# Patient Record
Sex: FEMALE | Race: WHITE | NOT HISPANIC OR LATINO | Employment: OTHER | URBAN - METROPOLITAN AREA
[De-identification: names, ages, dates, MRNs, and addresses within clinical notes are randomized per-mention and may not be internally consistent; named-entity substitution may affect disease eponyms.]

---

## 2023-06-05 LAB
ACTIVATED PARTIAL THROMBOPLASTIN TIME IN PPP BY COAGULATION ASSAY: 34 SEC (ref 26–39)
ALANINE AMINOTRANSFERASE (SGPT) (U/L) IN SER/PLAS: 14 U/L (ref 7–45)
ALBUMIN (G/DL) IN SER/PLAS: 4 G/DL (ref 3.4–5)
ALKALINE PHOSPHATASE (U/L) IN SER/PLAS: 68 U/L (ref 33–136)
ANION GAP IN SER/PLAS: 10 MMOL/L (ref 10–20)
ASPARTATE AMINOTRANSFERASE (SGOT) (U/L) IN SER/PLAS: 20 U/L (ref 9–39)
BILIRUBIN TOTAL (MG/DL) IN SER/PLAS: 0.4 MG/DL (ref 0–1.2)
CALCIUM (MG/DL) IN SER/PLAS: 8.9 MG/DL (ref 8.6–10.3)
CARBON DIOXIDE, TOTAL (MMOL/L) IN SER/PLAS: 30 MMOL/L (ref 21–32)
CHLORIDE (MMOL/L) IN SER/PLAS: 103 MMOL/L (ref 98–107)
CREATININE (MG/DL) IN SER/PLAS: 0.92 MG/DL (ref 0.5–1.05)
ERYTHROCYTE DISTRIBUTION WIDTH (RATIO) BY AUTOMATED COUNT: 13.8 % (ref 11.5–14.5)
ERYTHROCYTE MEAN CORPUSCULAR HEMOGLOBIN CONCENTRATION (G/DL) BY AUTOMATED: 31.3 G/DL (ref 32–36)
ERYTHROCYTE MEAN CORPUSCULAR VOLUME (FL) BY AUTOMATED COUNT: 93 FL (ref 80–100)
ERYTHROCYTES (10*6/UL) IN BLOOD BY AUTOMATED COUNT: 4.67 X10E12/L (ref 4–5.2)
GFR FEMALE: 67 ML/MIN/1.73M2
GLUCOSE (MG/DL) IN SER/PLAS: 100 MG/DL (ref 74–99)
HEMATOCRIT (%) IN BLOOD BY AUTOMATED COUNT: 43.2 % (ref 36–46)
HEMOGLOBIN (G/DL) IN BLOOD: 13.5 G/DL (ref 12–16)
INR IN PPP BY COAGULATION ASSAY: 1 (ref 0.9–1.1)
LEUKOCYTES (10*3/UL) IN BLOOD BY AUTOMATED COUNT: 6.5 X10E9/L (ref 4.4–11.3)
PLATELETS (10*3/UL) IN BLOOD AUTOMATED COUNT: 337 X10E9/L (ref 150–450)
POTASSIUM (MMOL/L) IN SER/PLAS: 4.3 MMOL/L (ref 3.5–5.3)
PROTEIN TOTAL: 6.9 G/DL (ref 6.4–8.2)
PROTHROMBIN TIME (PT) IN PPP BY COAGULATION ASSAY: 11.5 SEC (ref 9.8–13.4)
SODIUM (MMOL/L) IN SER/PLAS: 139 MMOL/L (ref 136–145)
UREA NITROGEN (MG/DL) IN SER/PLAS: 16 MG/DL (ref 6–23)

## 2023-06-15 ENCOUNTER — HOSPITAL ENCOUNTER (OUTPATIENT)
Dept: DATA CONVERSION | Facility: HOSPITAL | Age: 68
End: 2023-06-15
Attending: INTERNAL MEDICINE | Admitting: INTERNAL MEDICINE
Payer: MEDICARE

## 2023-06-15 DIAGNOSIS — E03.9 HYPOTHYROIDISM, UNSPECIFIED: ICD-10-CM

## 2023-06-15 DIAGNOSIS — R91.8 OTHER NONSPECIFIC ABNORMAL FINDING OF LUNG FIELD: ICD-10-CM

## 2023-06-15 DIAGNOSIS — Z87.891 PERSONAL HISTORY OF NICOTINE DEPENDENCE: ICD-10-CM

## 2023-06-15 DIAGNOSIS — R59.0 LOCALIZED ENLARGED LYMPH NODES: ICD-10-CM

## 2023-06-15 DIAGNOSIS — C79.51 SECONDARY MALIGNANT NEOPLASM OF BONE (MULTI): ICD-10-CM

## 2023-06-15 DIAGNOSIS — E78.5 HYPERLIPIDEMIA, UNSPECIFIED: ICD-10-CM

## 2023-06-15 DIAGNOSIS — C77.1 SECONDARY AND UNSPECIFIED MALIGNANT NEOPLASM OF INTRATHORACIC LYMPH NODES (MULTI): ICD-10-CM

## 2023-06-26 LAB
COMPLETE PATHOLOGY REPORT: NORMAL
CONVERTED ADDENDUM DIAGNOSIS 2: NORMAL
CONVERTED ADDENDUM DIAGNOSIS 3: NORMAL
CONVERTED CLINICAL DIAGNOSIS-HISTORY: NORMAL
CONVERTED DIAGNOSIS COMMENT: NORMAL
CONVERTED FINAL DIAGNOSIS: NORMAL
CONVERTED FINAL REPORT PDF LINK TO COPY AND PASTE: NORMAL
CONVERTED RAPID EVALUATION: NORMAL
CONVERTED SPECIMEN DESCRIPTION: NORMAL

## 2023-06-28 ENCOUNTER — DOCUMENTATION (OUTPATIENT)
Dept: RADIATION ONCOLOGY | Facility: HOSPITAL | Age: 68
End: 2023-06-28

## 2023-06-28 ENCOUNTER — HOSPITAL ENCOUNTER (OUTPATIENT)
Dept: DATA CONVERSION | Facility: HOSPITAL | Age: 68
End: 2023-06-28
Attending: NEUROLOGICAL SURGERY | Admitting: NEUROLOGICAL SURGERY
Payer: MEDICARE

## 2023-06-28 DIAGNOSIS — C79.31 SECONDARY MALIGNANT NEOPLASM OF BRAIN (MULTI): ICD-10-CM

## 2023-06-28 DIAGNOSIS — Z85.118 PERSONAL HISTORY OF OTHER MALIGNANT NEOPLASM OF BRONCHUS AND LUNG: ICD-10-CM

## 2023-09-01 PROBLEM — N81.9 VAGINAL VAULT PROLAPSE: Status: ACTIVE | Noted: 2023-09-01

## 2023-09-01 PROBLEM — E55.9 VITAMIN D DEFICIENCY: Status: ACTIVE | Noted: 2023-09-01

## 2023-09-01 PROBLEM — R92.30 BREAST DENSITY: Status: ACTIVE | Noted: 2023-09-01

## 2023-09-01 PROBLEM — R19.5 FECAL OCCULT BLOOD TEST POSITIVE: Status: ACTIVE | Noted: 2023-09-01

## 2023-09-01 PROBLEM — E03.9 HYPOTHYROIDISM: Status: ACTIVE | Noted: 2023-09-01

## 2023-09-01 PROBLEM — R39.15 URINARY URGENCY: Status: ACTIVE | Noted: 2023-09-01

## 2023-09-01 PROBLEM — C34.90 LUNG CANCER (MULTI): Status: ACTIVE | Noted: 2023-09-01

## 2023-09-01 PROBLEM — F41.9 ANXIETY DISORDER: Status: ACTIVE | Noted: 2023-09-01

## 2023-09-01 PROBLEM — S61.431A PUNCTURE WOUND OF RIGHT HAND WITHOUT FOREIGN BODY: Status: ACTIVE | Noted: 2023-09-01

## 2023-09-01 PROBLEM — E78.2 HYPERLIPIDEMIA, MIXED: Status: ACTIVE | Noted: 2023-09-01

## 2023-09-01 PROBLEM — C79.31 SECONDARY MALIGNANT NEOPLASM OF BRAIN (MULTI): Status: ACTIVE | Noted: 2023-09-01

## 2023-09-01 PROBLEM — R15.1 FECAL SMEARING: Status: ACTIVE | Noted: 2023-09-01

## 2023-09-01 PROBLEM — M81.0 OSTEOPOROSIS: Status: ACTIVE | Noted: 2023-09-01

## 2023-09-01 RX ORDER — CHOLECALCIFEROL (VITAMIN D3) 50 MCG
1 TABLET ORAL DAILY
COMMUNITY
Start: 2014-12-21

## 2023-09-01 RX ORDER — ATORVASTATIN CALCIUM 20 MG/1
10 TABLET, FILM COATED ORAL DAILY
COMMUNITY
End: 2024-03-26 | Stop reason: ALTCHOICE

## 2023-09-01 RX ORDER — DEXAMETHASONE 2 MG/1
1 TABLET ORAL
COMMUNITY
Start: 2023-06-19 | End: 2024-03-26 | Stop reason: ALTCHOICE

## 2023-09-01 RX ORDER — BETAMETHASONE DIPROPIONATE 0.5 MG/G
CREAM TOPICAL 2 TIMES DAILY
COMMUNITY
Start: 2022-11-03 | End: 2024-02-19 | Stop reason: WASHOUT

## 2023-09-01 RX ORDER — LEVOTHYROXINE SODIUM 100 UG/1
1 TABLET ORAL DAILY
COMMUNITY
End: 2024-02-19 | Stop reason: WASHOUT

## 2023-09-01 RX ORDER — SERTRALINE HYDROCHLORIDE 50 MG/1
1 TABLET, FILM COATED ORAL DAILY
COMMUNITY
End: 2024-04-26 | Stop reason: SDUPTHER

## 2023-09-05 PROBLEM — H35.3190 DRY ARMD: Status: ACTIVE | Noted: 2023-09-05

## 2023-09-05 PROBLEM — R91.8 RIGHT LOWER LOBE LUNG MASS: Status: ACTIVE | Noted: 2023-09-05

## 2023-09-07 VITALS — BODY MASS INDEX: 20.98 KG/M2 | WEIGHT: 111.11 LBS | HEIGHT: 61 IN

## 2023-09-20 VITALS — WEIGHT: 110.89 LBS | BODY MASS INDEX: 20.94 KG/M2 | HEIGHT: 61 IN

## 2023-09-20 DIAGNOSIS — C34.90 MALIGNANT NEOPLASM OF LUNG, UNSPECIFIED LATERALITY, UNSPECIFIED PART OF LUNG (MULTI): Primary | ICD-10-CM

## 2023-09-20 PROBLEM — C34.11 PRIMARY ADENOCARCINOMA OF UPPER LOBE OF RIGHT LUNG (MULTI): Status: ACTIVE | Noted: 2023-09-20

## 2023-09-20 RX ORDER — HEPARIN SODIUM,PORCINE/PF 10 UNIT/ML
50 SYRINGE (ML) INTRAVENOUS AS NEEDED
Status: CANCELLED | OUTPATIENT
Start: 2023-09-30

## 2023-09-20 RX ORDER — HEPARIN 100 UNIT/ML
500 SYRINGE INTRAVENOUS AS NEEDED
Status: CANCELLED | OUTPATIENT
Start: 2023-09-30

## 2023-09-25 DIAGNOSIS — C34.90 MALIGNANT NEOPLASM OF LUNG, UNSPECIFIED LATERALITY, UNSPECIFIED PART OF LUNG (MULTI): Primary | ICD-10-CM

## 2023-09-25 RX ORDER — EPINEPHRINE 0.3 MG/.3ML
0.3 INJECTION SUBCUTANEOUS EVERY 5 MIN PRN
Status: CANCELLED | OUTPATIENT
Start: 2023-10-09

## 2023-09-25 RX ORDER — PROCHLORPERAZINE EDISYLATE 5 MG/ML
10 INJECTION INTRAMUSCULAR; INTRAVENOUS EVERY 6 HOURS PRN
Status: CANCELLED | OUTPATIENT
Start: 2023-10-09

## 2023-09-25 RX ORDER — FAMOTIDINE 10 MG/ML
20 INJECTION INTRAVENOUS ONCE AS NEEDED
Status: CANCELLED | OUTPATIENT
Start: 2023-10-09

## 2023-09-25 RX ORDER — DIPHENHYDRAMINE HYDROCHLORIDE 50 MG/ML
50 INJECTION INTRAMUSCULAR; INTRAVENOUS AS NEEDED
Status: CANCELLED | OUTPATIENT
Start: 2023-10-09

## 2023-09-25 RX ORDER — ALBUTEROL SULFATE 0.83 MG/ML
3 SOLUTION RESPIRATORY (INHALATION) AS NEEDED
Status: CANCELLED | OUTPATIENT
Start: 2023-10-09

## 2023-09-25 RX ORDER — PROCHLORPERAZINE MALEATE 10 MG
10 TABLET ORAL EVERY 6 HOURS PRN
Status: CANCELLED | OUTPATIENT
Start: 2023-10-09

## 2023-10-09 ENCOUNTER — DOCUMENTATION (OUTPATIENT)
Dept: RESEARCH | Age: 68
End: 2023-10-09

## 2023-10-09 ENCOUNTER — EDUCATION (OUTPATIENT)
Dept: RESEARCH | Age: 68
End: 2023-10-09
Payer: MEDICARE

## 2023-10-09 ENCOUNTER — INFUSION (OUTPATIENT)
Dept: HEMATOLOGY/ONCOLOGY | Facility: HOSPITAL | Age: 68
End: 2023-10-09
Payer: MEDICARE

## 2023-10-09 ENCOUNTER — APPOINTMENT (OUTPATIENT)
Dept: LAB | Facility: HOSPITAL | Age: 68
End: 2023-10-09
Payer: MEDICARE

## 2023-10-09 ENCOUNTER — TELEPHONE (OUTPATIENT)
Dept: ADMISSION | Facility: HOSPITAL | Age: 68
End: 2023-10-09

## 2023-10-09 ENCOUNTER — OFFICE VISIT (OUTPATIENT)
Dept: HEMATOLOGY/ONCOLOGY | Facility: HOSPITAL | Age: 68
End: 2023-10-09
Payer: MEDICARE

## 2023-10-09 ENCOUNTER — LAB (OUTPATIENT)
Dept: LAB | Facility: HOSPITAL | Age: 68
End: 2023-10-09
Payer: MEDICARE

## 2023-10-09 VITALS
WEIGHT: 110.67 LBS | OXYGEN SATURATION: 99 % | HEART RATE: 65 BPM | HEIGHT: 61 IN | BODY MASS INDEX: 20.89 KG/M2 | SYSTOLIC BLOOD PRESSURE: 120 MMHG | RESPIRATION RATE: 16 BRPM | DIASTOLIC BLOOD PRESSURE: 59 MMHG | TEMPERATURE: 97.5 F

## 2023-10-09 DIAGNOSIS — C34.90 MALIGNANT NEOPLASM OF LUNG, UNSPECIFIED LATERALITY, UNSPECIFIED PART OF LUNG (MULTI): ICD-10-CM

## 2023-10-09 LAB
ALBUMIN SERPL BCP-MCNC: 4 G/DL (ref 3.4–5)
ALP SERPL-CCNC: 60 U/L (ref 33–136)
ALT SERPL W P-5'-P-CCNC: 18 U/L (ref 7–45)
ANION GAP SERPL CALC-SCNC: 13 MMOL/L (ref 10–20)
APPEARANCE UR: ABNORMAL
AST SERPL W P-5'-P-CCNC: 22 U/L (ref 9–39)
BASOPHILS # BLD AUTO: 0.02 X10*3/UL (ref 0–0.1)
BASOPHILS NFR BLD AUTO: 0.5 %
BILIRUB SERPL-MCNC: 0.3 MG/DL (ref 0–1.2)
BILIRUB UR STRIP.AUTO-MCNC: NEGATIVE MG/DL
BUN SERPL-MCNC: 22 MG/DL (ref 6–23)
CALCIUM SERPL-MCNC: 8.9 MG/DL (ref 8.6–10.3)
CHLORIDE SERPL-SCNC: 106 MMOL/L (ref 98–107)
CK SERPL-CCNC: 52 U/L (ref 0–215)
CO2 SERPL-SCNC: 28 MMOL/L (ref 21–32)
COLOR UR: YELLOW
CREAT SERPL-MCNC: 1.13 MG/DL (ref 0.5–1.05)
EOSINOPHIL # BLD AUTO: 0.08 X10*3/UL (ref 0–0.7)
EOSINOPHIL NFR BLD AUTO: 1.9 %
ERYTHROCYTE [DISTWIDTH] IN BLOOD BY AUTOMATED COUNT: 14.4 % (ref 11.5–14.5)
GFR SERPL CREATININE-BSD FRML MDRD: 52 ML/MIN/1.73M*2
GLUCOSE SERPL-MCNC: 94 MG/DL (ref 74–99)
GLUCOSE UR STRIP.AUTO-MCNC: NEGATIVE MG/DL
HCT VFR BLD AUTO: 41.9 % (ref 36–46)
HGB BLD-MCNC: 13.3 G/DL (ref 12–16)
IMM GRANULOCYTES # BLD AUTO: 0.02 X10*3/UL (ref 0–0.7)
IMM GRANULOCYTES NFR BLD AUTO: 0.5 % (ref 0–0.9)
KETONES UR STRIP.AUTO-MCNC: NEGATIVE MG/DL
LEUKOCYTE ESTERASE UR QL STRIP.AUTO: ABNORMAL
LYMPHOCYTES # BLD AUTO: 0.88 X10*3/UL (ref 1.2–4.8)
LYMPHOCYTES NFR BLD AUTO: 20.8 %
MAGNESIUM SERPL-MCNC: 2.12 MG/DL (ref 1.6–2.4)
MCH RBC QN AUTO: 29.4 PG (ref 26–34)
MCHC RBC AUTO-ENTMCNC: 31.7 G/DL (ref 32–36)
MCV RBC AUTO: 93 FL (ref 80–100)
MONOCYTES # BLD AUTO: 0.27 X10*3/UL (ref 0.1–1)
MONOCYTES NFR BLD AUTO: 6.4 %
MUCOUS THREADS #/AREA URNS AUTO: ABNORMAL /LPF
NEUTROPHILS # BLD AUTO: 2.97 X10*3/UL (ref 1.2–7.7)
NEUTROPHILS NFR BLD AUTO: 69.9 %
NITRITE UR QL STRIP.AUTO: NEGATIVE
NRBC BLD-RTO: 0 /100 WBCS (ref 0–0)
PH UR STRIP.AUTO: 5 [PH]
PLATELET # BLD AUTO: 177 X10*3/UL (ref 150–450)
PMV BLD AUTO: 9.7 FL (ref 7.5–11.5)
POTASSIUM SERPL-SCNC: 3.9 MMOL/L (ref 3.5–5.3)
PROT SERPL-MCNC: 6.6 G/DL (ref 6.4–8.2)
PROT UR STRIP.AUTO-MCNC: NEGATIVE MG/DL
RBC # BLD AUTO: 4.52 X10*6/UL (ref 4–5.2)
RBC # UR STRIP.AUTO: NEGATIVE /UL
RBC #/AREA URNS AUTO: ABNORMAL /HPF
SODIUM SERPL-SCNC: 143 MMOL/L (ref 136–145)
SP GR UR STRIP.AUTO: 1.02
SQUAMOUS #/AREA URNS AUTO: ABNORMAL /HPF
TRANS CELLS #/AREA UR COMP ASSIST: ABNORMAL /HPF
UROBILINOGEN UR STRIP.AUTO-MCNC: <2 MG/DL
WBC # BLD AUTO: 4.2 X10*3/UL (ref 4.4–11.3)
WBC #/AREA URNS AUTO: ABNORMAL /HPF

## 2023-10-09 PROCEDURE — 85025 COMPLETE CBC W/AUTO DIFF WBC: CPT

## 2023-10-09 PROCEDURE — 96413 CHEMO IV INFUSION 1 HR: CPT

## 2023-10-09 PROCEDURE — 96365 THER/PROPH/DIAG IV INF INIT: CPT

## 2023-10-09 PROCEDURE — 80053 COMPREHEN METABOLIC PANEL: CPT

## 2023-10-09 PROCEDURE — 99214 OFFICE O/P EST MOD 30 MIN: CPT | Performed by: CLINICAL NURSE SPECIALIST

## 2023-10-09 PROCEDURE — 83735 ASSAY OF MAGNESIUM: CPT

## 2023-10-09 PROCEDURE — 1036F TOBACCO NON-USER: CPT | Performed by: CLINICAL NURSE SPECIALIST

## 2023-10-09 PROCEDURE — 99214 OFFICE O/P EST MOD 30 MIN: CPT | Mod: 25 | Performed by: CLINICAL NURSE SPECIALIST

## 2023-10-09 PROCEDURE — 1159F MED LIST DOCD IN RCRD: CPT | Performed by: CLINICAL NURSE SPECIALIST

## 2023-10-09 PROCEDURE — 36415 COLL VENOUS BLD VENIPUNCTURE: CPT

## 2023-10-09 PROCEDURE — 81001 URINALYSIS AUTO W/SCOPE: CPT | Performed by: INTERNAL MEDICINE

## 2023-10-09 PROCEDURE — 1126F AMNT PAIN NOTED NONE PRSNT: CPT | Performed by: CLINICAL NURSE SPECIALIST

## 2023-10-09 PROCEDURE — 82550 ASSAY OF CK (CPK): CPT | Performed by: INTERNAL MEDICINE

## 2023-10-09 PROCEDURE — 2500000004 HC RX 250 GENERAL PHARMACY W/ HCPCS (ALT 636 FOR OP/ED): Performed by: INTERNAL MEDICINE

## 2023-10-09 RX ORDER — DIPHENHYDRAMINE HYDROCHLORIDE 50 MG/ML
50 INJECTION INTRAMUSCULAR; INTRAVENOUS AS NEEDED
Status: DISCONTINUED | OUTPATIENT
Start: 2023-10-09 | End: 2023-10-09 | Stop reason: HOSPADM

## 2023-10-09 RX ORDER — FAMOTIDINE 10 MG/ML
20 INJECTION INTRAVENOUS ONCE AS NEEDED
Status: DISCONTINUED | OUTPATIENT
Start: 2023-10-09 | End: 2023-10-09 | Stop reason: HOSPADM

## 2023-10-09 RX ORDER — PROCHLORPERAZINE EDISYLATE 5 MG/ML
10 INJECTION INTRAMUSCULAR; INTRAVENOUS EVERY 6 HOURS PRN
Status: DISCONTINUED | OUTPATIENT
Start: 2023-10-09 | End: 2023-10-09 | Stop reason: HOSPADM

## 2023-10-09 RX ORDER — ALBUTEROL SULFATE 0.83 MG/ML
3 SOLUTION RESPIRATORY (INHALATION) AS NEEDED
Status: DISCONTINUED | OUTPATIENT
Start: 2023-10-09 | End: 2023-10-09 | Stop reason: HOSPADM

## 2023-10-09 RX ORDER — PROCHLORPERAZINE MALEATE 10 MG
10 TABLET ORAL EVERY 6 HOURS PRN
Status: DISCONTINUED | OUTPATIENT
Start: 2023-10-09 | End: 2023-10-09 | Stop reason: HOSPADM

## 2023-10-09 RX ORDER — EPINEPHRINE 0.3 MG/.3ML
0.3 INJECTION SUBCUTANEOUS EVERY 5 MIN PRN
Status: DISCONTINUED | OUTPATIENT
Start: 2023-10-09 | End: 2023-10-09 | Stop reason: HOSPADM

## 2023-10-09 RX ADMIN — Medication 757.5 MG: at 12:31

## 2023-10-09 ASSESSMENT — PAIN SCALES - GENERAL: PAINLEVEL: 0-NO PAIN

## 2023-10-09 ASSESSMENT — PATIENT HEALTH QUESTIONNAIRE - PHQ9
SUM OF ALL RESPONSES TO PHQ9 QUESTIONS 1 AND 2: 0
1. LITTLE INTEREST OR PLEASURE IN DOING THINGS: NOT AT ALL
2. FEELING DOWN, DEPRESSED OR HOPELESS: NOT AT ALL

## 2023-10-09 ASSESSMENT — ENCOUNTER SYMPTOMS
OCCASIONAL FEELINGS OF UNSTEADINESS: 0
DEPRESSION: 0
LOSS OF SENSATION IN FEET: 0

## 2023-10-09 NOTE — PROGRESS NOTES
Cancer History:   Treatment Synopsis:       DIAGNOSIS     Adenocarcinoma of the lung.  Date of diagnosis is [place] 15, 2023 from a level 7 lymph node biopsy/fine-needle aspiration.  Immunohistochemistry positive for TTF-1.        STAGING     Clinical T2a (right lower lobe mass measuring 3.6 cm), clinical N2 (PET positivity and subcarinal region), M1C disease (presence of brain metastases, ribs, right acetabulum, liver, brain), stage IVb disease        CURRENT SITES OF DISEASE     Right lower lobe, right hilum of the lung, ipsilateral mediastinum, liver, bones, brain        MOLECULAR GENOMICS     TEST: Focused Solid Tumor DNA/RNA Panel   SPECIMEN: Supernatant, LYMPH NODE 7, [id number] A   DISEASE DIAGNOSIS: Adenocarcinoma   Estimated Tumor Content: 40%   COLLECTION DATE: 6/15/2023     MICROSATELLITE STATUS: Microsatellite  Instability-High (MSI-H) is NOT DETECTED.     DISEASE ASSOCIATED GENOMIC FINDINGS:   EGFR p.[id number] ([id number] c.[id number])    TP53 p.N247I ([id number] c.740A>T)     DISEASE RELEVANT ALTERATIONS NOT DETECTED:   Negative for ALK fusion.   Negative for BRAF V600E.   Negative for ERBB2 activating mutation   Negative for KRAS G12C.   Negative for  MET exon 14 skipping mutation.   Negative for NTRK fusion.   Negative for RET fusion.   Negative for ROS1 fusion.        Circulating tumor DNA sent on June 19, 2023  EGFR p.[id number], Variant allelic fraction of 1.6%  TP53 N247I Missense variant, loss of function, variant allelic fraction 1.1%  MSI stable        SERUM TUMOR MARKER     Slightly elevated CEA and CA 19.9 June 2023        PRIOR THERAPY     1- Gamma knife radiosurgery to brain lesions completed on June 28, 2023.        CURRENT THERAPY     Osimertinib on study  on clinical trial EA [id number] which is a randomized trial of osimertinib with or without bevacizumab.  She has been randomized to the bevacizumab arm- Cycle 1 Day 1 is 7/12/2023        CURRENT ONCOLOGICAL PROBLEMS     1-brain  metastases, symptomatic, word finding difficulties, mild unsteadiness on her feet but no falls, Resolved July 3, 2023  2-anterior chest pain, increases with deep inspiration, Improved July 3, 2023  3-mild intermittent grade 1 diarrhea, mild hair loss, mild leukopenia, mild elevation in creatinine, mild fatigue.  Most likely all osimertinib related August 7, 2023        HISTORY OF PRESENT ILLNESS     This is a 68-year-old patient.  She states that towards Thanksgiving of 2022 she started feeling some pain in her right shoulder.  Eventually got better however the same pain came back in January or February 2023.  Ultimately some x-rays were done of  the shoulder that were generally negative.  She then developed some pain in her sternum a month or 2 later with deep inspiration and ultimately underwent imaging in May 2023.  Chest CT without contrast was done on June 1, 2023.  This showed a 3.4 cm mass  within the superior segment of the right lower lobe with some associated satellite pulmonary nodules.  Additionally there were some other subcentimeter pulmonary nodules.  There was evidence of mediastinal lymphadenopathy.  Subcarinal lymph node measured  1.4 cm in short axis.She was seen by pulmonary medicine on June 5, 2023 a combined PET/CT was ordered on June 6, 2023 showing a relatively intense area of hypermetabolic activity along the superior posteromedial aspect of the right lower lobe corresponding  to the known right lower lobe lung mass.  There was foci of hypermetabolic activity in the right supraclavicular region, subcarinal and right hilar regions.  There was area of uptake within the posterior upper ribs as well as right acetabulum suspicious  for osseous metastatic disease.  There was a punctate area of mild hypermetabolic activity in the right hepatic lobe concerning for hepatic metastases.  Small focus in the subcutaneous region just anterior to the lower aspect of the sternum could be inflammatory   versus metastatic.  She undergoes a bronchoscopy dated [place] 15, 2023.  There were no endobronchial lesions.  Primary cytology with rapid onsite evaluation was suggestive of malignancy in level 7, final lymph nodes were pathology was pending.  MRI of the  brain with and without contrast dated June 16, 2023 showed approximately 20 enhancing nodules concerning for metastatic disease.  Specifically there was a 1.5 cm nodule in the right cerebellar hemisphere.  Most of the other nodules were less than 1 cm.   Patient also notes that she has some difficulty expressing words over the past 1 to 2 weeks prior to her first visit with us as well as some mild unsteadiness on her legs where she feels she is drifting towards the right side.        PAST MEDICAL HISTORY     1-hypothyroidism  2-hysterectomy 1988  3-abdominal hernia surgery 1995  4-some hearing difficulties        SOCIAL HISTORY     Patient lives with her significant other.  She has 2 daughters.  Lives in [facility].  Smoked for only 3 years during college and during this time it was a pack per day.  She has retired.  She had a retail store.        CURRENT MEDS     See medication list, meds reviewed        ALLERGIES     Patient denies any drug allergies        FAMILY HISTORY     Patient's father had bladder cancer at the age of 86.  She has 1 brother with no cancer.  She has 2 children        History of Present Illness:      ID Statement:    MIRTA BUTTS is a 68 year old Female        Interval History:       Today Mirta is here on study.  She reports that she is doing well overall.  She does have 2 bothersome symptoms, diarrhea and hair loss.  Diarrhea is intermittent, sometimes starting in the morning and she may have 4 bowel movements in a short period of time.  She has reluctantly taken 1/2 to 1 imodium with not much effect.  Other days it starts in the afternoon.  She has diarrhea most days.  She also has some patchy hair loss, and she does feel it has been a  little worse lately.  She has seen dermatology and they have prescribed topical minoxidil.  She thinks it may be related to the gamma knife, or stress.  She has some mild  elevation of her creatinine (1.13 today) and white count.  She is feeling well and is active and busy.      Review of Systems:   · Constitutional NEGATIVE: Fever, Chills, Anorexia, Weight Loss, Malaise      · Eyes NEGATIVE: Blurry Vision, Drainage, Diploplia, Redness, Vision Loss/ Change      · ENMT NEGATIVE: Nasal Discharge, Nasal Congestion, Ear Pain, Mouth Pain, Throat Pain      · Respiratory NEGATIVE: Dry Cough, Productive Cough, Hemoptysis, Wheezing, Shortness of Breath      · Cardiology NEGATIVE: Chest Pain, Dyspnea on Exertion, Orthopnea, Palpitations, Syncope      ·  Gastrointestinal POSITIVE: Diarrhea     NEGATIVE: Abdominal Pain, Constipation, Nausea, Vomiting      · Genitourinary NEGATIVE: Discharge, Dysuria, Flank Pain, Frequency, Hematuria      · Musculoskeletal NEGATIVE: Decreased ROM, Pain, Swelling, Stiffness, Weakness      · Neurological NEGATIVE: Dizziness, Confusion, Headache, Seizures, Syncope      · Psychiatric NEGATIVE: Mood Changes, Anxiety, Hallucinations, Sleep Changes, Suicidal Ideas      · Skin NEGATIVE: Mass, Pain, Pruritus, Rash, Ulcer      · Hematologic/Lymph NEGATIVE: Anemia, Bruising, Easy Bleeding, Night Sweats, Petechiae      · Allergic/Immunologic NEGATIVE: Anaphylaxis, Itchy/ Teary Eyes, Itching, Sneezing, Swelling            Allergies and Intolerances:       Allergies:         No Known Allergies: Active    ASSESSMENT and PLAN:    Makayla will dispense the study drug Tagrisso after her EKG, and she will have avastin today.  We have urged her to be a little more aggressive with the imodium, and gave her permission to take 2 tabs after first loose stool and one or 2 after subsequent bowel movements.  She has had an elevated TSH and her PCP has increased her synthroid, but feels she may be losing some of it in  her diarrhea- we will all keep an eye on her TSH as well.  She is planning to get a wig just in case hair loss continues, and will follow with Derm.  We will see her back per trial requirements.

## 2023-10-09 NOTE — RESEARCH NOTES
Research Note Treatment Day    Mirta Hills is here today for treatment on [id number]. Today is C5D1. Procedures completed per protocol. AE's and con-meds reviewed with patient. Patient is aware of treatment plan.    [x]   Received treatment as planned   OR  []    Treatment delayed; patient calendar updated as required   Treatment delayed because:    []   AE    []   Physician Discretion    []   Clinical Deterioration or Progression     []   Other

## 2023-10-10 ENCOUNTER — TELEPHONE (OUTPATIENT)
Dept: HEMATOLOGY/ONCOLOGY | Facility: HOSPITAL | Age: 68
End: 2023-10-10
Payer: MEDICARE

## 2023-10-10 DIAGNOSIS — N39.0 URINARY TRACT INFECTION WITHOUT HEMATURIA, SITE UNSPECIFIED: Primary | ICD-10-CM

## 2023-10-10 RX ORDER — CIPROFLOXACIN 250 MG/1
250 TABLET, FILM COATED ORAL 2 TIMES DAILY
Qty: 10 TABLET | Refills: 0 | Status: SHIPPED | OUTPATIENT
Start: 2023-10-10 | End: 2023-10-15

## 2023-10-10 NOTE — PROGRESS NOTES
Research Note Treatment Day    Mirta Hills is here today for treatment on [id number] Today is C5D1. Procedures completed per protocol. AE's and con-meds reviewed with patient. Patient is aware of treatment plan.    [x]   Received treatment as planned   OR  []    Treatment delayed; patient calendar updated as required   Treatment delayed because:    []   AE    []   Physician Discretion    []   Clinical Deterioration or Progression     []   Other    Education Documentation  Alopecia, taught by Makayla Joseph RN at 10/9/2023  1:58 PM.  Learner: Patient  Readiness: Acceptance  Method: Explanation  Response: Verbalizes Understanding, Needs Reinforcement    Diarrhea, taught by Makayla Joseph RN at 10/9/2023  1:58 PM.  Learner: Patient  Readiness: Acceptance  Method: Explanation  Response: Verbalizes Understanding, Needs Reinforcement    Supportive Medications, taught by Makayla Joseph RN at 10/9/2023  1:58 PM.  Learner: Patient  Readiness: Acceptance  Method: Explanation  Response: Verbalizes Understanding, Needs Reinforcement    Education Comments  10/09/23 1148 Makayla Joseph RN  Patient instructed to take immodium by Moni Cortez 2 tabs after first episode of diarrhea and then 1-2 tablets for subsequent diarrhea.

## 2023-10-12 DIAGNOSIS — C34.90 MALIGNANT NEOPLASM OF LUNG, UNSPECIFIED LATERALITY, UNSPECIFIED PART OF LUNG (MULTI): Primary | ICD-10-CM

## 2023-10-17 RX ORDER — DIPHENHYDRAMINE HYDROCHLORIDE 50 MG/ML
50 INJECTION INTRAMUSCULAR; INTRAVENOUS AS NEEDED
Status: CANCELLED | OUTPATIENT
Start: 2023-10-30

## 2023-10-17 RX ORDER — PROCHLORPERAZINE MALEATE 10 MG
10 TABLET ORAL EVERY 6 HOURS PRN
Status: CANCELLED | OUTPATIENT
Start: 2023-10-30

## 2023-10-17 RX ORDER — PROCHLORPERAZINE EDISYLATE 5 MG/ML
10 INJECTION INTRAMUSCULAR; INTRAVENOUS EVERY 6 HOURS PRN
Status: CANCELLED | OUTPATIENT
Start: 2023-10-30

## 2023-10-17 RX ORDER — ALBUTEROL SULFATE 0.83 MG/ML
3 SOLUTION RESPIRATORY (INHALATION) AS NEEDED
Status: CANCELLED | OUTPATIENT
Start: 2023-10-30

## 2023-10-17 RX ORDER — EPINEPHRINE 0.3 MG/.3ML
0.3 INJECTION SUBCUTANEOUS EVERY 5 MIN PRN
Status: CANCELLED | OUTPATIENT
Start: 2023-10-30

## 2023-10-17 RX ORDER — FAMOTIDINE 10 MG/ML
20 INJECTION INTRAVENOUS ONCE AS NEEDED
Status: CANCELLED | OUTPATIENT
Start: 2023-10-30

## 2023-10-23 DIAGNOSIS — C34.90 MALIGNANT NEOPLASM OF LUNG, UNSPECIFIED LATERALITY, UNSPECIFIED PART OF LUNG (MULTI): ICD-10-CM

## 2023-10-30 ENCOUNTER — INFUSION (OUTPATIENT)
Dept: HEMATOLOGY/ONCOLOGY | Facility: HOSPITAL | Age: 68
End: 2023-10-30
Payer: MEDICARE

## 2023-10-30 ENCOUNTER — APPOINTMENT (OUTPATIENT)
Dept: HEMATOLOGY/ONCOLOGY | Facility: HOSPITAL | Age: 68
End: 2023-10-30
Payer: MEDICARE

## 2023-10-30 ENCOUNTER — APPOINTMENT (OUTPATIENT)
Dept: LAB | Facility: HOSPITAL | Age: 68
End: 2023-10-30
Payer: MEDICARE

## 2023-10-30 ENCOUNTER — EDUCATION (OUTPATIENT)
Dept: HEMATOLOGY/ONCOLOGY | Facility: HOSPITAL | Age: 68
End: 2023-10-30

## 2023-10-30 ENCOUNTER — LAB (OUTPATIENT)
Dept: LAB | Facility: HOSPITAL | Age: 68
End: 2023-10-30
Payer: MEDICARE

## 2023-10-30 ENCOUNTER — OFFICE VISIT (OUTPATIENT)
Dept: HEMATOLOGY/ONCOLOGY | Facility: HOSPITAL | Age: 68
End: 2023-10-30
Payer: MEDICARE

## 2023-10-30 VITALS
BODY MASS INDEX: 20.8 KG/M2 | SYSTOLIC BLOOD PRESSURE: 122 MMHG | RESPIRATION RATE: 16 BRPM | OXYGEN SATURATION: 100 % | TEMPERATURE: 97.7 F | HEART RATE: 68 BPM | DIASTOLIC BLOOD PRESSURE: 74 MMHG | WEIGHT: 111.33 LBS

## 2023-10-30 DIAGNOSIS — C34.11 PRIMARY ADENOCARCINOMA OF UPPER LOBE OF RIGHT LUNG (MULTI): ICD-10-CM

## 2023-10-30 DIAGNOSIS — C34.90 MALIGNANT NEOPLASM OF LUNG, UNSPECIFIED LATERALITY, UNSPECIFIED PART OF LUNG (MULTI): Primary | ICD-10-CM

## 2023-10-30 DIAGNOSIS — C34.90 MALIGNANT NEOPLASM OF LUNG, UNSPECIFIED LATERALITY, UNSPECIFIED PART OF LUNG (MULTI): ICD-10-CM

## 2023-10-30 LAB
ALBUMIN SERPL BCP-MCNC: 4.1 G/DL (ref 3.4–5)
ALP SERPL-CCNC: 56 U/L (ref 33–136)
ALT SERPL W P-5'-P-CCNC: 16 U/L (ref 7–45)
ANION GAP SERPL CALC-SCNC: 13 MMOL/L (ref 10–20)
APPEARANCE UR: ABNORMAL
AST SERPL W P-5'-P-CCNC: 22 U/L (ref 9–39)
BASOPHILS # BLD AUTO: 0.02 X10*3/UL (ref 0–0.1)
BASOPHILS NFR BLD AUTO: 0.5 %
BILIRUB SERPL-MCNC: 0.4 MG/DL (ref 0–1.2)
BILIRUB UR STRIP.AUTO-MCNC: NEGATIVE MG/DL
BUN SERPL-MCNC: 19 MG/DL (ref 6–23)
CALCIUM SERPL-MCNC: 9.5 MG/DL (ref 8.6–10.3)
CHLORIDE SERPL-SCNC: 104 MMOL/L (ref 98–107)
CK SERPL-CCNC: 60 U/L (ref 0–215)
CO2 SERPL-SCNC: 29 MMOL/L (ref 21–32)
COLOR UR: YELLOW
CREAT SERPL-MCNC: 1.19 MG/DL (ref 0.5–1.05)
EOSINOPHIL # BLD AUTO: 0.06 X10*3/UL (ref 0–0.7)
EOSINOPHIL NFR BLD AUTO: 1.4 %
ERYTHROCYTE [DISTWIDTH] IN BLOOD BY AUTOMATED COUNT: 14.2 % (ref 11.5–14.5)
GFR SERPL CREATININE-BSD FRML MDRD: 49 ML/MIN/1.73M*2
GLUCOSE SERPL-MCNC: 92 MG/DL (ref 74–99)
GLUCOSE UR STRIP.AUTO-MCNC: NEGATIVE MG/DL
HCT VFR BLD AUTO: 42.3 % (ref 36–46)
HGB BLD-MCNC: 13.4 G/DL (ref 12–16)
IMM GRANULOCYTES # BLD AUTO: 0.01 X10*3/UL (ref 0–0.7)
IMM GRANULOCYTES NFR BLD AUTO: 0.2 % (ref 0–0.9)
KETONES UR STRIP.AUTO-MCNC: NEGATIVE MG/DL
LEUKOCYTE ESTERASE UR QL STRIP.AUTO: ABNORMAL
LYMPHOCYTES # BLD AUTO: 0.74 X10*3/UL (ref 1.2–4.8)
LYMPHOCYTES NFR BLD AUTO: 17.6 %
MAGNESIUM SERPL-MCNC: 2.38 MG/DL (ref 1.6–2.4)
MCH RBC QN AUTO: 29.2 PG (ref 26–34)
MCHC RBC AUTO-ENTMCNC: 31.7 G/DL (ref 32–36)
MCV RBC AUTO: 92 FL (ref 80–100)
MONOCYTES # BLD AUTO: 0.3 X10*3/UL (ref 0.1–1)
MONOCYTES NFR BLD AUTO: 7.1 %
MUCOUS THREADS #/AREA URNS AUTO: ABNORMAL /LPF
NEUTROPHILS # BLD AUTO: 3.08 X10*3/UL (ref 1.2–7.7)
NEUTROPHILS NFR BLD AUTO: 73.2 %
NITRITE UR QL STRIP.AUTO: NEGATIVE
NRBC BLD-RTO: 0 /100 WBCS (ref 0–0)
PH UR STRIP.AUTO: 6 [PH]
PLATELET # BLD AUTO: 191 X10*3/UL (ref 150–450)
PMV BLD AUTO: 9.8 FL (ref 7.5–11.5)
POTASSIUM SERPL-SCNC: 4.1 MMOL/L (ref 3.5–5.3)
PROT SERPL-MCNC: 6.8 G/DL (ref 6.4–8.2)
PROT UR STRIP.AUTO-MCNC: NEGATIVE MG/DL
RBC # BLD AUTO: 4.59 X10*6/UL (ref 4–5.2)
RBC # UR STRIP.AUTO: NEGATIVE /UL
RBC #/AREA URNS AUTO: ABNORMAL /HPF
SODIUM SERPL-SCNC: 142 MMOL/L (ref 136–145)
SP GR UR STRIP.AUTO: 1.01
SQUAMOUS #/AREA URNS AUTO: ABNORMAL /HPF
T4 FREE SERPL-MCNC: 1.14 NG/DL (ref 0.78–1.48)
TRANS CELLS #/AREA UR COMP ASSIST: ABNORMAL /HPF
TSH SERPL-ACNC: 9.3 MIU/L (ref 0.44–3.98)
UROBILINOGEN UR STRIP.AUTO-MCNC: <2 MG/DL
WBC # BLD AUTO: 4.2 X10*3/UL (ref 4.4–11.3)
WBC #/AREA URNS AUTO: ABNORMAL /HPF

## 2023-10-30 PROCEDURE — 81001 URINALYSIS AUTO W/SCOPE: CPT | Performed by: INTERNAL MEDICINE

## 2023-10-30 PROCEDURE — 83735 ASSAY OF MAGNESIUM: CPT

## 2023-10-30 PROCEDURE — 2500000004 HC RX 250 GENERAL PHARMACY W/ HCPCS (ALT 636 FOR OP/ED): Performed by: INTERNAL MEDICINE

## 2023-10-30 PROCEDURE — 99214 OFFICE O/P EST MOD 30 MIN: CPT | Mod: 25 | Performed by: CLINICAL NURSE SPECIALIST

## 2023-10-30 PROCEDURE — 1126F AMNT PAIN NOTED NONE PRSNT: CPT | Performed by: CLINICAL NURSE SPECIALIST

## 2023-10-30 PROCEDURE — 99214 OFFICE O/P EST MOD 30 MIN: CPT | Performed by: CLINICAL NURSE SPECIALIST

## 2023-10-30 PROCEDURE — 84443 ASSAY THYROID STIM HORMONE: CPT | Performed by: CLINICAL NURSE SPECIALIST

## 2023-10-30 PROCEDURE — 1036F TOBACCO NON-USER: CPT | Performed by: CLINICAL NURSE SPECIALIST

## 2023-10-30 PROCEDURE — 1159F MED LIST DOCD IN RCRD: CPT | Performed by: CLINICAL NURSE SPECIALIST

## 2023-10-30 PROCEDURE — 84439 ASSAY OF FREE THYROXINE: CPT | Performed by: CLINICAL NURSE SPECIALIST

## 2023-10-30 PROCEDURE — 80053 COMPREHEN METABOLIC PANEL: CPT

## 2023-10-30 PROCEDURE — 85025 COMPLETE CBC W/AUTO DIFF WBC: CPT

## 2023-10-30 PROCEDURE — 36415 COLL VENOUS BLD VENIPUNCTURE: CPT

## 2023-10-30 PROCEDURE — 82550 ASSAY OF CK (CPK): CPT | Performed by: INTERNAL MEDICINE

## 2023-10-30 PROCEDURE — 96365 THER/PROPH/DIAG IV INF INIT: CPT | Mod: INF

## 2023-10-30 RX ORDER — PROCHLORPERAZINE MALEATE 10 MG
10 TABLET ORAL EVERY 6 HOURS PRN
Status: DISCONTINUED | OUTPATIENT
Start: 2023-10-30 | End: 2023-10-30 | Stop reason: HOSPADM

## 2023-10-30 RX ORDER — HEPARIN 100 UNIT/ML
500 SYRINGE INTRAVENOUS AS NEEDED
Status: CANCELLED | OUTPATIENT
Start: 2023-10-30

## 2023-10-30 RX ORDER — PROCHLORPERAZINE EDISYLATE 5 MG/ML
10 INJECTION INTRAMUSCULAR; INTRAVENOUS EVERY 6 HOURS PRN
Status: DISCONTINUED | OUTPATIENT
Start: 2023-10-30 | End: 2023-10-30 | Stop reason: HOSPADM

## 2023-10-30 RX ORDER — HEPARIN SODIUM,PORCINE/PF 10 UNIT/ML
50 SYRINGE (ML) INTRAVENOUS AS NEEDED
Status: CANCELLED | OUTPATIENT
Start: 2023-10-30

## 2023-10-30 RX ADMIN — Medication 757.5 MG: at 14:10

## 2023-10-30 ASSESSMENT — PAIN SCALES - GENERAL: PAINLEVEL: 0-NO PAIN

## 2023-10-30 NOTE — PROGRESS NOTES
Cancer History:   Treatment Synopsis:       DIAGNOSIS     Adenocarcinoma of the lung.  Date of diagnosis is [place] 15, 2023 from a level 7 lymph node biopsy/fine-needle aspiration.  Immunohistochemistry positive for TTF-1.        STAGING     Clinical T2a (right lower lobe mass measuring 3.6 cm), clinical N2 (PET positivity and subcarinal region), M1C disease (presence of brain metastases, ribs, right acetabulum, liver, brain), stage IVb disease        CURRENT SITES OF DISEASE     Right lower lobe, right hilum of the lung, ipsilateral mediastinum, liver, bones, brain        MOLECULAR GENOMICS     TEST: Focused Solid Tumor DNA/RNA Panel   SPECIMEN: Supernatant, LYMPH NODE 7, [id number] A   DISEASE DIAGNOSIS: Adenocarcinoma   Estimated Tumor Content: 40%   COLLECTION DATE: 6/15/2023     MICROSATELLITE STATUS: Microsatellite  Instability-High (MSI-H) is NOT DETECTED.     DISEASE ASSOCIATED GENOMIC FINDINGS:   EGFR p.[id number] ([id number] c.[id number])    TP53 p.N247I ([id number] c.740A>T)     DISEASE RELEVANT ALTERATIONS NOT DETECTED:   Negative for ALK fusion.   Negative for BRAF V600E.   Negative for ERBB2 activating mutation   Negative for KRAS G12C.   Negative for  MET exon 14 skipping mutation.   Negative for NTRK fusion.   Negative for RET fusion.   Negative for ROS1 fusion.        Circulating tumor DNA sent on June 19, 2023  EGFR p.[id number], Variant allelic fraction of 1.6%  TP53 N247I Missense variant, loss of function, variant allelic fraction 1.1%  MSI stable        SERUM TUMOR MARKER     Slightly elevated CEA and CA 19.9 June 2023        PRIOR THERAPY     1- Gamma knife radiosurgery to brain lesions completed on June 28, 2023.        CURRENT THERAPY     Osimertinib on study  on clinical trial [id number] which is a randomized trial of osimertinib with or without bevacizumab.  She has been randomized to the bevacizumab arm- Cycle 1 Day 1 is 7/12/2023        CURRENT ONCOLOGICAL PROBLEMS     1-brain  metastases, symptomatic, word finding difficulties, mild unsteadiness on her feet but no falls, Resolved July 3, 2023  2-anterior chest pain, increases with deep inspiration, Improved July 3, 2023  3-mild intermittent grade 1 diarrhea, mild hair loss, mild leukopenia, mild elevation in creatinine, mild fatigue.  Most likely all osimertinib related August 7, 2023        HISTORY OF PRESENT ILLNESS     This is a 68-year-old patient.  She states that towards Thanksgiving of 2022 she started feeling some pain in her right shoulder.  Eventually got better however the same pain came back in January or February 2023.  Ultimately some x-rays were done of  the shoulder that were generally negative.  She then developed some pain in her sternum a month or 2 later with deep inspiration and ultimately underwent imaging in May 2023.  Chest CT without contrast was done on June 1, 2023.  This showed a 3.4 cm mass  within the superior segment of the right lower lobe with some associated satellite pulmonary nodules.  Additionally there were some other subcentimeter pulmonary nodules.  There was evidence of mediastinal lymphadenopathy.  Subcarinal lymph node measured  1.4 cm in short axis.She was seen by pulmonary medicine on June 5, 2023 a combined PET/CT was ordered on June 6, 2023 showing a relatively intense area of hypermetabolic activity along the superior posteromedial aspect of the right lower lobe corresponding  to the known right lower lobe lung mass.  There was foci of hypermetabolic activity in the right supraclavicular region, subcarinal and right hilar regions.  There was area of uptake within the posterior upper ribs as well as right acetabulum suspicious  for osseous metastatic disease.  There was a punctate area of mild hypermetabolic activity in the right hepatic lobe concerning for hepatic metastases.  Small focus in the subcutaneous region just anterior to the lower aspect of the sternum could be inflammatory   versus metastatic.  She undergoes a bronchoscopy dated [place] 15, 2023.  There were no endobronchial lesions.  Primary cytology with rapid onsite evaluation was suggestive of malignancy in level 7, final lymph nodes were pathology was pending.  MRI of the  brain with and without contrast dated June 16, 2023 showed approximately 20 enhancing nodules concerning for metastatic disease.  Specifically there was a 1.5 cm nodule in the right cerebellar hemisphere.  Most of the other nodules were less than 1 cm.   Patient also notes that she has some difficulty expressing words over the past 1 to 2 weeks prior to her first visit with us as well as some mild unsteadiness on her legs where she feels she is drifting towards the right side.        PAST MEDICAL HISTORY     1-hypothyroidism  2-hysterectomy 1988  3-abdominal hernia surgery 1995  4-some hearing difficulties        SOCIAL HISTORY     Patient lives with her significant other.  She has 2 daughters.  Lives in [facility].  Smoked for only 3 years during college and during this time it was a pack per day.  She has retired.  She had a retail store.        CURRENT MEDS     See medication list, meds reviewed        ALLERGIES     Patient denies any drug allergies        FAMILY HISTORY     Patient's father had bladder cancer at the age of 86.  She has 1 brother with no cancer.  She has 2 children        History of Present Illness:      ID Statement:    MIRTA BUTTS is a 68 year old Female        Interval History:       Today Mirta is here on study.  She reports that she is doing well overall.  At last visit she reported some increase in diarrhea and we encouraged more liberal use of imodium.  Today she reports that it is under much better control.  She has seen derm related to the hair loss, and is hopeful it will come back.    She is feeling well and is active and busy.  Synthroid has been adjusted by her PCP.      Review of Systems:   · Constitutional NEGATIVE: Fever, Chills,  Anorexia, Weight Loss, Malaise      · Eyes NEGATIVE: Blurry Vision, Drainage, Diploplia, Redness, Vision Loss/ Change      · ENMT NEGATIVE: Nasal Discharge, Nasal Congestion, Ear Pain, Mouth Pain, Throat Pain      · Respiratory NEGATIVE: Dry Cough, Productive Cough, Hemoptysis, Wheezing, Shortness of Breath      · Cardiology NEGATIVE: Chest Pain, Dyspnea on Exertion, Orthopnea, Palpitations, Syncope      ·  Gastrointestinal POSITIVE: Diarrhea     NEGATIVE: Abdominal Pain, Constipation, Nausea, Vomiting      · Genitourinary NEGATIVE: Discharge, Dysuria, Flank Pain, Frequency, Hematuria      · Musculoskeletal NEGATIVE: Decreased ROM, Pain, Swelling, Stiffness, Weakness      · Neurological NEGATIVE: Dizziness, Confusion, Headache, Seizures, Syncope      · Psychiatric NEGATIVE: Mood Changes, Anxiety, Hallucinations, Sleep Changes, Suicidal Ideas      · Skin NEGATIVE: Mass, Pain, Pruritus, Rash, Ulcer      · Hematologic/Lymph NEGATIVE: Anemia, Bruising, Easy Bleeding, Night Sweats, Petechiae      · Allergic/Immunologic NEGATIVE: Anaphylaxis, Itchy/ Teary Eyes, Itching, Sneezing, Swelling            Allergies and Intolerances:       Allergies:         No Known Allergies: Active    ASSESSMENT and PLAN:  Proceed with study plan today.    Makayla will dispense the study drug Tagrisso after her EKG, and she will have avastin today.   At last visit we treated her for signs of a UTI in urinalysis, she was not symptomatic.  We will follow TSH.

## 2023-10-30 NOTE — RESEARCH NOTES
Research Note Non-Treatment Day    Mirta Hills is here today. Patient is on [id number]. Today is C1D6. Procedures completed per protocol. AE's and con-meds reviewed with patient. Patient is aware of treatment plan.      Education Documentation  No documentation found.  Education Comments  No comments found.

## 2023-10-31 ENCOUNTER — TELEPHONE (OUTPATIENT)
Dept: HEMATOLOGY/ONCOLOGY | Facility: HOSPITAL | Age: 68
End: 2023-10-31
Payer: MEDICARE

## 2023-10-31 DIAGNOSIS — C34.90 MALIGNANT NEOPLASM OF LUNG, UNSPECIFIED LATERALITY, UNSPECIFIED PART OF LUNG (MULTI): Primary | ICD-10-CM

## 2023-10-31 DIAGNOSIS — Z00.6 EXAM FOR CLINICAL TRIAL: ICD-10-CM

## 2023-10-31 DIAGNOSIS — Z51.81 ENCOUNTER FOR MONITORING CARDIOTOXIC DRUG THERAPY: Primary | ICD-10-CM

## 2023-10-31 DIAGNOSIS — Z79.899 ENCOUNTER FOR MONITORING CARDIOTOXIC DRUG THERAPY: Primary | ICD-10-CM

## 2023-10-31 DIAGNOSIS — C34.90 MALIGNANT NEOPLASM OF LUNG, UNSPECIFIED LATERALITY, UNSPECIFIED PART OF LUNG (MULTI): ICD-10-CM

## 2023-10-31 NOTE — RESEARCH NOTES
Research Note Non-Treatment Day    Mirta Hills is here today. Patient is on [id number]. Today is C6D1. Procedures completed per protocol. AE's and con-meds reviewed with patient. Patient is aware of treatment plan.      Education Documentation  Shortness of Breath, taught by Makalya Joseph RN at 10/31/2023  1:42 PM.  Learner: Patient  Readiness: Acceptance  Method: Explanation  Response: Verbalizes Understanding    Changes in Appetite, taught by Makayla Joseph RN at 10/31/2023  1:42 PM.  Learner: Patient  Readiness: Acceptance  Method: Explanation  Response: Verbalizes Understanding    Fertility Issues, taught by Makayla Joseph RN at 10/31/2023  1:42 PM.  Learner: Patient  Readiness: Acceptance  Method: Explanation  Response: Verbalizes Understanding    Memory Problems, taught by Makayla Joseph RN at 10/31/2023  1:42 PM.  Learner: Patient  Readiness: Acceptance  Method: Explanation  Response: Verbalizes Understanding    Skin and Nail Changes, taught by Makayla Joseph RN at 10/31/2023  1:42 PM.  Learner: Patient  Readiness: Acceptance  Method: Explanation  Response: Verbalizes Understanding    Changes in Urination, taught by Makayla Joseph RN at 10/31/2023  1:42 PM.  Learner: Patient  Readiness: Acceptance  Method: Explanation  Response: Verbalizes Understanding    Bleeding Precautions, taught by Makayla Joseph RN at 10/31/2023  1:42 PM.  Learner: Patient  Readiness: Acceptance  Method: Explanation  Response: Verbalizes Understanding    Edema Management, taught by Makayla Joseph RN at 10/31/2023  1:42 PM.  Learner: Patient  Readiness: Acceptance  Method: Explanation  Response: Verbalizes Understanding    Care of Neuropathy, taught by Makayla Joseph RN at 10/31/2023  1:42 PM.  Learner: Patient  Readiness: Acceptance  Method: Explanation  Response: Verbalizes Understanding    Infection Control, taught by Makayla Joseph RN at 10/31/2023  1:42 PM.  Learner:  Patient  Readiness: Acceptance  Method: Explanation  Response: Verbalizes Understanding    Constipation, taught by Makayla Joseph RN at 10/31/2023  1:42 PM.  Learner: Patient  Readiness: Acceptance  Method: Explanation  Response: Verbalizes Understanding    Mouth Sores, taught by Makayla Joseph RN at 10/31/2023  1:42 PM.  Learner: Patient  Readiness: Acceptance  Method: Explanation  Response: Verbalizes Understanding    Nausea Management, taught by Makayla Joseph RN at 10/31/2023  1:42 PM.  Learner: Patient  Readiness: Acceptance  Method: Explanation  Response: Verbalizes Understanding    Fatigue, taught by Makayla Joseph RN at 10/31/2023  1:42 PM.  Learner: Patient  Readiness: Acceptance  Method: Explanation  Response: Verbalizes Understanding    Alopecia, taught by Makayla Joseph RN at 10/31/2023  1:42 PM.  Learner: Patient  Readiness: Acceptance  Method: Explanation  Response: Verbalizes Understanding    Diarrhea, taught by Makayla Joseph RN at 10/31/2023  1:42 PM.  Learner: Patient  Readiness: Acceptance  Method: Explanation  Response: Verbalizes Understanding    Changes in Appetite, taught by Makayla Joseph RN at 10/31/2023  1:41 PM.  Learner: Patient  Readiness: Acceptance  Method: Explanation  Response: Verbalizes Understanding    Memory Problems, taught by Makayla Joseph RN at 10/31/2023  1:41 PM.  Learner: Patient  Readiness: Acceptance  Method: Explanation  Response: Verbalizes Understanding    Skin and Nail Changes, taught by Makayla Joseph RN at 10/31/2023  1:41 PM.  Learner: Patient  Readiness: Acceptance  Method: Explanation  Response: Verbalizes Understanding    Changes in Urination, taught by Makayla Joseph RN at 10/31/2023  1:41 PM.  Learner: Patient  Readiness: Acceptance  Method: Explanation  Response: Verbalizes Understanding    Bleeding Precautions, taught by Makayla Joseph RN at 10/31/2023  1:41 PM.  Learner: Patient  Readiness:  Acceptance  Method: Explanation  Response: Verbalizes Understanding    Edema Management, taught by Makayla Joseph RN at 10/31/2023  1:41 PM.  Learner: Patient  Readiness: Acceptance  Method: Explanation  Response: Verbalizes Understanding    Care of Neuropathy, taught by Makayla Joseph RN at 10/31/2023  1:41 PM.  Learner: Patient  Readiness: Acceptance  Method: Explanation  Response: Verbalizes Understanding    Infection Control, taught by Makalya Joseph RN at 10/31/2023  1:41 PM.  Learner: Patient  Readiness: Acceptance  Method: Explanation  Response: Verbalizes Understanding    Constipation, taught by Makayla Joseph RN at 10/31/2023  1:41 PM.  Learner: Patient  Readiness: Acceptance  Method: Explanation  Response: Verbalizes Understanding    Mouth Sores, taught by Makayla Joseph RN at 10/31/2023  1:41 PM.  Learner: Patient  Readiness: Acceptance  Method: Explanation  Response: Verbalizes Understanding    Nausea Management, taught by Makayla Joseph RN at 10/31/2023  1:41 PM.  Learner: Patient  Readiness: Acceptance  Method: Explanation  Response: Verbalizes Understanding    Fatigue, taught by Makayla Joseph RN at 10/31/2023  1:41 PM.  Learner: Patient  Readiness: Acceptance  Method: Explanation  Response: Verbalizes Understanding    Alopecia, taught by Makayla Joseph RN at 10/31/2023  1:41 PM.  Learner: Patient  Readiness: Acceptance  Method: Explanation  Response: Verbalizes Understanding    Diarrhea, taught by Makayla Joseph RN at 10/31/2023  1:41 PM.  Learner: Patient  Readiness: Acceptance  Method: Explanation  Response: Verbalizes Understanding    Changes in Appetite, taught by Makayla Joseph RN at 10/31/2023  1:40 PM.  Learner: Patient  Readiness: Acceptance  Method: Explanation  Response: Verbalizes Understanding    Memory Problems, taught by Makayla Joseph RN at 10/31/2023  1:40 PM.  Learner: Patient  Readiness: Acceptance  Method:  Explanation  Response: Verbalizes Understanding    Skin and Nail Changes, taught by Makayla Joseph RN at 10/31/2023  1:40 PM.  Learner: Patient  Readiness: Acceptance  Method: Explanation  Response: Verbalizes Understanding    Changes in Urination, taught by Makayla Joseph RN at 10/31/2023  1:40 PM.  Learner: Patient  Readiness: Acceptance  Method: Explanation  Response: Verbalizes Understanding    Bleeding Precautions, taught by Makayla Joseph RN at 10/31/2023  1:40 PM.  Learner: Patient  Readiness: Acceptance  Method: Explanation  Response: Verbalizes Understanding    Edema Management, taught by Makayla Joseph RN at 10/31/2023  1:40 PM.  Learner: Patient  Readiness: Acceptance  Method: Explanation  Response: Verbalizes Understanding    Care of Neuropathy, taught by Makayla Joseph RN at 10/31/2023  1:40 PM.  Learner: Patient  Readiness: Acceptance  Method: Explanation  Response: Verbalizes Understanding    Infection Control, taught by Makayla Joseph RN at 10/31/2023  1:40 PM.  Learner: Patient  Readiness: Acceptance  Method: Explanation  Response: Verbalizes Understanding    Constipation, taught by Makayla Joseph RN at 10/31/2023  1:40 PM.  Learner: Patient  Readiness: Acceptance  Method: Explanation  Response: Verbalizes Understanding    Mouth Sores, taught by Makayla Joseph RN at 10/31/2023  1:40 PM.  Learner: Patient  Readiness: Acceptance  Method: Explanation  Response: Verbalizes Understanding    Nausea Management, taught by Makayla Jospeh RN at 10/31/2023  1:40 PM.  Learner: Patient  Readiness: Acceptance  Method: Explanation  Response: Verbalizes Understanding    Fatigue, taught by Makayla Joseph RN at 10/31/2023  1:40 PM.  Learner: Patient  Readiness: Acceptance  Method: Explanation  Response: Verbalizes Understanding    Alopecia, taught by Makayla Joseph RN at 10/31/2023  1:40 PM.  Learner: Patient  Readiness: Acceptance  Method: Explanation  Response:  Verbalizes Understanding    Diarrhea, taught by Makayla Joseph RN at 10/31/2023  1:40 PM.  Learner: Patient  Readiness: Acceptance  Method: Explanation  Response: Verbalizes Understanding    Changes in Appetite, taught by Makayla Joseph RN at 10/30/2023  2:38 PM.  Learner: Patient  Readiness: Acceptance  Method: Explanation  Response: Verbalizes Understanding    Memory Problems, taught by Makayla Joseph RN at 10/30/2023  2:38 PM.  Learner: Patient  Readiness: Acceptance  Method: Explanation  Response: Verbalizes Understanding    Skin and Nail Changes, taught by Makayla Joseph RN at 10/30/2023  2:38 PM.  Learner: Patient  Readiness: Acceptance  Method: Explanation  Response: Verbalizes Understanding    Changes in Urination, taught by Makayla Joseph RN at 10/30/2023  2:38 PM.  Learner: Patient  Readiness: Acceptance  Method: Explanation  Response: Verbalizes Understanding    Bleeding Precautions, taught by Makayla Joseph RN at 10/30/2023  2:38 PM.  Learner: Patient  Readiness: Acceptance  Method: Explanation  Response: Verbalizes Understanding    Edema Management, taught by Makayla Joseph RN at 10/30/2023  2:38 PM.  Learner: Patient  Readiness: Acceptance  Method: Explanation  Response: Verbalizes Understanding    Care of Neuropathy, taught by Makayla Joseph RN at 10/30/2023  2:38 PM.  Learner: Patient  Readiness: Acceptance  Method: Explanation  Response: Verbalizes Understanding    Infection Control, taught by Makayla Joseph RN at 10/30/2023  2:38 PM.  Learner: Patient  Readiness: Acceptance  Method: Explanation  Response: Verbalizes Understanding    Constipation, taught by Makayla Joseph RN at 10/30/2023  2:38 PM.  Learner: Patient  Readiness: Acceptance  Method: Explanation  Response: Verbalizes Understanding    Mouth Sores, taught by Makayla Joseph RN at 10/30/2023  2:38 PM.  Learner: Patient  Readiness: Acceptance  Method: Explanation  Response: Verbalizes  Understanding    Nausea Management, taught by Makayla Joseph RN at 10/30/2023  2:38 PM.  Learner: Patient  Readiness: Acceptance  Method: Explanation  Response: Verbalizes Understanding    Fatigue, taught by Makayla Joseph RN at 10/30/2023  2:38 PM.  Learner: Patient  Readiness: Acceptance  Method: Explanation  Response: Verbalizes Understanding    Alopecia, taught by Makayla Joseph RN at 10/30/2023  2:38 PM.  Learner: Patient  Readiness: Acceptance  Method: Explanation  Response: Verbalizes Understanding    Diarrhea, taught by Makayla Joseph RN at 10/30/2023  2:38 PM.  Learner: Patient  Readiness: Acceptance  Method: Explanation  Response: Verbalizes Understanding    Education Comments  No comments found.

## 2023-10-31 NOTE — TELEPHONE ENCOUNTER
Called Mirta HUMPHREY Reinier [phone] to inform her of upcoming appointments made today per [id number] protocol.     The appt that was made is an ECHO at [facility] - on 11/9/2023 @ 9:20am.     Ms. Hills did not answer the phone, a message was left. Patient DOES use [employer]. Patient was instructed to call back if she had any questions but no call back is necessary.

## 2023-11-01 DIAGNOSIS — C34.90 MALIGNANT NEOPLASM OF LUNG, UNSPECIFIED LATERALITY, UNSPECIFIED PART OF LUNG (MULTI): Primary | ICD-10-CM

## 2023-11-06 ENCOUNTER — NURSE TRIAGE (OUTPATIENT)
Dept: ADMISSION | Facility: HOSPITAL | Age: 68
End: 2023-11-06
Payer: MEDICARE

## 2023-11-06 NOTE — TELEPHONE ENCOUNTER
Moni Cortez calling Mirta back directly to further discuss; she confirmed she is currently speaking with the patient.

## 2023-11-06 NOTE — TELEPHONE ENCOUNTER
Mirta called the office with c/o pain in her chest. She has lung cancer and has scans on 11/15 with FUV 11/20. Taking Tagrisso and also receiving Avastin (on a trial). She denies any shortness of breath, tachycardia or swelling. Denies any other symptoms at this time. Asking if scans can be moved up. Patient does not feel she needs to report to the ED at this time as this pain is not debilitating and has been going on for about a week now. She goes out of town Thursday so asking if scans could be done before this. Secure Chat sent to team and trials nurse.   Additional Information   Are there daily activities that you're having trouble with such as getting dressed or making meals?     States she is able to do her ADLs, however, she is just more mindful of certain movements she is making. States she was out running errands today so the pain is not debilitating.   Commented on: Where is the pain? Is there more than one place where you're having pain?     Pain is in her chest above her breastbone; she states she has not had pain in this area before, this is higher than her previous pain.   Commented on: On a scale of 0 - 10 with 0 being no pain and 10 being the worst pain ever, how would you rate your pain when it is at its worst?     Sneezing/turning her steering wheel; states pain is worse with certain movement   Commented on: What does the pain feel like? What words would describe your pain (sharp, burning, stabbing, etc)?     Stabbing pain with sudden movement otherwise it is an ache   Commented on: How long have they been going on?     Progressively worse since last week. Today has been the worst   Commented on: What helps these problems?     Has not tried anything   Commented on: What makes these problems worse?     Sneezing   Commented on: What else do you want to tell me about this problem?     She is supposed to go out of town on Thursday to [place] so is anxious if she should have imaging/intervention done  prior    Protocols used: Pain

## 2023-11-07 ENCOUNTER — TELEPHONE (OUTPATIENT)
Dept: HEMATOLOGY/ONCOLOGY | Facility: HOSPITAL | Age: 68
End: 2023-11-07
Payer: MEDICARE

## 2023-11-07 NOTE — TELEPHONE ENCOUNTER
"Per Moni Cortez \"She and I talked at length- sounded musculoskeletal- but if she is not better today I will see her in clinic tomorrow\".   "

## 2023-11-07 NOTE — TELEPHONE ENCOUNTER
Patient called to assess how she was feeling. She stated she took 2 ibuprofen last evening which helped to decrease her chest pain. She rates today 3-4 on pain scale compared to yesterday which was a 6 out of 10. Patient agreed to see Moni Cortez tomorrow in clinic.

## 2023-11-08 ENCOUNTER — APPOINTMENT (OUTPATIENT)
Dept: HEMATOLOGY/ONCOLOGY | Facility: HOSPITAL | Age: 68
End: 2023-11-08
Payer: MEDICARE

## 2023-11-08 ENCOUNTER — OFFICE VISIT (OUTPATIENT)
Dept: HEMATOLOGY/ONCOLOGY | Facility: HOSPITAL | Age: 68
End: 2023-11-08
Payer: MEDICARE

## 2023-11-08 VITALS
SYSTOLIC BLOOD PRESSURE: 117 MMHG | TEMPERATURE: 97.7 F | WEIGHT: 110.45 LBS | DIASTOLIC BLOOD PRESSURE: 73 MMHG | HEART RATE: 74 BPM | BODY MASS INDEX: 20.64 KG/M2 | RESPIRATION RATE: 16 BRPM | OXYGEN SATURATION: 98 %

## 2023-11-08 DIAGNOSIS — C34.90 MALIGNANT NEOPLASM OF LUNG, UNSPECIFIED LATERALITY, UNSPECIFIED PART OF LUNG (MULTI): Primary | ICD-10-CM

## 2023-11-08 PROCEDURE — 99214 OFFICE O/P EST MOD 30 MIN: CPT | Performed by: CLINICAL NURSE SPECIALIST

## 2023-11-08 PROCEDURE — 1036F TOBACCO NON-USER: CPT | Performed by: CLINICAL NURSE SPECIALIST

## 2023-11-08 PROCEDURE — 1126F AMNT PAIN NOTED NONE PRSNT: CPT | Performed by: CLINICAL NURSE SPECIALIST

## 2023-11-08 PROCEDURE — 1159F MED LIST DOCD IN RCRD: CPT | Performed by: CLINICAL NURSE SPECIALIST

## 2023-11-08 ASSESSMENT — PAIN SCALES - GENERAL: PAINLEVEL: 0-NO PAIN

## 2023-11-08 ASSESSMENT — COLUMBIA-SUICIDE SEVERITY RATING SCALE - C-SSRS
1. IN THE PAST MONTH, HAVE YOU WISHED YOU WERE DEAD OR WISHED YOU COULD GO TO SLEEP AND NOT WAKE UP?: NO
6. HAVE YOU EVER DONE ANYTHING, STARTED TO DO ANYTHING, OR PREPARED TO DO ANYTHING TO END YOUR LIFE?: NO
2. HAVE YOU ACTUALLY HAD ANY THOUGHTS OF KILLING YOURSELF?: NO

## 2023-11-09 ENCOUNTER — APPOINTMENT (OUTPATIENT)
Dept: CARDIOLOGY | Facility: CLINIC | Age: 68
End: 2023-11-09
Payer: MEDICARE

## 2023-11-11 NOTE — PROGRESS NOTES
Cancer History:   Treatment Synopsis:       DIAGNOSIS     Adenocarcinoma of the lung.  Date of diagnosis is [place] 15, 2023 from a level 7 lymph node biopsy/fine-needle aspiration.  Immunohistochemistry positive for TTF-1.        STAGING     Clinical T2a (right lower lobe mass measuring 3.6 cm), clinical N2 (PET positivity and subcarinal region), M1C disease (presence of brain metastases, ribs, right acetabulum, liver, brain), stage IVb disease        CURRENT SITES OF DISEASE     Right lower lobe, right hilum of the lung, ipsilateral mediastinum, liver, bones, brain        MOLECULAR GENOMICS     TEST: Focused Solid Tumor DNA/RNA Panel   SPECIMEN: Supernatant, LYMPH NODE 7, [id number] A   DISEASE DIAGNOSIS: Adenocarcinoma   Estimated Tumor Content: 40%   COLLECTION DATE: 6/15/2023     MICROSATELLITE STATUS: Microsatellite  Instability-High (MSI-H) is NOT DETECTED.     DISEASE ASSOCIATED GENOMIC FINDINGS:   EGFR p.[id number] ([id number])    TP53 p.N247I ([id number] c.[id number]>T)     DISEASE RELEVANT ALTERATIONS NOT DETECTED:   Negative for ALK fusion.   Negative for BRAF V600E.   Negative for ERBB2 activating mutation   Negative for KRAS G12C.   Negative for  MET exon 14 skipping mutation.   Negative for NTRK fusion.   Negative for RET fusion.   Negative for ROS1 fusion.        Circulating tumor DNA sent on June 19, 2023  EGFR p.[id number], Variant allelic fraction of 1.6%  TP53 N247I Missense variant, loss of function, variant allelic fraction 1.1%  MSI stable        SERUM TUMOR MARKER     Slightly elevated CEA and CA 19.9 June 2023        PRIOR THERAPY     1- Gamma knife radiosurgery to brain lesions completed on June 28, 2023.        CURRENT THERAPY     Osimertinib on study  on clinical trial [id number] which is a randomized trial of osimertinib with or without bevacizumab.  She has been randomized to the bevacizumab arm- Cycle 1 Day 1 is 7/12/2023        CURRENT ONCOLOGICAL PROBLEMS     1-brain  metastases, symptomatic, word finding difficulties, mild unsteadiness on her feet but no falls, Resolved July 3, 2023  2-anterior chest pain, increases with deep inspiration, Improved July 3, 2023  3-mild intermittent grade 1 diarrhea, mild hair loss, mild leukopenia, mild elevation in creatinine, mild fatigue.  Most likely all osimertinib related August 7, 2023        HISTORY OF PRESENT ILLNESS     This is a 68-year-old patient.  She states that towards Thanksgiving of 2022 she started feeling some pain in her right shoulder.  Eventually got better however the same pain came back in January or February 2023.  Ultimately some x-rays were done of  the shoulder that were generally negative.  She then developed some pain in her sternum a month or 2 later with deep inspiration and ultimately underwent imaging in May 2023.  Chest CT without contrast was done on June 1, 2023.  This showed a 3.4 cm mass  within the superior segment of the right lower lobe with some associated satellite pulmonary nodules.  Additionally there were some other subcentimeter pulmonary nodules.  There was evidence of mediastinal lymphadenopathy.  Subcarinal lymph node measured  1.4 cm in short axis.She was seen by pulmonary medicine on June 5, 2023 a combined PET/CT was ordered on June 6, 2023 showing a relatively intense area of hypermetabolic activity along the superior posteromedial aspect of the right lower lobe corresponding  to the known right lower lobe lung mass.  There was foci of hypermetabolic activity in the right supraclavicular region, subcarinal and right hilar regions.  There was area of uptake within the posterior upper ribs as well as right acetabulum suspicious  for osseous metastatic disease.  There was a punctate area of mild hypermetabolic activity in the right hepatic lobe concerning for hepatic metastases.  Small focus in the subcutaneous region just anterior to the lower aspect of the sternum could be inflammatory   versus metastatic.  She undergoes a bronchoscopy dated [place] 15, 2023.  There were no endobronchial lesions.  Primary cytology with rapid onsite evaluation was suggestive of malignancy in level 7, final lymph nodes were pathology was pending.  MRI of the  brain with and without contrast dated June 16, 2023 showed approximately 20 enhancing nodules concerning for metastatic disease.  Specifically there was a 1.5 cm nodule in the right cerebellar hemisphere.  Most of the other nodules were less than 1 cm.   Patient also notes that she has some difficulty expressing words over the past 1 to 2 weeks prior to her first visit with us as well as some mild unsteadiness on her legs where she feels she is drifting towards the right side.        PAST MEDICAL HISTORY     1-hypothyroidism  2-hysterectomy 1988  3-abdominal hernia surgery 1995  4-some hearing difficulties        SOCIAL HISTORY     Patient lives with her significant other.  She has 2 daughters.  Lives in [facility] [facility].  Smoked for only 3 years during college and during this time it was a pack per day.  She has retired.  She had a retail store.        CURRENT MEDS     See medication list, meds reviewed        ALLERGIES     Patient denies any drug allergies        FAMILY HISTORY     Patient's father had bladder cancer at the age of 86.  She has 1 brother with no cancer.  She has 2 children        History of Present Illness:      ID Statement:    MIRTA BUTTS is a 68 year old Female        Interval History:       Today Mirta is here after calling in to report some chest pain.  She noticed it had sudden onset almost a week ago,, and is related to some movement - it is in her upper chest area and is sharp and stabbing- and intermittent.  She noticed it when she moved her head while driving.  She is fearful her cancer is returning.  We spoke b;y phone 2 days ago,  and she agreed to try 2 ibuprofen.  She had no blood pressure cuff at home, and is leaving to go out of town on  Thursday for a bar mitzvah, so she is worried.  I had some concern due to her recent avastin infusion- she was unable to check her blood pressure at home.  Today she reports that her pain was much improved after 2 ibuprofen yesterday, and is much improved overall.  She agrees that it feels musculoskeletal.  Her blood pressure is good today.   Synthroid has been adjusted by her PCP.  Her diarrhea is much better controlled now that she is taking more imodium.      Review of Systems:   · Constitutional NEGATIVE: Fever, Chills, Anorexia, Weight Loss, Malaise      · Eyes NEGATIVE: Blurry Vision, Drainage, Diploplia, Redness, Vision Loss/ Change      · ENMT NEGATIVE: Nasal Discharge, Nasal Congestion, Ear Pain, Mouth Pain, Throat Pain      · Respiratory NEGATIVE: Dry Cough, Productive Cough, Hemoptysis, Wheezing, Shortness of Breath      · Cardiology NEGATIVE: Chest Pain, Dyspnea on Exertion, Orthopnea, Palpitations, Syncope      ·  Gastrointestinal POSITIVE: Diarrhea     NEGATIVE: Abdominal Pain, Constipation, Nausea, Vomiting      · Genitourinary NEGATIVE: Discharge, Dysuria, Flank Pain, Frequency, Hematuria      · Musculoskeletal NEGATIVE: Decreased ROM, Pain, Swelling, Stiffness, Weakness      · Neurological NEGATIVE: Dizziness, Confusion, Headache, Seizures, Syncope      · Psychiatric NEGATIVE: Mood Changes, Anxiety, Hallucinations, Sleep Changes, Suicidal Ideas      · Skin NEGATIVE: Mass, Pain, Pruritus, Rash, Ulcer      · Hematologic/Lymph NEGATIVE: Anemia, Bruising, Easy Bleeding, Night Sweats, Petechiae      · Allergic/Immunologic NEGATIVE: Anaphylaxis, Itchy/ Teary Eyes, Itching, Sneezing, Swelling            Allergies and Intolerances:       Allergies:         No Known Allergies: Active    ASSESSMENT and PLAN:    Mirta noticed some new upper chest pain related to movement which was relieved with ibuprofen, and seemed related to movement, but she was fearful that her cancer was coming back.  She is also going out  of [facility].  I had some concern related to her recent Avastin, , and wanted to see her in follow up.  She has upcoming scans on 11/15.  Today her blood pressure is good, and her pain responded to some ibuprofen, supporting the idea that her pain was musculoskeletal in nature.  I am reassured that she I s not having symptoms related to the avastin.  She will keep in touch by phone, with any reoccurrence, and we will see her back next week with planned scans.

## 2023-11-14 DIAGNOSIS — C34.90 MALIGNANT NEOPLASM OF LUNG, UNSPECIFIED LATERALITY, UNSPECIFIED PART OF LUNG (MULTI): Primary | ICD-10-CM

## 2023-11-14 RX ORDER — EPINEPHRINE 0.3 MG/.3ML
0.3 INJECTION SUBCUTANEOUS EVERY 5 MIN PRN
Status: CANCELLED | OUTPATIENT
Start: 2023-11-20

## 2023-11-14 RX ORDER — ALBUTEROL SULFATE 0.83 MG/ML
3 SOLUTION RESPIRATORY (INHALATION) AS NEEDED
Status: CANCELLED | OUTPATIENT
Start: 2023-11-20

## 2023-11-14 RX ORDER — PROCHLORPERAZINE MALEATE 10 MG
10 TABLET ORAL EVERY 6 HOURS PRN
Status: CANCELLED | OUTPATIENT
Start: 2023-11-20

## 2023-11-14 RX ORDER — PROCHLORPERAZINE EDISYLATE 5 MG/ML
10 INJECTION INTRAMUSCULAR; INTRAVENOUS EVERY 6 HOURS PRN
Status: CANCELLED | OUTPATIENT
Start: 2023-11-20

## 2023-11-14 RX ORDER — DIPHENHYDRAMINE HYDROCHLORIDE 50 MG/ML
50 INJECTION INTRAMUSCULAR; INTRAVENOUS AS NEEDED
Status: CANCELLED | OUTPATIENT
Start: 2023-11-20

## 2023-11-14 RX ORDER — FAMOTIDINE 10 MG/ML
20 INJECTION INTRAVENOUS ONCE AS NEEDED
Status: CANCELLED | OUTPATIENT
Start: 2023-11-20

## 2023-11-15 ENCOUNTER — HOSPITAL ENCOUNTER (OUTPATIENT)
Dept: RADIOLOGY | Facility: HOSPITAL | Age: 68
Discharge: HOME | End: 2023-11-15
Payer: MEDICARE

## 2023-11-15 ENCOUNTER — TELEPHONE (OUTPATIENT)
Dept: RADIATION ONCOLOGY | Facility: HOSPITAL | Age: 68
End: 2023-11-15
Payer: MEDICARE

## 2023-11-15 DIAGNOSIS — C34.90 MALIGNANT NEOPLASM OF UNSPECIFIED PART OF UNSPECIFIED BRONCHUS OR LUNG (MULTI): ICD-10-CM

## 2023-11-15 PROCEDURE — 70553 MRI BRAIN STEM W/O & W/DYE: CPT | Performed by: RADIOLOGY

## 2023-11-15 PROCEDURE — A9575 INJ GADOTERATE MEGLUMI 0.1ML: HCPCS | Performed by: INTERNAL MEDICINE

## 2023-11-15 PROCEDURE — 71260 CT THORAX DX C+: CPT | Performed by: STUDENT IN AN ORGANIZED HEALTH CARE EDUCATION/TRAINING PROGRAM

## 2023-11-15 PROCEDURE — 74177 CT ABD & PELVIS W/CONTRAST: CPT

## 2023-11-15 PROCEDURE — 74177 CT ABD & PELVIS W/CONTRAST: CPT | Performed by: STUDENT IN AN ORGANIZED HEALTH CARE EDUCATION/TRAINING PROGRAM

## 2023-11-15 PROCEDURE — 2550000001 HC RX 255 CONTRASTS: Performed by: INTERNAL MEDICINE

## 2023-11-15 PROCEDURE — 70553 MRI BRAIN STEM W/O & W/DYE: CPT

## 2023-11-15 RX ORDER — GADOTERATE MEGLUMINE 376.9 MG/ML
9 INJECTION INTRAVENOUS
Status: COMPLETED | OUTPATIENT
Start: 2023-11-15 | End: 2023-11-15

## 2023-11-15 RX ADMIN — GADOTERATE MEGLUMINE 9 ML: 376.9 INJECTION INTRAVENOUS at 10:18

## 2023-11-15 RX ADMIN — IOHEXOL 75 ML: 350 INJECTION, SOLUTION INTRAVENOUS at 10:40

## 2023-11-15 NOTE — TELEPHONE ENCOUNTER
Called pt to remind of appointment on 11/16/23 at 11:00 Pt confirmed appointment.     Vladimir Chawla MA

## 2023-11-16 ENCOUNTER — HOSPITAL ENCOUNTER (OUTPATIENT)
Dept: RADIATION ONCOLOGY | Facility: HOSPITAL | Age: 68
Setting detail: RADIATION/ONCOLOGY SERIES
Discharge: HOME | End: 2023-11-16
Payer: MEDICARE

## 2023-11-16 VITALS
BODY MASS INDEX: 21.02 KG/M2 | HEIGHT: 61 IN | WEIGHT: 111.33 LBS | SYSTOLIC BLOOD PRESSURE: 118 MMHG | OXYGEN SATURATION: 98 % | HEART RATE: 63 BPM | RESPIRATION RATE: 18 BRPM | TEMPERATURE: 96.8 F | DIASTOLIC BLOOD PRESSURE: 73 MMHG

## 2023-11-16 DIAGNOSIS — C79.31 SECONDARY MALIGNANT NEOPLASM OF BRAIN (MULTI): Primary | ICD-10-CM

## 2023-11-16 PROCEDURE — 99214 OFFICE O/P EST MOD 30 MIN: CPT | Performed by: NURSE PRACTITIONER

## 2023-11-16 PROCEDURE — 99214 OFFICE O/P EST MOD 30 MIN: CPT | Mod: PO | Performed by: NURSE PRACTITIONER

## 2023-11-16 ASSESSMENT — PAIN SCALES - GENERAL: PAINLEVEL: 0-NO PAIN

## 2023-11-16 ASSESSMENT — PATIENT HEALTH QUESTIONNAIRE - PHQ9
2. FEELING DOWN, DEPRESSED OR HOPELESS: NOT AT ALL
SUM OF ALL RESPONSES TO PHQ9 QUESTIONS 1 AND 2: 0
1. LITTLE INTEREST OR PLEASURE IN DOING THINGS: NOT AT ALL

## 2023-11-16 ASSESSMENT — COLUMBIA-SUICIDE SEVERITY RATING SCALE - C-SSRS
6. HAVE YOU EVER DONE ANYTHING, STARTED TO DO ANYTHING, OR PREPARED TO DO ANYTHING TO END YOUR LIFE?: NO
2. HAVE YOU ACTUALLY HAD ANY THOUGHTS OF KILLING YOURSELF?: NO
1. IN THE PAST MONTH, HAVE YOU WISHED YOU WERE DEAD OR WISHED YOU COULD GO TO SLEEP AND NOT WAKE UP?: NO

## 2023-11-16 ASSESSMENT — ENCOUNTER SYMPTOMS
DEPRESSION: 0
LOSS OF SENSATION IN FEET: 0
OCCASIONAL FEELINGS OF UNSTEADINESS: 0

## 2023-11-16 NOTE — PROGRESS NOTES
Radiation Oncology Follow-Up    Patient Name:  Mirta Hills  MRN:  [id number]  :  1955    Referring Provider: No ref. provider found  Primary Care Provider: Haris Mcclellan MD  Care Team: Patient Care Team:  Haris Mcclellan MD as PCP - General  Humberto Landin MD as Consulting Physician (Hematology and Oncology)  Makayla Joseph, RN as Registered Nurse (Hematology and Oncology)    Date of Service: 2023   68 y.o. female with adenocarcinoma of the lung.  Date of diagnosis is [place] 15, 2023 from a level 7 lymph node biopsy/fine-needle aspiration.  Immunohistochemistry positive  for TTF-1.  Clinical T2a (right lower lobe mass measuring 3.6 cm), clinical N2 (PET positivity and subcarinal region), M1C disease (presence of brain metastases, ribs, right acetabulum, liver, brain), stage IVb disease.     Treatment Summary:     - Towards  of  she started feeling some pain in her right shoulder.  Eventually got better however the same pain came back in January or 2023.  Ultimately some x-rays were done of the shoulder that were generally negative.       - She then developed some pain in her sternum a month or 2 later with deep inspiration and ultimately underwent imaging in May 2023.  Chest CT without contrast was done on 2023.  This showed a 3.4 cm mass within the superior segment of the right  lower lobe with some associated satellite pulmonary nodules.  Additionally there were some other subcentimeter pulmonary nodules.  There was evidence of mediastinal lymphadenopathy.  Subcarinal lymph node measured 1.4 cm in short axis.     - She was seen by pulmonary medicine on 2023 a combined PET/CT was ordered on 2023 showing a relatively intense area of hypermetabolic activity along the superior posteromedial aspect of the right lower lobe corresponding to the known  right lower lobe lung mass.  There was foci of hypermetabolic activity in the right  supraclavicular region, subcarinal and right hilar regions.  There was area of uptake within the posterior upper ribs as well as right acetabulum suspicious for osseous  metastatic disease.  There was a punctate area of mild hypermetabolic activity in the right hepatic lobe concerning for hepatic metastases.  Small focus in the subcutaneous region just anterior to the lower aspect of the sternum could be inflammatory  versus metastatic.       - She underwent a bronchoscopy dated [place] 15, 2023.  There were no endobronchial lesions.  Primary cytology with rapid onsite evaluation was suggestive of malignancy in level 7, final lymph nodes were pathology was pending.      -  MRI of the brain with and without contrast dated June 16, 2023 showed approximately 20 enhancing nodules concerning for metastatic disease.  Specifically there was a 1.5 cm nodule in the right cerebellar hemisphere.  Most of the other nodules were  less than 1 cm.      -  Gamma knife radiosurgery to multiple brain lesions (total of 9) completed on June 28, 2023.     - Clinical trial [id number] which is a randomized trial of osimertinib with or without bevacizumab.  She has been randomized to the bevacizumab arm- Cycle 1 Day 1 on 7/12/2023     SUBJECTIVE  History of Present Illness:   Mirta Hills is here today for routine radiation follow up and review of MRI of the brain done yesterday. She says she is feeling very well and continues on bevacizumab on a clinical trial.  CT of CAP also done yesterday with results pending. She denies any headaches, seizures, visual changes or neurologic deficits.  She did have some musculoskeletal pain upper chest this past week but it resolved with ibuprofen.  She has occasional diarrhea controlled with immodium.  Denies cough, SOB, hemoptysis, N/V, abdominal pain or bony pain.     Review of Systems:    Review of Systems   All other systems reviewed and are negative.    Performance Status:   The Karnofsky performance  scale today is 100, Fully active, able to carry on all pre-disease performed without restriction (ECOG equivalent 0).      OBJECTIVE    Current Outpatient Medications:     atorvastatin (Lipitor) 20 mg tablet, Take 1 tablet (20 mg) by mouth once daily., Disp: , Rfl:     betamethasone, augmented, (Diprolene AF) 0.05 % cream, Apply topically 2 times a day.  apply to affected area ON NECK, Disp: , Rfl:     CALCIUM ORAL, Take by mouth., Disp: , Rfl:     cholecalciferol (Vitamin D-3) 50 MCG (2000 UT) tablet, Take 1 tablet (2,000 Units) by mouth once daily., Disp: , Rfl:     dexAMETHasone (Decadron) 2 mg tablet, Take 1 tablet (2 mg) by mouth 2 times a day with meals. Take with food., Disp: , Rfl:     multivit-min/ferrous fumarate (MULTI VITAMIN ORAL), Take by mouth., Disp: , Rfl:     sertraline (Zoloft) 50 mg tablet, Take 1 tablet (50 mg) by mouth once daily., Disp: , Rfl:     Study [id number] osimertinib 80 mg tablet, Take 1 tablet (80 mg total) by mouth once daily for 21 doses.  Swallow whole., Disp: 30 tablet, Rfl: 0    Synthroid 100 mcg tablet, Take 1 tablet (100 mcg) by mouth once daily. EXCEPT ON SUNDAY ONLY TAKE 1/2 TABLET, Disp: , Rfl:      Physical Exam  Constitutional:       Appearance: Normal appearance.   HENT:      Head: Normocephalic and atraumatic.      Nose: Nose normal.      Mouth/Throat:      Mouth: Mucous membranes are moist.      Pharynx: Oropharynx is clear.   Eyes:      Extraocular Movements: Extraocular movements intact.      Pupils: Pupils are equal, round, and reactive to light.   Cardiovascular:      Rate and Rhythm: Normal rate.      Heart sounds: Normal heart sounds.   Pulmonary:      Effort: Pulmonary effort is normal.      Breath sounds: Normal breath sounds.   Abdominal:      Palpations: Abdomen is soft.   Musculoskeletal:         General: No swelling. Normal range of motion.      Cervical back: Normal range of motion.   Lymphadenopathy:      Cervical: No cervical adenopathy.   Skin:     General:  Skin is warm and dry.   Neurological:      General: No focal deficit present.      Mental Status: She is alert and oriented to person, place, and time.      Cranial Nerves: No cranial nerve deficit.      Sensory: No sensory deficit.      Motor: No weakness.      Coordination: Coordination normal.      Gait: Gait normal.   Psychiatric:         Mood and Affect: Mood normal.         Behavior: Behavior normal.       RESULTS:  MR brain w and wo IV contrast    Result Date: 11/15/2023  Interpreted By:  Dickson San  and Tommy Neal STUDY: MR BRAIN W AND WO IV CONTRAST;  11/15/2023 10:27 am   INDICATION: Signs/Symptoms: metastatic disease  C34.90: Lung cancer.   Per EMR: 68-year-old female history of adenocarcinoma of the right lower lobe with metastasis to the brain, ribs, right acetabulum, and liver. Status post gamma knife radiosurgery to brain lesions completed on 06/28/2023. Currently on chemotherapy.   COMPARISON: MRI brain dated 09/13/2023 and 06/16/2023.   ACCESSION NUMBER(S): [id number]   ORDERING CLINICIAN: ADA ANTON   TECHNIQUE: Axial T2, FLAIR, DWI, gradient echo T2 and axial T1 weighted images of brain were acquired. Post contrast T1 weighted images were acquired after administration of 9 mL of Dotarem gadolinium based intravenous contrast.   FINDINGS: There is continued interval improvement in numerous scattered enhancing lesions throughout the cerebral and cerebellar hemispheres. Numerous prior lesions within the bilateral cerebral hemispheres and body of the corpus callosum continues to decrease in size when compared with MRI brain dated 06/16/2023 and were not well visualized on today's study. The remaining enhancing lesions within the bilateral cerebellar hemispheres (series 12, image 73 and 77) and brainstem (series 12, image 76), and left parietal lobe (series 12, image 175) are minimally decreased compared to most recent MRI brain dated 09/13/2023. There is no acute diffusion restriction within  these enhancing lesions. There is no mass effect or midline shift.   There is no acute intracranial hemorrhage or acute infarct. There is no abnormal extra-axial fluid collection. Few susceptibility artifact within T2 gradient echo may likely representing hemosiderin. Nonspecific subcortical and periventricular T2/FLAIR hyperintensities likely sequela of chronic small-vessel ischemic changes.   The ventricles, sulci, and basilar cisterns are prominent reflecting age related generalized parenchymal volume loss.   Visualized paranasal sinuses and mastoid air cells are unremarkable.       1. Continued decreased size/enhancement of numerous scattered enhancing lesions throughout the cerebral and cerebellar hemispheres as detailed above. No new or enlarging intracranial metastases. 2. Mild small-vessel ischemic disease.   I personally reviewed the images/study and I agree with the findings as stated by Dr. Ángel Sanders. This study was interpreted at [place], [place].   MACRO: None   Signed by: Dickson San 11/15/2023 3:52 PM Dictation workstation:   [id number]       ASSESSMENT/PLAN:  68 y.o. female with metastatic lung cancer to the brain s/p gamma knife to multiple lesions.  She seems to be doing well and no adverse effects from gamma knife and  excellent response to treatment and no new lesions identified. She will continue getting CT scans and brain MRIs every 2 mo per the clinical trial and these appts to be arranged per clinical trials team.  Radiation follow up after her next MRI of brain.  She will call me with any questions  or concerns     Amada Bradley CNP  [phone]

## 2023-11-17 ENCOUNTER — HOSPITAL ENCOUNTER (OUTPATIENT)
Dept: CARDIOLOGY | Facility: CLINIC | Age: 68
Discharge: HOME | End: 2023-11-17
Payer: MEDICARE

## 2023-11-17 DIAGNOSIS — Z79.899 ENCOUNTER FOR MONITORING CARDIOTOXIC DRUG THERAPY: ICD-10-CM

## 2023-11-17 DIAGNOSIS — Z51.81 ENCOUNTER FOR MONITORING CARDIOTOXIC DRUG THERAPY: ICD-10-CM

## 2023-11-17 PROCEDURE — 93306 TTE W/DOPPLER COMPLETE: CPT | Performed by: INTERNAL MEDICINE

## 2023-11-17 PROCEDURE — 93306 TTE W/DOPPLER COMPLETE: CPT

## 2023-11-20 ENCOUNTER — INFUSION (OUTPATIENT)
Dept: HEMATOLOGY/ONCOLOGY | Facility: HOSPITAL | Age: 68
End: 2023-11-20
Payer: MEDICARE

## 2023-11-20 ENCOUNTER — APPOINTMENT (OUTPATIENT)
Dept: HEMATOLOGY/ONCOLOGY | Facility: HOSPITAL | Age: 68
End: 2023-11-20
Payer: MEDICARE

## 2023-11-20 ENCOUNTER — LAB (OUTPATIENT)
Dept: LAB | Facility: HOSPITAL | Age: 68
End: 2023-11-20
Payer: MEDICARE

## 2023-11-20 ENCOUNTER — OFFICE VISIT (OUTPATIENT)
Dept: HEMATOLOGY/ONCOLOGY | Facility: HOSPITAL | Age: 68
End: 2023-11-20
Payer: MEDICARE

## 2023-11-20 VITALS
BODY MASS INDEX: 20.91 KG/M2 | RESPIRATION RATE: 18 BRPM | OXYGEN SATURATION: 100 % | HEART RATE: 57 BPM | SYSTOLIC BLOOD PRESSURE: 137 MMHG | WEIGHT: 110.67 LBS | TEMPERATURE: 97.9 F | DIASTOLIC BLOOD PRESSURE: 80 MMHG

## 2023-11-20 DIAGNOSIS — C34.90 MALIGNANT NEOPLASM OF LUNG, UNSPECIFIED LATERALITY, UNSPECIFIED PART OF LUNG (MULTI): ICD-10-CM

## 2023-11-20 DIAGNOSIS — C34.90 MALIGNANT NEOPLASM OF LUNG, UNSPECIFIED LATERALITY, UNSPECIFIED PART OF LUNG (MULTI): Primary | ICD-10-CM

## 2023-11-20 LAB
ALBUMIN SERPL BCP-MCNC: 4.1 G/DL (ref 3.4–5)
ALP SERPL-CCNC: 61 U/L (ref 33–136)
ALT SERPL W P-5'-P-CCNC: 17 U/L (ref 7–45)
ANION GAP SERPL CALC-SCNC: 11 MMOL/L (ref 10–20)
AORTIC VALVE PEAK VELOCITY: 1.17
APPEARANCE UR: ABNORMAL
AST SERPL W P-5'-P-CCNC: 20 U/L (ref 9–39)
AV PEAK GRADIENT: 5.4
AVA (PEAK VEL): 2.25
BASOPHILS # BLD AUTO: 0.03 X10*3/UL (ref 0–0.1)
BASOPHILS NFR BLD AUTO: 0.6 %
BILIRUB SERPL-MCNC: 0.4 MG/DL (ref 0–1.2)
BILIRUB UR STRIP.AUTO-MCNC: NEGATIVE MG/DL
BUN SERPL-MCNC: 15 MG/DL (ref 6–23)
CALCIUM SERPL-MCNC: 9.9 MG/DL (ref 8.6–10.3)
CHLORIDE SERPL-SCNC: 105 MMOL/L (ref 98–107)
CK SERPL-CCNC: 49 U/L (ref 0–215)
CO2 SERPL-SCNC: 30 MMOL/L (ref 21–32)
COLOR UR: YELLOW
CREAT SERPL-MCNC: 1.26 MG/DL (ref 0.5–1.05)
EJECTION FRACTION APICAL 4 CHAMBER: 65.2
EJECTION FRACTION: 66
EOSINOPHIL # BLD AUTO: 0.09 X10*3/UL (ref 0–0.7)
EOSINOPHIL NFR BLD AUTO: 1.9 %
ERYTHROCYTE [DISTWIDTH] IN BLOOD BY AUTOMATED COUNT: 14.1 % (ref 11.5–14.5)
GFR SERPL CREATININE-BSD FRML MDRD: 46 ML/MIN/1.73M*2
GLUCOSE SERPL-MCNC: 100 MG/DL (ref 74–99)
GLUCOSE UR STRIP.AUTO-MCNC: NEGATIVE MG/DL
HCT VFR BLD AUTO: 43.3 % (ref 36–46)
HGB BLD-MCNC: 13.7 G/DL (ref 12–16)
IMM GRANULOCYTES # BLD AUTO: 0.01 X10*3/UL (ref 0–0.7)
IMM GRANULOCYTES NFR BLD AUTO: 0.2 % (ref 0–0.9)
KETONES UR STRIP.AUTO-MCNC: NEGATIVE MG/DL
LEFT ATRIUM VOLUME AREA LENGTH INDEX BSA: 26.6
LEFT VENTRICLE INTERNAL DIMENSION DIASTOLE: 4.02 (ref 3.5–6)
LEFT VENTRICULAR OUTFLOW TRACT DIAMETER: 1.83
LEUKOCYTE ESTERASE UR QL STRIP.AUTO: ABNORMAL
LYMPHOCYTES # BLD AUTO: 0.77 X10*3/UL (ref 1.2–4.8)
LYMPHOCYTES NFR BLD AUTO: 15.9 %
MAGNESIUM SERPL-MCNC: 2.19 MG/DL (ref 1.6–2.4)
MCH RBC QN AUTO: 29.3 PG (ref 26–34)
MCHC RBC AUTO-ENTMCNC: 31.6 G/DL (ref 32–36)
MCV RBC AUTO: 93 FL (ref 80–100)
MITRAL VALVE E/A RATIO: 0.91
MITRAL VALVE E/E' RATIO: 7.09
MONOCYTES # BLD AUTO: 0.39 X10*3/UL (ref 0.1–1)
MONOCYTES NFR BLD AUTO: 8.1 %
MUCOUS THREADS #/AREA URNS AUTO: ABNORMAL /LPF
NEUTROPHILS # BLD AUTO: 3.55 X10*3/UL (ref 1.2–7.7)
NEUTROPHILS NFR BLD AUTO: 73.3 %
NITRITE UR QL STRIP.AUTO: NEGATIVE
NRBC BLD-RTO: 0 /100 WBCS (ref 0–0)
PH UR STRIP.AUTO: 5 [PH]
PLATELET # BLD AUTO: 191 X10*3/UL (ref 150–450)
POTASSIUM SERPL-SCNC: 5.1 MMOL/L (ref 3.5–5.3)
PROT SERPL-MCNC: 6.6 G/DL (ref 6.4–8.2)
PROT UR STRIP.AUTO-MCNC: NEGATIVE MG/DL
RBC # BLD AUTO: 4.67 X10*6/UL (ref 4–5.2)
RBC # UR STRIP.AUTO: NEGATIVE /UL
RBC #/AREA URNS AUTO: ABNORMAL /HPF
RIGHT VENTRICLE FREE WALL PEAK S': 10
RIGHT VENTRICLE PEAK SYSTOLIC PRESSURE: 20.9
SODIUM SERPL-SCNC: 141 MMOL/L (ref 136–145)
SP GR UR STRIP.AUTO: 1.01
SQUAMOUS #/AREA URNS AUTO: ABNORMAL /HPF
TRANS CELLS #/AREA UR COMP ASSIST: ABNORMAL /HPF
TRICUSPID ANNULAR PLANE SYSTOLIC EXCURSION: 1.7
UROBILINOGEN UR STRIP.AUTO-MCNC: <2 MG/DL
WBC # BLD AUTO: 4.8 X10*3/UL (ref 4.4–11.3)
WBC #/AREA URNS AUTO: >50 /HPF

## 2023-11-20 PROCEDURE — 1036F TOBACCO NON-USER: CPT | Performed by: INTERNAL MEDICINE

## 2023-11-20 PROCEDURE — 99215 OFFICE O/P EST HI 40 MIN: CPT | Performed by: INTERNAL MEDICINE

## 2023-11-20 PROCEDURE — 83735 ASSAY OF MAGNESIUM: CPT

## 2023-11-20 PROCEDURE — 85025 COMPLETE CBC W/AUTO DIFF WBC: CPT

## 2023-11-20 PROCEDURE — 1126F AMNT PAIN NOTED NONE PRSNT: CPT | Performed by: INTERNAL MEDICINE

## 2023-11-20 PROCEDURE — 81001 URINALYSIS AUTO W/SCOPE: CPT | Performed by: INTERNAL MEDICINE

## 2023-11-20 PROCEDURE — 1159F MED LIST DOCD IN RCRD: CPT | Performed by: INTERNAL MEDICINE

## 2023-11-20 PROCEDURE — 82550 ASSAY OF CK (CPK): CPT | Performed by: INTERNAL MEDICINE

## 2023-11-20 PROCEDURE — 80053 COMPREHEN METABOLIC PANEL: CPT

## 2023-11-20 PROCEDURE — 2500000004 HC RX 250 GENERAL PHARMACY W/ HCPCS (ALT 636 FOR OP/ED): Performed by: INTERNAL MEDICINE

## 2023-11-20 PROCEDURE — 36415 COLL VENOUS BLD VENIPUNCTURE: CPT

## 2023-11-20 PROCEDURE — 96413 CHEMO IV INFUSION 1 HR: CPT

## 2023-11-20 RX ORDER — DIPHENHYDRAMINE HYDROCHLORIDE 50 MG/ML
50 INJECTION INTRAMUSCULAR; INTRAVENOUS AS NEEDED
Status: DISCONTINUED | OUTPATIENT
Start: 2023-11-20 | End: 2023-11-20 | Stop reason: HOSPADM

## 2023-11-20 RX ORDER — EPINEPHRINE 0.3 MG/.3ML
0.3 INJECTION SUBCUTANEOUS EVERY 5 MIN PRN
Status: DISCONTINUED | OUTPATIENT
Start: 2023-11-20 | End: 2023-11-20 | Stop reason: HOSPADM

## 2023-11-20 RX ORDER — HEPARIN SODIUM,PORCINE/PF 10 UNIT/ML
50 SYRINGE (ML) INTRAVENOUS AS NEEDED
Status: CANCELLED | OUTPATIENT
Start: 2023-11-20

## 2023-11-20 RX ORDER — ALBUTEROL SULFATE 0.83 MG/ML
3 SOLUTION RESPIRATORY (INHALATION) AS NEEDED
Status: DISCONTINUED | OUTPATIENT
Start: 2023-11-20 | End: 2023-11-20 | Stop reason: HOSPADM

## 2023-11-20 RX ORDER — FAMOTIDINE 10 MG/ML
20 INJECTION INTRAVENOUS ONCE AS NEEDED
Status: DISCONTINUED | OUTPATIENT
Start: 2023-11-20 | End: 2023-11-20 | Stop reason: HOSPADM

## 2023-11-20 RX ORDER — HEPARIN 100 UNIT/ML
500 SYRINGE INTRAVENOUS AS NEEDED
Status: CANCELLED | OUTPATIENT
Start: 2023-11-20

## 2023-11-20 RX ADMIN — Medication 757.5 MG: at 14:18

## 2023-11-20 ASSESSMENT — ENCOUNTER SYMPTOMS
FATIGUE: 0
HEADACHES: 0
JOINT SWELLING: 0
NAUSEA: 0
VERTIGO: 0
ANOREXIA: 0
VISUAL CHANGE: 0
NECK PAIN: 0
CHILLS: 0
COUGH: 0
SWOLLEN GLANDS: 0
CHANGE IN BOWEL HABIT: 1
DIAPHORESIS: 0
ARTHRALGIAS: 0
VOMITING: 0
SORE THROAT: 0
FEVER: 0
WEAKNESS: 0
NUMBNESS: 0
ABDOMINAL PAIN: 0
MYALGIAS: 0

## 2023-11-20 ASSESSMENT — PAIN SCALES - GENERAL: PAINLEVEL: 0-NO PAIN

## 2023-11-20 NOTE — PROGRESS NOTES
Patient ID: Mirta Hills is a 68 y.o. female.  Referring Physician: No referring provider defined for this encounter.  Primary Care Provider: Haris Mcclellan MD     DIAGNOSIS     Adenocarcinoma of the lung.  Date of diagnosis is [place] 15, 2023 from a level 7 lymph node biopsy/fine-needle aspiration.  Immunohistochemistry positive for TTF-1.        STAGING     Clinical T2a (right lower lobe mass measuring 3.6 cm), clinical N2 (PET positivity and subcarinal region), M1C disease (presence of brain metastases, ribs, right acetabulum, liver, brain), stage IVb disease        CURRENT SITES OF DISEASE     Right lower lobe, right hilum of the lung, ipsilateral mediastinum, liver, bones, brain        MOLECULAR GENOMICS     TEST: Focused Solid Tumor DNA/RNA Panel   SPECIMEN: Supernatant, LYMPH NODE 7, [id number] A   DISEASE DIAGNOSIS: Adenocarcinoma   Estimated Tumor Content: 40%   COLLECTION DATE: 6/15/2023     MICROSATELLITE STATUS: Microsatellite  Instability-High (MSI-H) is NOT DETECTED.     DISEASE ASSOCIATED GENOMIC FINDINGS:   EGFR p.[id number] ([id number] [id number])    TP53 p.N247I ([id number] c.740A>T)     DISEASE RELEVANT ALTERATIONS NOT DETECTED:   Negative for ALK fusion.   Negative for BRAF V600E.   Negative for ERBB2 activating mutation   Negative for KRAS G12C.   Negative for  MET exon 14 skipping mutation.   Negative for NTRK fusion.   Negative for RET fusion.   Negative for ROS1 fusion.        Circulating tumor DNA sent on June 19, 2023  EGFR p.[id number], Variant allelic fraction of 1.6%  TP53 N247I Missense variant, loss of function, variant allelic fraction 1.1%  MSI stable        SERUM TUMOR MARKER     Slightly elevated CEA and CA 19.9 June 2023        PRIOR THERAPY     1- Gamma knife radiosurgery to brain lesions completed on June 28, 2023.        CURRENT THERAPY     Osimertinib on study  on clinical trial EA [id number] which is a randomized trial of osimertinib with or without bevacizumab.   She has been randomized to the bevacizumab arm- Cycle 1 Day 1 is 7/12/2023        CURRENT ONCOLOGICAL PROBLEMS     1-brain metastases, symptomatic, word finding difficulties, mild unsteadiness on her feet but no falls, Resolved July 3, 2023  2-anterior chest pain, increases with deep inspiration, Improved July 3, 2023  3-mild intermittent grade 1 diarrhea, mild hair loss, mild leukopenia, mild elevation in creatinine, mild fatigue.  Most likely all osimertinib related August 7, 2023        HISTORY OF PRESENT ILLNESS     This is a 68-year-old patient.  She states that towards Thanksgiving of 2022 she started feeling some pain in her right shoulder.  Eventually got better however the same pain came back in January or February 2023.  Ultimately some x-rays were done of  the shoulder that were generally negative.  She then developed some pain in her sternum a month or 2 later with deep inspiration and ultimately underwent imaging in May 2023.  Chest CT without contrast was done on June 1, 2023.  This showed a 3.4 cm mass  within the superior segment of the right lower lobe with some associated satellite pulmonary nodules.  Additionally there were some other subcentimeter pulmonary nodules.  There was evidence of mediastinal lymphadenopathy.  Subcarinal lymph node measured  1.4 cm in short axis.She was seen by pulmonary medicine on June 5, 2023 a combined PET/CT was ordered on June 6, 2023 showing a relatively intense area of hypermetabolic activity along the superior posteromedial aspect of the right lower lobe corresponding  to the known right lower lobe lung mass.  There was foci of hypermetabolic activity in the right supraclavicular region, subcarinal and right hilar regions.  There was area of uptake within the posterior upper ribs as well as right acetabulum suspicious  for osseous metastatic disease.  There was a punctate area of mild hypermetabolic activity in the right hepatic lobe concerning for hepatic  metastases.  Small focus in the subcutaneous region just anterior to the lower aspect of the sternum could be inflammatory  versus metastatic.  She undergoes a bronchoscopy dated [place] 15, 2023.  There were no endobronchial lesions.  Primary cytology with rapid onsite evaluation was suggestive of malignancy in level 7, final lymph nodes were pathology was pending.  MRI of the  brain with and without contrast dated June 16, 2023 showed approximately 20 enhancing nodules concerning for metastatic disease.  Specifically there was a 1.5 cm nodule in the right cerebellar hemisphere.  Most of the other nodules were less than 1 cm.   Patient also notes that she has some difficulty expressing words over the past 1 to 2 weeks prior to her first visit with us as well as some mild unsteadiness on her legs where she feels she is drifting towards the right side.        PAST MEDICAL HISTORY     1-hypothyroidism  2-hysterectomy 1988  3-abdominal hernia surgery 1995  4-some hearing difficulties        SOCIAL HISTORY     Patient lives with her significant other.  She has 2 daughter.  L[place] in [facility].  Smoked for only 3 years during college and during this time it was a pack per day.  She has retired.  She had a retail store.        CURRENT MEDS     See medication list, meds reviewed        ALLERGIES     Patient denies any drug allergies        FAMILY HISTORY     Patient's father had bladder cancer at the age of 86.  She has 1 brother with no cancer.  She has 2 children        History of Present Illness:      ID Statement:    EUN BUTTS is a 68 year old Female        Interval History:       Patient is overall feeling well.  She has episodic diarrhea up to 3 bowel movements per day but not any worse.  It does not occur every day, on average she has 2 bowel movements per day, no nausea no vomiting, appetite is good, some skin sensitivity around  her eyes but no rash, no fever no night sweats.  Mild intermittent headaches that  have been improving, some mild hair loss.     Review of Systems:   · Constitutional NEGATIVE: Fever, Chills, Anorexia, Weight Loss, Malaise      · Eyes NEGATIVE: Blurry Vision, Drainage, Diploplia, Redness, Vision Loss/ Change      · ENMT NEGATIVE: Nasal Discharge, Nasal Congestion, Ear Pain, Mouth Pain, Throat Pain      · Respiratory NEGATIVE: Dry Cough, Productive Cough, Hemoptysis, Wheezing, Shortness of Breath      · Cardiology NEGATIVE: Chest Pain, Dyspnea on Exertion, Orthopnea, Palpitations, Syncope      ·  Gastrointestinal POSITIVE: Diarrhea     NEGATIVE: Abdominal Pain, Constipation, Nausea, Vomiting      · Genitourinary NEGATIVE: Discharge, Dysuria, Flank Pain, Frequency, Hematuria      · Musculoskeletal NEGATIVE: Decreased ROM, Pain, Swelling, Stiffness, Weakness      · Neurological NEGATIVE: Dizziness, Confusion, Headache, Seizures, Syncope      · Psychiatric NEGATIVE: Mood Changes, Anxiety, Hallucinations, Sleep Changes, Suicidal Ideas      · Skin NEGATIVE: Mass, Pain, Pruritus, Rash, Ulcer      · Hematologic/Lymph NEGATIVE: Anemia, Bruising, Easy Bleeding, Night Sweats, Petechiae      · Allergic/Immunologic NEGATIVE: Anaphylaxis, Itchy/ Teary Eyes, Itching, Sneezing, Swelling            Allergies and Intolerances:       Allergies:         No Known Allergies: Active     Outpatient Medication Profile:  * Patient Currently Takes Medications as of 07-Aug-2023 09:37 documented in Structured Notes         ondansetron 8 mg oral tablet : Last Dose Taken:  , 1 tab(s) orally every 8 hours as needed for nausea, Start Date: 12-Jul-2023         Lipitor 20 mg oral tablet: Last Dose Taken:  , 1 tab(s) orally once a  day         Synthroid 100 mcg (0.1 mg) oral tablet: Last Dose Taken:  , 1 tab(s)  orally once a day         sertraline 50 mg oral tablet: Last Dose Taken:  , 1 tab(s) orally once  a day             Medical History:         Primary adenocarcinoma of upper lobe of right lung: ICD-10:  C34.11, Status:  Active         Metastasis to brain: ICD-10: C79.31, Status: Active     Family History: No Family History items are recorded  in the problem list.      Social History:   Social Substance History:  ·  Smoking Status former smoker (1)   ·  Additional History     PAST MEDICAL HISTORY     1-hypothyroidism  2-hysterectomy 1988  3-abdominal hernia surgery 1995  4-some hearing difficulties        SOCIAL HISTORY     Patient lives with her significant other.  She has 2 daughter.  Lives in [facility].  Smoked for only 3 years during college and during this time it was a pack per day.  She has retired.  She had a retail store.        CURRENT MEDS     See medication list, meds reviewed        ALLERGIES     Patient denies any drug allergies        FAMILY HISTORY     Patient's father had bladder cancer at the age of 86.  She has 1 brother with no cancer.  She has 2 children       Subjective    Cancer  Associated symptoms include a change in bowel habit. Pertinent negatives include no abdominal pain, anorexia, arthralgias, chest pain, chills, congestion, coughing, diaphoresis, fatigue, fever, headaches, joint swelling, myalgias, nausea, neck pain, numbness, rash, sore throat, swollen glands, urinary symptoms, vertigo, visual change, vomiting or weakness.       Patient is clinically doing very well.  She had some chest pain that seems to be musculoskeletal for few days back in October 2023.  Ultimately this was felt to be chest wall related given the fact that it would increase with sneezing and certain movements and it is now gone away.  It was not related to exertion.  She is not coughing she denies any shortness of breath no new headaches no fever no night sweats her appetite is good her bowel movements still at times can have diarrhea up to 2 to 3/day but she is taking Imodium.      Review of Systems   Constitutional:  Negative for chills, diaphoresis, fatigue and fever.   HENT:   Negative for congestion and sore throat.     Respiratory:  Negative for cough.    Cardiovascular:  Negative for chest pain.   Gastrointestinal:  Positive for change in bowel habit. Negative for abdominal pain, anorexia, nausea and vomiting.   Musculoskeletal:  Negative for arthralgias, joint swelling, myalgias and neck pain.   Skin:  Negative for rash.   Neurological:  Negative for headaches, numbness, vertigo and weakness.        Objective   BSA: 1.47 meters squared  /80 (BP Location: Right arm, Patient Position: Sitting, BP Cuff Size: Adult)   Pulse 57   Temp 36.6 °C (97.9 °F) (Temporal)   Resp 18   Wt 50.2 kg (110 lb 10.7 oz)   SpO2 100%   BMI 20.91 kg/m²       Physical Exam  Constitutional:       Appearance: Normal appearance. She is normal weight.   HENT:      Mouth/Throat:      Mouth: Mucous membranes are moist.      Pharynx: Oropharynx is clear. No oropharyngeal exudate or posterior oropharyngeal erythema.   Eyes:      General: No scleral icterus.     Conjunctiva/sclera: Conjunctivae normal.      Pupils: Pupils are equal, round, and reactive to light.   Cardiovascular:      Rate and Rhythm: Normal rate and regular rhythm.      Pulses: Normal pulses.      Heart sounds: Normal heart sounds. No murmur heard.     No friction rub. No gallop.   Pulmonary:      Effort: Pulmonary effort is normal. No respiratory distress.      Breath sounds: Normal breath sounds. No stridor. No wheezing or rhonchi.   Abdominal:      General: Abdomen is flat. Bowel sounds are normal. There is no distension.      Palpations: Abdomen is soft. There is no mass.      Tenderness: There is no abdominal tenderness. There is no guarding or rebound.   Musculoskeletal:         General: Signs of injury present. No swelling, tenderness or deformity. Normal range of motion.      Cervical back: No rigidity or tenderness.      Right lower leg: No edema.      Left lower leg: No edema.   Lymphadenopathy:      Cervical: No cervical adenopathy.   Skin:     General: Skin is warm.       Coloration: Skin is not jaundiced or pale.      Findings: No bruising or erythema.   Neurological:      General: No focal deficit present.      Mental Status: She is alert and oriented to person, place, and time.      Motor: No weakness.      Gait: Gait normal.   Psychiatric:         Mood and Affect: Mood normal.         Behavior: Behavior normal.         Performance Status:  Asymptomatic     Latest Reference Range & Units 11/20/23 10:44   GLUCOSE 74 - 99 mg/dL 100 (H)   SODIUM 136 - 145 mmol/L 141   POTASSIUM 3.5 - 5.3 mmol/L 5.1   CHLORIDE 98 - 107 mmol/L 105   Bicarbonate 21 - 32 mmol/L 30   Anion Gap 10 - 20 mmol/L 11   Blood Urea Nitrogen 6 - 23 mg/dL 15   Creatinine 0.50 - 1.05 mg/dL 1.26 (H)   EGFR >60 mL/min/1.73m*2 46 (L)   Calcium 8.6 - 10.3 mg/dL 9.9   Albumin 3.4 - 5.0 g/dL 4.1   Alkaline Phosphatase 33 - 136 U/L 61   ALT 7 - 45 U/L 17   AST 9 - 39 U/L 20   Bilirubin Total 0.0 - 1.2 mg/dL 0.4   Total Protein 6.4 - 8.2 g/dL 6.6   MAGNESIUM 1.60 - 2.40 mg/dL 2.19   WBC 4.4 - 11.3 x10*3/uL 4.8   nRBC 0.0 - 0.0 /100 WBCs 0.0   RBC 4.00 - 5.20 x10*6/uL 4.67   HEMOGLOBIN 12.0 - 16.0 g/dL 13.7   HEMATOCRIT 36.0 - 46.0 % 43.3   MCV 80 - 100 fL 93   MCH 26.0 - 34.0 pg 29.3   MCHC 32.0 - 36.0 g/dL 31.6 (L)   RED CELL DISTRIBUTION WIDTH 11.5 - 14.5 % 14.1   Platelets 150 - 450 x10*3/uL 191   Neutrophils % 40.0 - 80.0 % 73.3   Immature Granulocytes %, Automated 0.0 - 0.9 % 0.2   Lymphocytes % 13.0 - 44.0 % 15.9   Monocytes % 2.0 - 10.0 % 8.1   Eosinophils % 0.0 - 6.0 % 1.9   Basophils % 0.0 - 2.0 % 0.6   Neutrophils Absolute 1.20 - 7.70 x10*3/uL 3.55   Immature Granulocytes Absolute, Automated 0.00 - 0.70 x10*3/uL 0.01   Lymphocytes Absolute 1.20 - 4.80 x10*3/uL 0.77 (L)   Monocytes Absolute 0.10 - 1.00 x10*3/uL 0.39   Eosinophils Absolute 0.00 - 0.70 x10*3/uL 0.09   Basophils Absolute 0.00 - 0.10 x10*3/uL 0.03   (H): Data is abnormally high  (L): Data is abnormally low    MRI Brain 11/15/2023  IMPRESSION:  1.  Continued decreased size/enhancement of numerous scattered  enhancing lesions throughout the cerebral and cerebellar hemispheres  as detailed above. No new or enlarging intracranial metastases.  2. Mild small-vessel ischemic disease.    CT C/A/P 11/15/2023  MPRESSION:  CHEST:  1. Stable right lower lobe superior segment spiculated mass measuring  2.7 x 1.9 cm with inward tethering of the right major fissure.  2. Stable few subcentimeter other right pulmonary nodules. No new  suspicious pulmonary nodules.  3. Stable prominent subcentimeter mediastinal lymph nodes. No new  intrathoracic suspicious lymphadenopathy.      ABDOMEN-PELVIS:  1. No intra-abdominal or pelvic metastatic disease.  2. Stable multiple prominent subcentimeter retroperitoneal and iliac  chain/pelvic lymph nodes.  3. Hepatic steatosis.  4. Splenomegaly as in prior.    11/17/2023 Echocardiogram  Pending      Assessment/Plan      This is a very nice 68-year-old patient with EGFR, exon 19 with concurrent T p53 mutation adenocarcinoma of the lung diagnosed 6 months ago in June 2023 on increased her cooperative onc[facility] group trial of osimertinib with bevacizumab.  She is tolerating treatment quite well except for some grade 1 intermittent diarrhea.  Imaging studies have been reviewed including MRI of the brain and CT scan of the chest that continue to show excellent response.  I personally reviewed them.  We will continue her per protocol.               Humberto Landin MD

## 2023-11-21 ENCOUNTER — EDUCATION (OUTPATIENT)
Dept: HEMATOLOGY/ONCOLOGY | Facility: HOSPITAL | Age: 68
End: 2023-11-21
Payer: MEDICARE

## 2023-11-21 NOTE — RESEARCH NOTES
Research Note Non-Treatment Day    Mirta Hills is here today. Patient is on [id number] clinical trial. Today is C7D1. Procedures completed per protocol. AE's and con-meds reviewed with patient. Patient is aware of treatment plan.  Dr. Chairez reviewed CT scan and brain MRI results. Patient states diarrhea is better controlled with consistent use of imodium.      Education Documentation  Treatment Plan and Schedule, taught by Makayla Joseph RN at 11/21/2023 12:04 AM.  Learner: Patient  Readiness: Acceptance  Method: Explanation  Response: Verbalizes Understanding    General Medication Information, taught by Makayla Joseph RN at 11/21/2023 12:04 AM.  Learner: Patient  Readiness: Acceptance  Method: Explanation  Response: Verbalizes Understanding    Supportive Medications, taught by Makayla Joseph RN at 11/21/2023 12:04 AM.  Learner: Patient  Readiness: Acceptance  Method: Explanation  Response: Verbalizes Understanding    Education Comments  No comments found.

## 2023-11-30 ENCOUNTER — TELEPHONE (OUTPATIENT)
Dept: HEMATOLOGY/ONCOLOGY | Facility: HOSPITAL | Age: 68
End: 2023-11-30
Payer: MEDICARE

## 2023-11-30 NOTE — TELEPHONE ENCOUNTER
Mirta calls today to inquire why she is being referred to Infectious Disease.  Per Dr. Landin's clinic note dated 11/20, the referral is secondary to pt's recurrent urinary tract infections.  Pt verbalized understanding with teach back and agreed to call back to schedule the appointment with ID. No further needs at this time.

## 2023-12-01 ENCOUNTER — OFFICE VISIT (OUTPATIENT)
Dept: INFECTIOUS DISEASES | Facility: CLINIC | Age: 68
End: 2023-12-01
Payer: MEDICARE

## 2023-12-01 DIAGNOSIS — C34.90 MALIGNANT NEOPLASM OF LUNG, UNSPECIFIED LATERALITY, UNSPECIFIED PART OF LUNG (MULTI): ICD-10-CM

## 2023-12-01 DIAGNOSIS — N81.9 VAGINAL VAULT PROLAPSE: Primary | ICD-10-CM

## 2023-12-01 PROBLEM — R82.81 PYURIA: Status: ACTIVE | Noted: 2023-12-01

## 2023-12-01 PROBLEM — M85.859 OSTEOPENIA OF NECK OF FEMUR: Status: ACTIVE | Noted: 2020-11-02

## 2023-12-01 PROBLEM — C34.91 ADENOCARCINOMA OF RIGHT LUNG (MULTI): Status: ACTIVE | Noted: 2023-11-13

## 2023-12-01 PROCEDURE — 1126F AMNT PAIN NOTED NONE PRSNT: CPT | Performed by: INTERNAL MEDICINE

## 2023-12-01 PROCEDURE — 1159F MED LIST DOCD IN RCRD: CPT | Performed by: INTERNAL MEDICINE

## 2023-12-01 PROCEDURE — 1036F TOBACCO NON-USER: CPT | Performed by: INTERNAL MEDICINE

## 2023-12-01 PROCEDURE — 99203 OFFICE O/P NEW LOW 30 MIN: CPT | Performed by: INTERNAL MEDICINE

## 2023-12-01 PROCEDURE — 1160F RVW MEDS BY RX/DR IN RCRD: CPT | Performed by: INTERNAL MEDICINE

## 2023-12-01 NOTE — PATIENT INSTRUCTIONS
It was great to meet you today.  The condition that you have is called pyuria or white blood cells in urine.  This does not equal having a UTI, especially as you have no symptoms of UTI.  You do not need to receive antibiotics for this unless you are having urinary symptoms.    I have referred you to physical therapy for management of your pelvic floor dysfunction.  They may have something to offer you for self-management / improvement in your pelvic floor.    Please feel free to contact my office if you have any further questions.

## 2023-12-01 NOTE — LETTER
12/26/23    Humberto Landin MD  [address]  [facility] [address]      Dear Dr. Humberto Landin MD,    Thank you for referring your patient, Mirta Hills, to receive care in my office. I have enclosed a summary of the care provided to Mirta on 12/01/23.    Please contact me with any questions you may have regarding the visit.    Sincerely,         Dee Temple MD  [address]  [place] [phone]    CC: No Recipients

## 2023-12-01 NOTE — Clinical Note
12/01/23    Haris Mcclellan MD  [address]  [facility]  [id number], [facility] [address]  [facility] [address]      Dear Dr. Haris Mcclellan MD,    I am writing to confirm that your patient, Mirta Hills, received care in my office on 12/01/23. I have enclosed a summary of the care provided to Mirta for your reference.    Please contact me with any questions you may have regarding the visit.    Sincerely,         Dee Temple MD  [address]  [facility] [id number]    CC: No Recipients

## 2023-12-06 NOTE — PROGRESS NOTES
Radiation Oncology Treatment Summary    Patient Name:  Mirta Hills  MRN:  [id number]  :  1955    Referring Provider: No ref. provider found  Primary Care Provider: Haris Mcclellan MD    Brief History: Mirta Hills is a 68 y.o. female with No matching staging information was found for the patient..  The patient completed radiotherapy as outlined below.    Radiation Treatment Summary:    Rx: [id number]  2023 1,850 cGY/1,850 cGY  Rx: [id number]  2023 2,000 cGY/2,000 cGY  Rx:  2023 2,000 cGY/2,000 cGY  Rx: [id number] 2023 2,000 cGY/2,000 cGY  Rx:  2023 2,000 cGY/2,000 cGY  Rx: [id number] 2023 1,800 cGY/1,800 cGY  Rx: [id number] 2023 1,800 cGY/1,800 cGY  Rx: [id number] 2023 1,800 cGY/1,800 cGY  Rx: [id number] 2023 1,500 cGY/1,500 cGY      Concurrent Chemotherapy:  Treatment Plans       Name Type Plan Dates Plan Provider         Active    ([facility]) [id number] Arm B - Bevacizumab / Osimertinib, 21 Day Cycles Oncology Treatment  2023 - Present Humberto Landin MD                    CTCAE Toxicity Overview:     Patient Disposition: Phone Follow up with Amada Bradley NP on 2023

## 2023-12-07 DIAGNOSIS — C34.90 MALIGNANT NEOPLASM OF LUNG, UNSPECIFIED LATERALITY, UNSPECIFIED PART OF LUNG (MULTI): Primary | ICD-10-CM

## 2023-12-07 RX ORDER — PROCHLORPERAZINE MALEATE 10 MG
10 TABLET ORAL EVERY 6 HOURS PRN
Status: CANCELLED | OUTPATIENT
Start: 2023-12-11

## 2023-12-07 RX ORDER — EPINEPHRINE 0.3 MG/.3ML
0.3 INJECTION SUBCUTANEOUS EVERY 5 MIN PRN
Status: CANCELLED | OUTPATIENT
Start: 2023-12-11

## 2023-12-07 RX ORDER — PROCHLORPERAZINE EDISYLATE 5 MG/ML
10 INJECTION INTRAMUSCULAR; INTRAVENOUS EVERY 6 HOURS PRN
Status: CANCELLED | OUTPATIENT
Start: 2023-12-11

## 2023-12-07 RX ORDER — DIPHENHYDRAMINE HYDROCHLORIDE 50 MG/ML
50 INJECTION INTRAMUSCULAR; INTRAVENOUS AS NEEDED
Status: CANCELLED | OUTPATIENT
Start: 2023-12-11

## 2023-12-07 RX ORDER — FAMOTIDINE 10 MG/ML
20 INJECTION INTRAVENOUS ONCE AS NEEDED
Status: CANCELLED | OUTPATIENT
Start: 2023-12-11

## 2023-12-07 RX ORDER — ALBUTEROL SULFATE 0.83 MG/ML
3 SOLUTION RESPIRATORY (INHALATION) AS NEEDED
Status: CANCELLED | OUTPATIENT
Start: 2023-12-11

## 2023-12-11 ENCOUNTER — INFUSION (OUTPATIENT)
Dept: HEMATOLOGY/ONCOLOGY | Facility: HOSPITAL | Age: 68
End: 2023-12-11
Payer: MEDICARE

## 2023-12-11 ENCOUNTER — LAB (OUTPATIENT)
Dept: LAB | Facility: HOSPITAL | Age: 68
End: 2023-12-11
Payer: MEDICARE

## 2023-12-11 ENCOUNTER — OFFICE VISIT (OUTPATIENT)
Dept: HEMATOLOGY/ONCOLOGY | Facility: HOSPITAL | Age: 68
End: 2023-12-11
Payer: MEDICARE

## 2023-12-11 ENCOUNTER — EDUCATION (OUTPATIENT)
Dept: HEMATOLOGY/ONCOLOGY | Facility: HOSPITAL | Age: 68
End: 2023-12-11

## 2023-12-11 VITALS
HEART RATE: 75 BPM | TEMPERATURE: 97.5 F | RESPIRATION RATE: 16 BRPM | DIASTOLIC BLOOD PRESSURE: 74 MMHG | SYSTOLIC BLOOD PRESSURE: 132 MMHG | WEIGHT: 110.23 LBS | BODY MASS INDEX: 20.83 KG/M2 | OXYGEN SATURATION: 99 %

## 2023-12-11 VITALS
TEMPERATURE: 98.2 F | OXYGEN SATURATION: 99 % | RESPIRATION RATE: 20 BRPM | SYSTOLIC BLOOD PRESSURE: 127 MMHG | DIASTOLIC BLOOD PRESSURE: 64 MMHG | HEART RATE: 66 BPM

## 2023-12-11 DIAGNOSIS — C34.90 MALIGNANT NEOPLASM OF LUNG, UNSPECIFIED LATERALITY, UNSPECIFIED PART OF LUNG (MULTI): ICD-10-CM

## 2023-12-11 DIAGNOSIS — C34.90 MALIGNANT NEOPLASM OF LUNG, UNSPECIFIED LATERALITY, UNSPECIFIED PART OF LUNG (MULTI): Primary | ICD-10-CM

## 2023-12-11 LAB
ALBUMIN SERPL BCP-MCNC: 4.1 G/DL (ref 3.4–5)
ALP SERPL-CCNC: 57 U/L (ref 33–136)
ALT SERPL W P-5'-P-CCNC: 17 U/L (ref 7–45)
ANION GAP SERPL CALC-SCNC: 14 MMOL/L (ref 10–20)
AST SERPL W P-5'-P-CCNC: 22 U/L (ref 9–39)
BASOPHILS # BLD AUTO: 0.03 X10*3/UL (ref 0–0.1)
BASOPHILS NFR BLD AUTO: 0.6 %
BILIRUB SERPL-MCNC: 0.3 MG/DL (ref 0–1.2)
BUN SERPL-MCNC: 23 MG/DL (ref 6–23)
CALCIUM SERPL-MCNC: 9.5 MG/DL (ref 8.6–10.3)
CHLORIDE SERPL-SCNC: 106 MMOL/L (ref 98–107)
CK SERPL-CCNC: 75 U/L (ref 0–215)
CO2 SERPL-SCNC: 27 MMOL/L (ref 21–32)
CREAT SERPL-MCNC: 1.3 MG/DL (ref 0.5–1.05)
EOSINOPHIL # BLD AUTO: 0.07 X10*3/UL (ref 0–0.7)
EOSINOPHIL NFR BLD AUTO: 1.5 %
ERYTHROCYTE [DISTWIDTH] IN BLOOD BY AUTOMATED COUNT: 14.4 % (ref 11.5–14.5)
GFR SERPL CREATININE-BSD FRML MDRD: 44 ML/MIN/1.73M*2
GLUCOSE SERPL-MCNC: 93 MG/DL (ref 74–99)
HCT VFR BLD AUTO: 43.7 % (ref 36–46)
HGB BLD-MCNC: 14 G/DL (ref 12–16)
IMM GRANULOCYTES # BLD AUTO: 0.02 X10*3/UL (ref 0–0.7)
IMM GRANULOCYTES NFR BLD AUTO: 0.4 % (ref 0–0.9)
LYMPHOCYTES # BLD AUTO: 0.87 X10*3/UL (ref 1.2–4.8)
LYMPHOCYTES NFR BLD AUTO: 18.7 %
MAGNESIUM SERPL-MCNC: 2.19 MG/DL (ref 1.6–2.4)
MCH RBC QN AUTO: 29.5 PG (ref 26–34)
MCHC RBC AUTO-ENTMCNC: 32 G/DL (ref 32–36)
MCV RBC AUTO: 92 FL (ref 80–100)
MONOCYTES # BLD AUTO: 0.36 X10*3/UL (ref 0.1–1)
MONOCYTES NFR BLD AUTO: 7.7 %
NEUTROPHILS # BLD AUTO: 3.3 X10*3/UL (ref 1.2–7.7)
NEUTROPHILS NFR BLD AUTO: 71.1 %
NRBC BLD-RTO: 0 /100 WBCS (ref 0–0)
PLATELET # BLD AUTO: 204 X10*3/UL (ref 150–450)
POTASSIUM SERPL-SCNC: 4.5 MMOL/L (ref 3.5–5.3)
PROT SERPL-MCNC: 6.4 G/DL (ref 6.4–8.2)
RBC # BLD AUTO: 4.75 X10*6/UL (ref 4–5.2)
SODIUM SERPL-SCNC: 142 MMOL/L (ref 136–145)
T4 FREE SERPL-MCNC: 1.24 NG/DL (ref 0.78–1.48)
TSH SERPL-ACNC: 7.16 MIU/L (ref 0.44–3.98)
WBC # BLD AUTO: 4.7 X10*3/UL (ref 4.4–11.3)

## 2023-12-11 PROCEDURE — 85025 COMPLETE CBC W/AUTO DIFF WBC: CPT

## 2023-12-11 PROCEDURE — 1160F RVW MEDS BY RX/DR IN RCRD: CPT | Performed by: CLINICAL NURSE SPECIALIST

## 2023-12-11 PROCEDURE — 36415 COLL VENOUS BLD VENIPUNCTURE: CPT

## 2023-12-11 PROCEDURE — 99214 OFFICE O/P EST MOD 30 MIN: CPT | Mod: 25 | Performed by: CLINICAL NURSE SPECIALIST

## 2023-12-11 PROCEDURE — 83735 ASSAY OF MAGNESIUM: CPT

## 2023-12-11 PROCEDURE — 1036F TOBACCO NON-USER: CPT | Performed by: CLINICAL NURSE SPECIALIST

## 2023-12-11 PROCEDURE — 84439 ASSAY OF FREE THYROXINE: CPT | Performed by: CLINICAL NURSE SPECIALIST

## 2023-12-11 PROCEDURE — 80053 COMPREHEN METABOLIC PANEL: CPT

## 2023-12-11 PROCEDURE — 99214 OFFICE O/P EST MOD 30 MIN: CPT | Performed by: CLINICAL NURSE SPECIALIST

## 2023-12-11 PROCEDURE — 84443 ASSAY THYROID STIM HORMONE: CPT | Performed by: CLINICAL NURSE SPECIALIST

## 2023-12-11 PROCEDURE — 1159F MED LIST DOCD IN RCRD: CPT | Performed by: CLINICAL NURSE SPECIALIST

## 2023-12-11 PROCEDURE — 82550 ASSAY OF CK (CPK): CPT

## 2023-12-11 PROCEDURE — 96365 THER/PROPH/DIAG IV INF INIT: CPT | Mod: INF

## 2023-12-11 PROCEDURE — 2500000004 HC RX 250 GENERAL PHARMACY W/ HCPCS (ALT 636 FOR OP/ED): Performed by: CLINICAL NURSE SPECIALIST

## 2023-12-11 PROCEDURE — 1126F AMNT PAIN NOTED NONE PRSNT: CPT | Performed by: CLINICAL NURSE SPECIALIST

## 2023-12-11 RX ORDER — PROCHLORPERAZINE EDISYLATE 5 MG/ML
10 INJECTION INTRAMUSCULAR; INTRAVENOUS EVERY 6 HOURS PRN
Status: DISCONTINUED | OUTPATIENT
Start: 2023-12-11 | End: 2023-12-11 | Stop reason: HOSPADM

## 2023-12-11 RX ORDER — PROCHLORPERAZINE MALEATE 10 MG
10 TABLET ORAL EVERY 6 HOURS PRN
Status: DISCONTINUED | OUTPATIENT
Start: 2023-12-11 | End: 2023-12-11 | Stop reason: HOSPADM

## 2023-12-11 RX ORDER — FAMOTIDINE 10 MG/ML
20 INJECTION INTRAVENOUS ONCE AS NEEDED
Status: DISCONTINUED | OUTPATIENT
Start: 2023-12-11 | End: 2023-12-11 | Stop reason: HOSPADM

## 2023-12-11 RX ORDER — DIPHENHYDRAMINE HYDROCHLORIDE 50 MG/ML
50 INJECTION INTRAMUSCULAR; INTRAVENOUS AS NEEDED
Status: DISCONTINUED | OUTPATIENT
Start: 2023-12-11 | End: 2023-12-11 | Stop reason: HOSPADM

## 2023-12-11 RX ORDER — EPINEPHRINE 0.3 MG/.3ML
0.3 INJECTION SUBCUTANEOUS EVERY 5 MIN PRN
Status: DISCONTINUED | OUTPATIENT
Start: 2023-12-11 | End: 2023-12-11 | Stop reason: HOSPADM

## 2023-12-11 RX ORDER — ALBUTEROL SULFATE 0.83 MG/ML
3 SOLUTION RESPIRATORY (INHALATION) AS NEEDED
Status: DISCONTINUED | OUTPATIENT
Start: 2023-12-11 | End: 2023-12-11 | Stop reason: HOSPADM

## 2023-12-11 RX ADMIN — Medication 757.5 MG: at 13:38

## 2023-12-11 ASSESSMENT — ENCOUNTER SYMPTOMS
VISUAL CHANGE: 0
VERTIGO: 0
COUGH: 0
ANOREXIA: 0
SWOLLEN GLANDS: 0
VOMITING: 0
ARTHRALGIAS: 0
SORE THROAT: 0
ABDOMINAL PAIN: 0
NUMBNESS: 0
HEADACHES: 0
JOINT SWELLING: 0
FATIGUE: 0
NECK PAIN: 0
CHILLS: 0
MYALGIAS: 0
FEVER: 0
NAUSEA: 0
DIAPHORESIS: 0
WEAKNESS: 0
CHANGE IN BOWEL HABIT: 1

## 2023-12-11 ASSESSMENT — PAIN SCALES - GENERAL: PAINLEVEL: 0-NO PAIN

## 2023-12-11 NOTE — RESEARCH NOTES
Research Note Non-Treatment Day    Mirta Hills is here today. Patient is on WC0334. Today is C8D1. Procedures completed per protocol. AE's and con-meds reviewed with patient. Patient is aware of treatment plan. Patient continues to have intermittent diarrhea which is controlled with immodium.      Education Documentation  Treatment Plan and Schedule, taught by Makayla Joseph RN at 12/11/2023  1:07 PM.  Learner: Patient  Readiness: Eager  Method: Explanation  Response: Verbalizes Understanding    General Medication Information, taught by Makayla Joseph RN at 12/11/2023  1:07 PM.  Learner: Patient  Readiness: Eager  Method: Explanation  Response: Verbalizes Understanding    Supportive Medications, taught by Makayla Joseph RN at 12/11/2023  1:07 PM.  Learner: Patient  Readiness: Eager  Method: Explanation  Response: Verbalizes Understanding    Education Comments  No comments found.

## 2023-12-11 NOTE — PROGRESS NOTES
Patient ID: Mirta Hills is a 68 y.o. female.  Referring Physician: No referring provider defined for this encounter.  Primary Care Provider: Haris Mcclellan MD     DIAGNOSIS     Adenocarcinoma of the lung.  Date of diagnosis is [place] 15, 2023 from a level 7 lymph node biopsy/fine-needle aspiration.  Immunohistochemistry positive for TTF-1.        STAGING     Clinical T2a (right lower lobe mass measuring 3.6 cm), clinical N2 (PET positivity and subcarinal region), M1C disease (presence of brain metastases, ribs, right acetabulum, liver, brain), stage IVb disease        CURRENT SITES OF DISEASE     Right lower lobe, right hilum of the lung, ipsilateral mediastinum, liver, bones, brain        MOLECULAR GENOMICS     TEST: Focused Solid Tumor DNA/RNA Panel   SPECIMEN: Supernatant, LYMPH NODE 7, [id number]   DISEASE DIAGNOSIS: Adenocarcinoma   Estimated Tumor Content: 40%   COLLECTION DATE: 6/15/2023     MICROSATELLITE STATUS: Microsatellite  Instability-High (MSI-H) is NOT DETECTED.     DISEASE ASSOCIATED GENOMIC FINDINGS:   EGFR p.[id number] ([id number])    TP53 p.N247I ([id number] c.[id number]>T)     DISEASE RELEVANT ALTERATIONS NOT DETECTED:   Negative for ALK fusion.   Negative for BRAF V600E.   Negative for ERBB2 activating mutation   Negative for KRAS G12C.   Negative for  MET exon 14 skipping mutation.   Negative for NTRK fusion.   Negative for RET fusion.   Negative for ROS1 fusion.        Circulating tumor DNA sent on June 19, 2023  EGFR p.[id number], Variant allelic fraction of 1.6%  TP53 N247I Missense variant, loss of function, variant allelic fraction 1.1%  MSI stable        SERUM TUMOR MARKER     Slightly elevated CEA and CA 19.9 June 2023        PRIOR THERAPY     1- Gamma knife radiosurgery to brain lesions completed on June 28, 2023.        CURRENT THERAPY     Osimertinib on study  on clinical trial [id number] which is a randomized trial of osimertinib with or without bevacizumab.   She has been randomized to the bevacizumab arm- Cycle 1 Day 1 is 7/12/2023        CURRENT ONCOLOGICAL PROBLEMS     1-brain metastases, symptomatic, word finding difficulties, mild unsteadiness on her feet but no falls, Resolved July 3, 2023  2-anterior chest pain, increases with deep inspiration, Improved July 3, 2023  3-mild intermittent grade 1 diarrhea, mild hair loss, mild leukopenia, mild elevation in creatinine, mild fatigue.  Most likely all osimertinib related August 7, 2023        HISTORY OF PRESENT ILLNESS     This is a 68-year-old patient.  She states that towards Thanksgiving of 2022 she started feeling some pain in her right shoulder.  Eventually got better however the same pain came back in January or February 2023.  Ultimately some x-rays were done of  the shoulder that were generally negative.  She then developed some pain in her sternum a month or 2 later with deep inspiration and ultimately underwent imaging in May 2023.  Chest CT without contrast was done on June 1, 2023.  This showed a 3.4 cm mass  within the superior segment of the right lower lobe with some associated satellite pulmonary nodules.  Additionally there were some other subcentimeter pulmonary nodules.  There was evidence of mediastinal lymphadenopathy.  Subcarinal lymph node measured  1.4 cm in short axis.She was seen by pulmonary medicine on June 5, 2023 a combined PET/CT was ordered on June 6, 2023 showing a relatively intense area of hypermetabolic activity along the superior posteromedial aspect of the right lower lobe corresponding  to the known right lower lobe lung mass.  There was foci of hypermetabolic activity in the right supraclavicular region, subcarinal and right hilar regions.  There was area of uptake within the posterior upper ribs as well as right acetabulum suspicious  for osseous metastatic disease.  There was a punctate area of mild hypermetabolic activity in the right hepatic lobe concerning for hepatic  metastases.  Small focus in the subcutaneous region just anterior to the lower aspect of the sternum could be inflammatory  versus metastatic.  She undergoes a bronchoscopy dated [place] 15, 2023.  There were no endobronchial lesions.  Primary cytology with rapid onsite evaluation was suggestive of malignancy in level 7, final lymph nodes were pathology was pending.  MRI of the  brain with and without contrast dated June 16, 2023 showed approximately 20 enhancing nodules concerning for metastatic disease.  Specifically there was a 1.5 cm nodule in the right cerebellar hemisphere.  Most of the other nodules were less than 1 cm.   Patient also notes that she has some difficulty expressing words over the past 1 to 2 weeks prior to her first visit with us as well as some mild unsteadiness on her legs where she feels she is drifting towards the right side.        PAST MEDICAL HISTORY     1-hypothyroidism  2-hysterectomy 1988  3-abdominal hernia surgery 1995  4-some hearing difficulties        SOCIAL HISTORY     Patient lives with her significant other.  She has 2 daughter.  Lives in [facility].  Smoked for only 3 years during college and during this time it was a pack per day.  She has retired.  She had a retail store.        CURRENT MEDS     See medication list, meds reviewed        ALLERGIES     Patient denies any drug allergies        FAMILY HISTORY     Patient's father had bladder cancer at the age of 86.  She has 1 brother with no cancer.  She has 2 children        History of Present Illness:      ID Statement:    EUN BUTTS is a 68 year old Female        Interval History:       Patient is overall feeling well.  She has episodic diarrhea up to 3 bowel movements per day but not any worse.  It does not occur every day, on average she has 2 bowel movements per day, no nausea no vomiting, appetite is good, some skin sensitivity around  her eyes but no rash, no fever no night sweats.  Mild intermittent headaches that  have been improving, some mild hair loss.     Review of Systems:   · Constitutional NEGATIVE: Fever, Chills, Anorexia, Weight Loss, Malaise      · Eyes NEGATIVE: Blurry Vision, Drainage, Diploplia, Redness, Vision Loss/ Change      · ENMT NEGATIVE: Nasal Discharge, Nasal Congestion, Ear Pain, Mouth Pain, Throat Pain      · Respiratory NEGATIVE: Dry Cough, Productive Cough, Hemoptysis, Wheezing, Shortness of Breath      · Cardiology NEGATIVE: Chest Pain, Dyspnea on Exertion, Orthopnea, Palpitations, Syncope      ·  Gastrointestinal POSITIVE: Diarrhea     NEGATIVE: Abdominal Pain, Constipation, Nausea, Vomiting      · Genitourinary NEGATIVE: Discharge, Dysuria, Flank Pain, Frequency, Hematuria      · Musculoskeletal NEGATIVE: Decreased ROM, Pain, Swelling, Stiffness, Weakness      · Neurological NEGATIVE: Dizziness, Confusion, Headache, Seizures, Syncope      · Psychiatric NEGATIVE: Mood Changes, Anxiety, Hallucinations, Sleep Changes, Suicidal Ideas      · Skin NEGATIVE: Mass, Pain, Pruritus, Rash, Ulcer      · Hematologic/Lymph NEGATIVE: Anemia, Bruising, Easy Bleeding, Night Sweats, Petechiae      · Allergic/Immunologic NEGATIVE: Anaphylaxis, Itchy/ Teary Eyes, Itching, Sneezing, Swelling            Allergies and Intolerances:       Allergies:         No Known Allergies: Active     Outpatient Medication Profile:  * Patient Currently Takes Medications as of 07-Aug-2023 09:37 documented in Structured Notes         ondansetron 8 mg oral tablet : Last Dose Taken:  , 1 tab(s) orally every 8 hours as needed for nausea, Start Date: 12-Jul-2023         Lipitor 20 mg oral tablet: Last Dose Taken:  , 1 tab(s) orally once a  day         Synthroid 100 mcg (0.1 mg) oral tablet: Last Dose Taken:  , 1 tab(s)  orally once a day         sertraline 50 mg oral tablet: Last Dose Taken:  , 1 tab(s) orally once  a day             Medical History:         Primary adenocarcinoma of upper lobe of right lung: ICD-10:  C34.11, Status:  Active         Metastasis to brain: ICD-10: C79.31, Status: Active     Family History: No Family History items are recorded  in the problem list.      Social History:   Social Substance History:  ·  Smoking Status former smoker (1)   ·  Additional History     PAST MEDICAL HISTORY     1-hypothyroidism  2-hysterectomy   3-abdominal hernia surgery   4-some hearing difficulties        SOCIAL HISTORY     Patient lives with her significant other.  She has 2 daughter.  Lives in [facility].  Smoked for only 3 years during college and during this time it was a pack per day.  She has retired.  She had a retail store.        CURRENT MEDS     See medication list, meds reviewed        ALLERGIES     Patient denies any drug allergies        FAMILY HISTORY     Patient's father had bladder cancer at the age of 86.  She has 1 brother with no cancer.  She has 2 children       Subjective    Mirta is here on study.  She is feeling well.  No new problems.  Her mother  recently at 99 1/2 years of age- this was difficult.  She is planning a trip to the [facility] [place] after - her ECOG performance staus is 0.  Only side effect from the Tagrisso is occasional loose stools- less of a problem now than when she first started.  She uses imodium as needed.      Cancer  Associated symptoms include a change in bowel habit. Pertinent negatives include no abdominal pain, anorexia, arthralgias, chest pain, chills, congestion, coughing, diaphoresis, fatigue, fever, headaches, joint swelling, myalgias, nausea, neck pain, numbness, rash, sore throat, swollen glands, urinary symptoms, vertigo, visual change, vomiting or weakness.         Review of Systems   Constitutional:  Negative for chills, diaphoresis, fatigue and fever.   HENT:   Negative for congestion and sore throat.    Respiratory:  Negative for cough.    Cardiovascular:  Negative for chest pain.   Gastrointestinal:  Positive for change in bowel habit. Negative for abdominal  pain, anorexia, nausea and vomiting.   Musculoskeletal:  Negative for arthralgias, joint swelling, myalgias and neck pain.   Skin:  Negative for rash.   Neurological:  Negative for headaches, numbness, vertigo and weakness.        Objective   BSA: 1.47 meters squared  /74 (BP Location: Right arm, Patient Position: Sitting, BP Cuff Size: Small adult)   Pulse 75   Temp 36.4 °C (97.5 °F) (Temporal)   Resp 16   Wt 50 kg (110 lb 3.7 oz)   SpO2 99%   BMI 20.83 kg/m²       Physical Exam  Constitutional:       Appearance: Normal appearance. She is normal weight.   HENT:      Mouth/Throat:      Mouth: Mucous membranes are moist.      Pharynx: Oropharynx is clear. No oropharyngeal exudate or posterior oropharyngeal erythema.   Eyes:      General: No scleral icterus.     Conjunctiva/sclera: Conjunctivae normal.      Pupils: Pupils are equal, round, and reactive to light.   Cardiovascular:      Rate and Rhythm: Normal rate and regular rhythm.      Pulses: Normal pulses.      Heart sounds: Normal heart sounds. No murmur heard.     No friction rub. No gallop.   Pulmonary:      Effort: Pulmonary effort is normal. No respiratory distress.      Breath sounds: Normal breath sounds. No stridor. No wheezing or rhonchi.   Abdominal:      General: Abdomen is flat. Bowel sounds are normal. There is no distension.      Palpations: Abdomen is soft. There is no mass.      Tenderness: There is no abdominal tenderness. There is no guarding or rebound.   Musculoskeletal:         General: Signs of injury present. No swelling, tenderness or deformity. Normal range of motion.      Cervical back: No rigidity or tenderness.      Right lower leg: No edema.      Left lower leg: No edema.   Lymphadenopathy:      Cervical: No cervical adenopathy.   Skin:     General: Skin is warm.      Coloration: Skin is not jaundiced or pale.      Findings: No bruising or erythema.   Neurological:      General: No focal deficit present.      Mental Status:  She is alert and oriented to person, place, and time.      Motor: No weakness.      Gait: Gait normal.   Psychiatric:         Mood and Affect: Mood normal.         Behavior: Behavior normal.       Performance Status:  Asymptomatic     Latest Reference Range & Units 11/20/23 10:44   GLUCOSE 74 - 99 mg/dL 100 (H)   SODIUM 136 - 145 mmol/L 141   POTASSIUM 3.5 - 5.3 mmol/L 5.1   CHLORIDE 98 - 107 mmol/L 105   Bicarbonate 21 - 32 mmol/L 30   Anion Gap 10 - 20 mmol/L 11   Blood Urea Nitrogen 6 - 23 mg/dL 15   Creatinine 0.50 - 1.05 mg/dL 1.26 (H)   EGFR >60 mL/min/1.73m*2 46 (L)   Calcium 8.6 - 10.3 mg/dL 9.9   Albumin 3.4 - 5.0 g/dL 4.1   Alkaline Phosphatase 33 - 136 U/L 61   ALT 7 - 45 U/L 17   AST 9 - 39 U/L 20   Bilirubin Total 0.0 - 1.2 mg/dL 0.4   Total Protein 6.4 - 8.2 g/dL 6.6   MAGNESIUM 1.60 - 2.40 mg/dL 2.19   WBC 4.4 - 11.3 x10*3/uL 4.8   nRBC 0.0 - 0.0 /100 WBCs 0.0   RBC 4.00 - 5.20 x10*6/uL 4.67   HEMOGLOBIN 12.0 - 16.0 g/dL 13.7   HEMATOCRIT 36.0 - 46.0 % 43.3   MCV 80 - 100 fL 93   MCH 26.0 - 34.0 pg 29.3   MCHC 32.0 - 36.0 g/dL 31.6 (L)   RED CELL DISTRIBUTION WIDTH 11.5 - 14.5 % 14.1   Platelets 150 - 450 x10*3/uL 191   Neutrophils % 40.0 - 80.0 % 73.3   Immature Granulocytes %, Automated 0.0 - 0.9 % 0.2   Lymphocytes % 13.0 - 44.0 % 15.9   Monocytes % 2.0 - 10.0 % 8.1   Eosinophils % 0.0 - 6.0 % 1.9   Basophils % 0.0 - 2.0 % 0.6   Neutrophils Absolute 1.20 - 7.70 x10*3/uL 3.55   Immature Granulocytes Absolute, Automated 0.00 - 0.70 x10*3/uL 0.01   Lymphocytes Absolute 1.20 - 4.80 x10*3/uL 0.77 (L)   Monocytes Absolute 0.10 - 1.00 x10*3/uL 0.39   Eosinophils Absolute 0.00 - 0.70 x10*3/uL 0.09   Basophils Absolute 0.00 - 0.10 x10*3/uL 0.03   (H): Data is abnormally high  (L): Data is abnormally low    MRI Brain 11/15/2023  IMPRESSION:  1. Continued decreased size/enhancement of numerous scattered  enhancing lesions throughout the cerebral and cerebellar hemispheres  as detailed above. No new or  enlarging intracranial metastases.  2. Mild small-vessel ischemic disease.    CT C/A/P 11/15/2023  MPRESSION:  CHEST:  1. Stable right lower lobe superior segment spiculated mass measuring  2.7 x 1.9 cm with inward tethering of the right major fissure.  2. Stable few subcentimeter other right pulmonary nodules. No new  suspicious pulmonary nodules.  3. Stable prominent subcentimeter mediastinal lymph nodes. No new  intrathoracic suspicious lymphadenopathy.      ABDOMEN-PELVIS:  1. No intra-abdominal or pelvic metastatic disease.  2. Stable multiple prominent subcentimeter retroperitoneal and iliac  chain/pelvic lymph nodes.  3. Hepatic steatosis.  4. Splenomegaly as in prior.    11/17/2023 Echocardiogram  Pending      Assessment/Plan      This is a very nice 68-year-old patient with EGFR, exon 19 with concurrent T p53 mutation adenocarcinoma of the lung diagnosed 6 months ago in June 2023 on increased her cooperative oncology group trial of osimertinib with bevacizumab.  She is tolerating treatment quite well except for some grade 1 intermittent diarrhea.  Recent imaging studies have been reviewed including MRI of the brain and CT scan of the chest that continue to show excellent response.  ECOG performance status is 0.    We will continue her per protocol.               Moni Cortez, APRN-CNS

## 2023-12-15 NOTE — PROGRESS NOTES
Infectious Diseases Clinic Consult Note:    Referred by Hmuberto Landin MD  Primary MD: Haris Mcclellan MD  Reason for Consult: Pyuria     History of Current Issue  Mirta Hills is a 68 y.o. year old female   referred for persistent abnormal urinalysis.     She was diagnosed with metastatic adenocarcinoma of the lung in June 2023 with staging investigation revealing metastatic lesions in her brain, ribs, right acetabulum, and liver.  She underwent gamma knife surgery to her brain lesions on June 28, 2023.  She is currently on clinical trial for which she is receiving Osimertinib and bevacizumab.  He study commenced on 7/12/23.    As part of her clinical trial, she submits urine for analysis on the day that she is to receive her chemotherapy.  She does not receive antibiotics prior to infusion. She states that she has only received antibiotics twice based on urinalysis. Both episodes were ciprofloxacin 250mg BID x 5 days.  She states that she has never had urinary symptoms at the time of urine submission.  Denies dysuria, frequency or urgency.  No hematuria, suprapubic pain, or flank pain.    Uro-gynecologic Hx:   3 years ago. Currently in a relationship.    She had 2 children by uncomplicated vaginal deliveries.  She underwent hysterectomy 35 yrs ago. She has never received estrogen replacement.  She was diagnosed with pelvic floor dysfunction by a urogynecologist. When assessed, she had no urinary retention.  She does suffer from both rectal and vaginal prolapse.  She has never used a pessary.  She also has hemorrhoids that occasionally flare.        PAST MEDICAL HISTORY:  Past Medical History:   Diagnosis Date    Chronic rhinitis 11/18/2014    Rhinitis    Encounter for other screening for malignant neoplasm of breast     Breast cancer screening    Encounter for screening for malignant neoplasm of cervix     Screening for cervical cancer    Laceration without foreign body of scalp, initial encounter  06/16/2014    Scalp laceration    Personal history of diseases of the skin and subcutaneous tissue 12/19/2014    History of dermatitis    Personal history of other benign neoplasm 03/18/2015    History of uterine leiomyoma     PAST SURGICAL HISTORY:  Past Surgical History:   Procedure Laterality Date    HERNIA REPAIR  03/18/2015    Hernia Repair    HYSTERECTOMY  04/09/2013    Hysterectomy     ALLERGIES:    No Known Allergies    MEDICATIONS:      Current Outpatient Medications:     atorvastatin (Lipitor) 20 mg tablet, Take 1 tablet (20 mg) by mouth once daily., Disp: , Rfl:     betamethasone, augmented, (Diprolene AF) 0.05 % cream, Apply topically 2 times a day.  apply to affected area ON NECK, Disp: , Rfl:     CALCIUM ORAL, Take by mouth., Disp: , Rfl:     cholecalciferol (Vitamin D-3) 50 MCG (2000 UT) tablet, Take 1 tablet (2,000 Units) by mouth once daily., Disp: , Rfl:     dexAMETHasone (Decadron) 2 mg tablet, Take 1 tablet (2 mg) by mouth 2 times a day with meals. Take with food., Disp: , Rfl:     multivit-min/ferrous fumarate (MULTI VITAMIN ORAL), Take by mouth., Disp: , Rfl:     sertraline (Zoloft) 50 mg tablet, Take 1 tablet (50 mg) by mouth once daily., Disp: , Rfl:     Study [id number] osimertinib 80 mg tablet, Take 1 tablet (80 mg total) by mouth once daily for 21 doses.  Swallow whole., Disp: 30 tablet, Rfl: 0    Synthroid 100 mcg tablet, Take 1 tablet (100 mcg) by mouth once daily. EXCEPT ON SUNDAY ONLY TAKE 1/2 TABLET, Disp: , Rfl:     FAMILY HISTORY:   Family History   Problem Relation Name Age of Onset    Hyperlipidemia Mother      Alzheimer's disease Mother      Hyperlipidemia Father      Cancer Maternal Grandmother      Cancer Other aunt     Hypertension Other      Coronary artery disease Other          SOCIAL HISTORY:       Marital Status:  Tobacco / Smokeless tobacco/ Vaping:   reports that she has quit smoking. Her smoking use included cigarettes. She has never used smokeless tobacco.   Alcohol:    Social History     Substance and Sexual Activity   Alcohol Use None      Drug Use:    Social History     Substance and Sexual Activity   Drug Use Not on file      IMMUNIZATIONS:    Immunization History   Administered Date(s) Administered    Flu vaccine (IIV4), preservative free *Check age/dose* 10/14/2016, 10/06/2017    Flu vaccine, quadrivalent, high-dose, preservative free, age 65y+ (FLUZONE) 10/02/2020, 10/05/2021    Influenza, High Dose Seasonal, Preservative Free 10/04/2020    Influenza, Seasonal, Quadrivalent, Adjuvanted 10/18/2022    Influenza, Unspecified 10/07/2009    Influenza, injectable, MDCK, preservative free, quadrivalent 10/08/2019    Influenza, seasonal, injectable, preservative free 10/09/2015    Influenza, trivalent, adjuvanted 10/08/2018    Moderna COVID-19 vaccine, bivalent, blue cap/gray label *Check age/dose* 12/15/2022    Moderna SARS-CoV-2 Vaccination 02/09/2021, 03/09/2021, 10/26/2021, 05/24/2022    Pneumococcal conjugate vaccine, 13-valent (PREVNAR 13) 05/10/2018    Pneumococcal polysaccharide vaccine, 23-valent, age 2 years and older (PNEUMOVAX 23) 08/28/2019    Tdap vaccine, age 7 year and older (BOOSTRIX) 08/17/2012, 10/09/2015    Zoster vaccine, recombinant, adult (SHINGRIX) 03/06/2020, 06/22/2020       REVIEW OF SYSTEMS:  All pertinent positives and negatives as documented in HPI.     PHYSICAL EXAMINATION:       DATA:  Laboratory Values and Test Results:   10/9/23, 10/30/23, 11/20/23:Urinalysis with reflex microscopic were hazy with large (3+) LE and negative nitrites.  Microscopic studies reveal pyuria with sparse to few squamous epithelial cells    Microbiology: 8/28/23: Urine culture with no growth    ASSESSMENT / RECOMMENDATIONS:  69 yo woman  referred to ID due to persistent pyuria found onscreening urinalysis as part of a clinical trial for metastatic lumg cancer.  She does not endorse symptoms of UTI when she submits urine specimens for testing.  Urinalyses are consistent  with asymptomatic bacteruria/pyuria.  This is consistent with the fact that she has rarely received antibiotics for urine results and suffered no ill effects.  When she received antibiotics she noted no change in urinary symptoms.    She has multiple uro-gynecologic issues that predispose her to having abnormal urinalysis.  We discussed Kegal excercises as well as possible pelvic floor PT to help with her pelvic floor dysfunction.    We also discussed that unless she has symptomatic urinary tract infections, she does not require antibiotics    I spent 35 minutes in the professional and overall care of this patient.      Dee Temple MD

## 2023-12-18 DIAGNOSIS — C34.90 MALIGNANT NEOPLASM OF LUNG, UNSPECIFIED LATERALITY, UNSPECIFIED PART OF LUNG (MULTI): ICD-10-CM

## 2023-12-19 RX ORDER — DIPHENHYDRAMINE HYDROCHLORIDE 50 MG/ML
50 INJECTION INTRAMUSCULAR; INTRAVENOUS AS NEEDED
Status: CANCELLED | OUTPATIENT
Start: 2024-01-03

## 2023-12-19 RX ORDER — ALBUTEROL SULFATE 0.83 MG/ML
3 SOLUTION RESPIRATORY (INHALATION) AS NEEDED
Status: CANCELLED | OUTPATIENT
Start: 2024-01-03

## 2023-12-19 RX ORDER — PROCHLORPERAZINE MALEATE 5 MG
10 TABLET ORAL EVERY 6 HOURS PRN
Status: CANCELLED | OUTPATIENT
Start: 2024-01-03

## 2023-12-19 RX ORDER — FAMOTIDINE 10 MG/ML
20 INJECTION INTRAVENOUS ONCE AS NEEDED
Status: CANCELLED | OUTPATIENT
Start: 2024-01-03

## 2023-12-19 RX ORDER — PROCHLORPERAZINE EDISYLATE 5 MG/ML
10 INJECTION INTRAMUSCULAR; INTRAVENOUS EVERY 6 HOURS PRN
Status: CANCELLED | OUTPATIENT
Start: 2024-01-03

## 2023-12-19 RX ORDER — EPINEPHRINE 0.3 MG/.3ML
0.3 INJECTION SUBCUTANEOUS EVERY 5 MIN PRN
Status: CANCELLED | OUTPATIENT
Start: 2024-01-03

## 2023-12-21 DIAGNOSIS — I42.7 CARDIOTOXICITY (MULTI): Primary | ICD-10-CM

## 2023-12-21 DIAGNOSIS — C34.90 MALIGNANT NEOPLASM OF LUNG, UNSPECIFIED LATERALITY, UNSPECIFIED PART OF LUNG (MULTI): ICD-10-CM

## 2024-01-03 ENCOUNTER — INFUSION (OUTPATIENT)
Dept: HEMATOLOGY/ONCOLOGY | Facility: HOSPITAL | Age: 69
End: 2024-01-03
Payer: MEDICARE

## 2024-01-03 ENCOUNTER — OFFICE VISIT (OUTPATIENT)
Dept: HEMATOLOGY/ONCOLOGY | Facility: HOSPITAL | Age: 69
End: 2024-01-03
Payer: MEDICARE

## 2024-01-03 ENCOUNTER — LAB (OUTPATIENT)
Dept: LAB | Facility: HOSPITAL | Age: 69
End: 2024-01-03
Payer: MEDICARE

## 2024-01-03 VITALS
BODY MASS INDEX: 20.08 KG/M2 | WEIGHT: 106.26 LBS | HEART RATE: 59 BPM | DIASTOLIC BLOOD PRESSURE: 64 MMHG | SYSTOLIC BLOOD PRESSURE: 120 MMHG | RESPIRATION RATE: 18 BRPM | OXYGEN SATURATION: 100 % | TEMPERATURE: 96.8 F

## 2024-01-03 DIAGNOSIS — C34.90 MALIGNANT NEOPLASM OF LUNG, UNSPECIFIED LATERALITY, UNSPECIFIED PART OF LUNG (MULTI): ICD-10-CM

## 2024-01-03 DIAGNOSIS — C34.90 MALIGNANT NEOPLASM OF LUNG, UNSPECIFIED LATERALITY, UNSPECIFIED PART OF LUNG (MULTI): Primary | ICD-10-CM

## 2024-01-03 LAB
ALBUMIN SERPL BCP-MCNC: 4.1 G/DL (ref 3.4–5)
ALP SERPL-CCNC: 50 U/L (ref 33–136)
ALT SERPL W P-5'-P-CCNC: 20 U/L (ref 7–45)
ANION GAP SERPL CALC-SCNC: 11 MMOL/L (ref 10–20)
APPEARANCE UR: ABNORMAL
AST SERPL W P-5'-P-CCNC: 24 U/L (ref 9–39)
BACTERIA #/AREA URNS AUTO: ABNORMAL /HPF
BASOPHILS # BLD AUTO: 0.02 X10*3/UL (ref 0–0.1)
BASOPHILS NFR BLD AUTO: 0.5 %
BILIRUB SERPL-MCNC: 0.3 MG/DL (ref 0–1.2)
BILIRUB UR STRIP.AUTO-MCNC: NEGATIVE MG/DL
BUN SERPL-MCNC: 21 MG/DL (ref 6–23)
CALCIUM SERPL-MCNC: 9 MG/DL (ref 8.6–10.3)
CHLORIDE SERPL-SCNC: 108 MMOL/L (ref 98–107)
CK SERPL-CCNC: 114 U/L (ref 0–215)
CO2 SERPL-SCNC: 26 MMOL/L (ref 21–32)
COLOR UR: YELLOW
CREAT SERPL-MCNC: 1.1 MG/DL (ref 0.5–1.05)
EOSINOPHIL # BLD AUTO: 0.07 X10*3/UL (ref 0–0.7)
EOSINOPHIL NFR BLD AUTO: 1.7 %
ERYTHROCYTE [DISTWIDTH] IN BLOOD BY AUTOMATED COUNT: 14.9 % (ref 11.5–14.5)
GFR SERPL CREATININE-BSD FRML MDRD: 54 ML/MIN/1.73M*2
GLUCOSE SERPL-MCNC: 98 MG/DL (ref 74–99)
GLUCOSE UR STRIP.AUTO-MCNC: NEGATIVE MG/DL
HCT VFR BLD AUTO: 42.4 % (ref 36–46)
HGB BLD-MCNC: 13.6 G/DL (ref 12–16)
HYALINE CASTS #/AREA URNS AUTO: ABNORMAL /LPF
IMM GRANULOCYTES # BLD AUTO: 0.02 X10*3/UL (ref 0–0.7)
IMM GRANULOCYTES NFR BLD AUTO: 0.5 % (ref 0–0.9)
KETONES UR STRIP.AUTO-MCNC: NEGATIVE MG/DL
LEUKOCYTE ESTERASE UR QL STRIP.AUTO: ABNORMAL
LYMPHOCYTES # BLD AUTO: 0.85 X10*3/UL (ref 1.2–4.8)
LYMPHOCYTES NFR BLD AUTO: 20.8 %
MAGNESIUM SERPL-MCNC: 2.2 MG/DL (ref 1.6–2.4)
MCH RBC QN AUTO: 29.3 PG (ref 26–34)
MCHC RBC AUTO-ENTMCNC: 32.1 G/DL (ref 32–36)
MCV RBC AUTO: 91 FL (ref 80–100)
MONOCYTES # BLD AUTO: 0.29 X10*3/UL (ref 0.1–1)
MONOCYTES NFR BLD AUTO: 7.1 %
MUCOUS THREADS #/AREA URNS AUTO: ABNORMAL /LPF
NEUTROPHILS # BLD AUTO: 2.84 X10*3/UL (ref 1.2–7.7)
NEUTROPHILS NFR BLD AUTO: 69.4 %
NITRITE UR QL STRIP.AUTO: NEGATIVE
NRBC BLD-RTO: 0 /100 WBCS (ref 0–0)
PH UR STRIP.AUTO: 5 [PH]
PLATELET # BLD AUTO: 201 X10*3/UL (ref 150–450)
POTASSIUM SERPL-SCNC: 4.2 MMOL/L (ref 3.5–5.3)
PROT SERPL-MCNC: 6.5 G/DL (ref 6.4–8.2)
PROT UR STRIP.AUTO-MCNC: NEGATIVE MG/DL
RBC # BLD AUTO: 4.64 X10*6/UL (ref 4–5.2)
RBC # UR STRIP.AUTO: NEGATIVE /UL
RBC #/AREA URNS AUTO: ABNORMAL /HPF
RENAL EPI CELLS #/AREA UR COMP ASSIST: ABNORMAL /HPF
SODIUM SERPL-SCNC: 141 MMOL/L (ref 136–145)
SP GR UR STRIP.AUTO: 1.02
SQUAMOUS #/AREA URNS AUTO: ABNORMAL /HPF
UROBILINOGEN UR STRIP.AUTO-MCNC: <2 MG/DL
WBC # BLD AUTO: 4.1 X10*3/UL (ref 4.4–11.3)
WBC #/AREA URNS AUTO: ABNORMAL /HPF

## 2024-01-03 PROCEDURE — 83735 ASSAY OF MAGNESIUM: CPT

## 2024-01-03 PROCEDURE — 96413 CHEMO IV INFUSION 1 HR: CPT

## 2024-01-03 PROCEDURE — 99214 OFFICE O/P EST MOD 30 MIN: CPT | Mod: 25 | Performed by: CLINICAL NURSE SPECIALIST

## 2024-01-03 PROCEDURE — 1159F MED LIST DOCD IN RCRD: CPT | Performed by: CLINICAL NURSE SPECIALIST

## 2024-01-03 PROCEDURE — 82550 ASSAY OF CK (CPK): CPT | Performed by: INTERNAL MEDICINE

## 2024-01-03 PROCEDURE — 85025 COMPLETE CBC W/AUTO DIFF WBC: CPT

## 2024-01-03 PROCEDURE — 2500000004 HC RX 250 GENERAL PHARMACY W/ HCPCS (ALT 636 FOR OP/ED): Performed by: INTERNAL MEDICINE

## 2024-01-03 PROCEDURE — 99214 OFFICE O/P EST MOD 30 MIN: CPT | Performed by: CLINICAL NURSE SPECIALIST

## 2024-01-03 PROCEDURE — 81003 URINALYSIS AUTO W/O SCOPE: CPT | Performed by: INTERNAL MEDICINE

## 2024-01-03 PROCEDURE — 1036F TOBACCO NON-USER: CPT | Performed by: CLINICAL NURSE SPECIALIST

## 2024-01-03 PROCEDURE — 1126F AMNT PAIN NOTED NONE PRSNT: CPT | Performed by: CLINICAL NURSE SPECIALIST

## 2024-01-03 PROCEDURE — 36415 COLL VENOUS BLD VENIPUNCTURE: CPT

## 2024-01-03 PROCEDURE — 80053 COMPREHEN METABOLIC PANEL: CPT

## 2024-01-03 RX ORDER — PROCHLORPERAZINE EDISYLATE 5 MG/ML
10 INJECTION INTRAMUSCULAR; INTRAVENOUS EVERY 6 HOURS PRN
Status: DISCONTINUED | OUTPATIENT
Start: 2024-01-03 | End: 2024-01-03 | Stop reason: HOSPADM

## 2024-01-03 RX ORDER — DIPHENHYDRAMINE HYDROCHLORIDE 50 MG/ML
50 INJECTION INTRAMUSCULAR; INTRAVENOUS AS NEEDED
Status: DISCONTINUED | OUTPATIENT
Start: 2024-01-03 | End: 2024-01-03 | Stop reason: HOSPADM

## 2024-01-03 RX ORDER — EPINEPHRINE 0.3 MG/.3ML
0.3 INJECTION SUBCUTANEOUS EVERY 5 MIN PRN
Status: DISCONTINUED | OUTPATIENT
Start: 2024-01-03 | End: 2024-01-03 | Stop reason: HOSPADM

## 2024-01-03 RX ORDER — HEPARIN SODIUM,PORCINE/PF 10 UNIT/ML
50 SYRINGE (ML) INTRAVENOUS AS NEEDED
Status: CANCELLED | OUTPATIENT
Start: 2024-01-03

## 2024-01-03 RX ORDER — FAMOTIDINE 10 MG/ML
20 INJECTION INTRAVENOUS ONCE AS NEEDED
Status: DISCONTINUED | OUTPATIENT
Start: 2024-01-03 | End: 2024-01-03 | Stop reason: HOSPADM

## 2024-01-03 RX ORDER — PROCHLORPERAZINE MALEATE 10 MG
10 TABLET ORAL EVERY 6 HOURS PRN
Status: DISCONTINUED | OUTPATIENT
Start: 2024-01-03 | End: 2024-01-03 | Stop reason: HOSPADM

## 2024-01-03 RX ORDER — ALBUTEROL SULFATE 0.83 MG/ML
3 SOLUTION RESPIRATORY (INHALATION) AS NEEDED
Status: DISCONTINUED | OUTPATIENT
Start: 2024-01-03 | End: 2024-01-03 | Stop reason: HOSPADM

## 2024-01-03 RX ORDER — HEPARIN 100 UNIT/ML
500 SYRINGE INTRAVENOUS AS NEEDED
Status: CANCELLED | OUTPATIENT
Start: 2024-01-03

## 2024-01-03 RX ADMIN — Medication 757.5 MG: at 13:58

## 2024-01-03 ASSESSMENT — PAIN SCALES - GENERAL: PAINLEVEL: 0-NO PAIN

## 2024-01-08 ENCOUNTER — TELEPHONE (OUTPATIENT)
Dept: ADMISSION | Facility: HOSPITAL | Age: 69
End: 2024-01-08

## 2024-01-08 ENCOUNTER — OFFICE VISIT (OUTPATIENT)
Dept: PRIMARY CARE | Facility: CLINIC | Age: 69
End: 2024-01-08
Payer: MEDICARE

## 2024-01-08 DIAGNOSIS — Z71.9 ENCOUNTER FOR COUNSELING: Primary | ICD-10-CM

## 2024-01-08 DIAGNOSIS — Z00.00 WELLNESS EXAMINATION: ICD-10-CM

## 2024-01-08 PROCEDURE — 1036F TOBACCO NON-USER: CPT | Performed by: INTERNAL MEDICINE

## 2024-01-08 PROCEDURE — UHSMG PR UH SELECT MEET AND GREET: Performed by: INTERNAL MEDICINE

## 2024-01-08 PROCEDURE — 1159F MED LIST DOCD IN RCRD: CPT | Performed by: INTERNAL MEDICINE

## 2024-01-08 PROCEDURE — 1126F AMNT PAIN NOTED NONE PRSNT: CPT | Performed by: INTERNAL MEDICINE

## 2024-01-08 ASSESSMENT — ENCOUNTER SYMPTOMS
ANOREXIA: 0
VOMITING: 0
HEADACHES: 0
WEAKNESS: 0
ABDOMINAL PAIN: 0
CHILLS: 0
VERTIGO: 0
SORE THROAT: 0
MYALGIAS: 0
VISUAL CHANGE: 0
CHANGE IN BOWEL HABIT: 1
FEVER: 0
DIAPHORESIS: 0
NECK PAIN: 0
NUMBNESS: 0
SWOLLEN GLANDS: 0
COUGH: 0
FATIGUE: 0
JOINT SWELLING: 0
NAUSEA: 0
ARTHRALGIAS: 0

## 2024-01-08 NOTE — TELEPHONE ENCOUNTER
The patient needs to schedule a TTE (left a VM on our RN triage line to get this scheduled), the order is in, I called the patient back and transferred her to the dept in order to get this scheduled

## 2024-01-08 NOTE — PROGRESS NOTES
Patient presents today for meet/greet  Current PCP, Dr. Mcclellan (previously Dr. Monte)    Plan will be to schedule wellness exam in April  Fasting lab work will be ordered, to exclude routine standing lab work per medical oncology    Nikolas Villatoro MD

## 2024-01-09 NOTE — PROGRESS NOTES
Patient ID: Mirta Hills is a 68 y.o. female.  Referring Physician: No referring provider defined for this encounter.  Primary Care Provider: Nikolas Villatoro MD     DIAGNOSIS     Adenocarcinoma of the lung.  Date of diagnosis is [place] 15, 2023 from a level 7 lymph node biopsy/fine-needle aspiration.  Immunohistochemistry positive for TTF-1.        STAGING     Clinical T2a (right lower lobe mass measuring 3.6 cm), clinical N2 (PET positivity and subcarinal region), M1C disease (presence of brain metastases, ribs, right acetabulum, liver, brain), stage IVb disease        CURRENT SITES OF DISEASE     Right lower lobe, right hilum of the lung, ipsilateral mediastinum, liver, bones, brain        MOLECULAR GENOMICS     TEST: Focused Solid Tumor DNA/RNA Panel   SPECIMEN: Supernatant, LYMPH NODE 7, [id number]   DISEASE DIAGNOSIS: Adenocarcinoma   Estimated Tumor Content: 40%   COLLECTION DATE: 6/15/2023     MICROSATELLITE STATUS: Microsatellite  Instability-High (MSI-H) is NOT DETECTED.     DISEASE ASSOCIATED GENOMIC FINDINGS:   EGFR p.[id number] ([id number] c.[id number])    TP53 p.N247I ([id number] c.740A>T)     DISEASE RELEVANT ALTERATIONS NOT DETECTED:   Negative for ALK fusion.   Negative for BRAF V600E.   Negative for ERBB2 activating mutation   Negative for KRAS G12C.   Negative for  MET exon 14 skipping mutation.   Negative for NTRK fusion.   Negative for RET fusion.   Negative for ROS1 fusion.        Circulating tumor DNA sent on June 19, 2023  EGFR p.[id number], Variant allelic fraction of 1.6%  TP53 N247I Missense variant, loss of function, variant allelic fraction 1.1%  MSI stable        SERUM TUMOR MARKER     Slightly elevated CEA and CA 19.9 June 2023        PRIOR THERAPY     1- Gamma knife radiosurgery to brain lesions completed on June 28, 2023.        CURRENT THERAPY     Osimertinib on study  on clinical trial [id number] which is a randomized trial of osimertinib with or without bevacizumab.  She  has been randomized to the bevacizumab arm- Cycle 1 Day 1 is 7/12/2023        CURRENT ONCOLOGICAL PROBLEMS     1-brain metastases, symptomatic, word finding difficulties, mild unsteadiness on her feet but no falls, Resolved July 3, 2023  2-anterior chest pain, increases with deep inspiration, Improved July 3, 2023  3-mild intermittent grade 1 diarrhea, mild hair loss, mild leukopenia, mild elevation in creatinine, mild fatigue.  Most likely all osimertinib related August 7, 2023        HISTORY OF PRESENT ILLNESS     This is a 68-year-old patient.  She states that towards Thanksgiving of 2022 she started feeling some pain in her right shoulder.  Eventually got better however the same pain came back in January or February 2023.  Ultimately some x-rays were done of  the shoulder that were generally negative.  She then developed some pain in her sternum a month or 2 later with deep inspiration and ultimately underwent imaging in May 2023.  Chest CT without contrast was done on June 1, 2023.  This showed a 3.4 cm mass  within the superior segment of the right lower lobe with some associated satellite pulmonary nodules.  Additionally there were some other subcentimeter pulmonary nodules.  There was evidence of mediastinal lymphadenopathy.  Subcarinal lymph node measured  1.4 cm in short axis.She was seen by pulmonary medicine on June 5, 2023 a combined PET/CT was ordered on June 6, 2023 showing a relatively intense area of hypermetabolic activity along the superior posteromedial aspect of the right lower lobe corresponding  to the known right lower lobe lung mass.  There was foci of hypermetabolic activity in the right supraclavicular region, subcarinal and right hilar regions.  There was area of uptake within the posterior upper ribs as well as right acetabulum suspicious  for osseous metastatic disease.  There was a punctate area of mild hypermetabolic activity in the right hepatic lobe concerning for hepatic  metastases.  Small focus in the subcutaneous region just anterior to the lower aspect of the sternum could be inflammatory  versus metastatic.  She undergoes a bronchoscopy dated [place] 15, 2023.  There were no endobronchial lesions.  Primary cytology with rapid onsite evaluation was suggestive of malignancy in level 7, final lymph nodes were pathology was pending.  MRI of the  brain with and without contrast dated June 16, 2023 showed approximately 20 enhancing nodules concerning for metastatic disease.  Specifically there was a 1.5 cm nodule in the right cerebellar hemisphere.  Most of the other nodules were less than 1 cm.   Patient also notes that she has some difficulty expressing words over the past 1 to 2 weeks prior to her first visit with us as well as some mild unsteadiness on her legs where she feels she is drifting towards the right side.        PAST MEDICAL HISTORY     1-hypothyroidism  2-hysterectomy 1988  3-abdominal hernia surgery 1995  4-some hearing difficulties        SOCIAL HISTORY     Patient lives with her significant other.  She has 2 daughter.  Lives in [facility].  Smoked for only 3 years during college and during this time it was a pack per day.  She has retired.  She had a retail store.        CURRENT MEDS     See medication list, meds reviewed        ALLERGIES     Patient denies any drug allergies        FAMILY HISTORY     Patient's father had bladder cancer at the age of 86.  She has 1 brother with no cancer.  She has 2 children        History of Present Illness:      ID Statement:    EUN BTUTS is a 68 year old Female        Interval History:       Patient is overall feeling well.  She has episodic diarrhea up to 3 bowel movements per day but not any worse.  It does not occur every day, on average she has 2 bowel movements per day, no nausea no vomiting, appetite is good, some skin sensitivity around  her eyes but no rash, no fever no night sweats.  Mild intermittent headaches that  have been improving, some mild hair loss.     Review of Systems:   · Constitutional NEGATIVE: Fever, Chills, Anorexia, Weight Loss, Malaise      · Eyes NEGATIVE: Blurry Vision, Drainage, Diploplia, Redness, Vision Loss/ Change      · ENMT NEGATIVE: Nasal Discharge, Nasal Congestion, Ear Pain, Mouth Pain, Throat Pain      · Respiratory NEGATIVE: Dry Cough, Productive Cough, Hemoptysis, Wheezing, Shortness of Breath      · Cardiology NEGATIVE: Chest Pain, Dyspnea on Exertion, Orthopnea, Palpitations, Syncope      ·  Gastrointestinal POSITIVE: Diarrhea     NEGATIVE: Abdominal Pain, Constipation, Nausea, Vomiting      · Genitourinary NEGATIVE: Discharge, Dysuria, Flank Pain, Frequency, Hematuria      · Musculoskeletal NEGATIVE: Decreased ROM, Pain, Swelling, Stiffness, Weakness      · Neurological NEGATIVE: Dizziness, Confusion, Headache, Seizures, Syncope      · Psychiatric NEGATIVE: Mood Changes, Anxiety, Hallucinations, Sleep Changes, Suicidal Ideas      · Skin NEGATIVE: Mass, Pain, Pruritus, Rash, Ulcer      · Hematologic/Lymph NEGATIVE: Anemia, Bruising, Easy Bleeding, Night Sweats, Petechiae      · Allergic/Immunologic NEGATIVE: Anaphylaxis, Itchy/ Teary Eyes, Itching, Sneezing, Swelling            Allergies and Intolerances:       Allergies:         No Known Allergies: Active     Outpatient Medication Profile:  * Patient Currently Takes Medications as of 07-Aug-2023 09:37 documented in Structured Notes         ondansetron 8 mg oral tablet : Last Dose Taken:  , 1 tab(s) orally every 8 hours as needed for nausea, Start Date: 12-Jul-2023         Lipitor 20 mg oral tablet: Last Dose Taken:  , 1 tab(s) orally once a  day         Synthroid 100 mcg (0.1 mg) oral tablet: Last Dose Taken:  , 1 tab(s)  orally once a day         sertraline 50 mg oral tablet: Last Dose Taken:  , 1 tab(s) orally once  a day             Medical History:         Primary adenocarcinoma of upper lobe of right lung: ICD-10:  C34.11, Status:  Active         Metastasis to brain: ICD-10: C79.31, Status: Active     Family History: No Family History items are recorded  in the problem list.      Social History:   Social Substance History:  ·  Smoking Status former smoker (1)   ·  Additional History     PAST MEDICAL HISTORY     1-hypothyroidism  2-hysterectomy 1988  3-abdominal hernia surgery 1995  4-some hearing difficulties        SOCIAL HISTORY     Patient lives with her significant other.  She has 2 daughter.  Lives in [facility].  Smoked for only 3 years during college and during this time it was a pack per day.  She has retired.  She had a retail store.        CURRENT MEDS     See medication list, meds reviewed        ALLERGIES     Patient denies any drug allergies        FAMILY HISTORY     Patient's father had bladder cancer at the age of 86.  She has 1 brother with no cancer.  She has 2 children       Subjective    Today I am meeting with Mirta - she is doing well- just back from a vacation.  Her chest pain has resolved, and thought to be costochondritis.  She did have some food poisoning on the trip and had difficulty for a few days of diarrhea.  Bowels had stabilized prior to this bout.  She is working on getting back on track- occasional loose stools.   She is pleased with how things are going.      Cancer  Associated symptoms include a change in bowel habit. Pertinent negatives include no abdominal pain, anorexia, arthralgias, chest pain, chills, congestion, coughing, diaphoresis, fatigue, fever, headaches, joint swelling, myalgias, nausea, neck pain, numbness, rash, sore throat, swollen glands, urinary symptoms, vertigo, visual change, vomiting or weakness.           Review of Systems   Constitutional:  Negative for chills, diaphoresis, fatigue and fever.   HENT:   Negative for congestion and sore throat.    Respiratory:  Negative for cough.    Cardiovascular:  Negative for chest pain.   Gastrointestinal:  Positive for change in bowel habit.  Negative for abdominal pain, anorexia, nausea and vomiting.   Musculoskeletal:  Negative for arthralgias, joint swelling, myalgias and neck pain.   Skin:  Negative for rash.   Neurological:  Negative for headaches, numbness, vertigo and weakness.        Objective   BSA: 1.44 meters squared  /64 (BP Location: Left arm, Patient Position: Sitting, BP Cuff Size: Small adult)   Pulse 59   Temp 36 °C (96.8 °F) (Temporal)   Resp 18   Wt 48.2 kg (106 lb 4.2 oz)   SpO2 100%   BMI 20.08 kg/m²       Physical Exam  Constitutional:       Appearance: Normal appearance. She is normal weight.   HENT:      Mouth/Throat:      Mouth: Mucous membranes are moist.      Pharynx: Oropharynx is clear. No oropharyngeal exudate or posterior oropharyngeal erythema.   Eyes:      General: No scleral icterus.     Conjunctiva/sclera: Conjunctivae normal.      Pupils: Pupils are equal, round, and reactive to light.   Cardiovascular:      Rate and Rhythm: Normal rate and regular rhythm.      Pulses: Normal pulses.      Heart sounds: Normal heart sounds. No murmur heard.     No friction rub. No gallop.   Pulmonary:      Effort: Pulmonary effort is normal. No respiratory distress.      Breath sounds: Normal breath sounds. No stridor. No wheezing or rhonchi.   Abdominal:      General: Abdomen is flat. Bowel sounds are normal. There is no distension.      Palpations: Abdomen is soft. There is no mass.      Tenderness: There is no abdominal tenderness. There is no guarding or rebound.   Musculoskeletal:         General: No swelling, tenderness or deformity. Normal range of motion.      Cervical back: No rigidity or tenderness.      Right lower leg: No edema.      Left lower leg: No edema.   Lymphadenopathy:      Cervical: No cervical adenopathy.   Skin:     General: Skin is warm.      Coloration: Skin is not jaundiced or pale.      Findings: No bruising or erythema.   Neurological:      General: No focal deficit present.      Mental Status:  She is alert and oriented to person, place, and time.      Motor: No weakness.      Gait: Gait normal.   Psychiatric:         Mood and Affect: Mood normal.         Behavior: Behavior normal.         Performance Status:  Asymptomatic        MRI Brain 11/15/2023  IMPRESSION:  1. Continued decreased size/enhancement of numerous scattered  enhancing lesions throughout the cerebral and cerebellar hemispheres  as detailed above. No new or enlarging intracranial metastases.  2. Mild small-vessel ischemic disease.    CT C/A/P 11/15/2023  MPRESSION:  CHEST:  1. Stable right lower lobe superior segment spiculated mass measuring  2.7 x 1.9 cm with inward tethering of the right major fissure.  2. Stable few subcentimeter other right pulmonary nodules. No new  suspicious pulmonary nodules.  3. Stable prominent subcentimeter mediastinal lymph nodes. No new  intrathoracic suspicious lymphadenopathy.      ABDOMEN-PELVIS:  1. No intra-abdominal or pelvic metastatic disease.  2. Stable multiple prominent subcentimeter retroperitoneal and iliac  chain/pelvic lymph nodes.  3. Hepatic steatosis.  4. Splenomegaly as in prior.    11/17/2023 Echocardiogram  Pending      Assessment/Plan      This is a very nice 68-year-old patient with EGFR, exon 19 with concurrent T p53 mutation adenocarcinoma of the lung diagnosed 6 months ago in June 2023 on increased her cooperative onc[facility] group trial of osimertinib with bevacizumab.  She is tolerating treatment quite well except for some grade 1 intermittent diarrhea.  We will continue treatment with bevacizumab today and osimertinib daily.  We will see her back per study requirements.             Moni Cortez, GONZALO-CNS

## 2024-01-17 ENCOUNTER — APPOINTMENT (OUTPATIENT)
Dept: RADIOLOGY | Facility: HOSPITAL | Age: 69
End: 2024-01-17
Payer: MEDICARE

## 2024-01-17 ENCOUNTER — APPOINTMENT (OUTPATIENT)
Dept: CARDIOLOGY | Facility: CLINIC | Age: 69
End: 2024-01-17
Payer: MEDICARE

## 2024-01-18 ENCOUNTER — HOSPITAL ENCOUNTER (OUTPATIENT)
Dept: RADIOLOGY | Facility: HOSPITAL | Age: 69
Discharge: HOME | End: 2024-01-18
Payer: MEDICARE

## 2024-01-18 DIAGNOSIS — C34.90 MALIGNANT NEOPLASM OF LUNG, UNSPECIFIED LATERALITY, UNSPECIFIED PART OF LUNG (MULTI): ICD-10-CM

## 2024-01-18 PROCEDURE — 2550000001 HC RX 255 CONTRASTS: Performed by: INTERNAL MEDICINE

## 2024-01-18 PROCEDURE — A9575 INJ GADOTERATE MEGLUMI 0.1ML: HCPCS | Performed by: INTERNAL MEDICINE

## 2024-01-18 PROCEDURE — 70553 MRI BRAIN STEM W/O & W/DYE: CPT | Performed by: RADIOLOGY

## 2024-01-18 PROCEDURE — 74177 CT ABD & PELVIS W/CONTRAST: CPT

## 2024-01-18 PROCEDURE — 70553 MRI BRAIN STEM W/O & W/DYE: CPT

## 2024-01-18 PROCEDURE — 71260 CT THORAX DX C+: CPT | Performed by: RADIOLOGY

## 2024-01-18 PROCEDURE — 74177 CT ABD & PELVIS W/CONTRAST: CPT | Performed by: RADIOLOGY

## 2024-01-18 RX ORDER — GADOTERATE MEGLUMINE 376.9 MG/ML
10 INJECTION INTRAVENOUS
Status: COMPLETED | OUTPATIENT
Start: 2024-01-18 | End: 2024-01-18

## 2024-01-18 RX ADMIN — GADOTERATE MEGLUMINE 10 ML: 376.9 INJECTION INTRAVENOUS at 10:50

## 2024-01-18 RX ADMIN — IOHEXOL 75 ML: 350 INJECTION, SOLUTION INTRAVENOUS at 10:03

## 2024-01-19 ENCOUNTER — HOSPITAL ENCOUNTER (OUTPATIENT)
Dept: CARDIOLOGY | Facility: CLINIC | Age: 69
Discharge: HOME | End: 2024-01-19
Payer: MEDICARE

## 2024-01-19 DIAGNOSIS — I42.7 CARDIOTOXICITY (MULTI): ICD-10-CM

## 2024-01-19 LAB
AORTIC VALVE PEAK VELOCITY: 1.31 M/S
AV PEAK GRADIENT: 6.9 MMHG
AVA (PEAK VEL): 2.43 CM2
EJECTION FRACTION APICAL 4 CHAMBER: 62.7
EJECTION FRACTION: 60 %
LEFT ATRIUM VOLUME AREA LENGTH INDEX BSA: 29.8 ML/M2
LEFT VENTRICLE INTERNAL DIMENSION DIASTOLE: 3.86 CM (ref 3.5–6)
LEFT VENTRICULAR OUTFLOW TRACT DIAMETER: 2.04 CM
MITRAL VALVE E/A RATIO: 0.93
MITRAL VALVE E/E' RATIO: 5.88
RIGHT VENTRICLE FREE WALL PEAK S': 9.65 CM/S
TRICUSPID ANNULAR PLANE SYSTOLIC EXCURSION: 2.4 CM

## 2024-01-19 PROCEDURE — 93306 TTE W/DOPPLER COMPLETE: CPT

## 2024-01-19 PROCEDURE — 93306 TTE W/DOPPLER COMPLETE: CPT | Performed by: INTERNAL MEDICINE

## 2024-01-22 ENCOUNTER — APPOINTMENT (OUTPATIENT)
Dept: HEMATOLOGY/ONCOLOGY | Facility: HOSPITAL | Age: 69
End: 2024-01-22
Payer: MEDICARE

## 2024-01-22 ENCOUNTER — OFFICE VISIT (OUTPATIENT)
Dept: PRIMARY CARE | Facility: CLINIC | Age: 69
End: 2024-01-22
Payer: MEDICARE

## 2024-01-22 DIAGNOSIS — Z71.9 HEALTH EDUCATION/COUNSELING: Primary | ICD-10-CM

## 2024-01-22 PROCEDURE — 1160F RVW MEDS BY RX/DR IN RCRD: CPT | Performed by: FAMILY MEDICINE

## 2024-01-22 PROCEDURE — 1126F AMNT PAIN NOTED NONE PRSNT: CPT | Performed by: FAMILY MEDICINE

## 2024-01-22 PROCEDURE — 1159F MED LIST DOCD IN RCRD: CPT | Performed by: FAMILY MEDICINE

## 2024-01-22 PROCEDURE — 1036F TOBACCO NON-USER: CPT | Performed by: FAMILY MEDICINE

## 2024-01-22 PROCEDURE — UHSMG PR UH SELECT MEET AND GREET: Performed by: FAMILY MEDICINE

## 2024-01-23 DIAGNOSIS — Z00.00 HEALTHCARE MAINTENANCE: ICD-10-CM

## 2024-01-25 DIAGNOSIS — C34.90 MALIGNANT NEOPLASM OF LUNG, UNSPECIFIED LATERALITY, UNSPECIFIED PART OF LUNG (MULTI): Primary | ICD-10-CM

## 2024-01-25 RX ORDER — FAMOTIDINE 10 MG/ML
20 INJECTION INTRAVENOUS ONCE AS NEEDED
Status: CANCELLED | OUTPATIENT
Start: 2024-01-31

## 2024-01-25 RX ORDER — ALBUTEROL SULFATE 0.83 MG/ML
3 SOLUTION RESPIRATORY (INHALATION) AS NEEDED
Status: CANCELLED | OUTPATIENT
Start: 2024-01-31

## 2024-01-25 RX ORDER — PROCHLORPERAZINE EDISYLATE 5 MG/ML
10 INJECTION INTRAMUSCULAR; INTRAVENOUS EVERY 6 HOURS PRN
Status: CANCELLED | OUTPATIENT
Start: 2024-01-31

## 2024-01-25 RX ORDER — PROCHLORPERAZINE MALEATE 10 MG
10 TABLET ORAL EVERY 6 HOURS PRN
Status: CANCELLED | OUTPATIENT
Start: 2024-01-31

## 2024-01-25 RX ORDER — EPINEPHRINE 0.3 MG/.3ML
0.3 INJECTION SUBCUTANEOUS EVERY 5 MIN PRN
Status: CANCELLED | OUTPATIENT
Start: 2024-01-31

## 2024-01-25 RX ORDER — DIPHENHYDRAMINE HYDROCHLORIDE 50 MG/ML
50 INJECTION INTRAMUSCULAR; INTRAVENOUS AS NEEDED
Status: CANCELLED | OUTPATIENT
Start: 2024-01-31

## 2024-01-29 ENCOUNTER — LAB (OUTPATIENT)
Dept: LAB | Facility: HOSPITAL | Age: 69
End: 2024-01-29
Payer: MEDICARE

## 2024-01-29 ENCOUNTER — EDUCATION (OUTPATIENT)
Dept: HEMATOLOGY/ONCOLOGY | Facility: HOSPITAL | Age: 69
End: 2024-01-29

## 2024-01-29 ENCOUNTER — INFUSION (OUTPATIENT)
Dept: HEMATOLOGY/ONCOLOGY | Facility: HOSPITAL | Age: 69
End: 2024-01-29
Payer: MEDICARE

## 2024-01-29 ENCOUNTER — OFFICE VISIT (OUTPATIENT)
Dept: HEMATOLOGY/ONCOLOGY | Facility: HOSPITAL | Age: 69
End: 2024-01-29
Payer: MEDICARE

## 2024-01-29 VITALS
WEIGHT: 108.69 LBS | OXYGEN SATURATION: 97 % | HEART RATE: 63 BPM | RESPIRATION RATE: 18 BRPM | BODY MASS INDEX: 20.54 KG/M2 | SYSTOLIC BLOOD PRESSURE: 131 MMHG | TEMPERATURE: 96.4 F | DIASTOLIC BLOOD PRESSURE: 75 MMHG

## 2024-01-29 DIAGNOSIS — C34.90 MALIGNANT NEOPLASM OF LUNG, UNSPECIFIED LATERALITY, UNSPECIFIED PART OF LUNG (MULTI): ICD-10-CM

## 2024-01-29 LAB
ALBUMIN SERPL BCP-MCNC: 4.2 G/DL (ref 3.4–5)
ALP SERPL-CCNC: 58 U/L (ref 33–136)
ALT SERPL W P-5'-P-CCNC: 18 U/L (ref 7–45)
ANION GAP SERPL CALC-SCNC: 12 MMOL/L (ref 10–20)
AST SERPL W P-5'-P-CCNC: 21 U/L (ref 9–39)
BASOPHILS # BLD AUTO: 0.02 X10*3/UL (ref 0–0.1)
BASOPHILS NFR BLD AUTO: 0.5 %
BILIRUB SERPL-MCNC: 0.4 MG/DL (ref 0–1.2)
BUN SERPL-MCNC: 26 MG/DL (ref 6–23)
CALCIUM SERPL-MCNC: 9.7 MG/DL (ref 8.6–10.3)
CHLORIDE SERPL-SCNC: 104 MMOL/L (ref 98–107)
CK SERPL-CCNC: 46 U/L (ref 0–215)
CO2 SERPL-SCNC: 30 MMOL/L (ref 21–32)
CREAT SERPL-MCNC: 1.34 MG/DL (ref 0.5–1.05)
EGFRCR SERPLBLD CKD-EPI 2021: 42 ML/MIN/1.73M*2
EOSINOPHIL # BLD AUTO: 0.09 X10*3/UL (ref 0–0.4)
EOSINOPHIL NFR BLD AUTO: 2.2 %
ERYTHROCYTE [DISTWIDTH] IN BLOOD BY AUTOMATED COUNT: 14.8 % (ref 11.5–14.5)
GLUCOSE SERPL-MCNC: 89 MG/DL (ref 74–99)
HCT VFR BLD AUTO: 44.8 % (ref 36–46)
HGB BLD-MCNC: 14.2 G/DL (ref 12–16)
IMM GRANULOCYTES # BLD AUTO: 0.02 X10*3/UL (ref 0–0.5)
IMM GRANULOCYTES NFR BLD AUTO: 0.5 % (ref 0–0.9)
LYMPHOCYTES # BLD AUTO: 0.81 X10*3/UL (ref 0.8–3)
LYMPHOCYTES NFR BLD AUTO: 19.5 %
MAGNESIUM SERPL-MCNC: 2.13 MG/DL (ref 1.6–2.4)
MCH RBC QN AUTO: 29.4 PG (ref 26–34)
MCHC RBC AUTO-ENTMCNC: 31.7 G/DL (ref 32–36)
MCV RBC AUTO: 93 FL (ref 80–100)
MONOCYTES # BLD AUTO: 0.31 X10*3/UL (ref 0.05–0.8)
MONOCYTES NFR BLD AUTO: 7.5 %
NEUTROPHILS # BLD AUTO: 2.9 X10*3/UL (ref 1.6–5.5)
NEUTROPHILS NFR BLD AUTO: 69.8 %
NRBC BLD-RTO: 0 /100 WBCS (ref 0–0)
PLATELET # BLD AUTO: 201 X10*3/UL (ref 150–450)
POTASSIUM SERPL-SCNC: 5 MMOL/L (ref 3.5–5.3)
PROT SERPL-MCNC: 6.9 G/DL (ref 6.4–8.2)
RBC # BLD AUTO: 4.83 X10*6/UL (ref 4–5.2)
SODIUM SERPL-SCNC: 141 MMOL/L (ref 136–145)
WBC # BLD AUTO: 4.2 X10*3/UL (ref 4.4–11.3)

## 2024-01-29 PROCEDURE — 99215 OFFICE O/P EST HI 40 MIN: CPT | Performed by: INTERNAL MEDICINE

## 2024-01-29 PROCEDURE — 1126F AMNT PAIN NOTED NONE PRSNT: CPT | Performed by: INTERNAL MEDICINE

## 2024-01-29 PROCEDURE — 82550 ASSAY OF CK (CPK): CPT

## 2024-01-29 PROCEDURE — 2500000004 HC RX 250 GENERAL PHARMACY W/ HCPCS (ALT 636 FOR OP/ED)

## 2024-01-29 PROCEDURE — 80053 COMPREHEN METABOLIC PANEL: CPT

## 2024-01-29 PROCEDURE — 36415 COLL VENOUS BLD VENIPUNCTURE: CPT

## 2024-01-29 PROCEDURE — 1036F TOBACCO NON-USER: CPT | Performed by: INTERNAL MEDICINE

## 2024-01-29 PROCEDURE — 96413 CHEMO IV INFUSION 1 HR: CPT

## 2024-01-29 PROCEDURE — 83735 ASSAY OF MAGNESIUM: CPT

## 2024-01-29 PROCEDURE — 1159F MED LIST DOCD IN RCRD: CPT | Performed by: INTERNAL MEDICINE

## 2024-01-29 PROCEDURE — 85025 COMPLETE CBC W/AUTO DIFF WBC: CPT

## 2024-01-29 RX ORDER — PROCHLORPERAZINE EDISYLATE 5 MG/ML
10 INJECTION INTRAMUSCULAR; INTRAVENOUS EVERY 6 HOURS PRN
Status: DISCONTINUED | OUTPATIENT
Start: 2024-01-29 | End: 2024-01-29 | Stop reason: HOSPADM

## 2024-01-29 RX ORDER — FAMOTIDINE 10 MG/ML
20 INJECTION INTRAVENOUS ONCE AS NEEDED
Status: DISCONTINUED | OUTPATIENT
Start: 2024-01-29 | End: 2024-01-29 | Stop reason: HOSPADM

## 2024-01-29 RX ORDER — ALBUTEROL SULFATE 0.83 MG/ML
3 SOLUTION RESPIRATORY (INHALATION) AS NEEDED
Status: DISCONTINUED | OUTPATIENT
Start: 2024-01-29 | End: 2024-01-29 | Stop reason: HOSPADM

## 2024-01-29 RX ORDER — PROCHLORPERAZINE MALEATE 10 MG
10 TABLET ORAL EVERY 6 HOURS PRN
Status: DISCONTINUED | OUTPATIENT
Start: 2024-01-29 | End: 2024-01-29 | Stop reason: HOSPADM

## 2024-01-29 RX ORDER — EPINEPHRINE 0.3 MG/.3ML
0.3 INJECTION SUBCUTANEOUS EVERY 5 MIN PRN
Status: DISCONTINUED | OUTPATIENT
Start: 2024-01-29 | End: 2024-01-29 | Stop reason: HOSPADM

## 2024-01-29 RX ORDER — DIPHENHYDRAMINE HYDROCHLORIDE 50 MG/ML
50 INJECTION INTRAMUSCULAR; INTRAVENOUS AS NEEDED
Status: DISCONTINUED | OUTPATIENT
Start: 2024-01-29 | End: 2024-01-29 | Stop reason: HOSPADM

## 2024-01-29 RX ADMIN — Medication 757.5 MG: at 13:57

## 2024-01-29 ASSESSMENT — ENCOUNTER SYMPTOMS
ABDOMINAL PAIN: 0
WEAKNESS: 0
VISUAL CHANGE: 0
DIAPHORESIS: 0
SWOLLEN GLANDS: 0
NUMBNESS: 0
VOMITING: 0
JOINT SWELLING: 0
NECK PAIN: 0
HEADACHES: 0
MYALGIAS: 0
SORE THROAT: 0
CHILLS: 0
FEVER: 0
VERTIGO: 0
ANOREXIA: 0
CHANGE IN BOWEL HABIT: 1
COUGH: 0
NAUSEA: 0
ARTHRALGIAS: 0
FATIGUE: 0

## 2024-01-29 ASSESSMENT — PAIN SCALES - GENERAL: PAINLEVEL: 0-NO PAIN

## 2024-01-29 NOTE — RESEARCH NOTES
Research Note Treatment Day    Mirta Hills is here today for treatment on [id number]. Today is C10. Procedures completed per protocol. AE's and con-meds reviewed with patient. Patient is aware of treatment plan. Patient continues to follow up on thyroid function and medications with endocrinology. No new AE's to report.     [x]   Received treatment as planned   OR  []    Treatment delayed; patient calendar updated as required   Treatment delayed because:    []   AE    []   Physician Discretion    []   Clinical Deterioration or Progression     []   Other    Education Documentation  Supportive Medications, taught by Makayla Joseph RN at 1/29/2024  3:11 PM.  Learner: Patient  Readiness: Acceptance  Method: Explanation  Response: Verbalizes Understanding    Education Comments  No comments found.

## 2024-01-29 NOTE — PROGRESS NOTES
Patient ID: Mirta Hills is a 69 y.o. female.    DIAGNOSIS     Adenocarcinoma of the lung.  Date of diagnosis is [place] 15, 2023 from a level 7 lymph node biopsy/fine-needle aspiration.  Immunohistochemistry positive for TTF-1.        STAGING     Clinical T2a (right lower lobe mass measuring 3.6 cm), clinical N2 (PET positivity and subcarinal region), M1C disease (presence of brain metastases, ribs, right acetabulum, liver, brain), stage IVb disease        CURRENT SITES OF DISEASE     Right lower lobe, right hilum of the lung, ipsilateral mediastinum, liver, bones, brain        MOLECULAR GENOMICS     TEST: Focused Solid Tumor DNA/RNA Panel   SPECIMEN: Supernatant, LYMPH NODE 7, [id number]   DISEASE DIAGNOSIS: Adenocarcinoma   Estimated Tumor Content: 40%   COLLECTION DATE: 6/15/2023     MICROSATELLITE STATUS: Microsatellite  Instability-High (MSI-H) is NOT DETECTED.     DISEASE ASSOCIATED GENOMIC FINDINGS:   EGFR p.[id number] ([id number] [id number])    TP53 p.N247I ([id number] c.740A>T)     DISEASE RELEVANT ALTERATIONS NOT DETECTED:   Negative for ALK fusion.   Negative for BRAF V600E.   Negative for ERBB2 activating mutation   Negative for KRAS G12C.   Negative for  MET exon 14 skipping mutation.   Negative for NTRK fusion.   Negative for RET fusion.   Negative for ROS1 fusion.        Circulating tumor DNA sent on June 19, 2023  EGFR p.[id number], Variant allelic fraction of 1.6%  TP53 N247I Missense variant, loss of function, variant allelic fraction 1.1%  MSI stable        SERUM TUMOR MARKER     Slightly elevated CEA and CA 19.9 June 2023        PRIOR THERAPY     1- Gamma knife radiosurgery to brain lesions completed on June 28, 2023.        CURRENT THERAPY     Osimertinib on study  on clinical trial [id number] which is a randomized trial of osimertinib with or without bevacizumab.  She has been randomized to the bevacizumab arm- Cycle 1 Day 1 is 7/12/2023        CURRENT ONCOLOGICAL PROBLEMS     1-brain  metastases, symptomatic, word finding difficulties, mild unsteadiness on her feet but no falls, Resolved July 3, 2023  2-anterior chest pain, increases with deep inspiration, Improved July 3, 2023  3-mild intermittent grade 1 diarrhea, mild hair loss, mild leukopenia, mild elevation in creatinine, mild fatigue.  Most likely all osimertinib related August 7, 2023        HISTORY OF PRESENT ILLNESS     This is a 68-year-old patient.  She states that towards Thanksgiving of 2022 she started feeling some pain in her right shoulder.  Eventually got better however the same pain came back in January or February 2023.  Ultimately some x-rays were done of  the shoulder that were generally negative.  She then developed some pain in her sternum a month or 2 later with deep inspiration and ultimately underwent imaging in May 2023.  Chest CT without contrast was done on June 1, 2023.  This showed a 3.4 cm mass  within the superior segment of the right lower lobe with some associated satellite pulmonary nodules.  Additionally there were some other subcentimeter pulmonary nodules.  There was evidence of mediastinal lymphadenopathy.  Subcarinal lymph node measured  1.4 cm in short axis.She was seen by pulmonary medicine on June 5, 2023 a combined PET/CT was ordered on June 6, 2023 showing a relatively intense area of hypermetabolic activity along the superior posteromedial aspect of the right lower lobe corresponding  to the known right lower lobe lung mass.  There was foci of hypermetabolic activity in the right supraclavicular region, subcarinal and right hilar regions.  There was area of uptake within the posterior upper ribs as well as right acetabulum suspicious  for osseous metastatic disease.  There was a punctate area of mild hypermetabolic activity in the right hepatic lobe concerning for hepatic metastases.  Small focus in the subcutaneous region just anterior to the lower aspect of the sternum could be inflammatory   versus metastatic.  She undergoes a bronchoscopy dated [place] 15, 2023.  There were no endobronchial lesions.  Primary cytology with rapid onsite evaluation was suggestive of malignancy in level 7, final lymph nodes were pathology was pending.  MRI of the  brain with and without contrast dated June 16, 2023 showed approximately 20 enhancing nodules concerning for metastatic disease.  Specifically there was a 1.5 cm nodule in the right cerebellar hemisphere.  Most of the other nodules were less than 1 cm.   Patient also notes that she has some difficulty expressing words over the past 1 to 2 weeks prior to her first visit with us as well as some mild unsteadiness on her legs where she feels she is drifting towards the right side.        PAST MEDICAL HISTORY     1-hypothyroidism  2-hysterectomy 1988  3-abdominal hernia surgery 1995  4-some hearing difficulties        SOCIAL HISTORY     Patient lives with her significant other.  She has 2 daughter.  Lives in [facility].  Smoked for only 3 years during college and during this time it was a pack per day.  She has retired.  She had a retail store.        CURRENT MEDS     See medication list, meds reviewed        ALLERGIES     Patient denies any drug allergies        FAMILY HISTORY     Patient's father had bladder cancer at the age of 86.  She has 1 brother with no cancer.  She has 2 children       Subjective    Cancer  Associated symptoms include a change in bowel habit. Pertinent negatives include no abdominal pain, anorexia, arthralgias, chest pain, chills, coughing, diaphoresis, fatigue, fever, headaches, joint swelling, myalgias, nausea, neck pain, numbness, rash, sore throat, swollen glands, urinary symptoms, vertigo, visual change, vomiting or weakness.     Patient is overall doing well, went [place].  Has some intermittent diarrhea up to 4 times a day, no melena or bright blood per rectum no nausea no vomiting, no new headaches,  feels dizzy at times.  No shortness of breath cough or hemoptysis.    Objective    BSA: 1.46 meters squared  /75 (BP Location: Right arm, Patient Position: Sitting, BP Cuff Size: Adult)   Pulse 63   Temp 35.8 °C (96.4 °F) (Temporal)   Resp 18   Wt 49.3 kg (108 lb 11 oz)   SpO2 97%   BMI 20.54 kg/m²      Physical Exam  Constitutional:       General: She is not in acute distress.     Appearance: She is not ill-appearing, toxic-appearing or diaphoretic.   HENT:      Mouth/Throat:      Mouth: Mucous membranes are moist.      Pharynx: Oropharynx is clear. No oropharyngeal exudate or posterior oropharyngeal erythema.   Eyes:      General: No scleral icterus.     Conjunctiva/sclera: Conjunctivae normal.   Cardiovascular:      Rate and Rhythm: Normal rate and regular rhythm.      Pulses: Normal pulses.      Heart sounds: Normal heart sounds. No murmur heard.     No friction rub. No gallop.   Pulmonary:      Effort: Pulmonary effort is normal. No respiratory distress.      Breath sounds: Normal breath sounds. No stridor. No wheezing, rhonchi or rales.   Chest:      Chest wall: No tenderness.   Abdominal:      General: There is no distension.      Palpations: There is no mass.      Tenderness: There is no abdominal tenderness. There is no guarding or rebound.   Musculoskeletal:         General: No swelling.      Cervical back: No tenderness.      Right lower leg: No edema.      Left lower leg: No edema.   Lymphadenopathy:      Cervical: No cervical adenopathy.   Skin:     General: Skin is warm.      Coloration: Skin is not jaundiced or pale.      Findings: No bruising, erythema or rash.   Neurological:      General: No focal deficit present.      Mental Status: She is oriented to person, place, and time.   Psychiatric:         Mood and Affect: Mood normal.         Behavior: Behavior normal.         Performance Status:  Asymptomatic     Latest Reference Range & Units 01/29/24 10:48   GLUCOSE 74 - 99 mg/dL 89    SODIUM 136 - 145 mmol/L 141   POTASSIUM 3.5 - 5.3 mmol/L 5.0   CHLORIDE 98 - 107 mmol/L 104   Bicarbonate 21 - 32 mmol/L 30   Anion Gap 10 - 20 mmol/L 12   Blood Urea Nitrogen 6 - 23 mg/dL 26 (H)   Creatinine 0.50 - 1.05 mg/dL 1.34 (H)   EGFR >60 mL/min/1.73m*2 42 (L)   Calcium 8.6 - 10.3 mg/dL 9.7   Albumin 3.4 - 5.0 g/dL 4.2   Alkaline Phosphatase 33 - 136 U/L 58   ALT 7 - 45 U/L 18   AST 9 - 39 U/L 21   Bilirubin Total 0.0 - 1.2 mg/dL 0.4   Total Protein 6.4 - 8.2 g/dL 6.9   MAGNESIUM 1.60 - 2.40 mg/dL 2.13   WBC 4.4 - 11.3 x10*3/uL 4.2 (L)   nRBC 0.0 - 0.0 /100 WBCs 0.0   RBC 4.00 - 5.20 x10*6/uL 4.83   HEMOGLOBIN 12.0 - 16.0 g/dL 14.2   HEMATOCRIT 36.0 - 46.0 % 44.8   MCV 80 - 100 fL 93   MCH 26.0 - 34.0 pg 29.4   MCHC 32.0 - 36.0 g/dL 31.7 (L)   RED CELL DISTRIBUTION WIDTH 11.5 - 14.5 % 14.8 (H)   Platelets 150 - 450 x10*3/uL 201   Neutrophils % 40.0 - 80.0 % 69.8   Immature Granulocytes %, Automated 0.0 - 0.9 % 0.5   Lymphocytes % 13.0 - 44.0 % 19.5   Monocytes % 2.0 - 10.0 % 7.5   Eosinophils % 0.0 - 6.0 % 2.2   Basophils % 0.0 - 2.0 % 0.5   Neutrophils Absolute 1.60 - 5.50 x10*3/uL 2.90   Immature Granulocytes Absolute, Automated 0.00 - 0.50 x10*3/uL 0.02   Lymphocytes Absolute 0.80 - 3.00 x10*3/uL 0.81   Monocytes Absolute 0.05 - 0.80 x10*3/uL 0.31   Eosinophils Absolute 0.00 - 0.40 x10*3/uL 0.09   Basophils Absolute 0.00 - 0.10 x10*3/uL 0.02   (H): Data is abnormally high  (L): Data is abnormally low    MRI Brain 1/18/2024  IMPRESSION:  When compared with the prior study dated 11/15/2023, there are  residual mildly enhancing lesions within the cerebral hemispheres  bilaterally and right cerebellar hemisphere denoted by arrows on the  axial post gadolinium volumetric T1 weighted MRI images series 14  which are similar when compared with the prior examination dated  11/15/2023.      No new abnormal intracranial enhancing mass lesions are noted on the  current exam.    Assessment/Plan     This is a very  nice 69-year-old patient with a history of metastatic adenocarcinoma of the lung, carrying an exon 19 EGFR mutation and a concurrent T p53 mutation.  She is on a randomized clinical trial and has been randomized to osimertinib (targeting EGFR) along with the bevacizumab, antibody targeting vascular endothelial growth factor).  She has had a great response to treatment and has had no evidence of disease progression.  Indeed her recent imaging studies dated January 18, 2024 including an MRI of the brain and a CT scan of the chest abdomen pelvis shows no evidence of disease progression.  She continues to have grade 1 GI toxicities, as needed Imodium is used.  No Avastin related toxicities.      Cancer Staging   No matching staging information was found for the patient.    Oncology History   Lung cancer (CMS/HCC)   7/12/2023 -  Research Study Participant    ([facility]) [id number] Arm B - Bevacizumab / Osimertinib, 21 Day Cycles  Plan Provider: Humberto Landin MD  Treatment goal: [No plan goal]  Line of treatment: [No plan line of treatment]  Associated studies: [id number] (Osimertinib) +/- Bevacizumab as Initial Treatment for EGFR-Mutant Lung Cancer     9/1/2023 Initial Diagnosis    Lung cancer (CMS/HCC)                   Humberto Landin MD

## 2024-02-02 ENCOUNTER — HOSPITAL ENCOUNTER (OUTPATIENT)
Dept: RADIATION ONCOLOGY | Facility: CLINIC | Age: 69
Setting detail: RADIATION/ONCOLOGY SERIES
Discharge: HOME | End: 2024-02-02
Payer: MEDICARE

## 2024-02-02 ENCOUNTER — TELEPHONE (OUTPATIENT)
Dept: HEMATOLOGY/ONCOLOGY | Facility: HOSPITAL | Age: 69
End: 2024-02-02

## 2024-02-02 VITALS
SYSTOLIC BLOOD PRESSURE: 127 MMHG | DIASTOLIC BLOOD PRESSURE: 80 MMHG | BODY MASS INDEX: 20.79 KG/M2 | WEIGHT: 110.01 LBS | TEMPERATURE: 97.7 F | HEART RATE: 65 BPM | OXYGEN SATURATION: 95 % | RESPIRATION RATE: 18 BRPM

## 2024-02-02 DIAGNOSIS — C79.31 SECONDARY MALIGNANT NEOPLASM OF BRAIN (MULTI): ICD-10-CM

## 2024-02-02 PROCEDURE — 99214 OFFICE O/P EST MOD 30 MIN: CPT | Performed by: NURSE PRACTITIONER

## 2024-02-02 ASSESSMENT — PAIN SCALES - GENERAL: PAINLEVEL: 0-NO PAIN

## 2024-02-02 NOTE — TELEPHONE ENCOUNTER
Mirta calls today requesting to speak with her Clinical Trials Nurse, Makayla Joseph. She is thinking about planning a trip and wants to know how much flexibility she has around her treatments and if she could be up to a week late if she wanted to go out of town.  Message sent to team.

## 2024-02-02 NOTE — PROGRESS NOTES
Radiation Oncology Follow-Up    Patient Name:  Mirta Hills  MRN:  [id number]  :  1955    Referring Provider: Ileana Bradley, APR*  Primary Care Provider: No Assigned PCP Generic Provider, MD  Care Team: Patient Care Team:  No Assigned Pcp Generic Provider, MD as PCP - General (Internal Medicine)  Humberto Landin MD as Consulting Physician (Hematology and Oncology)  Makayla Joseph, RN as Registered Nurse (Hematology and Oncology)    Date of Service: 2024   69 y.o. female with adenocarcinoma of the lung.  Date of diagnosis is [place] 15, 2023 from a level 7 lymph node biopsy/fine-needle aspiration.  Immunohistochemistry positive  for TTF-1.  Clinical T2a (right lower lobe mass measuring 3.6 cm), clinical N2 (PET positivity and subcarinal region), M1C disease (presence of brain metastases, ribs, right acetabulum, liver, brain), stage IVb disease.     Treatment Summary:     - Towards  of  she started feeling some pain in her right shoulder.  Eventually got better however the same pain came back in January or 2023.  Ultimately some x-rays were done of the shoulder that were generally negative.       - She then developed some pain in her sternum a month or 2 later with deep inspiration and ultimately underwent imaging in May 2023.  Chest CT without contrast was done on 2023.  This showed a 3.4 cm mass within the superior segment of the right  lower lobe with some associated satellite pulmonary nodules.  Additionally there were some other subcentimeter pulmonary nodules.  There was evidence of mediastinal lymphadenopathy.  Subcarinal lymph node measured 1.4 cm in short axis.     - She was seen by pulmonary medicine on 2023 a combined PET/CT was ordered on 2023 showing a relatively intense area of hypermetabolic activity along the superior posteromedial aspect of the right lower lobe corresponding to the known  right lower lobe lung mass.  There was foci of  hypermetabolic activity in the right supraclavicular region, subcarinal and right hilar regions.  There was area of uptake within the posterior upper ribs as well as right acetabulum suspicious for osseous  metastatic disease.  There was a punctate area of mild hypermetabolic activity in the right hepatic lobe concerning for hepatic metastases.  Small focus in the subcutaneous region just anterior to the lower aspect of the sternum could be inflammatory  versus metastatic.       - She underwent a bronchoscopy dated [place] 15, 2023.  There were no endobronchial lesions.  Primary cytology with rapid onsite evaluation was suggestive of malignancy in level 7, final lymph nodes were pathology was pending.      -  MRI of the brain with and without contrast dated June 16, 2023 showed approximately 20 enhancing nodules concerning for metastatic disease.  Specifically there was a 1.5 cm nodule in the right cerebellar hemisphere.  Most of the other nodules were  less than 1 cm.      -  Gamma knife radiosurgery to multiple brain lesions (total of 9) completed on June 28, 2023.     - Clinical trial EA [id number] which is a randomized trial of osimertinib with or without bevacizumab.  She has been randomized to the bevacizumab arm- Cycle 1 Day 1 on 7/12/2023     SUBJECTIVE  History of Present Illness:   Mirta Hills is here today for routine radiation follow up and review of MRI of the brain done yesterday. She says she is feeling very well and continues on bevacizumab on a clinical trial.  CT of CAP also done yesterday with results pending. She denies any headaches, seizures, visual changes or neurologic deficits.  She has been having some lightheadedness recently but no problems with balance or falls. She has occasional diarrhea controlled with immodium.  Denies cough, SOB, hemoptysis, N/V, abdominal pain or bony pain.     Review of Systems:    Review of Systems   All other systems reviewed and are negative.    Performance Status:    The Karnofsky performance scale today is 90, Able to carry on normal activity; minor signs or symptoms of disease (ECOG equivalent 0).        OBJECTIVE  Vital Signs:  /80 (BP Location: Left arm, Patient Position: Sitting, BP Cuff Size: Adult)   Pulse 65   Temp 36.5 °C (97.7 °F) (Core)   Resp 18   Wt 49.9 kg (110 lb 0.2 oz)   SpO2 95%   BMI 20.79 kg/m²      Current Outpatient Medications:     atorvastatin (Lipitor) 20 mg tablet, Take 1 tablet (20 mg) by mouth once daily., Disp: , Rfl:     betamethasone, augmented, (Diprolene AF) 0.05 % cream, Apply topically 2 times a day.  apply to affected area ON NECK, Disp: , Rfl:     CALCIUM ORAL, Take by mouth., Disp: , Rfl:     cholecalciferol (Vitamin D-3) 50 MCG (2000 UT) tablet, Take 1 tablet (2,000 Units) by mouth once daily., Disp: , Rfl:     dexAMETHasone (Decadron) 2 mg tablet, Take 1 tablet (2 mg) by mouth 2 times a day with meals. Take with food., Disp: , Rfl:     multivit-min/ferrous fumarate (MULTI VITAMIN ORAL), Take by mouth., Disp: , Rfl:     sertraline (Zoloft) 50 mg tablet, Take 1 tablet (50 mg) by mouth once daily., Disp: , Rfl:     Study RR0186 osimertinib 80 mg tablet, Take 1 tablet (80 mg total) by mouth once daily for 21 doses.  Swallow whole., Disp: 30 tablet, Rfl: 0    Synthroid 100 mcg tablet, Take 1 tablet (100 mcg) by mouth once daily. EXCEPT ON SUNDAY ONLY TAKE 1/2 TABLET, Disp: , Rfl:    Physical Exam:  Physical Exam  Constitutional:       Appearance: Normal appearance.   HENT:      Head: Normocephalic and atraumatic.      Nose: Nose normal.      Mouth/Throat:      Mouth: Mucous membranes are moist.      Pharynx: Oropharynx is clear.   Eyes:      Extraocular Movements: Extraocular movements intact.      Conjunctiva/sclera: Conjunctivae normal.      Pupils: Pupils are equal, round, and reactive to light.   Cardiovascular:      Rate and Rhythm: Normal rate and regular rhythm.      Heart sounds: Normal heart sounds.   Pulmonary:       Effort: Pulmonary effort is normal.      Breath sounds: Normal breath sounds.   Abdominal:      Palpations: Abdomen is soft.   Musculoskeletal:         General: No swelling. Normal range of motion.      Cervical back: Normal range of motion and neck supple.   Lymphadenopathy:      Cervical: No cervical adenopathy.   Skin:     General: Skin is warm and dry.      Comments: Raynauds in hands   Neurological:      General: No focal deficit present.      Mental Status: She is alert.      Cranial Nerves: No cranial nerve deficit.      Sensory: No sensory deficit.      Motor: No weakness.      Coordination: Coordination normal.      Gait: Gait normal.   Psychiatric:         Mood and Affect: Mood normal.         Behavior: Behavior normal.         Thought Content: Thought content normal.         Judgment: Judgment normal.             RESULTS:  CT chest abdomen pelvis w IV contrast    Result Date: 1/19/2024  Interpreted By:  Alvaro Ayon and Beyersdorf Conner STUDY: CT CHEST ABDOMEN PELVIS W IV CONTRAST;  1/18/2024 10:31 am   INDICATION: Signs/Symptoms:metastatic disease evaluation per clinical trial requirements. 68-year-old female with right lower lung adenocarcinoma with metastasis to nodes and distant sites including liver, bone, and brain, diagnosed [place] 15, 2023 with CA 19 9 of 65 and CEA 10.8. Started on osimertinib and bevacizumab 07/12/2023. Gamma knife radiation performed on brain lesions.   COMPARISON: CT chest abdomen pelvis 11/15/2023   ACCESSION NUMBER(S): [id number]   ORDERING CLINICIAN: ADA ANTON   TECHNIQUE: CT of the chest, abdomen, and pelvis was performed.  Contiguous axial images were obtained at 3 mm slice thickness through the chest, abdomen and pelvis. Coronal and sagittal reconstructions at 3 mm slice thickness were performed. 75 ml of contrast Omnipaque 350 were administered intravenously without immediate complication.   FINDINGS: CHEST:   LUNG/PLEURA/LARGE AIRWAYS: Spiculated nodule in  the right lower lobe measuring 2.6 X 1.8 cm (series 204, image 123) consistent with known adenocarcinoma is similar to mildly decreased in size, previously measuring 2.7 X 1.9 cm. Persistent distortion of the right major fissure. Similar appearance of right minor fissural nodule measuring 0.8 cm (series 204, image 143), right upper lobe nodule measuring 0.3 cm (series 204, image 121), and left lower lobe subpleural nodule measuring 0.3 cm (series 204, image 235). Calcified granuloma within the right middle lobe is stable. Small pulmonary cyst in the left lower lobe measuring 1.7 cm (series 204, image 223) is stable. No focal consolidation, pleural effusion, or pneumothorax. Central airways are patent without evidence of intraluminal mass.   VESSELS: Aorta and pulmonary arteries are normal caliber.  Mild atherosclerotic changes are noted of the aorta and branching vessels. Moderate coronary artery calcifications are present.   HEART: The heart is normal in size.  No pericardial effusion   MEDIASTINUM AND RAI: Right perihilar calcified nodules are stable. Few prominent subcentimeter mediastinal lymph nodes are stable to mildly decreased in size. No axillary lymphadenopathy. The esophagus is normal.   CHEST WALL AND LOWER NECK: The soft tissues of the chest wall demonstrate no gross abnormality. The visualized thyroid gland appears within normal limits.   ABDOMEN:   LIVER: The liver is normal in size measuring 16 cm in the craniocaudal axis. Few small hypodensities in the right lobe of the liver are too small to characterize.   BILE DUCTS: The intrahepatic and extrahepatic ducts are not dilated.   GALLBLADDER: The gallbladder is nondistended and without evidence of radiopaque stones.   PANCREAS: The pancreas appears unremarkable without evidence of ductal dilatation or masses.   SPLEEN: Splenomegaly measuring 15 cm in craniocaudal axis of the 13 cm in the AP dimension. Around hypodensity measuring 0.6 cm (series 201,  image 81) is unchanged.   ADRENAL GLANDS: Bilateral adrenal glands appear normal.   KIDNEYS AND URETERS: The kidneys are normal in size and enhance symmetrically.  No hydroureteronephrosis or nephroureterolithiasis is identified.   PELVIS:   BLADDER: The urinary bladder appears normal without abnormal wall thickening.   REPRODUCTIVE ORGANS: The uterus is surgically absent.   BOWEL: The stomach is unremarkable.  The small and large bowel are normal in caliber and demonstrate no wall thickening.  The appendix is not definitely visualized. There is however no pericecal stranding or fluid.     VESSELS: There is no aneurysmal dilatation of the abdominal aorta. The IVC appears normal.   PERITONEUM/RETROPERITONEUM/LYMPH NODES: Multiple prominent subcentimeter mesenteric lymph nodes including a 0.7 cm node (series 201, image 112) and two 0.8 cm nodes (series 201, image 124). Previously described retroperitoneal para-aortic node measures 0.5 cm (series 201, image 115), and previously measured 0.7 cm. Right obturator node measures 1.0 cm (series 201, image 161), stable when remeasured on prior exam. No abdominopelvic fluid collections or free air.   BONE AND SOFT TISSUE: Sclerotic/lytic lesion at the inferior sternum (series 204, image 145) likely represents area of prior metastasis and correlates with increased activity on prior PET-CT, and demonstrates increased sclerosis compared to prior exam. Additional sclerotic area in the right T3 transverse process/costovertebral joint space. No additional suspicious osseous lesions. The abdominal wall soft tissues appear normal.       Right lung adenocarcinoma with lesley and brain metastasis, restaging scan as compared to CT on 11/15/2023: 1.  Stable appearance of the right lower lobe spiculated mass consistent with known adenocarcinoma. 2. Multiple bilateral pulmonary nodules are stable in size, no new suspicious nodules. 3. No new evidence of metastatic disease in the chest,  abdomen, or pelvis. 4. Splenomegaly, stable compared to prior exams, is likely a benign finding but underlying indolent lymphoma can not be excluded. 5. Mixed sclerotic/lytic lesion in the lower left-sided sternum with interval increased sclerosis compared to prior exam likely representing posttreatment changes and bone healing.     I personally reviewed the image(s)/study and resident interpretation. I agree with the findings as stated by resident Brayden Galvan. Data analyzed and images interpreted at [facility], [place], [place].   MACRO: None   Signed by: Alvaro Ayon 1/19/2024 6:42 PM Dictation workstation:   [id number]    Transthoracic Echo (TTE) Complete    Result Date: 1/19/2024    [facility] at [place] [address]                      Tel [phone] and Fax [phone] TRANSTHORACIC ECHOCARDIOGRAM REPORT  Patient Name:      EUN Ruiz Physician:    [id number] Haris Johnson MD Study Date:        1/19/2024            Ordering Provider:    [id number] ANTON MRN/PID:           [id number]             Fellow: Accession#:        [id number]         Nurse: Date of Birth/Age: 1955 / 68 years Sonographer:          Sonali Holman RDCS Gender:            F                    Additional Staff: Height:            154.94 cm            Admit Date: Weight:            48.08 kg             Admission Status:     Outpatient BSA:               1.44 m2              Encounter#:           [id number]                                         Department Location:  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 Hypothyroid, Lung Cancer. Study Detail: The following Echo studies were performed: color flow, Doppler, 2D               and M-Mode. Technically challenging study due to prominent lung               artifact.  PHYSICIAN INTERPRETATION: Left Ventricle: The left ventricular systolic function is normal, with an estimated ejection fraction of 60-65%. There are no regional wall motion abnormalities. The left ventricular cavity size is normal. Spectral Doppler shows a normal pattern of left ventricular diastolic filling. Left Atrium: The left atrium is normal in size. Right Ventricle: The right ventricle is slightly enlarged. There is normal right ventricular global systolic function. Right Atrium: The right atrium is normal in size. Aortic Valve: The aortic valve is trileaflet. There is minimal aortic valve cusp calcification. There is trace to mild aortic valve regurgitation. The peak instantaneous gradient of the aortic valve is 6.9 mmHg. Mitral Valve: The mitral valve is normal in structure. There is trace mitral valve regurgitation. Tricuspid Valve: The tricuspid valve is structurally normal. There is trace tricuspid regurgitation. The right ventricular systolic pressure is unable to be estimated. Pulmonic Valve: The pulmonic valve is structurally normal. There is trace pulmonic valve regurgitation. Pericardium: There is a trivial to small pericardial effusion. Aorta: The aortic root is normal.  CONCLUSIONS:  1. Left ventricular systolic function is normal with a 60-65% estimated ejection fraction.  2. There is evidence of slow flow in the IVC and a ~ 3 mm mobile echo density in the pre-hepatic vein IVC of uncertain significance. Suggest abdominal sonogram to better characterize the echodensity.  QUANTITATIVE DATA SUMMARY: 2D MEASUREMENTS:                          Normal Ranges: Ao Root d:     2.90 cm   (2.0-3.7cm) LAs:           3.68 cm   (2.7-4.0cm) RVIDd:         1.78 cm   (0.9-3.6cm) IVSd:          0.82 cm   (0.6-1.1cm) LVPWd:         0.69 cm   (0.6-1.1cm) LVIDd:         3.86 cm   (3.9-5.9cm) LVIDs:         2.41 cm LV Mass Index: 56.8 g/m2 LV % FS        37.5 % LA VOLUME:                               Normal Ranges: LA Vol A4C:        38.3 ml    (22+/-6mL/m2) LA Vol A2C:        46.3 ml LA Vol BP:         43.0 ml LA Vol Index A4C:  26.5ml/m2 LA Vol Index A2C:  32.1 ml/m2 LA Vol Index BP:   29.8 ml/m2 LA Area A4C:       14.7 cm2 LA Area A2C:       16.5 cm2 LA Major Axis A4C: 4.8 cm LA Major Axis A2C: 5.0 cm LA Volume Index:   32.1 ml/m2 LA Vol A4C:        34.8 ml LA Vol A2C:        44.0 ml AORTA MEASUREMENTS:                    Normal Ranges: Asc Ao, d: 2.90 cm (2.1-3.4cm) LV SYSTOLIC FUNCTION BY 2D PLANIMETRY (MOD):                     Normal Ranges: EF-A4C View: 62.7 % (>=55%) EF-A2C View: 58.1 % EF-Biplane:  60.5 % LV DIASTOLIC FUNCTION:                               Normal Ranges: MV Peak E:        0.65 m/s    (0.7-1.2 m/s) MV Peak A:        0.69 m/s    (0.42-0.7 m/s) E/A Ratio:        0.93        (1.0-2.2) MV e'             0.11 m/s    (>8.0) MV lateral e'     0.13 m/s MV medial e'      0.09 m/s MV A Dur:         128.45 msec E/e' Ratio:       5.88        (<8.0) PulmV Sys Chevy:    51.21 cm/s PulmV Vera Chevy:   21.12 cm/s PulmV S/D Chevy:    2.42 PulmV A Revs Chevy: 25.09 cm/s PulmV A Revs Dur: 108.47 msec MITRAL VALVE:                 Normal Ranges: MV DT: 230 msec (150-240msec) AORTIC VALVE:                         Normal Ranges: AoV Vmax:      1.31 m/s (<=1.7m/s) AoV Peak P.9 mmHg (<20mmHg) LVOT Max Chevy:  0.98 m/s (<=1.1m/s) LVOT VTI:      22.99 cm LVOT Diameter: 2.04 cm  (1.8-2.4cm) AoV Area,Vmax: 2.43 cm2 (2.5-4.5cm2)  RIGHT VENTRICLE: RV Basal 3.60 cm RV Mid   1.80 cm RV Major 7.3 cm TAPSE:   23.6  mm RV s'    0.10 m/s PULMONIC VALVE:                      Normal Ranges: PV Max Chevy: 0.7 m/s  (0.6-0.9m/s) PV Max P.1 mmHg Pulmonary Veins: PulmV A Revs Dur: 108.47 msec PulmV A Revs Chevy: 25.09 cm/s PulmV Vera Chevy:   21.12 cm/s PulmV S/D Chevy:    2.42 PulmV Sys Chevy:    51.21 cm/s AORTA: Asc Ao Diam 2.93 cm  [id number] Haris Johnson MD Electronically signed on 2024 at 11:42:56 AM  ** Final **     MR brain w and wo IV contrast    Result Date: 2024  Interpreted By:  Jostin Gray, STUDY: MR BRAIN W AND WO IV CONTRAST;  2024 11:12 am   INDICATION: Signs/Symptoms:metastatic disease evaluation per clinical trial requirements.   COMPARISON: 11/15/2023   ACCESSION NUMBER(S): [id number]   ORDERING CLINICIAN: ADA ANTON   TECHNIQUE: Axial diffusion, axial T2, axial FLAIR, axial gradient echo T2, axial T1, post gadolinium volumetric T1, as well as post gadolinium axial T1 weighted MRI images of the brain were obtained. The patient received  10 mL of  Dotarem gadolinium intravenously.   FINDINGS:   When compared with the prior study dated 11/15/2023, there are residual mildly enhancing lesions within the cerebral hemispheres bilaterally and right cerebellar hemisphere denoted by arrows on the axial post gadolinium volumetric T1 weighted MRI images series 14 which are similar when compared with the prior examination dated 11/15/2023. The most conspicuous residual enhancing lesion is again noted in a parasagittal location along the left postcentral gyrus along the convexity measuring approximately 6-7 mm in axial dimension as seen on axial post gadolinium volumetric T1 slice 173 of 240 similar when compared with 11/15/2023. There is again evidence of a subtle focus of enhancement noted along the right frontal convexity as seen on axial post gadolinium volumetric T1 slice 172 of 240. There is a stable punctate enhancing lesion again noted more anteriorly along the left frontal lobe convexity as seen on axial  post gadolinium volumetric T1 slice 160 of 240. There is a stable small focal lesion noted along the body of the corpus callosum without well-defined pathologic enhancement as seen on axial post gadolinium slice 134 of 240. There is a vague focus of subtle enhancement within the right cerebellar hemisphere as seen on axial post gadolinium volumetric T1 slice 72 of 240 similar when compared with the prior exam. There is again evidence of a similar small vague focus of enhancement more inferiorly within the right cerebellar hemisphere as seen on axial post gadolinium volumetric T1 slice 65 of 240.   No new abnormal intracranial enhancing mass lesions are noted on the current exam.   The ventricular system remains nondilated.   There are a few small nonspecific white matter changes noted within the cerebral hemispheres bilaterally as well as minimal ill-defined increased signal on the FLAIR and T2 images overlying the brainstem which while nonspecific, given the patient's age, may represent sequelae of more remote small-vessel ischemic change.   There are additional punctate foci of bright signal on the T2 images noted within the subinsular regions and basal ganglia bilaterally suggesting incidental mildly prominent perivascular spaces and/or small scattered more remote lacunar infarctions.   There is no midline shift. The suprasellar/basilar cisterns are patent.   There is a retention cyst or polyp within the inferior left maxillary sinus. Mild mucosal thickening is noted within the inferior right maxillary sinus and scattered ethmoid air cells.   The visualized paranasal sinuses and mastoid air cells are clear.       When compared with the prior study dated 11/15/2023, there are residual mildly enhancing lesions within the cerebral hemispheres bilaterally and right cerebellar hemisphere denoted by arrows on the axial post gadolinium volumetric T1 weighted MRI images series 14 which are similar when compared with the  prior examination dated 11/15/2023.   No new abnormal intracranial enhancing mass lesions are noted on the current exam.   MACRO: None.   Signed by: Jostin Gray 1/18/2024 12:24 PM Dictation workstation:   [id number]     ASSESSMENT:  69 y.o. female with metastatic lung cancer to the brain s/p gamma knife to multiple lesions.  She seems to be doing well and no adverse effects from gamma knife and excellent response to treatment and no new lesions identified. She will continue getting CT scans and brain MRIs every 2 mo per the clinical trial and these appts to be arranged per clinical trials team.  Radiation follow up after her next MRI of brain.  She will call me with any questions  or concerns      Amada Bradley CNP  [phone]

## 2024-02-12 ENCOUNTER — APPOINTMENT (OUTPATIENT)
Dept: HEMATOLOGY/ONCOLOGY | Facility: HOSPITAL | Age: 69
End: 2024-02-12
Payer: MEDICARE

## 2024-02-12 ENCOUNTER — TELEPHONE (OUTPATIENT)
Dept: ADMISSION | Facility: HOSPITAL | Age: 69
End: 2024-02-12
Payer: MEDICARE

## 2024-02-12 NOTE — TELEPHONE ENCOUNTER
Pt requesting to be seen or speak with someone from research team or Dr. Landin's office today if possible.   Pt states that on Saturday she had her acrylic nails removed because technician noticed her nails were falling off. Pt states that nails are broken, peeling, and a few have fallen off  down to the cuticle.   She is also c/o chills and palpitations at night when laying down.   Pt denies fever, ongoing bowel issues managed with imodium, no urination issues, no headache.   Last FUV 1/29 with next FUV and infusion 2/19.

## 2024-02-13 ENCOUNTER — TELEPHONE (OUTPATIENT)
Dept: ADMISSION | Facility: HOSPITAL | Age: 69
End: 2024-02-13
Payer: MEDICARE

## 2024-02-13 DIAGNOSIS — C34.90 MALIGNANT NEOPLASM OF LUNG, UNSPECIFIED LATERALITY, UNSPECIFIED PART OF LUNG (MULTI): Primary | ICD-10-CM

## 2024-02-13 DIAGNOSIS — E03.8 HYPOTHYROIDISM DUE TO HASHIMOTO'S THYROIDITIS: ICD-10-CM

## 2024-02-13 DIAGNOSIS — C34.11 PRIMARY ADENOCARCINOMA OF UPPER LOBE OF RIGHT LUNG (MULTI): Primary | ICD-10-CM

## 2024-02-13 DIAGNOSIS — C34.90 MALIGNANT NEOPLASM OF LUNG, UNSPECIFIED LATERALITY, UNSPECIFIED PART OF LUNG (MULTI): ICD-10-CM

## 2024-02-13 DIAGNOSIS — E06.3 HYPOTHYROIDISM DUE TO HASHIMOTO'S THYROIDITIS: ICD-10-CM

## 2024-02-13 RX ORDER — FAMOTIDINE 10 MG/ML
20 INJECTION INTRAVENOUS ONCE AS NEEDED
Status: CANCELLED | OUTPATIENT
Start: 2024-02-21

## 2024-02-13 RX ORDER — ALBUTEROL SULFATE 0.83 MG/ML
3 SOLUTION RESPIRATORY (INHALATION) AS NEEDED
Status: CANCELLED | OUTPATIENT
Start: 2024-02-21

## 2024-02-13 RX ORDER — EPINEPHRINE 0.3 MG/.3ML
0.3 INJECTION SUBCUTANEOUS EVERY 5 MIN PRN
Status: CANCELLED | OUTPATIENT
Start: 2024-02-21

## 2024-02-13 RX ORDER — PROCHLORPERAZINE EDISYLATE 5 MG/ML
10 INJECTION INTRAMUSCULAR; INTRAVENOUS EVERY 6 HOURS PRN
Status: CANCELLED | OUTPATIENT
Start: 2024-02-21

## 2024-02-13 RX ORDER — PROCHLORPERAZINE MALEATE 10 MG
10 TABLET ORAL EVERY 6 HOURS PRN
Status: CANCELLED | OUTPATIENT
Start: 2024-02-21

## 2024-02-13 RX ORDER — DIPHENHYDRAMINE HYDROCHLORIDE 50 MG/ML
50 INJECTION INTRAMUSCULAR; INTRAVENOUS AS NEEDED
Status: CANCELLED | OUTPATIENT
Start: 2024-02-21

## 2024-02-13 NOTE — TELEPHONE ENCOUNTER
Pt notified that Dr. Landin placed lab orders for prior to FUV on 2/19, also dermatology referral   Spoke with pt and she also spoke with research team.

## 2024-02-13 NOTE — TELEPHONE ENCOUNTER
Patient called back in for an update. She is also asking if labs could be ordered to check her thyroid as she takes Levothyroxine and states her Endocrinologist has been managing. She is wondering if this may be what is causing her symptoms.

## 2024-02-13 NOTE — TELEPHONE ENCOUNTER
Pt called- she said she just got off the phone with Faviola in scheduling regarding her infusion on 2/20. She said as soon as she hung up with her she realized that date won't work and wanted to ask about openings on 2/19 and if not then find a different day. Faviola aware and said she will call her tomorrow. Pt agreeable.

## 2024-02-16 ENCOUNTER — LAB (OUTPATIENT)
Dept: LAB | Facility: LAB | Age: 69
End: 2024-02-16
Payer: MEDICARE

## 2024-02-16 DIAGNOSIS — C34.90 MALIGNANT NEOPLASM OF LUNG, UNSPECIFIED LATERALITY, UNSPECIFIED PART OF LUNG (MULTI): ICD-10-CM

## 2024-02-16 DIAGNOSIS — E06.3 HYPOTHYROIDISM DUE TO HASHIMOTO'S THYROIDITIS: ICD-10-CM

## 2024-02-16 DIAGNOSIS — C34.90 MALIGNANT NEOPLASM OF UNSPECIFIED PART OF UNSPECIFIED BRONCHUS OR LUNG (MULTI): Primary | ICD-10-CM

## 2024-02-16 DIAGNOSIS — E03.8 HYPOTHYROIDISM DUE TO HASHIMOTO'S THYROIDITIS: ICD-10-CM

## 2024-02-16 LAB
ALBUMIN SERPL BCP-MCNC: 4.2 G/DL (ref 3.4–5)
ALP SERPL-CCNC: 56 U/L (ref 33–136)
ALT SERPL W P-5'-P-CCNC: 22 U/L (ref 7–45)
ANION GAP SERPL CALC-SCNC: 13 MMOL/L (ref 10–20)
AST SERPL W P-5'-P-CCNC: 27 U/L (ref 9–39)
BASOPHILS # BLD AUTO: 0.03 X10*3/UL (ref 0–0.1)
BASOPHILS NFR BLD AUTO: 0.7 %
BILIRUB SERPL-MCNC: 0.4 MG/DL (ref 0–1.2)
BUN SERPL-MCNC: 22 MG/DL (ref 6–23)
CALCIUM SERPL-MCNC: 10 MG/DL (ref 8.6–10.6)
CHLORIDE SERPL-SCNC: 105 MMOL/L (ref 98–107)
CK SERPL-CCNC: 87 U/L (ref 0–215)
CO2 SERPL-SCNC: 28 MMOL/L (ref 21–32)
CREAT SERPL-MCNC: 1.29 MG/DL (ref 0.5–1.05)
EGFRCR SERPLBLD CKD-EPI 2021: 44 ML/MIN/1.73M*2
EOSINOPHIL # BLD AUTO: 0.09 X10*3/UL (ref 0–0.4)
EOSINOPHIL NFR BLD AUTO: 2 %
ERYTHROCYTE [DISTWIDTH] IN BLOOD BY AUTOMATED COUNT: 14.7 % (ref 11.5–14.5)
GLUCOSE SERPL-MCNC: 81 MG/DL (ref 74–99)
HCT VFR BLD AUTO: 46.9 % (ref 36–46)
HGB BLD-MCNC: 14.3 G/DL (ref 12–16)
IMM GRANULOCYTES # BLD AUTO: 0.02 X10*3/UL (ref 0–0.5)
IMM GRANULOCYTES NFR BLD AUTO: 0.4 % (ref 0–0.9)
LYMPHOCYTES # BLD AUTO: 0.64 X10*3/UL (ref 0.8–3)
LYMPHOCYTES NFR BLD AUTO: 14.3 %
MAGNESIUM SERPL-MCNC: 2.2 MG/DL (ref 1.6–2.4)
MCH RBC QN AUTO: 28.5 PG (ref 26–34)
MCHC RBC AUTO-ENTMCNC: 30.5 G/DL (ref 32–36)
MCV RBC AUTO: 93 FL (ref 80–100)
MONOCYTES # BLD AUTO: 0.3 X10*3/UL (ref 0.05–0.8)
MONOCYTES NFR BLD AUTO: 6.7 %
NEUTROPHILS # BLD AUTO: 3.4 X10*3/UL (ref 1.6–5.5)
NEUTROPHILS NFR BLD AUTO: 75.9 %
NRBC BLD-RTO: 0 /100 WBCS (ref 0–0)
PLATELET # BLD AUTO: 202 X10*3/UL (ref 150–450)
POTASSIUM SERPL-SCNC: 5.1 MMOL/L (ref 3.5–5.3)
PROT SERPL-MCNC: 6.6 G/DL (ref 6.4–8.2)
RBC # BLD AUTO: 5.02 X10*6/UL (ref 4–5.2)
SODIUM SERPL-SCNC: 141 MMOL/L (ref 136–145)
T4 FREE SERPL-MCNC: 1.31 NG/DL (ref 0.78–1.48)
TSH SERPL-ACNC: 2.81 MIU/L (ref 0.44–3.98)
WBC # BLD AUTO: 4.5 X10*3/UL (ref 4.4–11.3)

## 2024-02-16 PROCEDURE — 84443 ASSAY THYROID STIM HORMONE: CPT

## 2024-02-16 PROCEDURE — 36415 COLL VENOUS BLD VENIPUNCTURE: CPT

## 2024-02-16 PROCEDURE — 84439 ASSAY OF FREE THYROXINE: CPT

## 2024-02-16 PROCEDURE — 85025 COMPLETE CBC W/AUTO DIFF WBC: CPT

## 2024-02-16 PROCEDURE — 82550 ASSAY OF CK (CPK): CPT

## 2024-02-16 PROCEDURE — 80053 COMPREHEN METABOLIC PANEL: CPT

## 2024-02-16 PROCEDURE — 83735 ASSAY OF MAGNESIUM: CPT

## 2024-02-19 ENCOUNTER — TELEPHONE (OUTPATIENT)
Dept: HEMATOLOGY/ONCOLOGY | Facility: HOSPITAL | Age: 69
End: 2024-02-19

## 2024-02-19 ENCOUNTER — EDUCATION (OUTPATIENT)
Dept: HEMATOLOGY/ONCOLOGY | Facility: HOSPITAL | Age: 69
End: 2024-02-19

## 2024-02-19 ENCOUNTER — APPOINTMENT (OUTPATIENT)
Dept: HEMATOLOGY/ONCOLOGY | Facility: HOSPITAL | Age: 69
End: 2024-02-19
Payer: MEDICARE

## 2024-02-19 ENCOUNTER — OFFICE VISIT (OUTPATIENT)
Dept: DERMATOLOGY | Facility: HOSPITAL | Age: 69
End: 2024-02-19
Payer: MEDICARE

## 2024-02-19 ENCOUNTER — OFFICE VISIT (OUTPATIENT)
Dept: HEMATOLOGY/ONCOLOGY | Facility: HOSPITAL | Age: 69
End: 2024-02-19
Payer: MEDICARE

## 2024-02-19 ENCOUNTER — INFUSION (OUTPATIENT)
Dept: HEMATOLOGY/ONCOLOGY | Facility: HOSPITAL | Age: 69
End: 2024-02-19
Payer: MEDICARE

## 2024-02-19 VITALS
HEART RATE: 85 BPM | BODY MASS INDEX: 20.79 KG/M2 | OXYGEN SATURATION: 100 % | SYSTOLIC BLOOD PRESSURE: 117 MMHG | WEIGHT: 110.01 LBS | DIASTOLIC BLOOD PRESSURE: 61 MMHG | TEMPERATURE: 97.5 F | RESPIRATION RATE: 18 BRPM

## 2024-02-19 DIAGNOSIS — C34.90 MALIGNANT NEOPLASM OF LUNG, UNSPECIFIED LATERALITY, UNSPECIFIED PART OF LUNG (MULTI): ICD-10-CM

## 2024-02-19 DIAGNOSIS — L60.1 ONYCHOLYSIS: Primary | ICD-10-CM

## 2024-02-19 DIAGNOSIS — L57.8 ACTINIC SKIN DAMAGE: ICD-10-CM

## 2024-02-19 PROCEDURE — 1036F TOBACCO NON-USER: CPT | Performed by: DERMATOLOGY

## 2024-02-19 PROCEDURE — 1126F AMNT PAIN NOTED NONE PRSNT: CPT | Performed by: DERMATOLOGY

## 2024-02-19 PROCEDURE — 1036F TOBACCO NON-USER: CPT | Performed by: CLINICAL NURSE SPECIALIST

## 2024-02-19 PROCEDURE — 99214 OFFICE O/P EST MOD 30 MIN: CPT | Performed by: CLINICAL NURSE SPECIALIST

## 2024-02-19 PROCEDURE — 1126F AMNT PAIN NOTED NONE PRSNT: CPT | Performed by: CLINICAL NURSE SPECIALIST

## 2024-02-19 PROCEDURE — 96413 CHEMO IV INFUSION 1 HR: CPT

## 2024-02-19 PROCEDURE — 87102 FUNGUS ISOLATION CULTURE: CPT | Performed by: DERMATOLOGY

## 2024-02-19 PROCEDURE — 99213 OFFICE O/P EST LOW 20 MIN: CPT | Performed by: DERMATOLOGY

## 2024-02-19 PROCEDURE — 2500000004 HC RX 250 GENERAL PHARMACY W/ HCPCS (ALT 636 FOR OP/ED): Performed by: INTERNAL MEDICINE

## 2024-02-19 PROCEDURE — 99203 OFFICE O/P NEW LOW 30 MIN: CPT | Performed by: DERMATOLOGY

## 2024-02-19 PROCEDURE — 1159F MED LIST DOCD IN RCRD: CPT | Performed by: DERMATOLOGY

## 2024-02-19 PROCEDURE — 1159F MED LIST DOCD IN RCRD: CPT | Performed by: CLINICAL NURSE SPECIALIST

## 2024-02-19 RX ORDER — PROCHLORPERAZINE MALEATE 10 MG
10 TABLET ORAL EVERY 6 HOURS PRN
Status: DISCONTINUED | OUTPATIENT
Start: 2024-02-19 | End: 2024-02-19 | Stop reason: HOSPADM

## 2024-02-19 RX ORDER — DIPHENHYDRAMINE HYDROCHLORIDE 50 MG/ML
50 INJECTION INTRAMUSCULAR; INTRAVENOUS AS NEEDED
Status: DISCONTINUED | OUTPATIENT
Start: 2024-02-19 | End: 2024-02-19 | Stop reason: HOSPADM

## 2024-02-19 RX ORDER — ALBUTEROL SULFATE 0.83 MG/ML
3 SOLUTION RESPIRATORY (INHALATION) AS NEEDED
Status: DISCONTINUED | OUTPATIENT
Start: 2024-02-19 | End: 2024-02-19 | Stop reason: HOSPADM

## 2024-02-19 RX ORDER — EPINEPHRINE 0.3 MG/.3ML
0.3 INJECTION SUBCUTANEOUS EVERY 5 MIN PRN
Status: DISCONTINUED | OUTPATIENT
Start: 2024-02-19 | End: 2024-02-19 | Stop reason: HOSPADM

## 2024-02-19 RX ORDER — HEPARIN 100 UNIT/ML
500 SYRINGE INTRAVENOUS AS NEEDED
Status: CANCELLED | OUTPATIENT
Start: 2024-02-19

## 2024-02-19 RX ORDER — PROCHLORPERAZINE EDISYLATE 5 MG/ML
10 INJECTION INTRAMUSCULAR; INTRAVENOUS EVERY 6 HOURS PRN
Status: DISCONTINUED | OUTPATIENT
Start: 2024-02-19 | End: 2024-02-19 | Stop reason: HOSPADM

## 2024-02-19 RX ORDER — LEVOTHYROXINE SODIUM 112 UG/1
112 TABLET ORAL
COMMUNITY

## 2024-02-19 RX ORDER — HEPARIN SODIUM,PORCINE/PF 10 UNIT/ML
50 SYRINGE (ML) INTRAVENOUS AS NEEDED
Status: CANCELLED | OUTPATIENT
Start: 2024-02-19

## 2024-02-19 RX ORDER — FAMOTIDINE 10 MG/ML
20 INJECTION INTRAVENOUS ONCE AS NEEDED
Status: DISCONTINUED | OUTPATIENT
Start: 2024-02-19 | End: 2024-02-19 | Stop reason: HOSPADM

## 2024-02-19 RX ADMIN — Medication 757.5 MG: at 12:31

## 2024-02-19 ASSESSMENT — ITCH NUMERIC RATING SCALE: HOW SEVERE IS YOUR ITCHING?: 0

## 2024-02-19 ASSESSMENT — ENCOUNTER SYMPTOMS
VOMITING: 0
NUMBNESS: 0
VERTIGO: 0
JOINT SWELLING: 0
VISUAL CHANGE: 0
ANOREXIA: 0
CHILLS: 0
HEADACHES: 0
NAUSEA: 0
SORE THROAT: 0
ARTHRALGIAS: 0
ABDOMINAL PAIN: 0
DIAPHORESIS: 0
FEVER: 0
WEAKNESS: 0
CHANGE IN BOWEL HABIT: 1
NECK PAIN: 0
MYALGIAS: 0
SWOLLEN GLANDS: 0
COUGH: 0
FATIGUE: 0

## 2024-02-19 ASSESSMENT — PAIN SCALES - GENERAL: PAINLEVEL: 0-NO PAIN

## 2024-02-19 ASSESSMENT — DERMATOLOGY QUALITY OF LIFE (QOL) ASSESSMENT
RATE HOW EMOTIONALLY BOTHERED YOU ARE BY YOUR SKIN PROBLEM (FOR EXAMPLE, WORRY, EMBARRASSMENT, FRUSTRATION): 0 - NEVER BOTHERED
RATE HOW BOTHERED YOU ARE BY EFFECTS OF YOUR SKIN PROBLEMS ON YOUR ACTIVITIES (EG, GOING OUT, ACCOMPLISHING WHAT YOU WANT, WORK ACTIVITIES OR YOUR RELATIONSHIPS WITH OTHERS): 0 - NEVER BOTHERED
DATE THE QUALITY-OF-LIFE ASSESSMENT WAS COMPLETED: 66889
RATE HOW BOTHERED YOU ARE BY SYMPTOMS OF YOUR SKIN PROBLEM (EG, ITCHING, STINGING BURNING, HURTING OR SKIN IRRITATION): 0 - NEVER BOTHERED
ARE THERE EXCLUSIONS OR EXCEPTIONS FOR THE QUALITY OF LIFE ASSESSMENT: NO

## 2024-02-19 ASSESSMENT — DERMATOLOGY PATIENT ASSESSMENT
HAVE YOU HAD OR DO YOU HAVE A STAPH INFECTION: NO
ARE YOU AN ORGAN TRANSPLANT RECIPIENT: NO
ARE YOU TRYING TO GET PREGNANT: NO
DO YOU HAVE ANY NEW OR CHANGING LESIONS: YES
ARE YOU ON BIRTH CONTROL: NO
DO YOU HAVE IRREGULAR MENSTRUAL CYCLES: NO
HAVE YOU HAD OR DO YOU HAVE VASCULAR DISEASE: NO
DO YOU USE SUNSCREEN: OCCASIONALLY
DO YOU USE A TANNING BED: YES, PREVIOUSLY

## 2024-02-19 ASSESSMENT — PATIENT GLOBAL ASSESSMENT (PGA): PATIENT GLOBAL ASSESSMENT: PATIENT GLOBAL ASSESSMENT:  1 - CLEAR

## 2024-02-19 NOTE — PATIENT INSTRUCTIONS
"- Ok to use nail polish  - Jennifer Carlos's \"hard as nails\" is a nail strengthening polish  - Avoid any nail extenders (tips, acrylic applications)  - Keep nails trimmed short  - OK to take an OTC biotin nail supplement but make sure you stop it 48 hours before blood draws  "

## 2024-02-19 NOTE — RESEARCH NOTES
Research Note Treatment Day    Mirta Hills is here today for treatment on [id number]. Today is C11. Procedures completed per protocol. AE's and con-meds reviewed with patient. Patient is aware of treatment plan. Patient is seeing dermatology today regarding finger nail symptoms.     []   Received treatment as planned   OR  [x]    Treatment delayed; patient calendar updated as required   Treatment delayed because:    []   AE    []   Physician Discretion    []   Clinical Deterioration or Progression     []   Other    Education Documentation  Skin and Nail Changes, taught by Makayla Joseph RN at 2/19/2024  2:31 PM.  Learner: Patient  Readiness: Acceptance  Method: Explanation  Response: Verbalizes Understanding    Education Comments  No comments found.

## 2024-02-19 NOTE — PROGRESS NOTES
Patient arrived to infusion. Patient reports dropping of urinalysis specimen at lab, but no lab pending. Urine specimen cup provided to patient while in infusion. IV access obtained, orders released to investigational pharmacy. Patient states she received her oral study osimertinib tablet from the research nurse. Study bevacizumab infusion given over 30 minutes, as ordered, and tolerated without difficulty.

## 2024-02-19 NOTE — PROGRESS NOTES
Patient ID: Mirta Hills is a 69 y.o. female.    DIAGNOSIS     Adenocarcinoma of the lung.  Date of diagnosis is [place] 15, 2023 from a level 7 lymph node biopsy/fine-needle aspiration.  Immunohistochemistry positive for TTF-1.        STAGING     Clinical T2a (right lower lobe mass measuring 3.6 cm), clinical N2 (PET positivity and subcarinal region), M1C disease (presence of brain metastases, ribs, right acetabulum, liver, brain), stage IVb disease        CURRENT SITES OF DISEASE     Right lower lobe, right hilum of the lung, ipsilateral mediastinum, liver, bones, brain        MOLECULAR GENOMICS     TEST: Focused Solid Tumor DNA/RNA Panel   SPECIMEN: Supernatant, LYMPH NODE 7, [id number] A   DISEASE DIAGNOSIS: Adenocarcinoma   Estimated Tumor Content: 40%   COLLECTION DATE: 6/15/2023     MICROSATELLITE STATUS: Microsatellite  Instability-High (MSI-H) is NOT DETECTED.     DISEASE ASSOCIATED GENOMIC FINDINGS:   EGFR p.[id number] ([id number] c.[id number])    TP53 p.N247I ([id number] c.740A>T)     DISEASE RELEVANT ALTERATIONS NOT DETECTED:   Negative for ALK fusion.   Negative for BRAF V600E.   Negative for ERBB2 activating mutation   Negative for KRAS G12C.   Negative for  MET exon 14 skipping mutation.   Negative for NTRK fusion.   Negative for RET fusion.   Negative for ROS1 fusion.        Circulating tumor DNA sent on June 19, 2023  EGFR p.[id number], Variant allelic fraction of 1.6%  TP53 N247I Missense variant, loss of function, variant allelic fraction 1.1%  MSI stable        SERUM TUMOR MARKER     Slightly elevated CEA and CA 19.9 June 2023        PRIOR THERAPY     1- Gamma knife radiosurgery to brain lesions completed on June 28, 2023.        CURRENT THERAPY     Osimertinib on study  on clinical trial EA 5182 which is a randomized trial of osimertinib with or without bevacizumab.  She has been randomized to the bevacizumab arm- Cycle 1 Day 1 is 7/12/2023        CURRENT ONCOLOGICAL PROBLEMS     1-brain  metastases, symptomatic, word finding difficulties, mild unsteadiness on her feet but no falls, Resolved July 3, 2023  2-anterior chest pain, increases with deep inspiration, Improved July 3, 2023  3-mild intermittent grade 1 diarrhea, mild hair loss, mild leukopenia, mild elevation in creatinine, mild fatigue.  Most likely all osimertinib related August 7, 2023        HISTORY OF PRESENT ILLNESS     This is a 68-year-old patient.  She states that towards Thanksgiving of 2022 she started feeling some pain in her right shoulder.  Eventually got better however the same pain came back in January or February 2023.  Ultimately some x-rays were done of  the shoulder that were generally negative.  She then developed some pain in her sternum a month or 2 later with deep inspiration and ultimately underwent imaging in May 2023.  Chest CT without contrast was done on June 1, 2023.  This showed a 3.4 cm mass  within the superior segment of the right lower lobe with some associated satellite pulmonary nodules.  Additionally there were some other subcentimeter pulmonary nodules.  There was evidence of mediastinal lymphadenopathy.  Subcarinal lymph node measured  1.4 cm in short axis.She was seen by pulmonary medicine on June 5, 2023 a combined PET/CT was ordered on June 6, 2023 showing a relatively intense area of hypermetabolic activity along the superior posteromedial aspect of the right lower lobe corresponding  to the known right lower lobe lung mass.  There was foci of hypermetabolic activity in the right supraclavicular region, subcarinal and right hilar regions.  There was area of uptake within the posterior upper ribs as well as right acetabulum suspicious  for osseous metastatic disease.  There was a punctate area of mild hypermetabolic activity in the right hepatic lobe concerning for hepatic metastases.  Small focus in the subcutaneous region just anterior to the lower aspect of the sternum could be inflammatory   versus metastatic.  She undergoes a bronchoscopy dated [place] 15, 2023.  There were no endobronchial lesions.  Primary cytology with rapid onsite evaluation was suggestive of malignancy in level 7, final lymph nodes were pathology was pending.  MRI of the  brain with and without contrast dated June 16, 2023 showed approximately 20 enhancing nodules concerning for metastatic disease.  Specifically there was a 1.5 cm nodule in the right cerebellar hemisphere.  Most of the other nodules were less than 1 cm.   Patient also notes that she has some difficulty expressing words over the past 1 to 2 weeks prior to her first visit with us as well as some mild unsteadiness on her legs where she feels she is drifting towards the right side.        PAST MEDICAL HISTORY     1-hypothyroidism  2-hysterectomy 1988  3-abdominal hernia surgery 1995  4-some hearing difficulties        SOCIAL HISTORY     Patient lives with her significant other.  She has 2 daughter.  L[facility] in [facility]note [facility]  Smoked for only 3 years during college and during this time it was a pack per day.  She has retired.  She had a retail store.        CURRENT MEDS     See medication list, meds reviewed        ALLERGIES     Patient denies any drug allergies        FAMILY HISTORY     Patient's father had bladder cancer at the age of 86.  She has 1 brother with no cancer.  She has 2 children       Subjective    Today I am meeting with Mirta before her Avastin infusion.  She continues the Tagrisso, and has recently noticed that her fingernails are breaking off.  She notes some tares in them, and then they peel off.  She has no paronychia- skin is a bit sensitive but not painful.  There is no sign of infection.  She did used to wear acrylic nails and wonders if this contributes.  Otherwise she is managing well.  She now takes an imodium every day and occasionally takes another if she has a loose stool.  She does not have more than 1-2 loose stools in a day.  Her dizziness is  improved, she occasionally has slight dizziness.       Cancer  Associated symptoms include a change in bowel habit. Pertinent negatives include no abdominal pain, anorexia, arthralgias, chest pain, chills, coughing, diaphoresis, fatigue, fever, headaches, joint swelling, myalgias, nausea, neck pain, numbness, rash, sore throat, swollen glands, urinary symptoms, vertigo, visual change, vomiting or weakness.         Objective    BSA: 1.47 meters squared  /61 (BP Location: Left arm, Patient Position: Sitting, BP Cuff Size: Small adult)   Pulse 85   Temp 36.4 °C (97.5 °F) (Temporal)   Resp 18   Wt 49.9 kg (110 lb 0.2 oz)   SpO2 100%   BMI 20.79 kg/m²      Physical Exam  Constitutional:       General: She is not in acute distress.     Appearance: Normal appearance. She is not ill-appearing, toxic-appearing or diaphoretic.   HENT:      Mouth/Throat:      Mouth: Mucous membranes are moist.      Pharynx: Oropharynx is clear. No oropharyngeal exudate or posterior oropharyngeal erythema.   Eyes:      General: No scleral icterus.     Conjunctiva/sclera: Conjunctivae normal.   Cardiovascular:      Rate and Rhythm: Normal rate and regular rhythm.      Pulses: Normal pulses.      Heart sounds: Normal heart sounds. No murmur heard.     No friction rub. No gallop.   Pulmonary:      Effort: Pulmonary effort is normal. No respiratory distress.      Breath sounds: Normal breath sounds. No stridor. No wheezing, rhonchi or rales.   Chest:      Chest wall: No tenderness.   Abdominal:      General: There is no distension.      Palpations: There is no mass.      Tenderness: There is no abdominal tenderness. There is no guarding or rebound.   Musculoskeletal:         General: No swelling.      Cervical back: Normal range of motion. No tenderness.      Right lower leg: No edema.      Left lower leg: No edema.   Lymphadenopathy:      Cervical: No cervical adenopathy.   Skin:     General: Skin is warm.      Coloration: Skin is not  jaundiced or pale.      Findings: No bruising, erythema or rash.   Neurological:      General: No focal deficit present.      Mental Status: She is alert and oriented to person, place, and time.   Psychiatric:         Mood and Affect: Mood normal.         Behavior: Behavior normal.         Performance Status:  Asymptomatic      MRI Brain 1/18/2024  IMPRESSION:  When compared with the prior study dated 11/15/2023, there are  residual mildly enhancing lesions within the cerebral hemispheres  bilaterally and right cerebellar hemisphere denoted by arrows on the  axial post gadolinium volumetric T1 weighted MRI images series 14  which are similar when compared with the prior examination dated  11/15/2023.      No new abnormal intracranial enhancing mass lesions are noted on the  current exam.    Assessment/Plan     This is a very nice 69-year-old patient with a history of metastatic adenocarcinoma of the lung, carrying an exon 19 EGFR mutation and a concurrent T p53 mutation.  She is on a randomized clinical trial and has been randomized to osimertinib (targeting EGFR) along with the bevacizumab, antibody targeting vascular endothelial growth factor).  She has had a great response to treatment and has had no evidence of disease progression.  Indeed her recent imaging studies dated January 18, 2024 including an MRI of the brain and a CT scan of the chest abdomen pelvis shows no evidence of disease progression.  She continues to have grade 1 GI toxicities, as needed Imodium is used.  She now has some fingernail breakage without paronychia. She will see derm today regarding this.  No Avastin related toxicities.  BP is 117/61.         Cancer Staging   No matching staging information was found for the patient.    Oncology History   Lung cancer (CMS/HCC)   7/12/2023 -  Research Study Participant    ([facility]) [id number] Arm B - Bevacizumab / Osimertinib, 21 Day Cycles  Plan Provider: Humberto Landin MD  Treatment goal: [No plan  goal]  Line of treatment: [No plan line of treatment]  Associated studies: [id number] (Osimertinib) +/- Bevacizumab as Initial Treatment for EGFR-Mutant Lung Cancer     9/1/2023 Initial Diagnosis    Lung cancer (CMS/HCC)                   Moni Cortez, APRN-CNS

## 2024-02-19 NOTE — PROGRESS NOTES
"Subjective     Mirta Hills is a 69 y.o. female who presents for the following: Nail Problem (Bilat hands).     She has a history of adenocarcinoma of the lung diagnosed 6/15/23. She is currently on osimertinib on clinica trial EA 5182; she has been randomized to the arm that is also receiving bevacizumab, received cycle 1 7/12/23. She has been developing fingernail breakage without paronychia and was referred to derm.     Review of Systems:  No other skin or systemic complaints other than what is documented elsewhere in the note.    The following portions of the chart were reviewed this encounter and updated as appropriate:          Skin Cancer History  No skin cancer on file.      Specialty Problems          Dermatology Problems    Puncture wound of right hand without foreign body        Objective   Well appearing patient in no apparent distress; mood and affect are within normal limits.    A focused skin examination was performed. All findings within normal limits unless otherwise noted below.    Assessment/Plan   1. Onycholysis (2)  Left Hand - Posterior, Right Hand - Posterior  Distal white onychlosis of several finger nails. All at same level 1/3 distal    Favor traumatic onycholysis  - we will check nail culture to eval for fungus  - less likely related to treatment, although it could be making nails/fingers more sensitive  - should be ok to continue treatment and avoid traumatic treatments to nails      - Ok to use nail polish  - Jennifer Carlos's \"hard as nails\" is a nail strengthening polish  - Avoid any nail extenders (tips, acrylic applications)  - Keep nails trimmed short  - OK to take an OTC biotin nail supplement but make sure you stop it 48 hours before blood draws    Specimen A - FUNGAL CULTURE/CALCO - DERM  Differential Diagnosis: traumatic onycholysis vs onychomycosis  Check Margins Yes/No?:    Comments:    Dermpath Lab: No DIF, Routine Histopath, B-Cell or T-Cell Gene Rearrangement    2. Actinic " skin damage  Background of photodamage with hyper- and hypo-pigmented macules on the skin    Related Procedures  Follow Up In Dermatology - Established Patient

## 2024-02-20 ENCOUNTER — APPOINTMENT (OUTPATIENT)
Dept: HEMATOLOGY/ONCOLOGY | Facility: HOSPITAL | Age: 69
End: 2024-02-20
Payer: MEDICARE

## 2024-02-21 ENCOUNTER — APPOINTMENT (OUTPATIENT)
Dept: HEMATOLOGY/ONCOLOGY | Facility: HOSPITAL | Age: 69
End: 2024-02-21
Payer: MEDICARE

## 2024-03-04 ENCOUNTER — APPOINTMENT (OUTPATIENT)
Dept: HEMATOLOGY/ONCOLOGY | Facility: HOSPITAL | Age: 69
End: 2024-03-04
Payer: MEDICARE

## 2024-03-05 DIAGNOSIS — C34.90 MALIGNANT NEOPLASM OF LUNG, UNSPECIFIED LATERALITY, UNSPECIFIED PART OF LUNG (MULTI): Primary | ICD-10-CM

## 2024-03-05 RX ORDER — FAMOTIDINE 10 MG/ML
20 INJECTION INTRAVENOUS ONCE AS NEEDED
Status: CANCELLED | OUTPATIENT
Start: 2024-03-13

## 2024-03-05 RX ORDER — DIPHENHYDRAMINE HYDROCHLORIDE 50 MG/ML
50 INJECTION INTRAMUSCULAR; INTRAVENOUS AS NEEDED
Status: CANCELLED | OUTPATIENT
Start: 2024-03-13

## 2024-03-05 RX ORDER — ALBUTEROL SULFATE 0.83 MG/ML
3 SOLUTION RESPIRATORY (INHALATION) AS NEEDED
Status: CANCELLED | OUTPATIENT
Start: 2024-03-13

## 2024-03-05 RX ORDER — EPINEPHRINE 0.3 MG/.3ML
0.3 INJECTION SUBCUTANEOUS EVERY 5 MIN PRN
Status: CANCELLED | OUTPATIENT
Start: 2024-03-13

## 2024-03-05 RX ORDER — PROCHLORPERAZINE MALEATE 10 MG
10 TABLET ORAL EVERY 6 HOURS PRN
Status: CANCELLED | OUTPATIENT
Start: 2024-03-13

## 2024-03-05 RX ORDER — PROCHLORPERAZINE EDISYLATE 5 MG/ML
10 INJECTION INTRAMUSCULAR; INTRAVENOUS EVERY 6 HOURS PRN
Status: CANCELLED | OUTPATIENT
Start: 2024-03-13

## 2024-03-11 ENCOUNTER — LAB (OUTPATIENT)
Dept: LAB | Facility: HOSPITAL | Age: 69
End: 2024-03-11
Payer: MEDICARE

## 2024-03-11 ENCOUNTER — INFUSION (OUTPATIENT)
Dept: HEMATOLOGY/ONCOLOGY | Facility: HOSPITAL | Age: 69
End: 2024-03-11
Payer: MEDICARE

## 2024-03-11 ENCOUNTER — OFFICE VISIT (OUTPATIENT)
Dept: HEMATOLOGY/ONCOLOGY | Facility: HOSPITAL | Age: 69
End: 2024-03-11
Payer: MEDICARE

## 2024-03-11 ENCOUNTER — EDUCATION (OUTPATIENT)
Dept: HEMATOLOGY/ONCOLOGY | Facility: HOSPITAL | Age: 69
End: 2024-03-11

## 2024-03-11 VITALS
RESPIRATION RATE: 18 BRPM | HEART RATE: 62 BPM | DIASTOLIC BLOOD PRESSURE: 74 MMHG | BODY MASS INDEX: 20.95 KG/M2 | WEIGHT: 110.89 LBS | OXYGEN SATURATION: 100 % | TEMPERATURE: 97.3 F | SYSTOLIC BLOOD PRESSURE: 141 MMHG

## 2024-03-11 DIAGNOSIS — C34.90 MALIGNANT NEOPLASM OF LUNG, UNSPECIFIED LATERALITY, UNSPECIFIED PART OF LUNG (MULTI): ICD-10-CM

## 2024-03-11 DIAGNOSIS — Z00.00 HEALTHCARE MAINTENANCE: ICD-10-CM

## 2024-03-11 LAB
25(OH)D3 SERPL-MCNC: 53 NG/ML (ref 30–100)
ALBUMIN SERPL BCP-MCNC: 4.2 G/DL (ref 3.4–5)
ALP SERPL-CCNC: 53 U/L (ref 33–136)
ALT SERPL W P-5'-P-CCNC: 15 U/L (ref 7–45)
ANION GAP SERPL CALC-SCNC: 13 MMOL/L (ref 10–20)
APPEARANCE UR: ABNORMAL
AST SERPL W P-5'-P-CCNC: 19 U/L (ref 9–39)
BASOPHILS # BLD AUTO: 0.03 X10*3/UL (ref 0–0.1)
BASOPHILS NFR BLD AUTO: 0.7 %
BILIRUB SERPL-MCNC: 0.4 MG/DL (ref 0–1.2)
BILIRUB UR STRIP.AUTO-MCNC: NEGATIVE MG/DL
BUN SERPL-MCNC: 30 MG/DL (ref 6–23)
CALCIUM SERPL-MCNC: 9.5 MG/DL (ref 8.6–10.3)
CHLORIDE SERPL-SCNC: 105 MMOL/L (ref 98–107)
CHOLEST SERPL-MCNC: 154 MG/DL (ref 0–199)
CHOLESTEROL/HDL RATIO: 2.9
CK SERPL-CCNC: 56 U/L (ref 0–215)
CO2 SERPL-SCNC: 28 MMOL/L (ref 21–32)
COLOR UR: YELLOW
CREAT SERPL-MCNC: 1.34 MG/DL (ref 0.5–1.05)
CRP SERPL HS-MCNC: <0.2 MG/L
EGFRCR SERPLBLD CKD-EPI 2021: 42 ML/MIN/1.73M*2
EOSINOPHIL # BLD AUTO: 0.07 X10*3/UL (ref 0–0.4)
EOSINOPHIL NFR BLD AUTO: 1.6 %
ERYTHROCYTE [DISTWIDTH] IN BLOOD BY AUTOMATED COUNT: 14.8 % (ref 11.5–14.5)
EST. AVERAGE GLUCOSE BLD GHB EST-MCNC: 103 MG/DL
GLUCOSE SERPL-MCNC: 94 MG/DL (ref 74–99)
GLUCOSE UR STRIP.AUTO-MCNC: NORMAL MG/DL
HBA1C MFR BLD: 5.2 %
HCT VFR BLD AUTO: 44.5 % (ref 36–46)
HDLC SERPL-MCNC: 52.9 MG/DL
HGB BLD-MCNC: 14.3 G/DL (ref 12–16)
HYALINE CASTS #/AREA URNS AUTO: ABNORMAL /LPF
IMM GRANULOCYTES # BLD AUTO: 0.02 X10*3/UL (ref 0–0.5)
IMM GRANULOCYTES NFR BLD AUTO: 0.4 % (ref 0–0.9)
KETONES UR STRIP.AUTO-MCNC: NEGATIVE MG/DL
LDLC SERPL CALC-MCNC: 78 MG/DL
LEUKOCYTE ESTERASE UR QL STRIP.AUTO: ABNORMAL
LYMPHOCYTES # BLD AUTO: 0.71 X10*3/UL (ref 0.8–3)
LYMPHOCYTES NFR BLD AUTO: 15.8 %
MAGNESIUM SERPL-MCNC: 2.12 MG/DL (ref 1.6–2.4)
MCH RBC QN AUTO: 29.6 PG (ref 26–34)
MCHC RBC AUTO-ENTMCNC: 32.1 G/DL (ref 32–36)
MCV RBC AUTO: 92 FL (ref 80–100)
MONOCYTES # BLD AUTO: 0.25 X10*3/UL (ref 0.05–0.8)
MONOCYTES NFR BLD AUTO: 5.6 %
MUCOUS THREADS #/AREA URNS AUTO: ABNORMAL /LPF
NEUTROPHILS # BLD AUTO: 3.41 X10*3/UL (ref 1.6–5.5)
NEUTROPHILS NFR BLD AUTO: 75.9 %
NITRITE UR QL STRIP.AUTO: NEGATIVE
NON HDL CHOLESTEROL: 101 MG/DL (ref 0–149)
NRBC BLD-RTO: 0 /100 WBCS (ref 0–0)
PH UR STRIP.AUTO: 5 [PH]
PLATELET # BLD AUTO: 196 X10*3/UL (ref 150–450)
POTASSIUM SERPL-SCNC: 4.6 MMOL/L (ref 3.5–5.3)
PROT SERPL-MCNC: 6.9 G/DL (ref 6.4–8.2)
PROT UR STRIP.AUTO-MCNC: ABNORMAL MG/DL
RBC # BLD AUTO: 4.83 X10*6/UL (ref 4–5.2)
RBC # UR STRIP.AUTO: NEGATIVE /UL
RBC #/AREA URNS AUTO: ABNORMAL /HPF
SODIUM SERPL-SCNC: 141 MMOL/L (ref 136–145)
SP GR UR STRIP.AUTO: 1.02
SQUAMOUS #/AREA URNS AUTO: ABNORMAL /HPF
T4 FREE SERPL-MCNC: 1.21 NG/DL (ref 0.78–1.48)
TRIGL SERPL-MCNC: 114 MG/DL (ref 0–149)
TSH SERPL-ACNC: 5.9 MIU/L (ref 0.44–3.98)
UROBILINOGEN UR STRIP.AUTO-MCNC: NORMAL MG/DL
VLDL: 23 MG/DL (ref 0–40)
WBC # BLD AUTO: 4.5 X10*3/UL (ref 4.4–11.3)
WBC #/AREA URNS AUTO: ABNORMAL /HPF

## 2024-03-11 PROCEDURE — 83036 HEMOGLOBIN GLYCOSYLATED A1C: CPT | Performed by: FAMILY MEDICINE

## 2024-03-11 PROCEDURE — 84075 ASSAY ALKALINE PHOSPHATASE: CPT

## 2024-03-11 PROCEDURE — 82550 ASSAY OF CK (CPK): CPT | Performed by: FAMILY MEDICINE

## 2024-03-11 PROCEDURE — 1159F MED LIST DOCD IN RCRD: CPT | Performed by: INTERNAL MEDICINE

## 2024-03-11 PROCEDURE — 86141 C-REACTIVE PROTEIN HS: CPT | Performed by: FAMILY MEDICINE

## 2024-03-11 PROCEDURE — 99215 OFFICE O/P EST HI 40 MIN: CPT | Performed by: INTERNAL MEDICINE

## 2024-03-11 PROCEDURE — 84439 ASSAY OF FREE THYROXINE: CPT | Performed by: FAMILY MEDICINE

## 2024-03-11 PROCEDURE — 96365 THER/PROPH/DIAG IV INF INIT: CPT | Mod: INF

## 2024-03-11 PROCEDURE — 2500000004 HC RX 250 GENERAL PHARMACY W/ HCPCS (ALT 636 FOR OP/ED): Performed by: INTERNAL MEDICINE

## 2024-03-11 PROCEDURE — 1036F TOBACCO NON-USER: CPT | Performed by: INTERNAL MEDICINE

## 2024-03-11 PROCEDURE — 81001 URINALYSIS AUTO W/SCOPE: CPT | Performed by: FAMILY MEDICINE

## 2024-03-11 PROCEDURE — 83735 ASSAY OF MAGNESIUM: CPT

## 2024-03-11 PROCEDURE — 84443 ASSAY THYROID STIM HORMONE: CPT | Performed by: FAMILY MEDICINE

## 2024-03-11 PROCEDURE — 1126F AMNT PAIN NOTED NONE PRSNT: CPT | Performed by: INTERNAL MEDICINE

## 2024-03-11 PROCEDURE — 80061 LIPID PANEL: CPT | Performed by: FAMILY MEDICINE

## 2024-03-11 PROCEDURE — 36415 COLL VENOUS BLD VENIPUNCTURE: CPT

## 2024-03-11 PROCEDURE — 85025 COMPLETE CBC W/AUTO DIFF WBC: CPT

## 2024-03-11 PROCEDURE — 82306 VITAMIN D 25 HYDROXY: CPT | Performed by: FAMILY MEDICINE

## 2024-03-11 RX ORDER — PROCHLORPERAZINE EDISYLATE 5 MG/ML
10 INJECTION INTRAMUSCULAR; INTRAVENOUS EVERY 6 HOURS PRN
Status: DISCONTINUED | OUTPATIENT
Start: 2024-03-11 | End: 2024-03-11 | Stop reason: HOSPADM

## 2024-03-11 RX ORDER — EPINEPHRINE 0.3 MG/.3ML
0.3 INJECTION SUBCUTANEOUS EVERY 5 MIN PRN
Status: DISCONTINUED | OUTPATIENT
Start: 2024-03-11 | End: 2024-03-11 | Stop reason: HOSPADM

## 2024-03-11 RX ORDER — DIPHENHYDRAMINE HYDROCHLORIDE 50 MG/ML
50 INJECTION INTRAMUSCULAR; INTRAVENOUS AS NEEDED
Status: DISCONTINUED | OUTPATIENT
Start: 2024-03-11 | End: 2024-03-11 | Stop reason: HOSPADM

## 2024-03-11 RX ORDER — PROCHLORPERAZINE MALEATE 10 MG
10 TABLET ORAL EVERY 6 HOURS PRN
Status: DISCONTINUED | OUTPATIENT
Start: 2024-03-11 | End: 2024-03-11 | Stop reason: HOSPADM

## 2024-03-11 RX ORDER — ALBUTEROL SULFATE 0.83 MG/ML
3 SOLUTION RESPIRATORY (INHALATION) AS NEEDED
Status: DISCONTINUED | OUTPATIENT
Start: 2024-03-11 | End: 2024-03-11 | Stop reason: HOSPADM

## 2024-03-11 RX ORDER — FAMOTIDINE 10 MG/ML
20 INJECTION INTRAVENOUS ONCE AS NEEDED
Status: DISCONTINUED | OUTPATIENT
Start: 2024-03-11 | End: 2024-03-11 | Stop reason: HOSPADM

## 2024-03-11 RX ADMIN — Medication 757.5 MG: at 13:20

## 2024-03-11 ASSESSMENT — ENCOUNTER SYMPTOMS
CHILLS: 0
NECK PAIN: 0
CHANGE IN BOWEL HABIT: 0
FATIGUE: 0
HEADACHES: 0
VERTIGO: 0
ARTHRALGIAS: 0
JOINT SWELLING: 0
SORE THROAT: 0
MYALGIAS: 0
ANOREXIA: 0
NAUSEA: 0
FEVER: 0
ABDOMINAL PAIN: 0
SWOLLEN GLANDS: 0
NUMBNESS: 0
COUGH: 0
VOMITING: 0
VISUAL CHANGE: 0
WEAKNESS: 0
DIAPHORESIS: 0

## 2024-03-11 ASSESSMENT — PAIN SCALES - GENERAL: PAINLEVEL: 0-NO PAIN

## 2024-03-11 NOTE — PROGRESS NOTES
Patient ID: Mirta Hills is a 69 y.o. female.    DIAGNOSIS     Adenocarcinoma of the lung.  Date of diagnosis is [place] 15, 2023 from a level 7 lymph node biopsy/fine-needle aspiration.  Immunohistochemistry positive for TTF-1.        STAGING     Clinical T2a (right lower lobe mass measuring 3.6 cm), clinical N2 (PET positivity and subcarinal region), M1C disease (presence of brain metastases, ribs, right acetabulum, liver, brain), stage IVb disease        CURRENT SITES OF DISEASE     Right lower lobe, right hilum of the lung, ipsilateral mediastinum, liver, bones, brain        MOLECULAR GENOMICS     TEST: Focused Solid Tumor DNA/RNA Panel   SPECIMEN: Supernatant, LYMPH NODE 7, [id number] A   DISEASE DIAGNOSIS: Adenocarcinoma   Estimated Tumor Content: 40%   COLLECTION DATE: 6/15/2023     MICROSATELLITE STATUS: Microsatellite  Instability-High (MSI-H) is NOT DETECTED.     DISEASE ASSOCIATED GENOMIC FINDINGS:   EGFR p.[id number] ([id number] c.[id number])    TP53 p.N247I ([id number] c.740A>T)     DISEASE RELEVANT ALTERATIONS NOT DETECTED:   Negative for ALK fusion.   Negative for BRAF V600E.   Negative for ERBB2 activating mutation   Negative for KRAS G12C.   Negative for  MET exon 14 skipping mutation.   Negative for NTRK fusion.   Negative for RET fusion.   Negative for ROS1 fusion.        Circulating tumor DNA sent on June 19, 2023  EGFR p.[id number], Variant allelic fraction of 1.6%  TP53 N247I Missense variant, loss of function, variant allelic fraction 1.1%  MSI stable        SERUM TUMOR MARKER     Slightly elevated CEA and CA 19.9 June 2023        PRIOR THERAPY     1- Gamma knife radiosurgery to brain lesions completed on June 28, 2023.        CURRENT THERAPY     Osimertinib on study  on clinical trial EA 5182 which is a randomized trial of osimertinib with or without bevacizumab.  She has been randomized to the bevacizumab arm- Cycle 1 Day 1 is 7/12/2023        CURRENT ONCOLOGICAL PROBLEMS     1-brain  metastases, symptomatic, word finding difficulties, mild unsteadiness on her feet but no falls, Resolved July 3, 2023  2-anterior chest pain, increases with deep inspiration, Improved July 3, 2023  3-mild intermittent grade 1 diarrhea, mild hair loss, mild leukopenia, mild elevation in creatinine, mild fatigue.  Most likely all osimertinib related August 7, 2023        HISTORY OF PRESENT ILLNESS     This is a 69-year-old patient.  She states that towards Thanksgiving of 2022 she started feeling some pain in her right shoulder.  Eventually got better however the same pain came back in January or February 2023.  Ultimately some x-rays were done of  the shoulder that were generally negative.  She then developed some pain in her sternum a month or 2 later with deep inspiration and ultimately underwent imaging in May 2023.  Chest CT without contrast was done on June 1, 2023.  This showed a 3.4 cm mass  within the superior segment of the right lower lobe with some associated satellite pulmonary nodules.  Additionally there were some other subcentimeter pulmonary nodules.  There was evidence of mediastinal lymphadenopathy.  Subcarinal lymph node measured  1.4 cm in short axis.She was seen by pulmonary medicine on June 5, 2023 a combined PET/CT was ordered on June 6, 2023 showing a relatively intense area of hypermetabolic activity along the superior posteromedial aspect of the right lower lobe corresponding  to the known right lower lobe lung mass.  There was foci of hypermetabolic activity in the right supraclavicular region, subcarinal and right hilar regions.  There was area of uptake within the posterior upper ribs as well as right acetabulum suspicious  for osseous metastatic disease.  There was a punctate area of mild hypermetabolic activity in the right hepatic lobe concerning for hepatic metastases.  Small focus in the subcutaneous region just anterior to the lower aspect of the sternum could be inflammatory   versus metastatic.  She undergoes a bronchoscopy dated [place] 15, 2023.  There were no endobronchial lesions.  Primary cytology with rapid onsite evaluation was suggestive of malignancy in level 7, final lymph nodes were pathology was pending.  MRI of the  brain with and without contrast dated June 16, 2023 showed approximately 20 enhancing nodules concerning for metastatic disease.  Specifically there was a 1.5 cm nodule in the right cerebellar hemisphere.  Most of the other nodules were less than 1 cm.   Patient also notes that she has some difficulty expressing words over the past 1 to 2 weeks prior to her first visit with us as well as some mild unsteadiness on her legs where she feels she is drifting towards the right side.        PAST MEDICAL HISTORY     1-hypothyroidism  2-hysterectomy 1988  3-abdominal hernia surgery 1995  4-some hearing difficulties        SOCIAL HISTORY     Patient lives with her significant other.  She has 2 daughter.  L[place] [facility].  Smoked for only 3 years during college and during this time it was a pack per day.  She has retired.  She had a retail store.        CURRENT MEDS     See medication list, meds reviewed        ALLERGIES     Patient denies any drug allergies        FAMILY HISTORY     Patient's father had bladder cancer at the age of 86.  She has 1 brother with no cancer.  She has 2 children    Subjective    Cancer  Pertinent negatives include no abdominal pain, anorexia, arthralgias, change in bowel habit, chest pain, chills, congestion, coughing, diaphoresis, fatigue, fever, headaches, joint swelling, myalgias, nausea, neck pain, numbness, rash, sore throat, swollen glands, urinary symptoms, vertigo, visual change, vomiting or weakness.     Patient is clinically doing well.  She was seen by dermatology because her nails are falling off.  They do not know if this is a reaction to the acrylic that she has been using on her nails.  However it is not really bothering her from  a symptomatic standpoint time.  She has had a few sores in her mouth especially on her lower lip inside of it.  The tip of them have been white.  She is having some tooth aches that she be seeing the dentist in the next day or 2.  She is breathing fine, no cough no hemoptysis, urinating well.  She has 1 loose bowel movement a day and takes Imodium preventatively.  No headaches.    Objective    BSA: There is no height or weight on file to calculate BSA.  There were no vitals taken for this visit.     Physical Exam  Constitutional:       General: She is not in acute distress.     Appearance: She is not ill-appearing, toxic-appearing or diaphoretic.   HENT:      Nose: No congestion or rhinorrhea.      Mouth/Throat:      Pharynx: Oropharyngeal exudate present. No posterior oropharyngeal erythema.   Eyes:      General: No scleral icterus.     Conjunctiva/sclera: Conjunctivae normal.   Cardiovascular:      Rate and Rhythm: Normal rate and regular rhythm.      Heart sounds: No murmur heard.     No friction rub. No gallop.   Pulmonary:      Effort: No respiratory distress.      Breath sounds: No stridor. No wheezing, rhonchi or rales.   Chest:      Chest wall: No tenderness.   Abdominal:      General: There is no distension.      Palpations: There is no mass.      Tenderness: There is no abdominal tenderness. There is no guarding or rebound.   Musculoskeletal:      Cervical back: No tenderness.      Right lower leg: No edema.      Left lower leg: No edema.   Lymphadenopathy:      Cervical: No cervical adenopathy.   Skin:     Coloration: Skin is not jaundiced or pale.      Findings: No bruising, erythema or lesion.   Psychiatric:         Mood and Affect: Mood normal.         Behavior: Behavior normal.         Performance Status:  Asymptomatic      Assessment/Plan     This is a very nice 69-year-old patient with EGFR mutation positive adenocarcinoma of the lung on a clinical trial of combination Tagrisso and bevacizumab.  She  has been on treatment since July 2023.  There is no evidence of disease progression based on imaging of January 2024.  He is tolerating treatment well has some grade 1 toxicities outlined above including grade 1 fatigue, grade 1 elevation in creatinine, grade 1 diarrhea.  Her nail problems are unclear if they are from the Tagrisso or not but is being evaluated by dermatology.        Cancer Staging   No matching staging information was found for the patient.    Oncology History   Lung cancer (CMS/HCC)   7/12/2023 -  Research Study Participant    ([facility]) [id number] Arm B - Bevacizumab / Osimertinib, 21 Day Cycles  Plan Provider: Humberto Landin MD  Treatment goal: [No plan goal]  Line of treatment: [No plan line of treatment]  Associated studies: [id number] (Osimertinib) +/- Bevacizumab as Initial Treatment for EGFR-Mutant Lung Cancer     9/1/2023 Initial Diagnosis    Lung cancer (CMS/HCC)                   Humberto Landin MD

## 2024-03-11 NOTE — RESEARCH NOTES
Research Note Non-Treatment Day    Mirta Hills is here today. Patient is on CO6117. Today is C12. Procedures completed per protocol. AE's and con-meds reviewed with patient. Patient is aware of treatment plan. Patient has new small mouth sores inside her lower gums/lip area. Patient discussed starting Biotin supplement for nail growth with Dr. Chairez. Patient also complained of left upper tooth pain and is seeing a dentist. She has been using immodium daily which is effective in diarrhea management.       Education Documentation  Mouth Sores, taught by Makayla Joseph RN at 3/11/2024 12:32 PM.  Learner: Patient  Readiness: Acceptance  Method: Explanation  Response: Verbalizes Understanding    Education Comments  No comments found.

## 2024-03-12 ENCOUNTER — TELEPHONE (OUTPATIENT)
Dept: HEMATOLOGY/ONCOLOGY | Facility: HOSPITAL | Age: 69
End: 2024-03-12

## 2024-03-12 ENCOUNTER — OFFICE VISIT (OUTPATIENT)
Dept: DERMATOLOGY | Facility: CLINIC | Age: 69
End: 2024-03-12
Payer: MEDICARE

## 2024-03-12 ENCOUNTER — APPOINTMENT (OUTPATIENT)
Dept: DERMATOLOGY | Facility: CLINIC | Age: 69
End: 2024-03-12
Payer: MEDICARE

## 2024-03-12 DIAGNOSIS — D48.5 NEOPLASM OF UNCERTAIN BEHAVIOR OF SKIN: Primary | ICD-10-CM

## 2024-03-12 DIAGNOSIS — L60.1 ONYCHOLYSIS: ICD-10-CM

## 2024-03-12 DIAGNOSIS — D22.9 MULTIPLE BENIGN NEVI: ICD-10-CM

## 2024-03-12 DIAGNOSIS — L82.1 SEBORRHEIC KERATOSIS: ICD-10-CM

## 2024-03-12 DIAGNOSIS — Z12.83 SCREENING EXAM FOR SKIN CANCER: ICD-10-CM

## 2024-03-12 DIAGNOSIS — L91.8 SKIN TAG: ICD-10-CM

## 2024-03-12 DIAGNOSIS — L81.4 LENTIGO: ICD-10-CM

## 2024-03-12 PROCEDURE — 1159F MED LIST DOCD IN RCRD: CPT | Performed by: DERMATOLOGY

## 2024-03-12 PROCEDURE — 1126F AMNT PAIN NOTED NONE PRSNT: CPT | Performed by: DERMATOLOGY

## 2024-03-12 PROCEDURE — 1036F TOBACCO NON-USER: CPT | Performed by: DERMATOLOGY

## 2024-03-12 PROCEDURE — 99213 OFFICE O/P EST LOW 20 MIN: CPT | Performed by: DERMATOLOGY

## 2024-03-12 ASSESSMENT — DERMATOLOGY QUALITY OF LIFE (QOL) ASSESSMENT
DATE THE QUALITY-OF-LIFE ASSESSMENT WAS COMPLETED: 66911
RATE HOW BOTHERED YOU ARE BY SYMPTOMS OF YOUR SKIN PROBLEM (EG, ITCHING, STINGING BURNING, HURTING OR SKIN IRRITATION): 0 - NEVER BOTHERED
RATE HOW BOTHERED YOU ARE BY EFFECTS OF YOUR SKIN PROBLEMS ON YOUR ACTIVITIES (EG, GOING OUT, ACCOMPLISHING WHAT YOU WANT, WORK ACTIVITIES OR YOUR RELATIONSHIPS WITH OTHERS): 0 - NEVER BOTHERED
ARE THERE EXCLUSIONS OR EXCEPTIONS FOR THE QUALITY OF LIFE ASSESSMENT: NO
RATE HOW EMOTIONALLY BOTHERED YOU ARE BY YOUR SKIN PROBLEM (FOR EXAMPLE, WORRY, EMBARRASSMENT, FRUSTRATION): 0 - NEVER BOTHERED

## 2024-03-12 ASSESSMENT — DERMATOLOGY PATIENT ASSESSMENT
DO YOU USE SUNSCREEN: OCCASIONALLY
HAVE YOU HAD OR DO YOU HAVE VASCULAR DISEASE: NO
DO YOU HAVE IRREGULAR MENSTRUAL CYCLES: NO
DO YOU USE A TANNING BED: YES, PREVIOUSLY
DO YOU HAVE ANY NEW OR CHANGING LESIONS: NO
ARE YOU TRYING TO GET PREGNANT: NO
ARE YOU AN ORGAN TRANSPLANT RECIPIENT: NO
HAVE YOU HAD OR DO YOU HAVE A STAPH INFECTION: NO
ARE YOU ON BIRTH CONTROL: NO

## 2024-03-12 ASSESSMENT — PATIENT GLOBAL ASSESSMENT (PGA): PATIENT GLOBAL ASSESSMENT: PATIENT GLOBAL ASSESSMENT:  1 - CLEAR

## 2024-03-12 ASSESSMENT — ITCH NUMERIC RATING SCALE: HOW SEVERE IS YOUR ITCHING?: 0

## 2024-03-12 NOTE — TELEPHONE ENCOUNTER
Pt saw her dentist today and wants to clarify if she still needs to hold Tagrisso for 3 days based on updated plan with her dentist?  She has follow up with dentist on 3/14 to have crown removed and he will either place a filling or replace the crown at that visit.  If pt needs a root canal or extraction, that would be done at another visit.

## 2024-03-12 NOTE — PATIENT INSTRUCTIONS
CeraVe - Facial moisturizers. Also can use Body cream for face  La Roche Posay - Lipikar (thicker creams) - gentle skin for face

## 2024-03-12 NOTE — PROGRESS NOTES
Subjective     Mirta Hills is a 69 y.o. female who presents for the following: Skin Check.     She has a history of adenocarcinoma of the lung diagnosed 6/15/23. She is currently on osimertinib on [facility] trial EA [id number]; she has been randomized to the arm that is also receiving bevacizumab, received cycle 1 7/12/23.     She was seen 2/19/24 for onycholysis, traumatic favored related to artificial nails. Fungal culture collected and still pending -- we will reach out with results.l onc said ok for biotin, stop 2 days prior to labs        No personal history of skin cancer      Review of Systems:  No other skin or systemic complaints other than what is documented elsewhere in the note.    The following portions of the chart were reviewed this encounter and updated as appropriate:         Skin Cancer History  No skin cancer on file.      Specialty Problems          Dermatology Problems    Puncture wound of right hand without foreign body        Objective   Well appearing patient in no apparent distress; mood and affect are within normal limits.    A full examination was performed including scalp, head, eyes, ears, nose, lips, neck, chest, axillae, abdomen, back, buttocks, bilateral upper extremities, bilateral lower extremities, hands, feet, fingers, toes, fingernails, and toenails. All findings within normal limits unless otherwise noted below.    Assessment/Plan   1. Neoplasm of uncertain behavior of skin  Right Lower Leg - Anterior  0.6x0.5 cm brown oval shaped macule with some corkscrew changes to pigment cords and early smudging of pigment pattern    Favor thin seborrheic keratosis but also consider melanocytic lesion. Photograph for monitroing                  2. Multiple benign nevi  Brown and tan macules and papules with reassuring findings on dermoscopy    -These lesions have benign, reassuring patterns on dermoscopy  -Recommend continued self observation, and to contact the office if any changes in nevi are  noticed    3. Lentigo  Tan macules    -Benign appearing on exam  -Reassurance, recommend observation    4. Seborrheic keratosis  Stuck on, waxy macule(s)/papule(s)/plaque(s) with comedo-like openings and milia like cysts    -Discussed the nature of the diagnosis  -Reassurance, recommend continued observation    5. Skin tag  Neck - Anterior  Flesh colored pedunculated papule(s); there is no clinically evident irritation or inflammation    -Discussed nature of the condition  -Reassurance  -Removal may be performed for an out of pocket fee given cosmetic nature of removal    - test spot today with liquid nitrogen     6. Screening exam for skin cancer    Full body skin exam  -No lesions concerning for malignancy on the remainder the skin exam today   - The ugly duckling sign was discussed. Monitor for any skin lesions that are different in color, shape, or size than others on body  -Sun protection was discussed. Recommend SPF 30+, hats with brims, sun protective clothing, and avoiding sun exposure between 10 AM and 2 PM whenever possible  -Recommend regular skin exams or sooner if new or changing lesions       Related Procedures  Follow Up In Dermatology - Established Patient    7. Onycholysis  Right Hand - Posterior  Nail polish     She was seen 2/19/24 for onycholysis, traumatic favored related to artificial nails. Fungal culture collected and still pending -- we will reach out with results.l onc said ok for biotin, stop 2 days prior to labs          Follow up 6-12 months Full Skin Exam

## 2024-03-13 NOTE — TELEPHONE ENCOUNTER
Pt called back and aware awaiting guidance on her question.  She did hold Tagrisso yesterday and today but was hoping to minimize the length of time she is off the medication.

## 2024-03-14 ENCOUNTER — TELEPHONE (OUTPATIENT)
Dept: PRIMARY CARE | Facility: CLINIC | Age: 69
End: 2024-03-14
Payer: MEDICARE

## 2024-03-16 NOTE — TELEPHONE ENCOUNTER
Some concerns about abnormal urinalysis, not new finding to see elev WBCs  Also no bacteria noted so do not feel needs to repeat in process culture at this time

## 2024-03-19 ENCOUNTER — TELEPHONE (OUTPATIENT)
Dept: DERMATOLOGY | Facility: HOSPITAL | Age: 69
End: 2024-03-19
Payer: MEDICARE

## 2024-03-19 DIAGNOSIS — C34.90 MALIGNANT NEOPLASM OF LUNG, UNSPECIFIED LATERALITY, UNSPECIFIED PART OF LUNG (MULTI): Primary | ICD-10-CM

## 2024-03-19 LAB
CALCOFLUOR WHITE SPEC: ABNORMAL
FUNGUS SPEC CULT: ABNORMAL

## 2024-03-19 RX ORDER — EPINEPHRINE 0.3 MG/.3ML
0.3 INJECTION SUBCUTANEOUS EVERY 5 MIN PRN
Status: CANCELLED | OUTPATIENT
Start: 2024-04-03

## 2024-03-19 RX ORDER — ALBUTEROL SULFATE 0.83 MG/ML
3 SOLUTION RESPIRATORY (INHALATION) AS NEEDED
Status: CANCELLED | OUTPATIENT
Start: 2024-04-03

## 2024-03-19 RX ORDER — FAMOTIDINE 10 MG/ML
20 INJECTION INTRAVENOUS ONCE AS NEEDED
Status: CANCELLED | OUTPATIENT
Start: 2024-04-03

## 2024-03-19 RX ORDER — DIPHENHYDRAMINE HYDROCHLORIDE 50 MG/ML
50 INJECTION INTRAMUSCULAR; INTRAVENOUS AS NEEDED
Status: CANCELLED | OUTPATIENT
Start: 2024-04-03

## 2024-03-19 RX ORDER — PROCHLORPERAZINE EDISYLATE 5 MG/ML
10 INJECTION INTRAMUSCULAR; INTRAVENOUS EVERY 6 HOURS PRN
Status: CANCELLED | OUTPATIENT
Start: 2024-04-03

## 2024-03-19 RX ORDER — PROCHLORPERAZINE MALEATE 10 MG
10 TABLET ORAL EVERY 6 HOURS PRN
Status: CANCELLED | OUTPATIENT
Start: 2024-04-03

## 2024-03-20 ENCOUNTER — APPOINTMENT (OUTPATIENT)
Dept: RADIOLOGY | Facility: CLINIC | Age: 69
End: 2024-03-20
Payer: MEDICARE

## 2024-03-26 ENCOUNTER — OFFICE VISIT (OUTPATIENT)
Dept: PRIMARY CARE | Facility: CLINIC | Age: 69
End: 2024-03-26
Payer: MEDICARE

## 2024-03-26 VITALS
BODY MASS INDEX: 20.81 KG/M2 | HEART RATE: 68 BPM | SYSTOLIC BLOOD PRESSURE: 118 MMHG | TEMPERATURE: 98.2 F | DIASTOLIC BLOOD PRESSURE: 70 MMHG | HEIGHT: 61 IN | WEIGHT: 110.2 LBS | OXYGEN SATURATION: 100 %

## 2024-03-26 VITALS
TEMPERATURE: 98.2 F | DIASTOLIC BLOOD PRESSURE: 84 MMHG | WEIGHT: 110.2 LBS | HEART RATE: 68 BPM | OXYGEN SATURATION: 100 % | SYSTOLIC BLOOD PRESSURE: 126 MMHG | HEIGHT: 61 IN | BODY MASS INDEX: 20.81 KG/M2

## 2024-03-26 DIAGNOSIS — Z13.6 SCREENING FOR HEART DISEASE: ICD-10-CM

## 2024-03-26 DIAGNOSIS — N81.89 PELVIC FLOOR WEAKNESS IN FEMALE: ICD-10-CM

## 2024-03-26 DIAGNOSIS — C34.90 MALIGNANT NEOPLASM OF LUNG, UNSPECIFIED LATERALITY, UNSPECIFIED PART OF LUNG (MULTI): ICD-10-CM

## 2024-03-26 DIAGNOSIS — Z00.00 MEDICARE ANNUAL WELLNESS VISIT, INITIAL: Primary | ICD-10-CM

## 2024-03-26 DIAGNOSIS — E03.9 HYPOTHYROIDISM, UNSPECIFIED TYPE: ICD-10-CM

## 2024-03-26 DIAGNOSIS — H35.3190 NONEXUDATIVE AGE-RELATED MACULAR DEGENERATION, UNSPECIFIED LATERALITY, UNSPECIFIED STAGE: ICD-10-CM

## 2024-03-26 DIAGNOSIS — G25.0 ESSENTIAL TREMOR: ICD-10-CM

## 2024-03-26 DIAGNOSIS — E78.2 HYPERLIPIDEMIA, MIXED: ICD-10-CM

## 2024-03-26 DIAGNOSIS — M85.80 OSTEOPENIA, UNSPECIFIED LOCATION: ICD-10-CM

## 2024-03-26 DIAGNOSIS — Z00.01 ENCOUNTER FOR GENERAL ADULT MEDICAL EXAMINATION WITH ABNORMAL FINDINGS: Primary | ICD-10-CM

## 2024-03-26 DIAGNOSIS — Z12.31 ENCOUNTER FOR SCREENING MAMMOGRAM FOR MALIGNANT NEOPLASM OF BREAST: ICD-10-CM

## 2024-03-26 DIAGNOSIS — I73.00 RAYNAUD'S PHENOMENON WITHOUT GANGRENE: ICD-10-CM

## 2024-03-26 PROCEDURE — 1036F TOBACCO NON-USER: CPT | Performed by: FAMILY MEDICINE

## 2024-03-26 PROCEDURE — UHSPHYS PR UH SELECT PHYSICAL: Performed by: FAMILY MEDICINE

## 2024-03-26 PROCEDURE — 1126F AMNT PAIN NOTED NONE PRSNT: CPT | Performed by: FAMILY MEDICINE

## 2024-03-26 PROCEDURE — 1159F MED LIST DOCD IN RCRD: CPT | Performed by: FAMILY MEDICINE

## 2024-03-26 PROCEDURE — G0438 PPPS, INITIAL VISIT: HCPCS | Performed by: FAMILY MEDICINE

## 2024-03-26 RX ORDER — LOPERAMIDE HYDROCHLORIDE 2 MG/1
2 CAPSULE ORAL DAILY
COMMUNITY

## 2024-03-26 RX ORDER — ATORVASTATIN CALCIUM 10 MG/1
10 TABLET, FILM COATED ORAL DAILY
COMMUNITY
Start: 2024-02-29

## 2024-03-26 ASSESSMENT — LIFESTYLE VARIABLES
AUDIT-C TOTAL SCORE: 3
HOW OFTEN DO YOU HAVE A DRINK CONTAINING ALCOHOL: 2-3 TIMES A WEEK
SKIP TO QUESTIONS 9-10: 1
HOW OFTEN DO YOU HAVE SIX OR MORE DRINKS ON ONE OCCASION: NEVER
HOW MANY STANDARD DRINKS CONTAINING ALCOHOL DO YOU HAVE ON A TYPICAL DAY: 1 OR 2

## 2024-03-26 ASSESSMENT — PATIENT HEALTH QUESTIONNAIRE - PHQ9
SUM OF ALL RESPONSES TO PHQ9 QUESTIONS 1 & 2: 0
1. LITTLE INTEREST OR PLEASURE IN DOING THINGS: NOT AT ALL
2. FEELING DOWN, DEPRESSED OR HOPELESS: NOT AT ALL

## 2024-03-26 ASSESSMENT — PAIN SCALES - GENERAL
PAINLEVEL: 0-NO PAIN
PAINLEVEL: 0-NO PAIN

## 2024-03-26 ASSESSMENT — ENCOUNTER SYMPTOMS
LOSS OF SENSATION IN FEET: 0
DEPRESSION: 0
OCCASIONAL FEELINGS OF UNSTEADINESS: 0

## 2024-03-26 NOTE — PROGRESS NOTES
Mirta Hills is a 68 year old here for a Medicare Annual Wellness Visit     Health Risk Assessment  In general, health is:  better than on paper     Concerns with balance:  no      Concerns with teeth or dentures:  yes - CALEB Briones     Concerns with sexual function:  no      Felt anxious, stressed, angry, irritable, lonely, isolated, or had thoughts of hurting themself:  no     Has little interest or pleasure in doing things:  no     Bothered by feeling down, depressed, or hopeless:  no     Needs help with grocery shopping, cooking, housework, bathing, grooming, dressing, eating, sitting or standing, walking, using the toilet, handling finances, taking medications, using the telephone, or driving:  no     Following safety precautions in the home environment and vehicle: removed throw rugs from floors, installed grab bars in the bathroom, handrails in stairwells, having adequate lighting, wearing seatbelt at all times?:  yes     Smokes cigarettes, vapes, or chew tobacco:  no     Eats healthy foods including fruits, vegetables, whole grains, and fiber-rich foods:  yes     Number of days per week engages in exercise:  1-3     Average alcohol consumption: 2-3/week        Current Providers  Specialists: I have reviewed specialist-related care of the patient in the medical record.     Medical/Family history review  Reviewed and updated problem list, medical/surgical/family/social history, medications, and allergies.     Opioid use review  Patient use of opioids:  no            Depression screening  Depression Screening PHQ-2 Score   Today  0         Depression screening tool completed and reviewed. Based on score and interview, patient is not at risk for depression. Screening tool discussed with patient, and I recommended no further intervention at this time.     Cognitive screening  Mini Cog Score:  5     Cognitive screening reviewed and plan:  follow - consider neurocog eval though given brain mets     Functional  "Observation  Was the patient's timed Up & Go test unsteady or >= 12 seconds?  <12     Advance Care Planning  End of Life planning discussed, including patient's advanced directive wishes:  yes - plans to bring in     Vision and Hearing assessment  Visual acuity (required for Welcome to Medicare):  sees Retinal specialist regularly  Hearing Evaluation:  sees audiologist     ====================================================    /84 (BP Location: Left arm, Patient Position: Sitting, BP Cuff Size: Adult)   Pulse 68   Temp 36.8 °C (98.2 °F) (Temporal)   Ht 1.55 m (5' 1.02\")   Wt 50 kg (110 lb 3.2 oz)   SpO2 100%   BMI 20.81 kg/m²     Chief Complaint   Patient presents with    Medicare Annual Wellness Visit Initial     1) Lung CA - followed by Dr Landin   DIAGNOSIS - Adenocarcinoma of the lung.  Date of diagnosis is [place] 15, 2023 from a level 7 lymph node biopsy/fine-needle aspiration.  Immunohistochemistry positive for TTF-1.  STAGING - Clinical T2a (right lower lobe mass measuring 3.6 cm), clinical N2 (PET positivity and subcarinal region), M1C disease (presence of brain metastases, ribs, right acetabulum, liver, brain), stage IVb disease  CURRENT SITES OF DISEASE - Right lower lobe, right hilum of the lung, ipsilateral mediastinum, liver, bones, brain  TX - q3w infusions and daily oral    2) Thyroid issues - elevated TSH - does feel a bit weak/sluggish, often can feel cold (has Raynaud's as well - CK was okay - has been titrating up on medication - uses trade Synthroid    3) Pelvic Floor Issues - had been suggested to get PT - would be interested - no marked bladder incontinence but this may be impacting her bowel fnxn (tends toward diarrhea and has been using Imodium daily)     4)  Dry MD - followed by Dr Chavez frequently     5) Rectal bleeding - happens frequently - consider CORS referral    6) Diagnosed w/ benign tremor L > R hand - seems stable / not a major issue    7) Raynaud's - long-standing " "- following - had tried something for it a long time ago     8) Here to follow-up lipids -denies chest pain, shortness of breath, dizziness, lightheadedness, hand or foot swelling that is new or different.  Taking and tolerating medications well.  Doing well with physical activity. Her 10 yr CAD risk is 8.4 v 7.7 %   Would be interested CAC testing     9) Screens  CAD risk - check CAC score  Breast CA risk - due for mammo  Bone Density - due next year - risks below target  Colon CA risk - due 2032      Review of Systems  Essentially noncontributory otherwise    Objective   /70   Pulse 68   Temp 36.8 °C (98.2 °F) (Temporal)   Ht 1.55 m (5' 1.02\")   Wt 50 kg (110 lb 3.2 oz)   SpO2 100%   BMI 20.81 kg/m²     Physical Exam  Vitals and nursing note reviewed.   Constitutional:       General: She is not in acute distress.     Appearance: She is not ill-appearing.   HENT:      Head: Normocephalic and atraumatic.      Mouth/Throat:      Pharynx: No posterior oropharyngeal erythema.   Eyes:      General: No scleral icterus.     Extraocular Movements: Extraocular movements intact.      Pupils: Pupils are equal, round, and reactive to light.   Cardiovascular:      Rate and Rhythm: Normal rate and regular rhythm.      Heart sounds: No murmur heard.  Pulmonary:      Breath sounds: Normal breath sounds. No wheezing or rhonchi.   Abdominal:      General: There is no distension.      Palpations: Abdomen is soft.      Tenderness: There is no abdominal tenderness.   Musculoskeletal:         General: Normal range of motion.      Cervical back: Normal range of motion.      Right lower leg: No edema.      Left lower leg: No edema.   Lymphadenopathy:      Cervical: No cervical adenopathy.   Skin:     General: Skin is dry.      Comments: Hands reveal duskiness and cool touch c/w Raynaud's   Neurological:      Mental Status: She is alert and oriented to person, place, and time. Mental status is at baseline.      Motor: No " weakness.      Coordination: Coordination normal.      Deep Tendon Reflexes: Reflexes normal.   Psychiatric:         Mood and Affect: Mood normal.         Behavior: Behavior normal.         Thought Content: Thought content normal.         Judgment: Judgment normal.         Assessment/Plan   Problem List Items Addressed This Visit       Hyperlipidemia, mixed    Hypothyroidism    Relevant Orders    Referral to Endocrinology    Tsh With Reflex To Free T4 If Abnormal    Lung cancer (CMS/HCC)    Dry ARMD     Other Visit Diagnoses       Encounter for general adult medical examination with abnormal findings    -  Primary    Pelvic floor weakness in female        Relevant Orders    Referral to Physical Therapy    Osteopenia, unspecified location        Raynaud's phenomenon without gangrene        Essential tremor        Encounter for screening mammogram for malignant neoplasm of breast        Relevant Orders    BI mammo bilateral screening tomosynthesis    Screening for heart disease        Relevant Orders    CT cardiac scoring wo IV contrast        1) Lung CA - followed by Dr Landin - cpm - some memory concerns (has brain involvement, consider neurocog testing)    2) Thyroid issues - change dose and refer to Endo    3) Pelvic Floor Issues - refer    4)  Dry MD - cpm    5) Rectal bleeding - ? Better w/ PF therapy  - consider CORS    6) Tremor - follow    7) Raynaud's -  follow - ? Better thyroid supp may help    8) Lipids - cpm - CAC test

## 2024-03-26 NOTE — PROGRESS NOTES
Subjective   Patient ID: Mirta Hills is a 69 y.o. female who presents for Annual Exam ([facility]).  HPI  1) Lung CA - followed by Dr Landin   DIAGNOSIS - Adenocarcinoma of the lung.  Date of diagnosis is [place] 15, 2023 from a level 7 lymph node biopsy/fine-needle aspiration.  Immunohistochemistry positive for TTF-1.  STAGING - Clinical T2a (right lower lobe mass measuring 3.6 cm), clinical N2 (PET positivity and subcarinal region), M1C disease (presence of brain metastases, ribs, right acetabulum, liver, brain), stage IVb disease  CURRENT SITES OF DISEASE - Right lower lobe, right hilum of the lung, ipsilateral mediastinum, liver, bones, brain  TX - q3w infusions and daily oral    2) Thyroid issues - elevated TSH - does feel a bit weak/sluggish, often can feel cold (has Raynaud's as well - CK was okay - has been titrating up on medication - uses trade Synthroid    3) Pelvic Floor Issues - had been suggested to get PT - would be interested - no marked bladder incontinence but this may be impacting her bowel fnxn (tends toward diarrhea and has been using Imodium daily)     4)  Dry MD - followed by Dr Chavez frequently     5) Rectal bleeding - happens frequently - consider CORS referral    6) Diagnosed w/ benign tremor L > R hand - seems stable / not a major issue    7) Raynaud's - long-standing - following - had tried something for it a long time ago     8) Here to follow-up lipids -denies chest pain, shortness of breath, dizziness, lightheadedness, hand or foot swelling that is new or different.  Taking and tolerating medications well.  Doing well with physical activity. Her 10 yr CAD risk is 8.4 v 7.7 %   Would be interested CAC testing     9) Screens  CAD risk - check CAC score  Breast CA risk - due for mammo  Bone Density - due next year - risks below target  Colon CA risk - due 2032      Review of Systems  Essentially noncontributory otherwise    Objective   /70   Pulse 68   Temp 36.8 °C (98.2  "°F) (Temporal)   Ht 1.55 m (5' 1.02\")   Wt 50 kg (110 lb 3.2 oz)   SpO2 100%   BMI 20.81 kg/m²     Physical Exam  Vitals and nursing note reviewed.   Constitutional:       General: She is not in acute distress.     Appearance: She is not ill-appearing.   HENT:      Head: Normocephalic and atraumatic.      Mouth/Throat:      Pharynx: No posterior oropharyngeal erythema.   Eyes:      General: No scleral icterus.     Extraocular Movements: Extraocular movements intact.      Pupils: Pupils are equal, round, and reactive to light.   Cardiovascular:      Rate and Rhythm: Normal rate and regular rhythm.      Heart sounds: No murmur heard.  Pulmonary:      Breath sounds: Normal breath sounds. No wheezing or rhonchi.   Abdominal:      General: There is no distension.      Palpations: Abdomen is soft.      Tenderness: There is no abdominal tenderness.   Musculoskeletal:         General: Normal range of motion.      Cervical back: Normal range of motion.      Right lower leg: No edema.      Left lower leg: No edema.   Lymphadenopathy:      Cervical: No cervical adenopathy.   Skin:     General: Skin is dry.      Comments: Hands reveal duskiness and cool touch c/w Raynaud's   Neurological:      Mental Status: She is alert and oriented to person, place, and time. Mental status is at baseline.      Motor: No weakness.      Coordination: Coordination normal.      Deep Tendon Reflexes: Reflexes normal.   Psychiatric:         Mood and Affect: Mood normal.         Behavior: Behavior normal.         Thought Content: Thought content normal.         Judgment: Judgment normal.         Assessment/Plan   Problem List Items Addressed This Visit       Hyperlipidemia, mixed    Hypothyroidism    Relevant Orders    Referral to Endocrinology    Tsh With Reflex To Free T4 If Abnormal    Lung cancer (CMS/HCC)    Dry ARMD     Other Visit Diagnoses       Encounter for general adult medical examination with abnormal findings    -  Primary    " Pelvic floor weakness in female        Relevant Orders    Referral to Physical Therapy    Osteopenia, unspecified location        Raynaud's phenomenon without gangrene        Essential tremor        Encounter for screening mammogram for malignant neoplasm of breast        Relevant Orders    BI mammo bilateral screening tomosynthesis    Screening for heart disease        Relevant Orders    CT cardiac scoring wo IV contrast        1) Lung CA - followed by Dr Landin - cpm - some memory concerns (has brain involvement, consider neurocog testing)    2) Thyroid issues - change dose and refer to Endo    3) Pelvic Floor Issues - refer    4)  Dry MD - cpm    5) Rectal bleeding - ? Better w/ PF therapy  - consider CORS    6) Tremor - follow    7) Raynaud's -  follow - ? Better thyroid supp may help    8) Lipids - cpm - CAC test

## 2024-03-27 ENCOUNTER — APPOINTMENT (OUTPATIENT)
Dept: RADIOLOGY | Facility: HOSPITAL | Age: 69
End: 2024-03-27
Payer: MEDICARE

## 2024-03-28 ENCOUNTER — HOSPITAL ENCOUNTER (OUTPATIENT)
Dept: RADIOLOGY | Facility: HOSPITAL | Age: 69
Discharge: HOME | End: 2024-03-28
Payer: MEDICARE

## 2024-03-28 DIAGNOSIS — C34.90 MALIGNANT NEOPLASM OF LUNG, UNSPECIFIED LATERALITY, UNSPECIFIED PART OF LUNG (MULTI): ICD-10-CM

## 2024-03-28 PROCEDURE — 70553 MRI BRAIN STEM W/O & W/DYE: CPT

## 2024-03-28 PROCEDURE — A9575 INJ GADOTERATE MEGLUMI 0.1ML: HCPCS | Performed by: INTERNAL MEDICINE

## 2024-03-28 PROCEDURE — 2550000001 HC RX 255 CONTRASTS: Performed by: INTERNAL MEDICINE

## 2024-03-28 PROCEDURE — 71260 CT THORAX DX C+: CPT

## 2024-03-28 PROCEDURE — 74177 CT ABD & PELVIS W/CONTRAST: CPT

## 2024-03-28 PROCEDURE — 70553 MRI BRAIN STEM W/O & W/DYE: CPT | Performed by: RADIOLOGY

## 2024-03-28 RX ORDER — GADOTERATE MEGLUMINE 376.9 MG/ML
9 INJECTION INTRAVENOUS
Status: COMPLETED | OUTPATIENT
Start: 2024-03-28 | End: 2024-03-28

## 2024-03-28 RX ADMIN — GADOTERATE MEGLUMINE 9 ML: 376.9 INJECTION INTRAVENOUS at 10:20

## 2024-03-28 RX ADMIN — IOHEXOL 75 ML: 350 INJECTION, SOLUTION INTRAVENOUS at 11:15

## 2024-03-30 ENCOUNTER — APPOINTMENT (OUTPATIENT)
Dept: RADIOLOGY | Facility: CLINIC | Age: 69
End: 2024-03-30
Payer: MEDICARE

## 2024-04-01 ENCOUNTER — LAB (OUTPATIENT)
Dept: LAB | Facility: HOSPITAL | Age: 69
End: 2024-04-01
Payer: MEDICARE

## 2024-04-01 ENCOUNTER — OFFICE VISIT (OUTPATIENT)
Dept: HEMATOLOGY/ONCOLOGY | Facility: HOSPITAL | Age: 69
End: 2024-04-01
Payer: MEDICARE

## 2024-04-01 ENCOUNTER — INFUSION (OUTPATIENT)
Dept: HEMATOLOGY/ONCOLOGY | Facility: HOSPITAL | Age: 69
End: 2024-04-01
Payer: MEDICARE

## 2024-04-01 ENCOUNTER — EDUCATION (OUTPATIENT)
Dept: HEMATOLOGY/ONCOLOGY | Facility: HOSPITAL | Age: 69
End: 2024-04-01

## 2024-04-01 VITALS
TEMPERATURE: 97.3 F | HEART RATE: 65 BPM | SYSTOLIC BLOOD PRESSURE: 146 MMHG | DIASTOLIC BLOOD PRESSURE: 69 MMHG | WEIGHT: 111.99 LBS | RESPIRATION RATE: 18 BRPM | OXYGEN SATURATION: 100 % | BODY MASS INDEX: 21.14 KG/M2

## 2024-04-01 DIAGNOSIS — C34.90 MALIGNANT NEOPLASM OF LUNG, UNSPECIFIED LATERALITY, UNSPECIFIED PART OF LUNG (MULTI): ICD-10-CM

## 2024-04-01 DIAGNOSIS — Z00.00 WELLNESS EXAMINATION: ICD-10-CM

## 2024-04-01 DIAGNOSIS — E03.9 HYPOTHYROIDISM, UNSPECIFIED TYPE: ICD-10-CM

## 2024-04-01 DIAGNOSIS — I42.7 CARDIOTOXICITY (MULTI): ICD-10-CM

## 2024-04-01 LAB
ALBUMIN SERPL BCP-MCNC: 4.1 G/DL (ref 3.4–5)
ALP SERPL-CCNC: 53 U/L (ref 33–136)
ALT SERPL W P-5'-P-CCNC: 17 U/L (ref 7–45)
ANION GAP SERPL CALC-SCNC: 12 MMOL/L (ref 10–20)
AST SERPL W P-5'-P-CCNC: 19 U/L (ref 9–39)
BASOPHILS # BLD AUTO: 0.02 X10*3/UL (ref 0–0.1)
BASOPHILS NFR BLD AUTO: 0.5 %
BILIRUB SERPL-MCNC: 0.4 MG/DL (ref 0–1.2)
BUN SERPL-MCNC: 23 MG/DL (ref 6–23)
CALCIUM SERPL-MCNC: 9.6 MG/DL (ref 8.6–10.3)
CHLORIDE SERPL-SCNC: 103 MMOL/L (ref 98–107)
CK SERPL-CCNC: 49 U/L (ref 0–215)
CO2 SERPL-SCNC: 29 MMOL/L (ref 21–32)
CREAT SERPL-MCNC: 1.3 MG/DL (ref 0.5–1.05)
EGFRCR SERPLBLD CKD-EPI 2021: 44 ML/MIN/1.73M*2
EOSINOPHIL # BLD AUTO: 0.11 X10*3/UL (ref 0–0.4)
EOSINOPHIL NFR BLD AUTO: 2.8 %
ERYTHROCYTE [DISTWIDTH] IN BLOOD BY AUTOMATED COUNT: 14.8 % (ref 11.5–14.5)
GLUCOSE SERPL-MCNC: 99 MG/DL (ref 74–99)
HCT VFR BLD AUTO: 43 % (ref 36–46)
HGB BLD-MCNC: 13.7 G/DL (ref 12–16)
IMM GRANULOCYTES # BLD AUTO: 0.01 X10*3/UL (ref 0–0.5)
IMM GRANULOCYTES NFR BLD AUTO: 0.3 % (ref 0–0.9)
LYMPHOCYTES # BLD AUTO: 0.66 X10*3/UL (ref 0.8–3)
LYMPHOCYTES NFR BLD AUTO: 17.1 %
MAGNESIUM SERPL-MCNC: 2.14 MG/DL (ref 1.6–2.4)
MCH RBC QN AUTO: 29.5 PG (ref 26–34)
MCHC RBC AUTO-ENTMCNC: 31.9 G/DL (ref 32–36)
MCV RBC AUTO: 93 FL (ref 80–100)
MONOCYTES # BLD AUTO: 0.27 X10*3/UL (ref 0.05–0.8)
MONOCYTES NFR BLD AUTO: 7 %
NEUTROPHILS # BLD AUTO: 2.8 X10*3/UL (ref 1.6–5.5)
NEUTROPHILS NFR BLD AUTO: 72.3 %
NRBC BLD-RTO: 0 /100 WBCS (ref 0–0)
PLATELET # BLD AUTO: 172 X10*3/UL (ref 150–450)
POTASSIUM SERPL-SCNC: 4.1 MMOL/L (ref 3.5–5.3)
PROT SERPL-MCNC: 6.3 G/DL (ref 6.4–8.2)
RBC # BLD AUTO: 4.65 X10*6/UL (ref 4–5.2)
SODIUM SERPL-SCNC: 140 MMOL/L (ref 136–145)
T4 FREE SERPL-MCNC: 1.3 NG/DL (ref 0.78–1.48)
TSH SERPL-ACNC: 6.21 MIU/L (ref 0.44–3.98)
WBC # BLD AUTO: 3.9 X10*3/UL (ref 4.4–11.3)

## 2024-04-01 PROCEDURE — 1126F AMNT PAIN NOTED NONE PRSNT: CPT | Performed by: INTERNAL MEDICINE

## 2024-04-01 PROCEDURE — 84439 ASSAY OF FREE THYROXINE: CPT | Performed by: FAMILY MEDICINE

## 2024-04-01 PROCEDURE — 36415 COLL VENOUS BLD VENIPUNCTURE: CPT

## 2024-04-01 PROCEDURE — 99215 OFFICE O/P EST HI 40 MIN: CPT | Performed by: INTERNAL MEDICINE

## 2024-04-01 PROCEDURE — 84443 ASSAY THYROID STIM HORMONE: CPT | Performed by: FAMILY MEDICINE

## 2024-04-01 PROCEDURE — 80053 COMPREHEN METABOLIC PANEL: CPT

## 2024-04-01 PROCEDURE — 82550 ASSAY OF CK (CPK): CPT | Performed by: INTERNAL MEDICINE

## 2024-04-01 PROCEDURE — 2500000004 HC RX 250 GENERAL PHARMACY W/ HCPCS (ALT 636 FOR OP/ED): Performed by: INTERNAL MEDICINE

## 2024-04-01 PROCEDURE — 96365 THER/PROPH/DIAG IV INF INIT: CPT | Mod: INF

## 2024-04-01 PROCEDURE — 1159F MED LIST DOCD IN RCRD: CPT | Performed by: INTERNAL MEDICINE

## 2024-04-01 PROCEDURE — 83735 ASSAY OF MAGNESIUM: CPT

## 2024-04-01 PROCEDURE — 1036F TOBACCO NON-USER: CPT | Performed by: INTERNAL MEDICINE

## 2024-04-01 PROCEDURE — 85025 COMPLETE CBC W/AUTO DIFF WBC: CPT

## 2024-04-01 RX ORDER — FAMOTIDINE 10 MG/ML
20 INJECTION INTRAVENOUS ONCE AS NEEDED
Status: DISCONTINUED | OUTPATIENT
Start: 2024-04-01 | End: 2024-04-01 | Stop reason: HOSPADM

## 2024-04-01 RX ORDER — DIPHENHYDRAMINE HYDROCHLORIDE 50 MG/ML
50 INJECTION INTRAMUSCULAR; INTRAVENOUS AS NEEDED
Status: DISCONTINUED | OUTPATIENT
Start: 2024-04-01 | End: 2024-04-01 | Stop reason: HOSPADM

## 2024-04-01 RX ORDER — EPINEPHRINE 0.3 MG/.3ML
0.3 INJECTION SUBCUTANEOUS EVERY 5 MIN PRN
Status: DISCONTINUED | OUTPATIENT
Start: 2024-04-01 | End: 2024-04-01 | Stop reason: HOSPADM

## 2024-04-01 RX ORDER — PROCHLORPERAZINE EDISYLATE 5 MG/ML
10 INJECTION INTRAMUSCULAR; INTRAVENOUS EVERY 6 HOURS PRN
Status: DISCONTINUED | OUTPATIENT
Start: 2024-04-01 | End: 2024-04-01 | Stop reason: HOSPADM

## 2024-04-01 RX ORDER — ALBUTEROL SULFATE 0.83 MG/ML
3 SOLUTION RESPIRATORY (INHALATION) AS NEEDED
Status: DISCONTINUED | OUTPATIENT
Start: 2024-04-01 | End: 2024-04-01 | Stop reason: HOSPADM

## 2024-04-01 RX ORDER — PROCHLORPERAZINE MALEATE 10 MG
10 TABLET ORAL EVERY 6 HOURS PRN
Status: DISCONTINUED | OUTPATIENT
Start: 2024-04-01 | End: 2024-04-01 | Stop reason: HOSPADM

## 2024-04-01 RX ADMIN — Medication 757.5 MG: at 14:10

## 2024-04-01 ASSESSMENT — ENCOUNTER SYMPTOMS
NAUSEA: 0
NECK PAIN: 0
VERTIGO: 0
HEADACHES: 0
ABDOMINAL PAIN: 0
FEVER: 0
CHILLS: 0
WEAKNESS: 0
CHANGE IN BOWEL HABIT: 1
VOMITING: 0
COUGH: 0
SORE THROAT: 0
JOINT SWELLING: 0
FATIGUE: 1
SWOLLEN GLANDS: 0
MYALGIAS: 0
ANOREXIA: 0
NUMBNESS: 0
VISUAL CHANGE: 0
DIAPHORESIS: 0
ARTHRALGIAS: 0

## 2024-04-01 ASSESSMENT — PAIN SCALES - GENERAL: PAINLEVEL: 0-NO PAIN

## 2024-04-01 NOTE — PROGRESS NOTES
Patient ID: Mirta Hills is a 69 y.o. female.    DIAGNOSIS     Adenocarcinoma of the lung.  Date of diagnosis is [place] 15, 2023 from a level 7 lymph node biopsy/fine-needle aspiration.  Immunohistochemistry positive for TTF-1.        STAGING     Clinical T2a (right lower lobe mass measuring 3.6 cm), clinical N2 (PET positivity and subcarinal region), M1C disease (presence of brain metastases, ribs, right acetabulum, liver, brain), stage IVb disease        CURRENT SITES OF DISEASE     Right lower lobe, right hilum of the lung, ipsilateral mediastinum, liver, bones, brain        MOLECULAR GENOMICS     TEST: Focused Solid Tumor DNA/RNA Panel   SPECIMEN: Supernatant, LYMPH NODE 7, [id number]   DISEASE DIAGNOSIS: Adenocarcinoma   Estimated Tumor Content: 40%   COLLECTION DATE: 6/15/2023     MICROSATELLITE STATUS: Microsatellite  Instability-High (MSI-H) is NOT DETECTED.     DISEASE ASSOCIATED GENOMIC FINDINGS:   EGFR p.[id number] ([id number] c.[id number])    TP53 p.N247I ([id number] c.740A>T)     DISEASE RELEVANT ALTERATIONS NOT DETECTED:   Negative for ALK fusion.   Negative for BRAF V600E.   Negative for ERBB2 activating mutation   Negative for KRAS G12C.   Negative for  MET exon 14 skipping mutation.   Negative for NTRK fusion.   Negative for RET fusion.   Negative for ROS1 fusion.        Circulating tumor DNA sent on June 19, 2023  EGFR p.[id number], Variant allelic fraction of 1.6%  TP53 N247I Missense variant, loss of function, variant allelic fraction 1.1%  MSI stable        SERUM TUMOR MARKER     Slightly elevated CEA and CA 19.9 June 2023        PRIOR THERAPY     1- Gamma knife radiosurgery to brain lesions completed on June 28, 2023.        CURRENT THERAPY     Osimertinib on study  on clinical trial [id number] which is a randomized trial of osimertinib with or without bevacizumab.  She has been randomized to the bevacizumab arm- Cycle 1 Day 1 is 7/12/2023        CURRENT ONCOLOGICAL PROBLEMS     1-brain  metastases, symptomatic, word finding difficulties, mild unsteadiness on her feet but no falls, Resolved July 3, 2023  2-anterior chest pain, increases with deep inspiration, Improved July 3, 2023  3-mild intermittent grade 1 diarrhea, mild hair loss, mild leukopenia, mild elevation in creatinine, mild fatigue.  Most likely all osimertinib related August 7, 2023        HISTORY OF PRESENT ILLNESS     This is a 69-year-old patient.  She states that towards Thanksgiving of 2022 she started feeling some pain in her right shoulder.  Eventually got better however the same pain came back in January or February 2023.  Ultimately some x-rays were done of  the shoulder that were generally negative.  She then developed some pain in her sternum a month or 2 later with deep inspiration and ultimately underwent imaging in May 2023.  Chest CT without contrast was done on June 1, 2023.  This showed a 3.4 cm mass  within the superior segment of the right lower lobe with some associated satellite pulmonary nodules.  Additionally there were some other subcentimeter pulmonary nodules.  There was evidence of mediastinal lymphadenopathy.  Subcarinal lymph node measured  1.4 cm in short axis.She was seen by pulmonary medicine on June 5, 2023 a combined PET/CT was ordered on June 6, 2023 showing a relatively intense area of hypermetabolic activity along the superior posteromedial aspect of the right lower lobe corresponding  to the known right lower lobe lung mass.  There was foci of hypermetabolic activity in the right supraclavicular region, subcarinal and right hilar regions.  There was area of uptake within the posterior upper ribs as well as right acetabulum suspicious  for osseous metastatic disease.  There was a punctate area of mild hypermetabolic activity in the right hepatic lobe concerning for hepatic metastases.  Small focus in the subcutaneous region just anterior to the lower aspect of the sternum could be inflammatory   versus metastatic.  She undergoes a bronchoscopy dated [place] 15, 2023.  There were no endobronchial lesions.  Primary cytology with rapid onsite evaluation was suggestive of malignancy in level 7, final lymph nodes were pathology was pending.  MRI of the  brain with and without contrast dated June 16, 2023 showed approximately 20 enhancing nodules concerning for metastatic disease.  Specifically there was a 1.5 cm nodule in the right cerebellar hemisphere.  Most of the other nodules were less than 1 cm.   Patient also notes that she has some difficulty expressing words over the past 1 to 2 weeks prior to her first visit with us as well as some mild unsteadiness on her legs where she feels she is drifting towards the right side.        PAST MEDICAL HISTORY     1-hypothyroidism  2-hysterectomy 1988  3-abdominal hernia surgery 1995  4-some hearing difficulties        SOCIAL HISTORY     Patient lives with her significant other.  She has 2 daughter.  Lives in [facility].  Smoked for only 3 years during college and during this time it was a pack per day.  She has retired.  She had a retail store.        CURRENT MEDS     See medication list, meds reviewed        ALLERGIES     Patient denies any drug allergies        FAMILY HISTORY     Patient's father had bladder cancer at the age of 86.  She has 1 brother with no cancer.  She has 2 children    Subjective    Cancer  Associated symptoms include a change in bowel habit and fatigue. Pertinent negatives include no abdominal pain, anorexia, arthralgias, chest pain, chills, congestion, coughing, diaphoresis, fever, headaches, joint swelling, myalgias, nausea, neck pain, numbness, rash, sore throat, swollen glands, urinary symptoms, vertigo, visual change, vomiting or weakness.     Patient is clinically doing well.  Nothing new, her nails are the same, has an episode of diarrhea from time to time up to 2 times per day, takes Imodium regularly.  Recently had increase in her thyroid  medication by her primary care doctor    Objective    BSA: 1.48 meters squared  /69 (BP Location: Right arm, Patient Position: Sitting, BP Cuff Size: Small adult)   Pulse 65   Temp 36.3 °C (97.3 °F) (Temporal)   Resp 18   Wt 50.8 kg (111 lb 15.9 oz)   SpO2 100%   BMI 21.14 kg/m²      Physical Exam  Constitutional:       General: She is not in acute distress.     Appearance: She is not ill-appearing, toxic-appearing or diaphoretic.   HENT:      Nose: No congestion or rhinorrhea.      Mouth/Throat:      Pharynx: No oropharyngeal exudate or posterior oropharyngeal erythema.   Eyes:      General: No scleral icterus.     Conjunctiva/sclera: Conjunctivae normal.   Cardiovascular:      Rate and Rhythm: Normal rate and regular rhythm.      Pulses: Normal pulses.      Heart sounds: Normal heart sounds. No murmur heard.     No friction rub. No gallop.   Pulmonary:      Effort: Pulmonary effort is normal. No respiratory distress.      Breath sounds: Normal breath sounds. No stridor. No wheezing, rhonchi or rales.   Chest:      Chest wall: No tenderness.   Abdominal:      General: There is no distension.      Palpations: There is no mass.      Tenderness: There is no abdominal tenderness. There is no guarding or rebound.   Musculoskeletal:      Cervical back: No tenderness.      Right lower leg: No edema.      Left lower leg: No edema.   Lymphadenopathy:      Cervical: No cervical adenopathy.   Skin:     Coloration: Skin is not jaundiced or pale.      Findings: No bruising, erythema, lesion or rash.   Neurological:      General: No focal deficit present.      Mental Status: She is oriented to person, place, and time.   Psychiatric:         Mood and Affect: Mood normal.         Behavior: Behavior normal.         Performance Status:  Asymptomatic     Latest Reference Range & Units 04/01/24 11:05   GLUCOSE 74 - 99 mg/dL 99   SODIUM 136 - 145 mmol/L 140   POTASSIUM 3.5 - 5.3 mmol/L 4.1   CHLORIDE 98 - 107 mmol/L 103    Bicarbonate 21 - 32 mmol/L 29   Anion Gap 10 - 20 mmol/L 12   Blood Urea Nitrogen 6 - 23 mg/dL 23   Creatinine 0.50 - 1.05 mg/dL 1.30 (H)   EGFR >60 mL/min/1.73m*2 44 (L)   Calcium 8.6 - 10.3 mg/dL 9.6   Albumin 3.4 - 5.0 g/dL 4.1   Alkaline Phosphatase 33 - 136 U/L 53   ALT 7 - 45 U/L 17   AST 9 - 39 U/L 19   Bilirubin Total 0.0 - 1.2 mg/dL 0.4   Total Protein 6.4 - 8.2 g/dL 6.3 (L)   MAGNESIUM 1.60 - 2.40 mg/dL 2.14   WBC 4.4 - 11.3 x10*3/uL 3.9 (L)   nRBC 0.0 - 0.0 /100 WBCs 0.0   RBC 4.00 - 5.20 x10*6/uL 4.65   HEMOGLOBIN 12.0 - 16.0 g/dL 13.7   HEMATOCRIT 36.0 - 46.0 % 43.0   MCV 80 - 100 fL 93   MCH 26.0 - 34.0 pg 29.5   MCHC 32.0 - 36.0 g/dL 31.9 (L)   RED CELL DISTRIBUTION WIDTH 11.5 - 14.5 % 14.8 (H)   Platelets 150 - 450 x10*3/uL 172   Neutrophils % 40.0 - 80.0 % 72.3   Immature Granulocytes %, Automated 0.0 - 0.9 % 0.3   Lymphocytes % 13.0 - 44.0 % 17.1   Monocytes % 2.0 - 10.0 % 7.0   Eosinophils % 0.0 - 6.0 % 2.8   Basophils % 0.0 - 2.0 % 0.5   Neutrophils Absolute 1.60 - 5.50 x10*3/uL 2.80   Immature Granulocytes Absolute, Automated 0.00 - 0.50 x10*3/uL 0.01   Lymphocytes Absolute 0.80 - 3.00 x10*3/uL 0.66 (L)   Monocytes Absolute 0.05 - 0.80 x10*3/uL 0.27   Eosinophils Absolute 0.00 - 0.40 x10*3/uL 0.11   Basophils Absolute 0.00 - 0.10 x10*3/uL 0.02   (H): Data is abnormally high  (L): Data is abnormally low      CT Chest  IMPRESSION:  In comparison to prior study dated 01/18/2024, the previously  described mildly enhancing lesions in the right cerebellar hemisphere  are not definitively visualized on current examination. The mildly  enhancing lesion in the left postcentral gyrus has decreased in size  and now measures 4 mm in axial dimension as detailed above. Findings  could reflect treatment response. No evidence of new abnormal  intracranial enhancing mass lesions appreciated on the current  examination.    IMPRESSION:  Superior segment right lower lobe mass is slightly larger than on the  prior  study. Other pulmonary nodules are unchanged and there are no  new pulmonary nodules      Bone lesions are unchanged with no new bone lesion noted.      Unchanged splenomegaly      Assessment/Plan     This is a patient with EGFR mutation positive adenocarcinoma of the lung on a clinical trial of Tagrisso and bevacizumab.  I personally reviewed her most recent CT scan showing no evidence of disease progression.  Her toxicities remain unchanged and minimal.  We will continue her on study.    Cancer Staging   No matching staging information was found for the patient.    Oncology History   Lung cancer (CMS/HCC)   7/12/2023 -  Research Study Participant    ([facility]) [id number] Arm B - Bevacizumab / Osimertinib, 21 Day Cycles  Plan Provider: Humberto Landin MD  Treatment goal: [No plan goal]  Line of treatment: [No plan line of treatment]  Associated studies: [id number] (Osimertinib) +/- Bevacizumab as Initial Treatment for EGFR-Mutant Lung Cancer     9/1/2023 Initial Diagnosis    Lung cancer (CMS/HCC)          Humberto Landin MD  Professor of Medicine and Oncology  [facility]  Sky Wilson Chair in Lung Cancer  Kimberly Wan Director  [facility]  Thoracic Oncology  [facility]  Chief Academic Officer  [facility]

## 2024-04-01 NOTE — PROGRESS NOTES
Patient came for  study bevacizumab . Patient tolerated it well, no issue during the infusion. Patient was discharged stable.

## 2024-04-01 NOTE — RESEARCH NOTES
Research Note Treatment Day    Mirta Hills is here today for treatment on NH1004. Today is C13 Procedures completed per protocol. AE's and con-meds reviewed with patient. Patient is aware of treatment plan. CT scan results reviewed by Dr. Landin to the patient.    [x]   Received treatment as planned   OR  []    Treatment delayed; patient calendar updated as required   Treatment delayed because:    []   AE    []   Physician Discretion    []   Clinical Deterioration or Progression     []   Other

## 2024-04-02 ENCOUNTER — HOSPITAL ENCOUNTER (OUTPATIENT)
Dept: RADIOLOGY | Facility: CLINIC | Age: 69
Discharge: HOME | End: 2024-04-02
Payer: MEDICARE

## 2024-04-02 VITALS — WEIGHT: 109 LBS | BODY MASS INDEX: 20.06 KG/M2 | HEIGHT: 62 IN

## 2024-04-02 DIAGNOSIS — Z12.31 ENCOUNTER FOR SCREENING MAMMOGRAM FOR MALIGNANT NEOPLASM OF BREAST: ICD-10-CM

## 2024-04-02 PROCEDURE — 77067 SCR MAMMO BI INCL CAD: CPT

## 2024-04-02 PROCEDURE — 77063 BREAST TOMOSYNTHESIS BI: CPT | Performed by: RADIOLOGY

## 2024-04-02 PROCEDURE — 77067 SCR MAMMO BI INCL CAD: CPT | Performed by: RADIOLOGY

## 2024-04-05 ENCOUNTER — HOSPITAL ENCOUNTER (OUTPATIENT)
Dept: RADIATION ONCOLOGY | Facility: CLINIC | Age: 69
Setting detail: RADIATION/ONCOLOGY SERIES
Discharge: HOME | End: 2024-04-05
Payer: MEDICARE

## 2024-04-05 VITALS
RESPIRATION RATE: 18 BRPM | OXYGEN SATURATION: 95 % | BODY MASS INDEX: 20.65 KG/M2 | HEART RATE: 68 BPM | SYSTOLIC BLOOD PRESSURE: 137 MMHG | WEIGHT: 111.11 LBS | DIASTOLIC BLOOD PRESSURE: 84 MMHG | TEMPERATURE: 96.6 F

## 2024-04-05 DIAGNOSIS — C34.90 MALIGNANT NEOPLASM OF LUNG, UNSPECIFIED LATERALITY, UNSPECIFIED PART OF LUNG (MULTI): Primary | ICD-10-CM

## 2024-04-05 DIAGNOSIS — C79.31 SECONDARY MALIGNANT NEOPLASM OF BRAIN (MULTI): ICD-10-CM

## 2024-04-05 PROCEDURE — 99214 OFFICE O/P EST MOD 30 MIN: CPT | Performed by: NURSE PRACTITIONER

## 2024-04-05 ASSESSMENT — PAIN SCALES - GENERAL: PAINLEVEL: 0-NO PAIN

## 2024-04-05 NOTE — ADDENDUM NOTE
Encounter addended by: Diana De Los Santos on: 4/5/2024 10:05 AM   Actions taken: Imaging Exam ended

## 2024-04-05 NOTE — PROGRESS NOTES
Radiation Oncology Follow-Up    Patient Name:  Mirta Hills  MRN:  [id number]  :  1955    Referring Provider: No ref. provider found  Primary Care Provider: Andrea Bush MD  Care Team: Patient Care Team:  Andrea Bush MD as PCP - General (Family Medicine)  Humberto Landin MD as Consulting Physician (Hematology and Oncology)  Makayla Joseph RN as Registered Nurse (Hematology and Oncology)  Yusef Chavez MD as Surgeon (Ophthalmology)  Del Gimenez MD as Referring Physician (Endocrinology)  GONZALO Salazar-CNP as Nurse Practitioner (Gynecology)  Virginia Paz MD as Consulting Physician (Dermatology)    Date of Service: 2024   69 y.o. female with adenocarcinoma of the lung.  Date of diagnosis is [place] 15, 2023 from a level 7 lymph node biopsy/fine-needle aspiration.  Immunohistochemistry positive  for TTF-1. Clinical T2a (right lower lobe mass measuring 3.6 cm), clinical N2 (PET positivity and subcarinal region), M1C disease (presence of brain metastases, ribs, right acetabulum, liver, brain), stage IVb disease.     Treatment Summary:     - Towards  of  she started feeling some pain in her right shoulder.  Eventually got better however the same pain came back in January or 2023.  Ultimately some x-rays were done of the shoulder that were generally negative.       - She then developed some pain in her sternum a month or 2 later with deep inspiration and ultimately underwent imaging in May 2023.  Chest CT without contrast was done on 2023.  This showed a 3.4 cm mass within the superior segment of the right  lower lobe with some associated satellite pulmonary nodules.  Additionally there were some other subcentimeter pulmonary nodules.  There was evidence of mediastinal lymphadenopathy.  Subcarinal lymph node measured 1.4 cm in short axis.     - She was seen by pulmonary medicine on 2023 a combined PET/CT was ordered on 2023 showing  a relatively intense area of hypermetabolic activity along the superior posteromedial aspect of the right lower lobe corresponding to the known  right lower lobe lung mass.  There was foci of hypermetabolic activity in the right supraclavicular region, subcarinal and right hilar regions.  There was area of uptake within the posterior upper ribs as well as right acetabulum suspicious for osseous  metastatic disease.  There was a punctate area of mild hypermetabolic activity in the right hepatic lobe concerning for hepatic metastases.  Small focus in the subcutaneous region just anterior to the lower aspect of the sternum could be inflammatory  versus metastatic.       - She underwent a bronchoscopy dated [place] 15, 2023.  There were no endobronchial lesions.  Primary cytology with rapid onsite evaluation was suggestive of malignancy in level 7, final lymph nodes were pathology was pending.      -  MRI of the brain with and without contrast dated June 16, 2023 showed approximately 20 enhancing nodules concerning for metastatic disease.  Specifically there was a 1.5 cm nodule in the right cerebellar hemisphere.  Most of the other nodules were  less than 1 cm.      -  Gamma knife radiosurgery to multiple brain lesions (total of 9) completed on June 28, 2023.     - Clinical trial EA 5182 which is a randomized trial of osimertinib with or without bevacizumab.  She has been randomized to the bevacizumab arm- Cycle 1 Day 1 on 7/12/2023     SUBJECTIVE  History of Present Illness:   Mirta HUMPHREY Reinier is here today for routine radiation follow up and review of MRI of the brain done last week. She says she is feeling very well and continues on bevacizumab on a clinical trial with osimertinib. CT of CAP stable.  She denies any headaches, seizures, visual changes or neurologic deficits. She has some short term memory issues at times. No problems with balance or falls. She has occasional diarrhea controlled with immodium.  Denies cough,  SOB, hemoptysis, N/V, abdominal pain or bony pain.     Review of Systems:    Review of Systems   All other systems reviewed and are negative.    Performance Status:   The Karnofsky performance scale today is 90, Able to carry on normal activity; minor signs or symptoms of disease (ECOG equivalent 0).      OBJECTIVE  Vital Signs:  /84   Pulse 68   Temp 35.9 °C (96.6 °F)   Resp 18   Wt 50.4 kg (111 lb 1.8 oz)   SpO2 95%   BMI 20.65 kg/m²      Current Outpatient Medications:     atorvastatin (Lipitor) 10 mg tablet, Take 1 tablet (10 mg) by mouth once daily., Disp: , Rfl:     BEVACIZUMAB IV, Infuse into a venous catheter every 21 (twenty-one) days., Disp: , Rfl:     CALCIUM ORAL, Take by mouth., Disp: , Rfl:     cholecalciferol (Vitamin D-3) 50 MCG (2000 UT) tablet, Take 1 tablet (2,000 Units) by mouth once daily., Disp: , Rfl:     levothyroxine (Synthroid) 112 mcg tablet, Take 1 tablet (112 mcg) by mouth once daily in the morning. Take before meals. But take additional 1/2 tab Weds and Sun, Disp: , Rfl:     loperamide (Imodium) 2 mg capsule, Take 1 capsule (2 mg) by mouth once daily., Disp: , Rfl:     multivit-min/ferrous fumarate (MULTI VITAMIN ORAL), Take by mouth., Disp: , Rfl:     mv-mn/lutein/zeax/bilber/hb277 (MAC[employer] FORMULA ORAL), Take 1 tablet by mouth once daily. MACULAR SUPPORT, Disp: , Rfl:     sertraline (Zoloft) 50 mg tablet, Take 1 tablet (50 mg) by mouth once daily., Disp: , Rfl:     Study OB1452 osimertinib 80 mg tablet, Take 1 tablet (80 mg total) by mouth once daily for 21 doses.  Swallow whole., Disp: 30 tablet, Rfl: 0     Physical Exam  Constitutional:       Appearance: Normal appearance.   HENT:      Head: Normocephalic and atraumatic.      Nose: Nose normal.      Mouth/Throat:      Mouth: Mucous membranes are moist.      Pharynx: Oropharynx is clear.   Eyes:      Extraocular Movements: Extraocular movements intact.      Conjunctiva/sclera: Conjunctivae normal.      Pupils:  Pupils are equal, round, and reactive to light.   Cardiovascular:      Rate and Rhythm: Normal rate and regular rhythm.      Heart sounds: Normal heart sounds.   Pulmonary:      Effort: Pulmonary effort is normal.      Breath sounds: Normal breath sounds.   Abdominal:      Palpations: Abdomen is soft.   Musculoskeletal:         General: No swelling. Normal range of motion.      Cervical back: Normal range of motion and neck supple.   Lymphadenopathy:      Cervical: No cervical adenopathy.   Skin:     General: Skin is warm and dry.      Comments: Raynauds in hands   Neurological:      General: No focal deficit present.      Mental Status: She is alert.      Cranial Nerves: No cranial nerve deficit.      Sensory: No sensory deficit.      Motor: No weakness.      Coordination: Coordination normal.      Gait: Gait normal.   Psychiatric:         Mood and Affect: Mood normal.         Behavior: Behavior normal.         Thought Content: Thought content normal.         Judgment: Judgment normal.         RESULTS:  CT chest abdomen pelvis w IV contrast    Result Date: 1/19/2024  Interpreted By:  Alvaro Ayon and Beyersdorf Conner STUDY: CT CHEST ABDOMEN PELVIS W IV CONTRAST;  1/18/2024 10:31 am   INDICATION: Signs/Symptoms:metastatic disease evaluation per clinical trial requirements. 68-year-old female with right lower lung adenocarcinoma with metastasis to nodes and distant sites including liver, bone, and brain, diagnosed [place] 15, 2023 with CA 19 9 of 65 and CEA 10.8. Started on osimertinib and bevacizumab 07/12/2023. Gamma knife radiation performed on brain lesions.   COMPARISON: CT chest abdomen pelvis 11/15/2023   ACCESSION NUMBER(S): [id number]   ORDERING CLINICIAN: ADA ANTON   TECHNIQUE: CT of the chest, abdomen, and pelvis was performed.  Contiguous axial images were obtained at 3 mm slice thickness through the chest, abdomen and pelvis. Coronal and sagittal reconstructions at 3 mm slice thickness were  performed. 75 ml of contrast Omnipaque 350 were administered intravenously without immediate complication.   FINDINGS: CHEST:   LUNG/PLEURA/LARGE AIRWAYS: Spiculated nodule in the right lower lobe measuring 2.6 X 1.8 cm (series 204, image 123) consistent with known adenocarcinoma is similar to mildly decreased in size, previously measuring 2.7 X 1.9 cm. Persistent distortion of the right major fissure. Similar appearance of right minor fissural nodule measuring 0.8 cm (series 204, image 143), right upper lobe nodule measuring 0.3 cm (series 204, image 121), and left lower lobe subpleural nodule measuring 0.3 cm (series 204, image 235). Calcified granuloma within the right middle lobe is stable. Small pulmonary cyst in the left lower lobe measuring 1.7 cm (series 204, image 223) is stable. No focal consolidation, pleural effusion, or pneumothorax. Central airways are patent without evidence of intraluminal mass.   VESSELS: Aorta and pulmonary arteries are normal caliber.  Mild atherosclerotic changes are noted of the aorta and branching vessels. Moderate coronary artery calcifications are present.   HEART: The heart is normal in size.  No pericardial effusion   MEDIASTINUM AND RAI: Right perihilar calcified nodules are stable. Few prominent subcentimeter mediastinal lymph nodes are stable to mildly decreased in size. No axillary lymphadenopathy. The esophagus is normal.   CHEST WALL AND LOWER NECK: The soft tissues of the chest wall demonstrate no gross abnormality. The visualized thyroid gland appears within normal limits.   ABDOMEN:   LIVER: The liver is normal in size measuring 16 cm in the craniocaudal axis. Few small hypodensities in the right lobe of the liver are too small to characterize.   BILE DUCTS: The intrahepatic and extrahepatic ducts are not dilated.   GALLBLADDER: The gallbladder is nondistended and without evidence of radiopaque stones.   PANCREAS: The pancreas appears unremarkable without evidence  of ductal dilatation or masses.   SPLEEN: Splenomegaly measuring 15 cm in craniocaudal axis of the 13 cm in the AP dimension. Around hypodensity measuring 0.6 cm (series 201, image 81) is unchanged.   ADRENAL GLANDS: Bilateral adrenal glands appear normal.   KIDNEYS AND URETERS: The kidneys are normal in size and enhance symmetrically.  No hydroureteronephrosis or nephroureterolithiasis is identified.   PELVIS:   BLADDER: The urinary bladder appears normal without abnormal wall thickening.   REPRODUCTIVE ORGANS: The uterus is surgically absent.   BOWEL: The stomach is unremarkable.  The small and large bowel are normal in caliber and demonstrate no wall thickening.  The appendix is not definitely visualized. There is however no pericecal stranding or fluid.     VESSELS: There is no aneurysmal dilatation of the abdominal aorta. The IVC appears normal.   PERITONEUM/RETROPERITONEUM/LYMPH NODES: Multiple prominent subcentimeter mesenteric lymph nodes including a 0.7 cm node (series 201, image 112) and two 0.8 cm nodes (series 201, image 124). Previously described retroperitoneal para-aortic node measures 0.5 cm (series 201, image 115), and previously measured 0.7 cm. Right obturator node measures 1.0 cm (series 201, image 161), stable when remeasured on prior exam. No abdominopelvic fluid collections or free air.   BONE AND SOFT TISSUE: Sclerotic/lytic lesion at the inferior sternum (series 204, image 145) likely represents area of prior metastasis and correlates with increased activity on prior PET-CT, and demonstrates increased sclerosis compared to prior exam. Additional sclerotic area in the right T3 transverse process/costovertebral joint space. No additional suspicious osseous lesions. The abdominal wall soft tissues appear normal.       Right lung adenocarcinoma with lesley and brain metastasis, restaging scan as compared to CT on 11/15/2023: 1.  Stable appearance of the right lower lobe spiculated mass consistent  with known adenocarcinoma. 2. Multiple bilateral pulmonary nodules are stable in size, no new suspicious nodules. 3. No new evidence of metastatic disease in the chest, abdomen, or pelvis. 4. Splenomegaly, stable compared to prior exams, is likely a benign finding but underlying indolent lymphoma can not be excluded. 5. Mixed sclerotic/lytic lesion in the lower left-sided sternum with interval increased sclerosis compared to prior exam likely representing posttreatment changes and bone healing.     I personally reviewed the image(s)/study and resident interpretation. I agree with the findings as stated by resident Brayden Galvan. Data analyzed and images interpreted at [place], [place].   MACRO: None   Signed by: Alvaro Ayon 1/19/2024 6:42 PM Dictation workstation:   [id number]    Transthoracic Echo (TTE) Complete    Result Date: 1/19/2024    [facility], [address] [place] [address]                      Tel [phone] and Fax [phone] TRANSTHORACIC ECHOCARDIOGRAM REPORT  Patient Name:      EUN Ruiz Physician:    [id number] Haris Johnson MD Study Date:        1/19/2024            Ordering Provider:    [id number] ADA ANTON MRN/PID:           [id number]             Fellow: Accession#:        [id number]         Nurse: Date of Birth/Age: 1955 / 68 years Sonographer:          Sonali Holman RDCS Gender:            F                    Additional Staff: Height:            154.94 cm            Admit Date: Weight:            48.08 kg             Admission Status:     Outpatient BSA:               1.44 m2              Encounter#:           [id number]                                          Department Location:  [facility]                                                               Stress Lab Blood Pressure: 152 /82 mmHg Study Type:    TRANSTHORACIC ECHO (TTE) COMPLETE Diagnosis/ICD: 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 Hypothyroid, Lung Cancer. Study Detail: The following Echo studies were performed: color flow, Doppler, 2D               and M-Mode. Technically challenging study due to prominent lung               artifact.  PHYSICIAN INTERPRETATION: Left Ventricle: The left ventricular systolic function is normal, with an estimated ejection fraction of 60-65%. There are no regional wall motion abnormalities. The left ventricular cavity size is normal. Spectral Doppler shows a normal pattern of left ventricular diastolic filling. Left Atrium: The left atrium is normal in size. Right Ventricle: The right ventricle is slightly enlarged. There is normal right ventricular global systolic function. Right Atrium: The right atrium is normal in size. Aortic Valve: The aortic valve is trileaflet. There is minimal aortic valve cusp calcification. There is trace to mild aortic valve regurgitation. The peak instantaneous gradient of the aortic valve is 6.9 mmHg. Mitral Valve: The mitral valve is normal in structure. There is trace mitral valve regurgitation. Tricuspid Valve: The tricuspid valve is structurally normal. There is trace tricuspid regurgitation. The right ventricular systolic pressure is unable to be estimated. Pulmonic Valve: The pulmonic valve is structurally normal. There is trace pulmonic valve regurgitation. Pericardium: There is a trivial to small pericardial effusion. Aorta: The aortic root is normal.  CONCLUSIONS:  1. Left ventricular systolic function is normal with a 60-65% estimated ejection fraction.  2. There is evidence of slow flow  in the IVC and a ~ 3 mm mobile echo density in the pre-hepatic vein IVC of uncertain significance. Suggest abdominal sonogram to better characterize the echodensity. QUANTITATIVE DATA SUMMARY: 2D MEASUREMENTS:                          Normal Ranges: Ao Root d:     2.90 cm   (2.0-3.7cm) LAs:           3.68 cm   (2.7-4.0cm) RVIDd:         1.78 cm   (0.9-3.6cm) IVSd:          0.82 cm   (0.6-1.1cm) LVPWd:         0.69 cm   (0.6-1.1cm) LVIDd:         3.86 cm   (3.9-5.9cm) LVIDs:         2.41 cm LV Mass Index: 56.8 g/m2 LV % FS        37.5 % LA VOLUME:                               Normal Ranges: LA Vol A4C:        38.3 ml    (22+/-6mL/m2) LA Vol A2C:        46.3 ml LA Vol BP:         43.0 ml LA Vol Index A4C:  26.5ml/m2 LA Vol Index A2C:  32.1 ml/m2 LA Vol Index BP:   29.8 ml/m2 LA Area A4C:       14.7 cm2 LA Area A2C:       16.5 cm2 LA Major Axis A4C: 4.8 cm LA Major Axis A2C: 5.0 cm LA Volume Index:   32.1 ml/m2 LA Vol A4C:        34.8 ml LA Vol A2C:        44.0 ml AORTA MEASUREMENTS:                    Normal Ranges: Asc Ao, d: 2.90 cm (2.1-3.4cm) LV SYSTOLIC FUNCTION BY 2D PLANIMETRY (MOD):                     Normal Ranges: EF-A4C View: 62.7 % (>=55%) EF-A2C View: 58.1 % EF-Biplane:  60.5 % LV DIASTOLIC FUNCTION:                               Normal Ranges: MV Peak E:        0.65 m/s    (0.7-1.2 m/s) MV Peak A:        0.69 m/s    (0.42-0.7 m/s) E/A Ratio:        0.93        (1.0-2.2) MV e'             0.11 m/s    (>8.0) MV lateral e'     0.13 m/s MV medial e'      0.09 m/s MV A Dur:         128.45 msec E/e' Ratio:       5.88        (<8.0) PulmV Sys Chevy:    51.21 cm/s PulmV Vera Chevy:   21.12 cm/s PulmV S/D Chevy:    2.42 PulmV A Revs Chevy: 25.09 cm/s PulmV A Revs Dur: 108.47 msec MITRAL VALVE:                 Normal Ranges: MV DT: 230 msec (150-240msec) AORTIC VALVE:                         Normal Ranges: AoV Vmax:      1.31 m/s (<=1.7m/s) AoV Peak P.9 mmHg (<20mmHg) LVOT Max Chevy:  0.98 m/s (<=1.1m/s) LVOT VTI:       22.99 cm LVOT Diameter: 2.04 cm  (1.8-2.4cm) AoV Area,Vmax: 2.43 cm2 (2.5-4.5cm2)  RIGHT VENTRICLE: RV Basal 3.60 cm RV Mid   1.80 cm RV Major 7.3 cm TAPSE:   23.6 mm RV s'    0.10 m/s PULMONIC VALVE:                      Normal Ranges: PV Max Chevy: 0.7 m/s  (0.6-0.9m/s) PV Max P.1 mmHg Pulmonary Veins: PulmV A Revs Dur: 108.47 msec PulmV A Revs Chevy: 25.09 cm/s PulmV Vera Chevy:   21.12 cm/s PulmV S/D Chevy:    2.42 PulmV Sys Chevy:    51.21 cm/s AORTA: Asc Ao Diam 2.93 cm  [id number] Haris Johnson MD Electronically signed on 2024 at 11:42:56 AM  ** Final **     CT chest abdomen pelvis w IV contrast    Result Date: 3/29/2024  Interpreted By:  Missy Gonzalez, STUDY: CT CHEST ABDOMEN PELVIS W IV CONTRAST; ;  3/28/2024 11:21 am   INDICATION: Signs/Symptoms:clinical trial disease assessment. Lung cancer.   COMPARISON: 2024   ACCESSION NUMBER(S): [id number]   ORDERING CLINICIAN: ADA ANTON   TECHNIQUE: Imaging was performed from thoracic apex through ischial tuberosities with axial, coronal and sagittal images reconstructed. Patient received 75 mL Omnipaque 350 intravenously.   FINDINGS: Trachea and mainstem bronchi are patent with no endoluminal mass.   Spiculated mass superior segment right lower lobe measures 2.4 x 2.8 cm and has enlarged from 2.6 x 1.8 cm previously. The mass extends to the pleural surface of the oblique fissure and distorts the contour at the fissure. 8 mm nodule associated with the minor fissure and projecting into the subpleural right middle lobe feces image 149 of 310) is unchanged. 3 mm right upper lobe nodule (image 128 of 310) is unchanged. Left lower lobe subpleural 3 mm nodule (image 249 of 310 feces is unchanged. Calcified nodule right middle lobe is unchanged. Thin walled 1.6 cm cyst left lower lobe (image 239 of 310) is also unchanged.   No acute pneumonia is noted. There is no pleural effusion.   Aorta and main pulmonary artery are normal in size.   Heart is normal in size.  Coronary calcifications are dense and similar to the prior study. There is no pericardial effusion.   Thyroid gland appears small.   No pathologically enlarged mediastinal or hilar lymph nodes are noted. There is no axillary adenopathy.   Subcentimeter hypodensity segment 8 is too small to characterize and appears unchanged. No new hepatic mass is identified. There is no intra or extrahepatic biliary dilatation. Gallbladder is normal in appearance.   No mass or inflammation is noted involving the pancreas.   Spleen is enlarged measuring 15.4 x 12.6 x 6.2 cm, unchanged. 6 mm hypodensity in the superior aspect of the spleen is unchanged.   Adrenal glands appear normal. Kidneys enhance symmetrically with no hydronephrosis noted. No suspicious renal mass is identified.   Bowel loops are nondilated. Appendix is not identified. No ascites, pneumoperitoneum or mesenteric inflammation is identified. Mesenteric lymph nodes are unchanged.   Aorta and vena cava are normal in size. Minimal vascular calcification is present. Visible retroperitoneal lymph nodes are unchanged with no pathologic enlargement. Right external iliac/obturator node measures 9 mm and is unchanged. No interval enlarging lymph nodes are identified.   Urinary bladder is normal in appearance with no wall thickening.   Uterus is absent.   Tiny amount of fat herniates into the umbilicus.   Mixed lucent and sclerotic lesion left side of the lower sternum is unchanged. Sclerosis is noted in the right transverse process and right lamina of T3, right pars of T7 and subtle mixed lytic and sclerotic change in the right acetabulum all appears similar to the prior study and correlate with areas that were hypermetabolic on the PET-CT of 06/06/2023. No new osseous lesions are noted.       Superior segment right lower lobe mass is slightly larger than on the prior study. Other pulmonary nodules are unchanged and there are no new pulmonary nodules   Bone lesions are  unchanged with no new bone lesion noted.   Unchanged splenomegaly     MACRO: None.   Signed by: Missy Gonzalez 3/29/2024 8:54 AM Dictation workstation:   [id number]    MR brain w and wo IV contrast    Result Date: 3/28/2024  Interpreted By:  Santos Nicholas and Ritchie Brandon STUDY: MR BRAIN W AND WO IV CONTRAST;  3/28/2024 10:26 am   INDICATION: Signs/Symptoms:clinical trial disease assessment.  Past medical history right lower lung adenocarcinoma metastasis 2 liver, and brain. Diagnosed [place] 15, 2023. Receiving osimertinib and bevacizumab. Status post gamma knife brain lesions.   COMPARISON: MRI of the brain with and without IV contrast 11/18/2024.  MR of the brain 11/15/2023.   ACCESSION NUMBER(S): [id number]   ORDERING CLINICIAN: ADA ANTON   TECHNIQUE: Axial T2, FLAIR, DWI, gradient echo T2 and sagittal and coronal T1 weighted images of brain were acquired. Post contrast T1 weighted images were acquired after administration of 9 mL gadolinium based intravenous contrast.   FINDINGS: When compared to prior examination from 01/18/2024, the previously described residual mildly enhancing lesions within the cerebral hemispheres bilaterally and right cerebellum are overall decreased in size and number. The previously characterized residual enhancing lesion in the parasagittal left postcentral gyrus measuring 7 mm in greatest axial dimension is now faintly visualized and measures 4 mm in axial dimension (series 11, image 186). The previously characterized subtle focus of enhancement along the right frontal convexity is not definitively visualized on current examination.   Stable redemonstration of nonenhancing punctate focus along the body of the corpus callosum (series 11, image 150).   The previously characterized subtle enhancing lesions in the right cerebellar hemisphere are less conspicuous on current examination and not definitively visualized. There is no evidence of new abnormal intracranial  enhancing mass lesions noted in the current examination.   The ventricles, sulci and basal cisterns are within normal limits.   There is no diffusion restriction abnormality to suggest acute infarct.  Similar nonspecific T2 and FLAIR hyperintensities in the periventricular and subcortical white matter bilaterally which are again favored to represent sequela of chronic small-vessel ischemic change. There is similar prominent perivascular spaces in the bilateral basal ganglia.   Paranasal Sinuses and Mastoids: Retention cyst/polyp within the inferior left maxillary sinus, similar in size compared to prior examination. Minimal mucosal thickening in the inferior right maxillary sinus. Otherwise the visualized paranasal sinuses and mastoid air cells are clear.       In comparison to prior study dated 01/18/2024, the previously described mildly enhancing lesions in the right cerebellar hemisphere are not definitively visualized on current examination. The mildly enhancing lesion in the left postcentral gyrus has decreased in size and now measures 4 mm in axial dimension as detailed above. Findings could reflect treatment response. No evidence of new abnormal intracranial enhancing mass lesions appreciated on the current examination.   I personally reviewed the images/study and I agree with the findings as stated by resident Kris Hsu. This study was interpreted at [facility], [place], [place].   MACRO: None   Signed by: Santos Nicholas 3/28/2024 11:05 AM Dictation workstation:   [id number]       ASSESSMENT:  69 y.o. female with metastatic lung cancer to the brain s/p gamma knife to multiple lesions.  She seems to be doing well and no adverse effects from gamma knife and excellent response to treatment and no new lesions identified. She will continue getting CT scans and brain MRIs every 2 mo per the clinical trial and these appts to be arranged per clinical trials team.   Radiation follow up after her next MRI of brain.  She will call me with any questions  or concerns      Amada Bradley CNP  [phone]

## 2024-04-08 DIAGNOSIS — E03.9 HYPOTHYROIDISM, UNSPECIFIED TYPE: Primary | ICD-10-CM

## 2024-04-12 ENCOUNTER — APPOINTMENT (OUTPATIENT)
Dept: PRIMARY CARE | Facility: CLINIC | Age: 69
End: 2024-04-12
Payer: MEDICARE

## 2024-04-22 ENCOUNTER — LAB (OUTPATIENT)
Dept: LAB | Facility: HOSPITAL | Age: 69
End: 2024-04-22
Payer: MEDICARE

## 2024-04-22 ENCOUNTER — EDUCATION (OUTPATIENT)
Dept: HEMATOLOGY/ONCOLOGY | Facility: HOSPITAL | Age: 69
End: 2024-04-22

## 2024-04-22 ENCOUNTER — INFUSION (OUTPATIENT)
Dept: HEMATOLOGY/ONCOLOGY | Facility: HOSPITAL | Age: 69
End: 2024-04-22
Payer: MEDICARE

## 2024-04-22 ENCOUNTER — APPOINTMENT (OUTPATIENT)
Dept: HEMATOLOGY/ONCOLOGY | Facility: HOSPITAL | Age: 69
End: 2024-04-22
Payer: MEDICARE

## 2024-04-22 ENCOUNTER — OFFICE VISIT (OUTPATIENT)
Dept: HEMATOLOGY/ONCOLOGY | Facility: HOSPITAL | Age: 69
End: 2024-04-22
Payer: MEDICARE

## 2024-04-22 VITALS
TEMPERATURE: 96.4 F | WEIGHT: 112.21 LBS | HEART RATE: 63 BPM | DIASTOLIC BLOOD PRESSURE: 74 MMHG | OXYGEN SATURATION: 100 % | SYSTOLIC BLOOD PRESSURE: 153 MMHG | BODY MASS INDEX: 20.86 KG/M2 | RESPIRATION RATE: 18 BRPM

## 2024-04-22 DIAGNOSIS — C34.90 MALIGNANT NEOPLASM OF LUNG, UNSPECIFIED LATERALITY, UNSPECIFIED PART OF LUNG (MULTI): Primary | ICD-10-CM

## 2024-04-22 DIAGNOSIS — E03.9 HYPOTHYROIDISM, UNSPECIFIED TYPE: ICD-10-CM

## 2024-04-22 DIAGNOSIS — C34.90 MALIGNANT NEOPLASM OF LUNG, UNSPECIFIED LATERALITY, UNSPECIFIED PART OF LUNG (MULTI): ICD-10-CM

## 2024-04-22 DIAGNOSIS — E03.8 HYPOTHYROIDISM DUE TO HASHIMOTO'S THYROIDITIS: ICD-10-CM

## 2024-04-22 DIAGNOSIS — E06.3 HYPOTHYROIDISM DUE TO HASHIMOTO'S THYROIDITIS: ICD-10-CM

## 2024-04-22 DIAGNOSIS — C34.90 MALIGNANT NEOPLASM OF UNSPECIFIED PART OF UNSPECIFIED BRONCHUS OR LUNG (MULTI): ICD-10-CM

## 2024-04-22 LAB
ALBUMIN SERPL BCP-MCNC: 4.3 G/DL (ref 3.4–5)
ALP SERPL-CCNC: 52 U/L (ref 33–136)
ALT SERPL W P-5'-P-CCNC: 18 U/L (ref 7–45)
ANION GAP SERPL CALC-SCNC: 12 MMOL/L (ref 10–20)
APPEARANCE UR: CLEAR
AST SERPL W P-5'-P-CCNC: 20 U/L (ref 9–39)
BACTERIA #/AREA URNS AUTO: ABNORMAL /HPF
BASOPHILS # BLD AUTO: 0.02 X10*3/UL (ref 0–0.1)
BASOPHILS NFR BLD AUTO: 0.5 %
BILIRUB SERPL-MCNC: 0.4 MG/DL (ref 0–1.2)
BILIRUB UR STRIP.AUTO-MCNC: NEGATIVE MG/DL
BUN SERPL-MCNC: 24 MG/DL (ref 6–23)
CALCIUM SERPL-MCNC: 9.4 MG/DL (ref 8.6–10.3)
CHLORIDE SERPL-SCNC: 103 MMOL/L (ref 98–107)
CK SERPL-CCNC: 52 U/L (ref 0–215)
CO2 SERPL-SCNC: 29 MMOL/L (ref 21–32)
COLOR UR: ABNORMAL
CREAT SERPL-MCNC: 1.26 MG/DL (ref 0.5–1.05)
EGFRCR SERPLBLD CKD-EPI 2021: 46 ML/MIN/1.73M*2
EOSINOPHIL # BLD AUTO: 0.09 X10*3/UL (ref 0–0.4)
EOSINOPHIL NFR BLD AUTO: 2.1 %
ERYTHROCYTE [DISTWIDTH] IN BLOOD BY AUTOMATED COUNT: 14.5 % (ref 11.5–14.5)
GLUCOSE SERPL-MCNC: 83 MG/DL (ref 74–99)
GLUCOSE UR STRIP.AUTO-MCNC: NORMAL MG/DL
HCT VFR BLD AUTO: 45.5 % (ref 36–46)
HGB BLD-MCNC: 14.5 G/DL (ref 12–16)
IMM GRANULOCYTES # BLD AUTO: 0.03 X10*3/UL (ref 0–0.5)
IMM GRANULOCYTES NFR BLD AUTO: 0.7 % (ref 0–0.9)
KETONES UR STRIP.AUTO-MCNC: NEGATIVE MG/DL
LEUKOCYTE ESTERASE UR QL STRIP.AUTO: ABNORMAL
LYMPHOCYTES # BLD AUTO: 0.79 X10*3/UL (ref 0.8–3)
LYMPHOCYTES NFR BLD AUTO: 18.2 %
MAGNESIUM SERPL-MCNC: 2.07 MG/DL (ref 1.6–2.4)
MCH RBC QN AUTO: 29.5 PG (ref 26–34)
MCHC RBC AUTO-ENTMCNC: 31.9 G/DL (ref 32–36)
MCV RBC AUTO: 93 FL (ref 80–100)
MONOCYTES # BLD AUTO: 0.28 X10*3/UL (ref 0.05–0.8)
MONOCYTES NFR BLD AUTO: 6.5 %
MUCOUS THREADS #/AREA URNS AUTO: ABNORMAL /LPF
NEUTROPHILS # BLD AUTO: 3.12 X10*3/UL (ref 1.6–5.5)
NEUTROPHILS NFR BLD AUTO: 72 %
NITRITE UR QL STRIP.AUTO: NEGATIVE
NRBC BLD-RTO: 0 /100 WBCS (ref 0–0)
PH UR STRIP.AUTO: 6 [PH]
PLATELET # BLD AUTO: 171 X10*3/UL (ref 150–450)
POTASSIUM SERPL-SCNC: 4.3 MMOL/L (ref 3.5–5.3)
PROT SERPL-MCNC: 6.7 G/DL (ref 6.4–8.2)
PROT UR STRIP.AUTO-MCNC: NEGATIVE MG/DL
RBC # BLD AUTO: 4.92 X10*6/UL (ref 4–5.2)
RBC # UR STRIP.AUTO: ABNORMAL /UL
RBC #/AREA URNS AUTO: ABNORMAL /HPF
SODIUM SERPL-SCNC: 140 MMOL/L (ref 136–145)
SP GR UR STRIP.AUTO: 1.01
SQUAMOUS #/AREA URNS AUTO: ABNORMAL /HPF
UROBILINOGEN UR STRIP.AUTO-MCNC: NORMAL MG/DL
WBC # BLD AUTO: 4.3 X10*3/UL (ref 4.4–11.3)
WBC #/AREA URNS AUTO: ABNORMAL /HPF

## 2024-04-22 PROCEDURE — 36415 COLL VENOUS BLD VENIPUNCTURE: CPT

## 2024-04-22 PROCEDURE — 1159F MED LIST DOCD IN RCRD: CPT | Performed by: CLINICAL NURSE SPECIALIST

## 2024-04-22 PROCEDURE — 2500000004 HC RX 250 GENERAL PHARMACY W/ HCPCS (ALT 636 FOR OP/ED): Performed by: CLINICAL NURSE SPECIALIST

## 2024-04-22 PROCEDURE — 82550 ASSAY OF CK (CPK): CPT | Performed by: CLINICAL NURSE SPECIALIST

## 2024-04-22 PROCEDURE — 80053 COMPREHEN METABOLIC PANEL: CPT

## 2024-04-22 PROCEDURE — 85025 COMPLETE CBC W/AUTO DIFF WBC: CPT

## 2024-04-22 PROCEDURE — 81001 URINALYSIS AUTO W/SCOPE: CPT | Performed by: INTERNAL MEDICINE

## 2024-04-22 PROCEDURE — 99214 OFFICE O/P EST MOD 30 MIN: CPT | Performed by: CLINICAL NURSE SPECIALIST

## 2024-04-22 PROCEDURE — 96413 CHEMO IV INFUSION 1 HR: CPT

## 2024-04-22 PROCEDURE — 1126F AMNT PAIN NOTED NONE PRSNT: CPT | Performed by: CLINICAL NURSE SPECIALIST

## 2024-04-22 PROCEDURE — 84439 ASSAY OF FREE THYROXINE: CPT | Performed by: CLINICAL NURSE SPECIALIST

## 2024-04-22 PROCEDURE — 84443 ASSAY THYROID STIM HORMONE: CPT | Performed by: CLINICAL NURSE SPECIALIST

## 2024-04-22 PROCEDURE — 83735 ASSAY OF MAGNESIUM: CPT

## 2024-04-22 RX ORDER — PROCHLORPERAZINE EDISYLATE 5 MG/ML
10 INJECTION INTRAMUSCULAR; INTRAVENOUS EVERY 6 HOURS PRN
Status: DISCONTINUED | OUTPATIENT
Start: 2024-04-22 | End: 2024-04-22 | Stop reason: HOSPADM

## 2024-04-22 RX ORDER — EPINEPHRINE 0.3 MG/.3ML
0.3 INJECTION SUBCUTANEOUS EVERY 5 MIN PRN
Status: CANCELLED | OUTPATIENT
Start: 2024-04-22

## 2024-04-22 RX ORDER — FAMOTIDINE 10 MG/ML
20 INJECTION INTRAVENOUS ONCE AS NEEDED
Status: CANCELLED | OUTPATIENT
Start: 2024-04-22

## 2024-04-22 RX ORDER — PROCHLORPERAZINE MALEATE 10 MG
10 TABLET ORAL EVERY 6 HOURS PRN
Status: CANCELLED | OUTPATIENT
Start: 2024-04-22

## 2024-04-22 RX ORDER — PROCHLORPERAZINE EDISYLATE 5 MG/ML
10 INJECTION INTRAMUSCULAR; INTRAVENOUS EVERY 6 HOURS PRN
Status: CANCELLED | OUTPATIENT
Start: 2024-04-22

## 2024-04-22 RX ORDER — ALBUTEROL SULFATE 0.83 MG/ML
3 SOLUTION RESPIRATORY (INHALATION) AS NEEDED
Status: CANCELLED | OUTPATIENT
Start: 2024-04-22

## 2024-04-22 RX ORDER — DIPHENHYDRAMINE HYDROCHLORIDE 50 MG/ML
50 INJECTION INTRAMUSCULAR; INTRAVENOUS AS NEEDED
Status: CANCELLED | OUTPATIENT
Start: 2024-04-22

## 2024-04-22 RX ORDER — FAMOTIDINE 10 MG/ML
20 INJECTION INTRAVENOUS ONCE AS NEEDED
Status: DISCONTINUED | OUTPATIENT
Start: 2024-04-22 | End: 2024-04-22 | Stop reason: HOSPADM

## 2024-04-22 RX ORDER — DIPHENHYDRAMINE HYDROCHLORIDE 50 MG/ML
50 INJECTION INTRAMUSCULAR; INTRAVENOUS AS NEEDED
Status: DISCONTINUED | OUTPATIENT
Start: 2024-04-22 | End: 2024-04-22 | Stop reason: HOSPADM

## 2024-04-22 RX ORDER — PROCHLORPERAZINE MALEATE 10 MG
10 TABLET ORAL EVERY 6 HOURS PRN
Status: DISCONTINUED | OUTPATIENT
Start: 2024-04-22 | End: 2024-04-22 | Stop reason: HOSPADM

## 2024-04-22 RX ORDER — EPINEPHRINE 0.3 MG/.3ML
0.3 INJECTION SUBCUTANEOUS EVERY 5 MIN PRN
Status: DISCONTINUED | OUTPATIENT
Start: 2024-04-22 | End: 2024-04-22 | Stop reason: HOSPADM

## 2024-04-22 RX ORDER — ALBUTEROL SULFATE 0.83 MG/ML
3 SOLUTION RESPIRATORY (INHALATION) AS NEEDED
Status: DISCONTINUED | OUTPATIENT
Start: 2024-04-22 | End: 2024-04-22 | Stop reason: HOSPADM

## 2024-04-22 RX ADMIN — Medication 757.5 MG: at 13:53

## 2024-04-22 ASSESSMENT — ENCOUNTER SYMPTOMS
SWOLLEN GLANDS: 0
NAUSEA: 0
ABDOMINAL PAIN: 0
HEADACHES: 0
CHILLS: 0
JOINT SWELLING: 0
COUGH: 0
WEAKNESS: 0
CHANGE IN BOWEL HABIT: 1
MYALGIAS: 0
NUMBNESS: 0
ANOREXIA: 0
VISUAL CHANGE: 0
ARTHRALGIAS: 0
FATIGUE: 1
VOMITING: 0
NECK PAIN: 0
FEVER: 0
SORE THROAT: 0
DIAPHORESIS: 0
VERTIGO: 0

## 2024-04-22 ASSESSMENT — PAIN SCALES - GENERAL: PAINLEVEL: 0-NO PAIN

## 2024-04-22 NOTE — PROGRESS NOTES
Patient ID: Mirta Hills is a 69 y.o. female.    DIAGNOSIS     Adenocarcinoma of the lung.  Date of diagnosis is [place] 15, 2023 from a level 7 lymph node biopsy/fine-needle aspiration.  Immunohistochemistry positive for TTF-1.        STAGING     Clinical T2a (right lower lobe mass measuring 3.6 cm), clinical N2 (PET positivity and subcarinal region), M1C disease (presence of brain metastases, ribs, right acetabulum, liver, brain), stage IVb disease        CURRENT SITES OF DISEASE     Right lower lobe, right hilum of the lung, ipsilateral mediastinum, liver, bones, brain        MOLECULAR GENOMICS     TEST: Focused Solid Tumor DNA/RNA Panel   SPECIMEN: Supernatant, LYMPH NODE 7, [id number] A   DISEASE DIAGNOSIS: Adenocarcinoma   Estimated Tumor Content: 40%   COLLECTION DATE: 6/15/2023     MICROSATELLITE STATUS: Microsatellite  Instability-High (MSI-H) is NOT DETECTED.     DISEASE ASSOCIATED GENOMIC FINDINGS:   EGFR p.[id number] ([id number] c.[id number])    TP53 p.N247I (NM_[id number] c.740A>T)     DISEASE RELEVANT ALTERATIONS NOT DETECTED:   Negative for ALK fusion.   Negative for BRAF V600E.   Negative for ERBB2 activating mutation   Negative for KRAS G12C.   Negative for  MET exon 14 skipping mutation.   Negative for NTRK fusion.   Negative for RET fusion.   Negative for ROS1 fusion.        Circulating tumor DNA sent on June 19, 2023  EGFR p.[id number], Variant allelic fraction of 1.6%  TP53 N247I Missense variant, loss of function, variant allelic fraction 1.1%  MSI stable        SERUM TUMOR MARKER     Slightly elevated CEA and CA 19.9 June 2023        PRIOR THERAPY     1- Gamma knife radiosurgery to brain lesions completed on June 28, 2023.        CURRENT THERAPY     Osimertinib on study  on clinical trial [id number] which is a randomized trial of osimertinib with or without bevacizumab.  She has been randomized to the bevacizumab arm- Cycle 1 Day 1 is 7/12/2023        CURRENT ONCOLOGICAL PROBLEMS     1-brain  metastases, symptomatic, word finding difficulties, mild unsteadiness on her feet but no falls, Resolved July 3, 2023  2-anterior chest pain, increases with deep inspiration, Improved July 3, 2023  3-mild intermittent grade 1 diarrhea, mild hair loss, mild leukopenia, mild elevation in creatinine, mild fatigue.  Most likely all osimertinib related August 7, 2023        HISTORY OF PRESENT ILLNESS     This is a 69-year-old patient.  She states that towards Thanksgiving of 2022 she started feeling some pain in her right shoulder.  Eventually got better however the same pain came back in January or February 2023.  Ultimately some x-rays were done of  the shoulder that were generally negative.  She then developed some pain in her sternum a month or 2 later with deep inspiration and ultimately underwent imaging in May 2023.  Chest CT without contrast was done on June 1, 2023.  This showed a 3.4 cm mass  within the superior segment of the right lower lobe with some associated satellite pulmonary nodules.  Additionally there were some other subcentimeter pulmonary nodules.  There was evidence of mediastinal lymphadenopathy.  Subcarinal lymph node measured  1.4 cm in short axis.She was seen by pulmonary medicine on June 5, 2023 a combined PET/CT was ordered on June 6, 2023 showing a relatively intense area of hypermetabolic activity along the superior posteromedial aspect of the right lower lobe corresponding  to the known right lower lobe lung mass.  There was foci of hypermetabolic activity in the right supraclavicular region, subcarinal and right hilar regions.  There was area of uptake within the posterior upper ribs as well as right acetabulum suspicious  for osseous metastatic disease.  There was a punctate area of mild hypermetabolic activity in the right hepatic lobe concerning for hepatic metastases.  Small focus in the subcutaneous region just anterior to the lower aspect of the sternum could be inflammatory   versus metastatic.  She undergoes a bronchoscopy dated [place] 15, 2023.  There were no endobronchial lesions.  Primary cytology with rapid onsite evaluation was suggestive of malignancy in level 7, final lymph nodes were pathology was pending.  MRI of the  brain with and without contrast dated June 16, 2023 showed approximately 20 enhancing nodules concerning for metastatic disease.  Specifically there was a 1.5 cm nodule in the right cerebellar hemisphere.  Most of the other nodules were less than 1 cm.   Patient also notes that she has some difficulty expressing words over the past 1 to 2 weeks prior to her first visit with us as well as some mild unsteadiness on her legs where she feels she is drifting towards the right side.        PAST MEDICAL HISTORY     1-hypothyroidism  2-hysterectomy 1988  3-abdominal hernia surgery 1995  4-some hearing difficulties        SOCIAL HISTORY     Patient lives with her significant other.  She has 2 daughter.  L[place] in [facility].  Smoked for only 3 years during college and during this time it was a pack per day.  She has retired.  She had a retail store.        CURRENT MEDS     See medication list, meds reviewed        ALLERGIES     Patient denies any drug allergies        FAMILY HISTORY     Patient's father had bladder cancer at the age of 86.  She has 1 brother with no cancer.  She has 2 children    Subjective    Today I am meeting with Mirta prior to treatment with Avastin, on study.  She continues on Tagrisso, and has one or 2 loose bowel movements a day- takes one imodium every morning.  She is off to [place] next week, and looking forward to it.  She notes some hair loss, fatigue, grade one, and attributes it to the need to adjust her thyroid medication.  She is active and feeling well.      Cancer  Associated symptoms include a change in bowel habit and fatigue. Pertinent negatives include no abdominal pain, anorexia, arthralgias, chest pain, chills, congestion, coughing,  diaphoresis, fever, headaches, joint swelling, myalgias, nausea, neck pain, numbness, rash, sore throat, swollen glands, urinary symptoms, vertigo, visual change, vomiting or weakness.     Objective    BSA: There is no height or weight on file to calculate BSA.  There were no vitals taken for this visit.     Physical Exam  Constitutional:       General: She is not in acute distress.     Appearance: She is not ill-appearing, toxic-appearing or diaphoretic.   HENT:      Nose: No congestion or rhinorrhea.      Mouth/Throat:      Pharynx: No oropharyngeal exudate or posterior oropharyngeal erythema.   Eyes:      General: No scleral icterus.     Conjunctiva/sclera: Conjunctivae normal.   Cardiovascular:      Rate and Rhythm: Normal rate and regular rhythm.      Pulses: Normal pulses.      Heart sounds: Normal heart sounds. No murmur heard.     No friction rub. No gallop.   Pulmonary:      Effort: Pulmonary effort is normal. No respiratory distress.      Breath sounds: Normal breath sounds. No stridor. No wheezing, rhonchi or rales.   Chest:      Chest wall: No tenderness.   Abdominal:      General: There is no distension.      Palpations: There is no mass.      Tenderness: There is no abdominal tenderness. There is no guarding or rebound.   Musculoskeletal:      Cervical back: No tenderness.      Right lower leg: No edema.      Left lower leg: No edema.   Lymphadenopathy:      Cervical: No cervical adenopathy.   Skin:     Coloration: Skin is not jaundiced or pale.      Findings: No bruising, erythema, lesion or rash.   Neurological:      General: No focal deficit present.      Mental Status: She is oriented to person, place, and time.   Psychiatric:         Mood and Affect: Mood normal.         Behavior: Behavior normal.         Performance Status:  Asymptomatic     Latest Reference Range & Units 04/01/24 11:05   GLUCOSE 74 - 99 mg/dL 99   SODIUM 136 - 145 mmol/L 140   POTASSIUM 3.5 - 5.3 mmol/L 4.1   CHLORIDE 98 - 107  mmol/L 103   Bicarbonate 21 - 32 mmol/L 29   Anion Gap 10 - 20 mmol/L 12   Blood Urea Nitrogen 6 - 23 mg/dL 23   Creatinine 0.50 - 1.05 mg/dL 1.30 (H)   EGFR >60 mL/min/1.73m*2 44 (L)   Calcium 8.6 - 10.3 mg/dL 9.6   Albumin 3.4 - 5.0 g/dL 4.1   Alkaline Phosphatase 33 - 136 U/L 53   ALT 7 - 45 U/L 17   AST 9 - 39 U/L 19   Bilirubin Total 0.0 - 1.2 mg/dL 0.4   Total Protein 6.4 - 8.2 g/dL 6.3 (L)   MAGNESIUM 1.60 - 2.40 mg/dL 2.14   WBC 4.4 - 11.3 x10*3/uL 3.9 (L)   nRBC 0.0 - 0.0 /100 WBCs 0.0   RBC 4.00 - 5.20 x10*6/uL 4.65   HEMOGLOBIN 12.0 - 16.0 g/dL 13.7   HEMATOCRIT 36.0 - 46.0 % 43.0   MCV 80 - 100 fL 93   MCH 26.0 - 34.0 pg 29.5   MCHC 32.0 - 36.0 g/dL 31.9 (L)   RED CELL DISTRIBUTION WIDTH 11.5 - 14.5 % 14.8 (H)   Platelets 150 - 450 x10*3/uL 172   Neutrophils % 40.0 - 80.0 % 72.3   Immature Granulocytes %, Automated 0.0 - 0.9 % 0.3   Lymphocytes % 13.0 - 44.0 % 17.1   Monocytes % 2.0 - 10.0 % 7.0   Eosinophils % 0.0 - 6.0 % 2.8   Basophils % 0.0 - 2.0 % 0.5   Neutrophils Absolute 1.60 - 5.50 x10*3/uL 2.80   Immature Granulocytes Absolute, Automated 0.00 - 0.50 x10*3/uL 0.01   Lymphocytes Absolute 0.80 - 3.00 x10*3/uL 0.66 (L)   Monocytes Absolute 0.05 - 0.80 x10*3/uL 0.27   Eosinophils Absolute 0.00 - 0.40 x10*3/uL 0.11   Basophils Absolute 0.00 - 0.10 x10*3/uL 0.02   (H): Data is abnormally high  (L): Data is abnormally low      CT Chest  IMPRESSION:  In comparison to prior study dated 01/18/2024, the previously  described mildly enhancing lesions in the right cerebellar hemisphere  are not definitively visualized on current examination. The mildly  enhancing lesion in the left postcentral gyrus has decreased in size  and now measures 4 mm in axial dimension as detailed above. Findings  could reflect treatment response. No evidence of new abnormal  intracranial enhancing mass lesions appreciated on the current  examination.    IMPRESSION:  Superior segment right lower lobe mass is slightly larger than on  the  prior study. Other pulmonary nodules are unchanged and there are no  new pulmonary nodules      Bone lesions are unchanged with no new bone lesion noted.      Unchanged splenomegaly      Assessment/Plan     This is a patient with EGFR mutation positive adenocarcinoma of the lung on a clinical trial of Tagrisso and bevacizumab.  We will recheck thyroid studies.  She has grade 1 diarrhea, and grade 1 fatigue.  Her systolic blood pressure was up a little the past few visits- a recheck of today's blood pressure was 143/74.  We will keep an eye on this.      Cancer Staging   No matching staging information was found for the patient.      Oncology History   Lung cancer (Multi)   7/12/2023 -  Research Study Participant    ([facility]) [id number] Arm B - Bevacizumab / Osimertinib, 21 Day Cycles  Plan Provider: Humberto Landin MD  Treatment goal: [No plan goal]  Line of treatment: [No plan line of treatment]  Associated studies: [id number] (Osimertinib) +/- Bevacizumab as Initial Treatment for EGFR-Mutant Lung Cancer     9/1/2023 Initial Diagnosis    Lung cancer (CMS/HCC)          GONZALO Forbes-CNS

## 2024-04-22 NOTE — PROGRESS NOTES
Patient came for her study bevacizumab. Patient tolerated her infusion. Patient was discharged ambulatory and stable.

## 2024-04-22 NOTE — RESEARCH NOTES
Research Note Treatment Day    Mirta Hills is here today for treatment on [id number]. Today is C14. Procedures completed per protocol. AE's and con-meds reviewed with patient. Patient is aware of treatment plan. Discussed with patient and provider, Moni Cortez to continue to monitor BP per clinical trial requirements and treatment parameters.    [x]   Received treatment as planned.   OR  []    Treatment delayed; patient calendar updated as required   Treatment delayed because:    []   AE    []   Physician Discretion    []   Clinical Deterioration or Progression     []   Other

## 2024-04-26 ENCOUNTER — TELEPHONE (OUTPATIENT)
Dept: PRIMARY CARE | Facility: CLINIC | Age: 69
End: 2024-04-26
Payer: MEDICARE

## 2024-04-26 ENCOUNTER — EVALUATION (OUTPATIENT)
Dept: PHYSICAL THERAPY | Facility: CLINIC | Age: 69
End: 2024-04-26
Payer: MEDICARE

## 2024-04-26 DIAGNOSIS — N81.9 VAGINAL VAULT PROLAPSE: Primary | ICD-10-CM

## 2024-04-26 DIAGNOSIS — F41.9 ANXIETY DISORDER, UNSPECIFIED TYPE: Primary | ICD-10-CM

## 2024-04-26 PROCEDURE — 97161 PT EVAL LOW COMPLEX 20 MIN: CPT | Mod: GP

## 2024-04-26 PROCEDURE — 97535 SELF CARE MNGMENT TRAINING: CPT | Mod: GP

## 2024-04-26 RX ORDER — SERTRALINE HYDROCHLORIDE 50 MG/1
50 TABLET, FILM COATED ORAL DAILY
Qty: 135 TABLET | Refills: 1 | Status: SHIPPED | OUTPATIENT
Start: 2024-04-26 | End: 2024-05-20 | Stop reason: SDUPTHER

## 2024-04-26 ASSESSMENT — ENCOUNTER SYMPTOMS
OCCASIONAL FEELINGS OF UNSTEADINESS: 0
LOSS OF SENSATION IN FEET: 0
DEPRESSION: 0

## 2024-04-26 ASSESSMENT — PATIENT HEALTH QUESTIONNAIRE - PHQ9
SUM OF ALL RESPONSES TO PHQ9 QUESTIONS 1 AND 2: 0
2. FEELING DOWN, DEPRESSED OR HOPELESS: NOT AT ALL
1. LITTLE INTEREST OR PLEASURE IN DOING THINGS: NOT AT ALL

## 2024-04-26 ASSESSMENT — PAIN - FUNCTIONAL ASSESSMENT: PAIN_FUNCTIONAL_ASSESSMENT: 0-10

## 2024-04-26 ASSESSMENT — PAIN SCALES - GENERAL: PAINLEVEL_OUTOF10: 0 - NO PAIN

## 2024-04-26 NOTE — PROGRESS NOTES
Physical Therapy    EVALUATION AND TREATMENT    Name: Mirta Hills  MRN: [id number]  : 1955  Today's Date: 24     Time Calculation  Start Time: 1009  Stop Time: 1103  Time Calculation (min): 54 min    Assessment:    Pt presenting to the clinic with worsening bowel and bladder function over time. She is describing increased fecal and bladder leakage and urgency. She also has gas incontinence with movement. Of note, her original complaint is consistent vaginal prolapse but this is not the most distressing for her. A vaginal and rectal exam were deferred due to time but will be performed at next visit - pt aware. This will be to determine pelvic floor and sphincter control. Of note, pt has significant weakness of her pelvic girdle. She was educated on various topics like bladder irritants, urge suppression, elimination position, etc. She could benefit from a course of PT to address the neuromuscular control of her pelvic girdle and optimize her quality of life.     Insurance:  Insurance Type: Medicare   Visit number: IE   Approved # of visits: mn  Authorization Needed: no  Cert Date Ends On: 24    Plan:   PT Plan: Skilled PT  PT Frequency: 1 time per week  Duration: 12 weeks  Certification Period Start Date: 24  Certification Period End Date: 24  Rehab Potential: Excellent  Plan of Care Agreement: Patient  Planned interventions include: biofeedback, cryotherapy, education/instruction, electrical stimulation, gait training, home program, hot pack, kinesiotaping, manual therapy, neuromuscular re-education, self care/home management, therapeutic activities, and therapeutic exercises.     1) Referral to nutritionist Lisa Sousa at the [facility]  2) Bladder irritants  3) The knack  4) Squatty potty and elimination  5) Urge suppression  6) Prolapse support underwear as needed     Access Code: [email or web address]  URL: [email or web address]  Date: 2024  Prepared by: Tiffany  Kyrie    Exercises  - Sidelying Hip Abduction  - 1 x daily - 7 x weekly - 2 sets - 10 reps - 5 seconds  hold  - Clamshell  - 1 x daily - 7 x weekly - 2 sets - 10 reps - 5 seconds  hold  - Supine Bridge  - 1 x daily - 7 x weekly - 2 sets - 10 reps - 5 seconds  hold      Current Problem:  1. Vaginal vault prolapse  Referral to Physical Therapy    Follow Up In Physical Therapy          Subjective   General  Reason for Referral: Pelvic floor dysfunction  Referred By: Dee Temple  She is complaining of vaginal prolapse for the past 3 years. She is also describing urgency more bladder than bowel.     Bladder:  Daytime frequency - 4   Night time frequency - 0-1   Urgency: 30% of the time  Leakage: a little urine loss, only occurring urgency   Wearing 1 panty liner per day   + Complete emptying     Ob Gyn:  2 children born vaginally   Menopause - roughly in her 40's, not doing any supplemental hormones   Partial hysterectomy - age 33, due to fibroids   Currently, sexually active. No pain with intercourse. Has a new partner.     Currently taking a non chemo daily pill for lung cancer. She is enrolled in a clinical trial for infusion every 3 weeks.      Bowel:  Intermittent diarrhea from infusion   Prone to loose stool, takes immodium daily. Normally having bowel movements 1-2 times per day. She can sometimes have 4-5 at the worst, prior to immodium.   Not straining, sit up to 5-10 minutes  + hemorrhoids with bleeding, UTD to colonoscopy  Urgency:  20%   Leakage - sneezing, coughing. Sometimes has seepage after bowel movements.   + gas incontinence - worse with exercise   Precautions  Precautions Comment: active treatment for cancer     PMH: HLD, hypothyroidism, osteoporosis, vitamin D deficiency, vaginal vault prolapse, lunge cancer, anxiety  Fall Risk: no  Pain  Pain Assessment: 0-10  Pain Score: 0 - No pain    Objective   Vaginal and rectal exam deferred due to extensive interview and discussion regarding  interventions. Will be performed next time - pt aware    Ortho Screen:  AROM:  Trunk and spine WFL in all planes  R/L hip WFL in all planes     Strength:  Hip abduction 3/5 R/L  Hip extension 3/5 R/L    Special Tests:  - SI cluster     Observation:   Decreased glute strategy with transfers  + glute atrophy     SL stance:  Decreased L single leg stance time before stepping strategy  B squat with increased vaginal pressure - anterior weight, minimal glute engagement      Outcome Measure:   NIH CPSI pain 1 NIH CPSI urinary 0 NIH CPSI quality of life 1     Careplan Goals:  1. Pt will be independent in HEP to maximize PT POC   2. Pt will improve NIH CPSI by >50% raw score  3. Pt will be able to improve worst pain severity on NPRS by >2 points MCID   4. Pt will demonstrate full pelvic floor mobility needed for continence   5. Pt will improve hip abduction and extension strength to 5/5 needed for continence of bowel and bladder       Sofi Mittal, PT

## 2024-04-26 NOTE — TELEPHONE ENCOUNTER
Feeling overanxious - down in the dumps - a lot of running to doctors and facing her lung cancer diagnosis - has reached out to Mary Alejandra as well (has worked with her before when going through divorce - will try boosting her dose to 75 mg - update as need be

## 2024-04-26 NOTE — TELEPHONE ENCOUNTER
Patient called and would like to discuss her Sertraline and possibly increasing the dosage.    Please give her a call at [phone]

## 2024-04-27 ENCOUNTER — TELEPHONE (OUTPATIENT)
Dept: PRIMARY CARE | Facility: CLINIC | Age: 69
End: 2024-04-27
Payer: MEDICARE

## 2024-04-27 DIAGNOSIS — E03.9 HYPOTHYROIDISM, UNSPECIFIED TYPE: Primary | ICD-10-CM

## 2024-04-27 LAB
T4 FREE SERPL-MCNC: 1.41 NG/DL (ref 0.78–1.48)
TSH SERPL-ACNC: 6.12 MIU/L (ref 0.44–3.98)

## 2024-04-29 NOTE — TELEPHONE ENCOUNTER
Called and discussed with patient - was concerned that her thyroid status was also not ideal (along with mood)  - did add-on test on Sat   Her TSH was still a bit elevated but her T4 was normal  Will have her advance her dosing to what we have in the chart  She does have follow-up scheduled with endocrinology next month

## 2024-05-02 ENCOUNTER — APPOINTMENT (OUTPATIENT)
Dept: PHYSICAL THERAPY | Facility: CLINIC | Age: 69
End: 2024-05-02
Payer: MEDICARE

## 2024-05-07 DIAGNOSIS — C34.90 MALIGNANT NEOPLASM OF LUNG, UNSPECIFIED LATERALITY, UNSPECIFIED PART OF LUNG (MULTI): Primary | ICD-10-CM

## 2024-05-07 RX ORDER — PROCHLORPERAZINE EDISYLATE 5 MG/ML
10 INJECTION INTRAMUSCULAR; INTRAVENOUS EVERY 6 HOURS PRN
Status: CANCELLED | OUTPATIENT
Start: 2024-05-13

## 2024-05-07 RX ORDER — PROCHLORPERAZINE MALEATE 10 MG
10 TABLET ORAL EVERY 6 HOURS PRN
Status: CANCELLED | OUTPATIENT
Start: 2024-05-13

## 2024-05-07 RX ORDER — ALBUTEROL SULFATE 0.83 MG/ML
3 SOLUTION RESPIRATORY (INHALATION) AS NEEDED
Status: CANCELLED | OUTPATIENT
Start: 2024-05-13

## 2024-05-07 RX ORDER — DIPHENHYDRAMINE HYDROCHLORIDE 50 MG/ML
50 INJECTION INTRAMUSCULAR; INTRAVENOUS AS NEEDED
Status: CANCELLED | OUTPATIENT
Start: 2024-05-13

## 2024-05-07 RX ORDER — EPINEPHRINE 0.3 MG/.3ML
0.3 INJECTION SUBCUTANEOUS EVERY 5 MIN PRN
Status: CANCELLED | OUTPATIENT
Start: 2024-05-13

## 2024-05-07 RX ORDER — FAMOTIDINE 10 MG/ML
20 INJECTION INTRAVENOUS ONCE AS NEEDED
Status: CANCELLED | OUTPATIENT
Start: 2024-05-13

## 2024-05-10 ENCOUNTER — APPOINTMENT (OUTPATIENT)
Dept: CARDIOLOGY | Facility: CLINIC | Age: 69
End: 2024-05-10
Payer: MEDICARE

## 2024-05-13 ENCOUNTER — OFFICE VISIT (OUTPATIENT)
Dept: HEMATOLOGY/ONCOLOGY | Facility: HOSPITAL | Age: 69
End: 2024-05-13
Payer: MEDICARE

## 2024-05-13 ENCOUNTER — LAB (OUTPATIENT)
Dept: LAB | Facility: HOSPITAL | Age: 69
End: 2024-05-13
Payer: MEDICARE

## 2024-05-13 ENCOUNTER — APPOINTMENT (OUTPATIENT)
Dept: HEMATOLOGY/ONCOLOGY | Facility: HOSPITAL | Age: 69
End: 2024-05-13
Payer: MEDICARE

## 2024-05-13 ENCOUNTER — EDUCATION (OUTPATIENT)
Dept: HEMATOLOGY/ONCOLOGY | Facility: HOSPITAL | Age: 69
End: 2024-05-13

## 2024-05-13 ENCOUNTER — INFUSION (OUTPATIENT)
Dept: HEMATOLOGY/ONCOLOGY | Facility: HOSPITAL | Age: 69
End: 2024-05-13
Payer: MEDICARE

## 2024-05-13 VITALS
OXYGEN SATURATION: 100 % | TEMPERATURE: 97.2 F | HEART RATE: 70 BPM | BODY MASS INDEX: 19.83 KG/M2 | WEIGHT: 106.7 LBS | RESPIRATION RATE: 18 BRPM | DIASTOLIC BLOOD PRESSURE: 86 MMHG | SYSTOLIC BLOOD PRESSURE: 143 MMHG

## 2024-05-13 DIAGNOSIS — C34.90 MALIGNANT NEOPLASM OF LUNG, UNSPECIFIED LATERALITY, UNSPECIFIED PART OF LUNG (MULTI): ICD-10-CM

## 2024-05-13 DIAGNOSIS — E03.9 HYPOTHYROIDISM, UNSPECIFIED TYPE: ICD-10-CM

## 2024-05-13 LAB
ALBUMIN SERPL BCP-MCNC: 4.1 G/DL (ref 3.4–5)
ALP SERPL-CCNC: 53 U/L (ref 33–136)
ALT SERPL W P-5'-P-CCNC: 18 U/L (ref 7–45)
ANION GAP SERPL CALC-SCNC: 15 MMOL/L (ref 10–20)
AST SERPL W P-5'-P-CCNC: 20 U/L (ref 9–39)
BASOPHILS # BLD AUTO: 0.02 X10*3/UL (ref 0–0.1)
BASOPHILS NFR BLD AUTO: 0.5 %
BILIRUB SERPL-MCNC: 0.3 MG/DL (ref 0–1.2)
BUN SERPL-MCNC: 29 MG/DL (ref 6–23)
CALCIUM SERPL-MCNC: 9.8 MG/DL (ref 8.6–10.6)
CHLORIDE SERPL-SCNC: 103 MMOL/L (ref 98–107)
CK SERPL-CCNC: 65 U/L (ref 0–215)
CO2 SERPL-SCNC: 29 MMOL/L (ref 21–32)
CREAT SERPL-MCNC: 1.43 MG/DL (ref 0.5–1.05)
EGFRCR SERPLBLD CKD-EPI 2021: 39 ML/MIN/1.73M*2
EOSINOPHIL # BLD AUTO: 0.1 X10*3/UL (ref 0–0.4)
EOSINOPHIL NFR BLD AUTO: 2.4 %
ERYTHROCYTE [DISTWIDTH] IN BLOOD BY AUTOMATED COUNT: 14.4 % (ref 11.5–14.5)
GLUCOSE SERPL-MCNC: 89 MG/DL (ref 74–99)
HCT VFR BLD AUTO: 43.2 % (ref 36–46)
HGB BLD-MCNC: 14.2 G/DL (ref 12–16)
IMM GRANULOCYTES # BLD AUTO: 0.02 X10*3/UL (ref 0–0.5)
IMM GRANULOCYTES NFR BLD AUTO: 0.5 % (ref 0–0.9)
LYMPHOCYTES # BLD AUTO: 0.78 X10*3/UL (ref 0.8–3)
LYMPHOCYTES NFR BLD AUTO: 19 %
MAGNESIUM SERPL-MCNC: 2.17 MG/DL (ref 1.6–2.4)
MCH RBC QN AUTO: 29.7 PG (ref 26–34)
MCHC RBC AUTO-ENTMCNC: 32.9 G/DL (ref 32–36)
MCV RBC AUTO: 90 FL (ref 80–100)
MONOCYTES # BLD AUTO: 0.3 X10*3/UL (ref 0.05–0.8)
MONOCYTES NFR BLD AUTO: 7.3 %
NEUTROPHILS # BLD AUTO: 2.89 X10*3/UL (ref 1.6–5.5)
NEUTROPHILS NFR BLD AUTO: 70.3 %
NRBC BLD-RTO: 0 /100 WBCS (ref 0–0)
PLATELET # BLD AUTO: 177 X10*3/UL (ref 150–450)
POTASSIUM SERPL-SCNC: 4.6 MMOL/L (ref 3.5–5.3)
PROT SERPL-MCNC: 6.6 G/DL (ref 6.4–8.2)
RBC # BLD AUTO: 4.78 X10*6/UL (ref 4–5.2)
SODIUM SERPL-SCNC: 142 MMOL/L (ref 136–145)
TSH SERPL-ACNC: 2.32 MIU/L (ref 0.44–3.98)
WBC # BLD AUTO: 4.1 X10*3/UL (ref 4.4–11.3)

## 2024-05-13 PROCEDURE — 2500000004 HC RX 250 GENERAL PHARMACY W/ HCPCS (ALT 636 FOR OP/ED): Performed by: INTERNAL MEDICINE

## 2024-05-13 PROCEDURE — 1159F MED LIST DOCD IN RCRD: CPT | Performed by: CLINICAL NURSE SPECIALIST

## 2024-05-13 PROCEDURE — 85025 COMPLETE CBC W/AUTO DIFF WBC: CPT

## 2024-05-13 PROCEDURE — 1036F TOBACCO NON-USER: CPT | Performed by: CLINICAL NURSE SPECIALIST

## 2024-05-13 PROCEDURE — 83735 ASSAY OF MAGNESIUM: CPT

## 2024-05-13 PROCEDURE — 36415 COLL VENOUS BLD VENIPUNCTURE: CPT

## 2024-05-13 PROCEDURE — 99214 OFFICE O/P EST MOD 30 MIN: CPT | Performed by: CLINICAL NURSE SPECIALIST

## 2024-05-13 PROCEDURE — 80053 COMPREHEN METABOLIC PANEL: CPT

## 2024-05-13 PROCEDURE — 96413 CHEMO IV INFUSION 1 HR: CPT

## 2024-05-13 PROCEDURE — 82550 ASSAY OF CK (CPK): CPT | Performed by: INTERNAL MEDICINE

## 2024-05-13 PROCEDURE — 84443 ASSAY THYROID STIM HORMONE: CPT | Performed by: FAMILY MEDICINE

## 2024-05-13 PROCEDURE — 1126F AMNT PAIN NOTED NONE PRSNT: CPT | Performed by: CLINICAL NURSE SPECIALIST

## 2024-05-13 RX ORDER — DIPHENHYDRAMINE HYDROCHLORIDE 50 MG/ML
50 INJECTION INTRAMUSCULAR; INTRAVENOUS AS NEEDED
Status: DISCONTINUED | OUTPATIENT
Start: 2024-05-13 | End: 2024-05-13 | Stop reason: HOSPADM

## 2024-05-13 RX ORDER — FAMOTIDINE 10 MG/ML
20 INJECTION INTRAVENOUS ONCE AS NEEDED
Status: DISCONTINUED | OUTPATIENT
Start: 2024-05-13 | End: 2024-05-13 | Stop reason: HOSPADM

## 2024-05-13 RX ORDER — EPINEPHRINE 0.3 MG/.3ML
0.3 INJECTION SUBCUTANEOUS EVERY 5 MIN PRN
Status: DISCONTINUED | OUTPATIENT
Start: 2024-05-13 | End: 2024-05-13 | Stop reason: HOSPADM

## 2024-05-13 RX ORDER — ALBUTEROL SULFATE 0.83 MG/ML
3 SOLUTION RESPIRATORY (INHALATION) AS NEEDED
Status: DISCONTINUED | OUTPATIENT
Start: 2024-05-13 | End: 2024-05-13 | Stop reason: HOSPADM

## 2024-05-13 RX ORDER — PROCHLORPERAZINE MALEATE 10 MG
10 TABLET ORAL EVERY 6 HOURS PRN
Status: DISCONTINUED | OUTPATIENT
Start: 2024-05-13 | End: 2024-05-13 | Stop reason: HOSPADM

## 2024-05-13 RX ORDER — PROCHLORPERAZINE EDISYLATE 5 MG/ML
10 INJECTION INTRAMUSCULAR; INTRAVENOUS EVERY 6 HOURS PRN
Status: DISCONTINUED | OUTPATIENT
Start: 2024-05-13 | End: 2024-05-13 | Stop reason: HOSPADM

## 2024-05-13 RX ADMIN — Medication 757.5 MG: at 10:51

## 2024-05-13 ASSESSMENT — ENCOUNTER SYMPTOMS
VISUAL CHANGE: 0
WEAKNESS: 0
FEVER: 0
FATIGUE: 1
ARTHRALGIAS: 0
NAUSEA: 0
ABDOMINAL PAIN: 0
JOINT SWELLING: 0
NUMBNESS: 0
NECK PAIN: 0
CHILLS: 0
ANOREXIA: 0
SWOLLEN GLANDS: 0
VERTIGO: 0
SORE THROAT: 0
COUGH: 0
MYALGIAS: 0
CHANGE IN BOWEL HABIT: 1
VOMITING: 0
DIAPHORESIS: 0
HEADACHES: 0

## 2024-05-13 ASSESSMENT — PAIN SCALES - GENERAL: PAINLEVEL: 0-NO PAIN

## 2024-05-13 NOTE — RESEARCH NOTES
Research Note Treatment Day    Mirta Hills is here today for treatment on [id number]. Today is C15. Procedures completed per protocol. AE's and con-meds reviewed with patient. Patient is aware of treatment plan. Discussed with provider and instructed patient to monitor blood pressure at home and document for next visit. Patient verbalized she will buy a BP monitor and record levels.    [x]   Received treatment as planned   OR  []    Treatment delayed; patient calendar updated as required   Treatment delayed because:    []   AE    []   Physician Discretion    []   Clinical Deterioration or Progression     []   Other    Education Documentation  Treatment Plan and Schedule, taught by Makayla Joseph, FELICIA at 5/13/2024  1:24 PM.  Learner: Patient  Readiness: Acceptance  Method: Explanation  Response: Verbalizes Understanding    Education Comments  No comments found.

## 2024-05-13 NOTE — PROGRESS NOTES
Patient came from the Dr's office for her Study bevacizumab.  She tolerated her infusion. Patient was discharged stable.

## 2024-05-13 NOTE — PROGRESS NOTES
Patient ID: Mirta Hills is a 69 y.o. female.    DIAGNOSIS     Adenocarcinoma of the lung.  Date of diagnosis is [place] 15, 2023 from a level 7 lymph node biopsy/fine-needle aspiration.  Immunohistochemistry positive for TTF-1.        STAGING     Clinical T2a (right lower lobe mass measuring 3.6 cm), clinical N2 (PET positivity and subcarinal region), M1C disease (presence of brain metastases, ribs, right acetabulum, liver, brain), stage IVb disease        CURRENT SITES OF DISEASE     Right lower lobe, right hilum of the lung, ipsilateral mediastinum, liver, bones, brain        MOLECULAR GENOMICS     TEST: Focused Solid Tumor DNA/RNA Panel   SPECIMEN: Supernatant, LYMPH NODE 7, [id number] A   DISEASE DIAGNOSIS: Adenocarcinoma   Estimated Tumor Content: 40%   COLLECTION DATE: 6/15/2023     MICROSATELLITE STATUS: Microsatellite  Instability-High (MSI-H) is NOT DETECTED.     DISEASE ASSOCIATED GENOMIC FINDINGS:   EGFR p.[id number] ([id number] c.2235_[id number])    TP53 p.N247I (NM_000546 c.740A>T)     DISEASE RELEVANT ALTERATIONS NOT DETECTED:   Negative for ALK fusion.   Negative for BRAF V600E.   Negative for ERBB2 activating mutation   Negative for KRAS G12C.   Negative for  MET exon 14 skipping mutation.   Negative for NTRK fusion.   Negative for RET fusion.   Negative for ROS1 fusion.        Circulating tumor DNA sent on June 19, 2023  EGFR p.[id number], Variant allelic fraction of 1.6%  TP53 N247I Missense variant, loss of function, variant allelic fraction 1.1%  MSI stable        SERUM TUMOR MARKER     Slightly elevated CEA and CA 19.9 June 2023        PRIOR THERAPY     1- Gamma knife radiosurgery to brain lesions completed on June 28, 2023.        CURRENT THERAPY     Osimertinib on study  on clinical trial [id number] which is a randomized trial of osimertinib with or without bevacizumab.  She has been randomized to the bevacizumab arm- Cycle 1 Day 1 is 7/12/2023        CURRENT ONCOLOGICAL PROBLEMS     1-brain  metastases, symptomatic, word finding difficulties, mild unsteadiness on her feet but no falls, Resolved July 3, 2023  2-anterior chest pain, increases with deep inspiration, Improved July 3, 2023  3-mild intermittent grade 1 diarrhea, mild hair loss, mild leukopenia, mild elevation in creatinine, mild fatigue.  Most likely all osimertinib related August 7, 2023        HISTORY OF PRESENT ILLNESS     This is a 69-year-old patient.  She states that towards Thanksgiving of 2022 she started feeling some pain in her right shoulder.  Eventually got better however the same pain came back in January or February 2023.  Ultimately some x-rays were done of  the shoulder that were generally negative.  She then developed some pain in her sternum a month or 2 later with deep inspiration and ultimately underwent imaging in May 2023.  Chest CT without contrast was done on June 1, 2023.  This showed a 3.4 cm mass  within the superior segment of the right lower lobe with some associated satellite pulmonary nodules.  Additionally there were some other subcentimeter pulmonary nodules.  There was evidence of mediastinal lymphadenopathy.  Subcarinal lymph node measured  1.4 cm in short axis.She was seen by pulmonary medicine on June 5, 2023 a combined PET/CT was ordered on June 6, 2023 showing a relatively intense area of hypermetabolic activity along the superior posteromedial aspect of the right lower lobe corresponding  to the known right lower lobe lung mass.  There was foci of hypermetabolic activity in the right supraclavicular region, subcarinal and right hilar regions.  There was area of uptake within the posterior upper ribs as well as right acetabulum suspicious  for osseous metastatic disease.  There was a punctate area of mild hypermetabolic activity in the right hepatic lobe concerning for hepatic metastases.  Small focus in the subcutaneous region just anterior to the lower aspect of the sternum could be inflammatory   versus metastatic.  She undergoes a bronchoscopy dated [place] 15, 2023.  There were no endobronchial lesions.  Primary cytology with rapid onsite evaluation was suggestive of malignancy in level 7, final lymph nodes were pathology was pending.  MRI of the  brain with and without contrast dated June 16, 2023 showed approximately 20 enhancing nodules concerning for metastatic disease.  Specifically there was a 1.5 cm nodule in the right cerebellar hemisphere.  Most of the other nodules were less than 1 cm.   Patient also notes that she has some difficulty expressing words over the past 1 to 2 weeks prior to her first visit with us as well as some mild unsteadiness on her legs where she feels she is drifting towards the right side.        PAST MEDICAL HISTORY     1-hypothyroidism  2-hysterectomy 1988  3-abdominal hernia surgery 1995  4-some hearing difficulties        SOCIAL HISTORY     Patient lives with her significant other.  She has 2 daughter.  Lives in [facility].  Smoked for only 3 years during college and during this time it was a pack per day.  She has retired.  She had a retail store.        CURRENT MEDS     See medication list, meds reviewed        ALLERGIES     Patient denies any drug allergies        FAMILY HISTORY     Patient's father had bladder cancer at the age of 86.  She has 1 brother with no cancer.  She has 2 children    Subjective    Today I am meeting with Mirta prior to treatment with Avastin, on study.  She continues on Tagrisso, and has one or 2 loose bowel movements a day- takes one imodium every morning.  She had a great trip to  [place].    She notes some hair loss, fatigue, grade one, and thyroid medication has been adjusted.  She is concerned about the rise in her blood pressure- today 143/86.  She is active and feeling well.      Cancer  Associated symptoms include a change in bowel habit and fatigue. Pertinent negatives include no abdominal pain, anorexia, arthralgias, chest pain,  chills, congestion, coughing, diaphoresis, fever, headaches, joint swelling, myalgias, nausea, neck pain, numbness, rash, sore throat, swollen glands, urinary symptoms, vertigo, visual change, vomiting or weakness.     Objective    BSA: 1.45 meters squared  /86 (BP Location: Right arm, Patient Position: Sitting, BP Cuff Size: Adult)   Pulse 70   Temp 36.2 °C (97.2 °F) (Temporal)   Resp 18   Wt 48.4 kg (106 lb 11.2 oz)   SpO2 100%   BMI 19.83 kg/m²      Physical Exam  Constitutional:       General: She is not in acute distress.     Appearance: She is not ill-appearing, toxic-appearing or diaphoretic.   HENT:      Nose: No congestion or rhinorrhea.      Mouth/Throat:      Pharynx: No oropharyngeal exudate or posterior oropharyngeal erythema.   Eyes:      General: No scleral icterus.     Conjunctiva/sclera: Conjunctivae normal.   Cardiovascular:      Rate and Rhythm: Normal rate and regular rhythm.      Pulses: Normal pulses.      Heart sounds: Normal heart sounds. No murmur heard.     No friction rub. No gallop.   Pulmonary:      Effort: Pulmonary effort is normal. No respiratory distress.      Breath sounds: Normal breath sounds. No stridor. No wheezing, rhonchi or rales.   Chest:      Chest wall: No tenderness.   Abdominal:      General: There is no distension.      Palpations: There is no mass.      Tenderness: There is no abdominal tenderness. There is no guarding or rebound.   Musculoskeletal:      Cervical back: No tenderness.      Right lower leg: No edema.      Left lower leg: No edema.   Lymphadenopathy:      Cervical: No cervical adenopathy.   Skin:     Coloration: Skin is not jaundiced or pale.      Findings: No bruising, erythema, lesion or rash.   Neurological:      General: No focal deficit present.      Mental Status: She is oriented to person, place, and time.   Psychiatric:         Mood and Affect: Mood normal.         Behavior: Behavior normal.       Performance  Status:  Asymptomatic    (H): Data is abnormally high  (L): Data is abnormally low      CT Chest  IMPRESSION:  In comparison to prior study dated 01/18/2024, the previously  described mildly enhancing lesions in the right cerebellar hemisphere  are not definitively visualized on current examination. The mildly  enhancing lesion in the left postcentral gyrus has decreased in size  and now measures 4 mm in axial dimension as detailed above. Findings  could reflect treatment response. No evidence of new abnormal  intracranial enhancing mass lesions appreciated on the current  examination.    IMPRESSION:  Superior segment right lower lobe mass is slightly larger than on the  prior study. Other pulmonary nodules are unchanged and there are no  new pulmonary nodules      Bone lesions are unchanged with no new bone lesion noted.      Unchanged splenomegaly      Assessment/Plan     This is a patient with EGFR mutation positive adenocarcinoma of the lung on a clinical trial of Tagrisso and bevacizumab.   She has grade 1 diarrhea, and grade 1 fatigue.  Her systolic blood pressure was up a little the past few visits- a recheck of today's blood pressure was 143/86.  We will keep an eye on this.  If it reaches grade 2 (systolic over 150) we will consider antihypertensives.  She is in agreement with the plan.       Cancer Staging   No matching staging information was found for the patient.      Oncology History   Lung cancer (Multi)   7/12/2023 -  Research Study Participant    ([facility]) [id number] Arm B - Bevacizumab / Osimertinib, 21 Day Cycles  Plan Provider: Humberto Landin MD  Treatment goal: [No plan goal]  Line of treatment: [No plan line of treatment]  Associated studies: [id number] (Osimertinib) +/- Bevacizumab as Initial Treatment for EGFR-Mutant Lung Cancer     9/1/2023 Initial Diagnosis    Lung cancer (CMS/HCC)          GONZALO Forbes-CNS

## 2024-05-15 ENCOUNTER — APPOINTMENT (OUTPATIENT)
Dept: PHYSICAL THERAPY | Facility: CLINIC | Age: 69
End: 2024-05-15
Payer: MEDICARE

## 2024-05-17 ENCOUNTER — APPOINTMENT (OUTPATIENT)
Dept: PHYSICAL THERAPY | Facility: CLINIC | Age: 69
End: 2024-05-17
Payer: MEDICARE

## 2024-05-20 ENCOUNTER — APPOINTMENT (OUTPATIENT)
Dept: CARDIOLOGY | Facility: CLINIC | Age: 69
End: 2024-05-20
Payer: MEDICARE

## 2024-05-20 ENCOUNTER — HOSPITAL ENCOUNTER (OUTPATIENT)
Dept: CARDIOLOGY | Facility: HOSPITAL | Age: 69
Discharge: HOME | End: 2024-05-20
Payer: MEDICARE

## 2024-05-20 DIAGNOSIS — F41.9 ANXIETY DISORDER, UNSPECIFIED TYPE: ICD-10-CM

## 2024-05-20 DIAGNOSIS — Z51.81 ENCOUNTER FOR THERAPEUTIC DRUG LEVEL MONITORING: ICD-10-CM

## 2024-05-20 DIAGNOSIS — I42.7 CARDIOTOXICITY (MULTI): ICD-10-CM

## 2024-05-20 PROCEDURE — 93306 TTE W/DOPPLER COMPLETE: CPT | Performed by: INTERNAL MEDICINE

## 2024-05-20 PROCEDURE — 93306 TTE W/DOPPLER COMPLETE: CPT

## 2024-05-20 RX ORDER — SERTRALINE HYDROCHLORIDE 50 MG/1
50 TABLET, FILM COATED ORAL DAILY
Qty: 90 TABLET | Refills: 3 | Status: SHIPPED | OUTPATIENT
Start: 2024-05-20

## 2024-05-21 DIAGNOSIS — C34.90 MALIGNANT NEOPLASM OF LUNG, UNSPECIFIED LATERALITY, UNSPECIFIED PART OF LUNG (MULTI): Primary | ICD-10-CM

## 2024-05-21 LAB
AORTIC VALVE MEAN GRADIENT: 4.8 MMHG
AORTIC VALVE PEAK VELOCITY: 1.45 M/S
AV PEAK GRADIENT: 8.4 MMHG
AVA (PEAK VEL): 1.97 CM2
AVA (VTI): 2.12 CM2
EJECTION FRACTION APICAL 4 CHAMBER: 72.8
GLOBAL LONGITUDINAL STRAIN: 19.4 %
LEFT ATRIUM VOLUME AREA LENGTH INDEX BSA: 25.5 ML/M2
LEFT VENTRICLE INTERNAL DIMENSION DIASTOLE: 3.6 CM (ref 3.5–6)
LEFT VENTRICULAR OUTFLOW TRACT DIAMETER: 1.97 CM
LV EJECTION FRACTION BIPLANE: 70 %
MITRAL VALVE E/A RATIO: 0.74
MITRAL VALVE E/E' RATIO: 5.04
RIGHT VENTRICLE FREE WALL PEAK S': 10 CM/S
RIGHT VENTRICLE PEAK SYSTOLIC PRESSURE: 21.5 MMHG
TRICUSPID ANNULAR PLANE SYSTOLIC EXCURSION: 16 CM

## 2024-05-21 RX ORDER — ALBUTEROL SULFATE 0.83 MG/ML
3 SOLUTION RESPIRATORY (INHALATION) AS NEEDED
Status: CANCELLED | OUTPATIENT
Start: 2024-06-03

## 2024-05-21 RX ORDER — DIPHENHYDRAMINE HYDROCHLORIDE 50 MG/ML
50 INJECTION INTRAMUSCULAR; INTRAVENOUS AS NEEDED
Status: CANCELLED | OUTPATIENT
Start: 2024-06-03

## 2024-05-21 RX ORDER — FAMOTIDINE 10 MG/ML
20 INJECTION INTRAVENOUS ONCE AS NEEDED
Status: CANCELLED | OUTPATIENT
Start: 2024-06-03

## 2024-05-21 RX ORDER — PROCHLORPERAZINE MALEATE 10 MG
10 TABLET ORAL EVERY 6 HOURS PRN
Status: CANCELLED | OUTPATIENT
Start: 2024-06-03

## 2024-05-21 RX ORDER — EPINEPHRINE 0.3 MG/.3ML
0.3 INJECTION SUBCUTANEOUS EVERY 5 MIN PRN
Status: CANCELLED | OUTPATIENT
Start: 2024-06-03

## 2024-05-21 RX ORDER — PROCHLORPERAZINE EDISYLATE 5 MG/ML
10 INJECTION INTRAMUSCULAR; INTRAVENOUS EVERY 6 HOURS PRN
Status: CANCELLED | OUTPATIENT
Start: 2024-06-03

## 2024-05-22 ENCOUNTER — APPOINTMENT (OUTPATIENT)
Dept: RADIOLOGY | Facility: CLINIC | Age: 69
End: 2024-05-22
Payer: MEDICARE

## 2024-05-22 DIAGNOSIS — E03.9 HYPOTHYROIDISM, UNSPECIFIED TYPE: Primary | ICD-10-CM

## 2024-05-28 ENCOUNTER — HOSPITAL ENCOUNTER (OUTPATIENT)
Dept: RADIOLOGY | Facility: HOSPITAL | Age: 69
Discharge: HOME | End: 2024-05-28
Payer: MEDICARE

## 2024-05-28 ENCOUNTER — APPOINTMENT (OUTPATIENT)
Dept: ENDOCRINOLOGY | Facility: CLINIC | Age: 69
End: 2024-05-28
Payer: MEDICARE

## 2024-05-28 VITALS — BODY MASS INDEX: 19.7 KG/M2 | WEIGHT: 106 LBS

## 2024-05-28 DIAGNOSIS — C34.90 MALIGNANT NEOPLASM OF LUNG, UNSPECIFIED LATERALITY, UNSPECIFIED PART OF LUNG (MULTI): ICD-10-CM

## 2024-05-28 LAB
CREAT SERPL-MCNC: 0.62 MG/DL (ref 0.6–1.3)
GFR SERPL CREATININE-BSD FRML MDRD: >90 ML/MIN/1.73M*2

## 2024-05-28 PROCEDURE — A9575 INJ GADOTERATE MEGLUMI 0.1ML: HCPCS | Performed by: INTERNAL MEDICINE

## 2024-05-28 PROCEDURE — 2550000001 HC RX 255 CONTRASTS: Performed by: INTERNAL MEDICINE

## 2024-05-28 PROCEDURE — 74177 CT ABD & PELVIS W/CONTRAST: CPT

## 2024-05-28 PROCEDURE — 82565 ASSAY OF CREATININE: CPT

## 2024-05-28 PROCEDURE — 70553 MRI BRAIN STEM W/O & W/DYE: CPT

## 2024-05-28 RX ORDER — GADOTERATE MEGLUMINE 376.9 MG/ML
10 INJECTION INTRAVENOUS
Status: COMPLETED | OUTPATIENT
Start: 2024-05-28 | End: 2024-05-28

## 2024-05-28 RX ADMIN — IOHEXOL 75 ML: 350 INJECTION, SOLUTION INTRAVENOUS at 11:46

## 2024-05-28 RX ADMIN — GADOTERATE MEGLUMINE 10 ML: 376.9 INJECTION INTRAVENOUS at 11:19

## 2024-06-03 ENCOUNTER — INFUSION (OUTPATIENT)
Dept: HEMATOLOGY/ONCOLOGY | Facility: HOSPITAL | Age: 69
End: 2024-06-03
Payer: MEDICARE

## 2024-06-03 ENCOUNTER — LAB (OUTPATIENT)
Dept: LAB | Facility: HOSPITAL | Age: 69
End: 2024-06-03
Payer: MEDICARE

## 2024-06-03 ENCOUNTER — APPOINTMENT (OUTPATIENT)
Dept: HEMATOLOGY/ONCOLOGY | Facility: HOSPITAL | Age: 69
End: 2024-06-03
Payer: MEDICARE

## 2024-06-03 ENCOUNTER — OFFICE VISIT (OUTPATIENT)
Dept: HEMATOLOGY/ONCOLOGY | Facility: HOSPITAL | Age: 69
End: 2024-06-03
Payer: MEDICARE

## 2024-06-03 ENCOUNTER — EDUCATION (OUTPATIENT)
Dept: HEMATOLOGY/ONCOLOGY | Facility: HOSPITAL | Age: 69
End: 2024-06-03
Payer: MEDICARE

## 2024-06-03 VITALS
HEART RATE: 67 BPM | SYSTOLIC BLOOD PRESSURE: 135 MMHG | BODY MASS INDEX: 20.65 KG/M2 | DIASTOLIC BLOOD PRESSURE: 79 MMHG | TEMPERATURE: 97.9 F | RESPIRATION RATE: 18 BRPM | OXYGEN SATURATION: 99 % | WEIGHT: 111.1 LBS

## 2024-06-03 DIAGNOSIS — C34.90 MALIGNANT NEOPLASM OF LUNG, UNSPECIFIED LATERALITY, UNSPECIFIED PART OF LUNG (MULTI): ICD-10-CM

## 2024-06-03 LAB
ALBUMIN SERPL BCP-MCNC: 4.3 G/DL (ref 3.4–5)
ALP SERPL-CCNC: 50 U/L (ref 33–136)
ALT SERPL W P-5'-P-CCNC: 14 U/L (ref 7–45)
ANION GAP SERPL CALC-SCNC: 13 MMOL/L (ref 10–20)
APPEARANCE UR: CLEAR
AST SERPL W P-5'-P-CCNC: 19 U/L (ref 9–39)
BASOPHILS # BLD AUTO: 0.02 X10*3/UL (ref 0–0.1)
BASOPHILS NFR BLD AUTO: 0.5 %
BILIRUB SERPL-MCNC: 0.3 MG/DL (ref 0–1.2)
BILIRUB UR STRIP.AUTO-MCNC: NEGATIVE MG/DL
BUN SERPL-MCNC: 20 MG/DL (ref 6–23)
CALCIUM SERPL-MCNC: 9.4 MG/DL (ref 8.6–10.3)
CHLORIDE SERPL-SCNC: 104 MMOL/L (ref 98–107)
CK SERPL-CCNC: 56 U/L (ref 0–215)
CO2 SERPL-SCNC: 31 MMOL/L (ref 21–32)
COLOR UR: ABNORMAL
CREAT SERPL-MCNC: 1.28 MG/DL (ref 0.5–1.05)
EGFRCR SERPLBLD CKD-EPI 2021: 45 ML/MIN/1.73M*2
EOSINOPHIL # BLD AUTO: 0.13 X10*3/UL (ref 0–0.4)
EOSINOPHIL NFR BLD AUTO: 3.1 %
ERYTHROCYTE [DISTWIDTH] IN BLOOD BY AUTOMATED COUNT: 14.2 % (ref 11.5–14.5)
GLUCOSE SERPL-MCNC: 93 MG/DL (ref 74–99)
GLUCOSE UR STRIP.AUTO-MCNC: NORMAL MG/DL
HCT VFR BLD AUTO: 44.2 % (ref 36–46)
HGB BLD-MCNC: 14.3 G/DL (ref 12–16)
IMM GRANULOCYTES # BLD AUTO: 0.02 X10*3/UL (ref 0–0.5)
IMM GRANULOCYTES NFR BLD AUTO: 0.5 % (ref 0–0.9)
KETONES UR STRIP.AUTO-MCNC: NEGATIVE MG/DL
LEUKOCYTE ESTERASE UR QL STRIP.AUTO: ABNORMAL
LYMPHOCYTES # BLD AUTO: 0.72 X10*3/UL (ref 0.8–3)
LYMPHOCYTES NFR BLD AUTO: 17.1 %
MAGNESIUM SERPL-MCNC: 2.14 MG/DL (ref 1.6–2.4)
MCH RBC QN AUTO: 30.2 PG (ref 26–34)
MCHC RBC AUTO-ENTMCNC: 32.4 G/DL (ref 32–36)
MCV RBC AUTO: 93 FL (ref 80–100)
MONOCYTES # BLD AUTO: 0.31 X10*3/UL (ref 0.05–0.8)
MONOCYTES NFR BLD AUTO: 7.4 %
NEUTROPHILS # BLD AUTO: 3.01 X10*3/UL (ref 1.6–5.5)
NEUTROPHILS NFR BLD AUTO: 71.4 %
NITRITE UR QL STRIP.AUTO: NEGATIVE
NRBC BLD-RTO: 0 /100 WBCS (ref 0–0)
PH UR STRIP.AUTO: 6.5 [PH]
PLATELET # BLD AUTO: 167 X10*3/UL (ref 150–450)
POTASSIUM SERPL-SCNC: 4.5 MMOL/L (ref 3.5–5.3)
PROT SERPL-MCNC: 6.6 G/DL (ref 6.4–8.2)
PROT UR STRIP.AUTO-MCNC: NEGATIVE MG/DL
RBC # BLD AUTO: 4.74 X10*6/UL (ref 4–5.2)
RBC # UR STRIP.AUTO: NEGATIVE /UL
RBC #/AREA URNS AUTO: NORMAL /HPF
SODIUM SERPL-SCNC: 143 MMOL/L (ref 136–145)
SP GR UR STRIP.AUTO: 1.01
SQUAMOUS #/AREA URNS AUTO: NORMAL /HPF
UROBILINOGEN UR STRIP.AUTO-MCNC: NORMAL MG/DL
WBC # BLD AUTO: 4.2 X10*3/UL (ref 4.4–11.3)
WBC #/AREA URNS AUTO: NORMAL /HPF
WBC CLUMPS #/AREA URNS AUTO: NORMAL /HPF

## 2024-06-03 PROCEDURE — 96365 THER/PROPH/DIAG IV INF INIT: CPT | Mod: INF

## 2024-06-03 PROCEDURE — 83735 ASSAY OF MAGNESIUM: CPT

## 2024-06-03 PROCEDURE — 36415 COLL VENOUS BLD VENIPUNCTURE: CPT

## 2024-06-03 PROCEDURE — 2500000004 HC RX 250 GENERAL PHARMACY W/ HCPCS (ALT 636 FOR OP/ED): Performed by: INTERNAL MEDICINE

## 2024-06-03 PROCEDURE — 85025 COMPLETE CBC W/AUTO DIFF WBC: CPT

## 2024-06-03 PROCEDURE — 81001 URINALYSIS AUTO W/SCOPE: CPT | Performed by: INTERNAL MEDICINE

## 2024-06-03 PROCEDURE — 82550 ASSAY OF CK (CPK): CPT | Performed by: INTERNAL MEDICINE

## 2024-06-03 PROCEDURE — 80053 COMPREHEN METABOLIC PANEL: CPT

## 2024-06-03 PROCEDURE — 84075 ASSAY ALKALINE PHOSPHATASE: CPT

## 2024-06-03 PROCEDURE — 99214 OFFICE O/P EST MOD 30 MIN: CPT | Performed by: CLINICAL NURSE SPECIALIST

## 2024-06-03 RX ORDER — PROCHLORPERAZINE EDISYLATE 5 MG/ML
10 INJECTION INTRAMUSCULAR; INTRAVENOUS EVERY 6 HOURS PRN
Status: DISCONTINUED | OUTPATIENT
Start: 2024-06-03 | End: 2024-06-03 | Stop reason: HOSPADM

## 2024-06-03 RX ORDER — HEPARIN SODIUM,PORCINE/PF 10 UNIT/ML
50 SYRINGE (ML) INTRAVENOUS AS NEEDED
OUTPATIENT
Start: 2024-06-03

## 2024-06-03 RX ORDER — DIPHENHYDRAMINE HYDROCHLORIDE 50 MG/ML
50 INJECTION INTRAMUSCULAR; INTRAVENOUS AS NEEDED
Status: DISCONTINUED | OUTPATIENT
Start: 2024-06-03 | End: 2024-06-03 | Stop reason: HOSPADM

## 2024-06-03 RX ORDER — HEPARIN 100 UNIT/ML
500 SYRINGE INTRAVENOUS AS NEEDED
OUTPATIENT
Start: 2024-06-03

## 2024-06-03 RX ORDER — FAMOTIDINE 10 MG/ML
20 INJECTION INTRAVENOUS ONCE AS NEEDED
Status: DISCONTINUED | OUTPATIENT
Start: 2024-06-03 | End: 2024-06-03 | Stop reason: HOSPADM

## 2024-06-03 RX ORDER — EPINEPHRINE 0.3 MG/.3ML
0.3 INJECTION SUBCUTANEOUS EVERY 5 MIN PRN
Status: DISCONTINUED | OUTPATIENT
Start: 2024-06-03 | End: 2024-06-03 | Stop reason: HOSPADM

## 2024-06-03 RX ORDER — ALBUTEROL SULFATE 0.83 MG/ML
3 SOLUTION RESPIRATORY (INHALATION) AS NEEDED
Status: DISCONTINUED | OUTPATIENT
Start: 2024-06-03 | End: 2024-06-03 | Stop reason: HOSPADM

## 2024-06-03 RX ORDER — PROCHLORPERAZINE MALEATE 10 MG
10 TABLET ORAL EVERY 6 HOURS PRN
Status: DISCONTINUED | OUTPATIENT
Start: 2024-06-03 | End: 2024-06-03 | Stop reason: HOSPADM

## 2024-06-03 RX ADMIN — Medication 757.5 MG: at 12:23

## 2024-06-03 ASSESSMENT — ENCOUNTER SYMPTOMS
VISUAL CHANGE: 0
JOINT SWELLING: 0
CHANGE IN BOWEL HABIT: 1
NUMBNESS: 0
NAUSEA: 0
HEADACHES: 0
WEAKNESS: 0
MYALGIAS: 0
NECK PAIN: 0
COUGH: 0
VERTIGO: 0
ABDOMINAL PAIN: 0
DIAPHORESIS: 0
SWOLLEN GLANDS: 0
SORE THROAT: 0
FATIGUE: 1
CHILLS: 0
VOMITING: 0
ANOREXIA: 0
FEVER: 0
ARTHRALGIAS: 0

## 2024-06-03 ASSESSMENT — PAIN SCALES - GENERAL: PAINLEVEL: 0-NO PAIN

## 2024-06-03 NOTE — PROGRESS NOTES
Patient ID: Mirta Hills is a 69 y.o. female.    DIAGNOSIS     Adenocarcinoma of the lung.  Date of diagnosis is [place] 15, 2023 from a level 7 lymph node biopsy/fine-needle aspiration.  Immunohistochemistry positive for TTF-1.        STAGING     Clinical T2a (right lower lobe mass measuring 3.6 cm), clinical N2 (PET positivity and subcarinal region), M1C disease (presence of brain metastases, ribs, right acetabulum, liver, brain), stage IVb disease        CURRENT SITES OF DISEASE     Right lower lobe, right hilum of the lung, ipsilateral mediastinum, liver, bones, brain        MOLECULAR GENOMICS     TEST: Focused Solid Tumor DNA/RNA Panel   SPECIMEN: Supernatant, LYMPH NODE 7, [id number] A   DISEASE DIAGNOSIS: Adenocarcinoma   Estimated Tumor Content: 40%   COLLECTION DATE: 6/15/2023     MICROSATELLITE STATUS: Microsatellite  Instability-High (MSI-H) is NOT DETECTED.     DISEASE ASSOCIATED GENOMIC FINDINGS:   EGFR p.[id number] ([id number] c.[id number]_[id number])    TP53 p.N247I (NM_000546 c.740A>T)     DISEASE RELEVANT ALTERATIONS NOT DETECTED:   Negative for ALK fusion.   Negative for BRAF V600E.   Negative for ERBB2 activating mutation   Negative for KRAS G12C.   Negative for  MET exon 14 skipping mutation.   Negative for NTRK fusion.   Negative for RET fusion.   Negative for ROS1 fusion.        Circulating tumor DNA sent on June 19, 2023  EGFR p.[id number], Variant allelic fraction of 1.6%  TP53 N247I Missense variant, loss of function, variant allelic fraction 1.1%  MSI stable        SERUM TUMOR MARKER     Slightly elevated CEA and CA 19.9 June 2023        PRIOR THERAPY     1- Gamma knife radiosurgery to brain lesions completed on June 28, 2023.        CURRENT THERAPY     Osimertinib on study  on clinical trial [id number] which is a randomized trial of osimertinib with or without bevacizumab.  She has been randomized to the bevacizumab arm- Cycle 1 Day 1 is 7/12/2023        CURRENT ONCOLOGICAL PROBLEMS     1-brain  metastases, symptomatic, word finding difficulties, mild unsteadiness on her feet but no falls, Resolved July 3, 2023  2-anterior chest pain, increases with deep inspiration, Improved July 3, 2023  3-mild intermittent grade 1 diarrhea, mild hair loss, mild leukopenia, mild elevation in creatinine, mild fatigue.  Most likely all osimertinib related August 7, 2023        HISTORY OF PRESENT ILLNESS     This is a 69-year-old patient.  She states that towards Thanksgiving of 2022 she started feeling some pain in her right shoulder.  Eventually got better however the same pain came back in January or February 2023.  Ultimately some x-rays were done of  the shoulder that were generally negative.  She then developed some pain in her sternum a month or 2 later with deep inspiration and ultimately underwent imaging in May 2023.  Chest CT without contrast was done on June 1, 2023.  This showed a 3.4 cm mass  within the superior segment of the right lower lobe with some associated satellite pulmonary nodules.  Additionally there were some other subcentimeter pulmonary nodules.  There was evidence of mediastinal lymphadenopathy.  Subcarinal lymph node measured  1.4 cm in short axis.She was seen by pulmonary medicine on June 5, 2023 a combined PET/CT was ordered on June 6, 2023 showing a relatively intense area of hypermetabolic activity along the superior posteromedial aspect of the right lower lobe corresponding  to the known right lower lobe lung mass.  There was foci of hypermetabolic activity in the right supraclavicular region, subcarinal and right hilar regions.  There was area of uptake within the posterior upper ribs as well as right acetabulum suspicious  for osseous metastatic disease.  There was a punctate area of mild hypermetabolic activity in the right hepatic lobe concerning for hepatic metastases.  Small focus in the subcutaneous region just anterior to the lower aspect of the sternum could be inflammatory   versus metastatic.  She undergoes a bronchoscopy dated [place] 15, 2023.  There were no endobronchial lesions.  Primary cytology with rapid onsite evaluation was suggestive of malignancy in level 7, final lymph nodes were pathology was pending.  MRI of the  brain with and without contrast dated June 16, 2023 showed approximately 20 enhancing nodules concerning for metastatic disease.  Specifically there was a 1.5 cm nodule in the right cerebellar hemisphere.  Most of the other nodules were less than 1 cm.   Patient also notes that she has some difficulty expressing words over the past 1 to 2 weeks prior to her first visit with us as well as some mild unsteadiness on her legs where she feels she is drifting towards the right side.        PAST MEDICAL HISTORY     1-hypothyroidism  2-hysterectomy 1988  3-abdominal hernia surgery 1995  4-some hearing difficulties        SOCIAL HISTORY     Patient lives with her significant other.  She has 2 daughter.  Lives in [facility].  Smoked for only 3 years during college and during this time it was a pack per day.  She has retired.  She had a retail store.        CURRENT MEDS     See medication list, meds reviewed        ALLERGIES     Patient denies any drug allergies        FAMILY HISTORY     Patient's father had bladder cancer at the age of 86.  She has 1 brother with no cancer.  She has 2 children    Subjective    Today I am meeting with Mirta prior to treatment with Avastin, on study.  She continues on Tagrisso, and is doing well with the exception of grade 1 loose stools, for which she takes one imodium every morning.  She is planning a summer of trips with her boyfriend.  At last visit we had some concern about her blood pressure, but today it is 135/79.  She is active and feeling well.      Cancer  Associated symptoms include a change in bowel habit and fatigue. Pertinent negatives include no abdominal pain, anorexia, arthralgias, chest pain, chills, congestion, coughing,  diaphoresis, fever, headaches, joint swelling, myalgias, nausea, neck pain, numbness, rash, sore throat, swollen glands, urinary symptoms, vertigo, visual change, vomiting or weakness.     Objective    BSA: 1.48 meters squared  /79 (BP Location: Left arm, Patient Position: Sitting, BP Cuff Size: Small adult)   Pulse 67   Temp 36.6 °C (97.9 °F) (Temporal)   Resp 18   Wt 50.4 kg (111 lb 1.6 oz)   SpO2 99%   BMI 20.65 kg/m²      Physical Exam  Constitutional:       General: She is not in acute distress.     Appearance: She is not ill-appearing, toxic-appearing or diaphoretic.   HENT:      Nose: No congestion or rhinorrhea.      Mouth/Throat:      Pharynx: No oropharyngeal exudate or posterior oropharyngeal erythema.   Eyes:      General: No scleral icterus.     Conjunctiva/sclera: Conjunctivae normal.   Cardiovascular:      Rate and Rhythm: Normal rate and regular rhythm.      Pulses: Normal pulses.      Heart sounds: Normal heart sounds. No murmur heard.     No friction rub. No gallop.   Pulmonary:      Effort: Pulmonary effort is normal. No respiratory distress.      Breath sounds: Normal breath sounds. No stridor. No wheezing, rhonchi or rales.   Chest:      Chest wall: No tenderness.   Abdominal:      General: There is no distension.      Palpations: There is no mass.      Tenderness: There is no abdominal tenderness. There is no guarding or rebound.   Musculoskeletal:      Cervical back: No tenderness.      Right lower leg: No edema.      Left lower leg: No edema.   Lymphadenopathy:      Cervical: No cervical adenopathy.   Skin:     Coloration: Skin is not jaundiced or pale.      Findings: No bruising, erythema, lesion or rash.   Neurological:      General: No focal deficit present.      Mental Status: She is oriented to person, place, and time.   Psychiatric:         Mood and Affect: Mood normal.         Behavior: Behavior normal.         Performance Status:  Asymptomatic    === 05/28/24 ===    CT  CHEST ABDOMEN PELVIS W IV CONTRAST    - Impression -  CHEST:  1. Stable right lower lobe superior segment spiculated mass measuring  2.8 x 2.4 cm. Stable few other subcentimeter parenchymal and fissural  based pulmonary nodules. No new suspicious pulmonary nodules.  2. No new suspicious intrathoracic lymphadenopathy.    ABDOMEN-PELVIS:  1. No new concerning findings in the abdomen or pelvis.  2. Stable prominent nonspecific right obturator lymph node measuring  1 cm.  3. Hepatic steatosis.  4. Splenomegaly as in prior.    Osseous structures:    1.       Unchanged multiple metastatic osseous lesions. No new  suspicious or destructive osseous lesions.    MACRO:  None    Signed by: Olman Templeton 5/29/2024 8:06 AM  Dictation workstation:   [id number]   Assessment/Plan     This is a patient with EGFR mutation positive adenocarcinoma of the lung on a clinical trial of Tagrisso and bevacizumab.   She has grade 1 diarrhea, and grade 1 fatigue.  Her systolic blood pressure was up a little the past few visits- but is mild, grade one elevation at 135/79 today.  If it reaches grade 2 (systolic over 150) we will consider antihypertensives.   She does not, obviously, need them today.  Scan done for today's visit is stable- we discussed results.   She is in agreement with the plan.       Cancer Staging   No matching staging information was found for the patient.      Oncology History   Lung cancer (Multi)   7/12/2023 -  Research Study Participant    ([facility]) [id number] Arm B - Bevacizumab / Osimertinib, 21 Day Cycles  Plan Provider: Humberto Landin MD  Treatment goal: [No plan goal]  Line of treatment: [No plan line of treatment]  Associated studies: [id number] (Osimertinib) +/- Bevacizumab as Initial Treatment for EGFR-Mutant Lung Cancer     9/1/2023 Initial Diagnosis    Lung cancer (CMS/HCC)          GONZALO Forbes-CNS

## 2024-06-03 NOTE — RESEARCH NOTES
Research Note Treatment Day    Mirta Hills is here today for treatment on [id number]. Today is C16D1. Procedures completed per protocol. AE's and con-meds reviewed with patient. Patient is aware of treatment plan.    [x]   Received treatment as planned   OR  []    Treatment delayed; patient calendar updated as required   Treatment delayed because:    []   AE    []   Physician Discretion    []   Clinical Deterioration or Progression     []   Other    Education Documentation  No documentation found.  Education Comments  No comments found.

## 2024-06-04 DIAGNOSIS — C34.90 MALIGNANT NEOPLASM OF LUNG, UNSPECIFIED LATERALITY, UNSPECIFIED PART OF LUNG (MULTI): ICD-10-CM

## 2024-06-07 ENCOUNTER — HOSPITAL ENCOUNTER (OUTPATIENT)
Dept: RADIATION ONCOLOGY | Facility: CLINIC | Age: 69
Setting detail: RADIATION/ONCOLOGY SERIES
Discharge: HOME | End: 2024-06-07
Payer: MEDICARE

## 2024-06-07 VITALS
OXYGEN SATURATION: 95 % | DIASTOLIC BLOOD PRESSURE: 74 MMHG | WEIGHT: 108.91 LBS | RESPIRATION RATE: 18 BRPM | TEMPERATURE: 97 F | SYSTOLIC BLOOD PRESSURE: 118 MMHG | HEART RATE: 75 BPM | BODY MASS INDEX: 20.24 KG/M2

## 2024-06-07 DIAGNOSIS — C79.31 SECONDARY MALIGNANT NEOPLASM OF BRAIN (MULTI): Primary | ICD-10-CM

## 2024-06-07 DIAGNOSIS — C34.90 MALIGNANT NEOPLASM OF LUNG, UNSPECIFIED LATERALITY, UNSPECIFIED PART OF LUNG (MULTI): ICD-10-CM

## 2024-06-07 PROCEDURE — 99214 OFFICE O/P EST MOD 30 MIN: CPT | Performed by: NURSE PRACTITIONER

## 2024-06-07 ASSESSMENT — ENCOUNTER SYMPTOMS
DEPRESSION: 0
LOSS OF SENSATION IN FEET: 0
OCCASIONAL FEELINGS OF UNSTEADINESS: 0

## 2024-06-07 ASSESSMENT — PAIN SCALES - GENERAL: PAINLEVEL: 0-NO PAIN

## 2024-06-07 NOTE — PROGRESS NOTES
Radiation Oncology Follow-Up    Patient Name:  Mirta Hills  MRN:  [id number]  :  1955    Referring Provider: Ileana Bradley, APR*  Primary Care Provider: Andrea Bush MD  Care Team: Patient Care Team:  Andrea Bush MD as PCP - General (Family Medicine)  Humberto Landin MD as Consulting Physician (Hematology and Oncology)  Makayla Joseph RN as Registered Nurse (Hematology and Oncology)  Yusef Chavez MD as Surgeon (Ophthalmology)  Del Gimenez MD as Referring Physician (Endocrinology)  Nancy Presley APRN-CNP as Nurse Practitioner (Gynecology)  Virginia Paz MD as Consulting Physician (Dermatology)    Date of Service: 2024   69 y.o. female with adenocarcinoma of the lung.  Date of diagnosis is [place] 15, 2023 from a level 7 lymph node biopsy/fine-needle aspiration.  Immunohistochemistry positive  for TTF-1. Clinical T2a (right lower lobe mass measuring 3.6 cm), clinical N2 (PET positivity and subcarinal region), M1C disease (presence of brain metastases, ribs, right acetabulum, liver, brain), stage IVb disease.     Treatment Summary:     - Towards  of  she started feeling some pain in her right shoulder.  Eventually got better however the same pain came back in January or 2023.  Ultimately some x-rays were done of the shoulder that were generally negative.       - She then developed some pain in her sternum a month or 2 later with deep inspiration and ultimately underwent imaging in May 2023.  Chest CT without contrast was done on 2023.  This showed a 3.4 cm mass within the superior segment of the right  lower lobe with some associated satellite pulmonary nodules.  Additionally there were some other subcentimeter pulmonary nodules.  There was evidence of mediastinal lymphadenopathy.  Subcarinal lymph node measured 1.4 cm in short axis.     - She was seen by pulmonary medicine on 2023 a combined PET/CT was ordered on 2023  showing a relatively intense area of hypermetabolic activity along the superior posteromedial aspect of the right lower lobe corresponding to the known  right lower lobe lung mass.  There was foci of hypermetabolic activity in the right supraclavicular region, subcarinal and right hilar regions.  There was area of uptake within the posterior upper ribs as well as right acetabulum suspicious for osseous  metastatic disease.  There was a punctate area of mild hypermetabolic activity in the right hepatic lobe concerning for hepatic metastases.  Small focus in the subcutaneous region just anterior to the lower aspect of the sternum could be inflammatory  versus metastatic.       - She underwent a bronchoscopy dated [place] 15, 2023.  There were no endobronchial lesions.  Primary cytology with rapid onsite evaluation was suggestive of malignancy in level 7, final lymph nodes were pathology was pending.      -  MRI of the brain with and without contrast dated June 16, 2023 showed approximately 20 enhancing nodules concerning for metastatic disease.  Specifically there was a 1.5 cm nodule in the right cerebellar hemisphere.  Most of the other nodules were  less than 1 cm.      -  Gamma knife radiosurgery to multiple brain lesions (total of 9) completed on June 28, 2023.     - Clinical trial [id number] which is a randomized trial of osimertinib with or without bevacizumab.  She has been randomized to the bevacizumab arm- Cycle 1 Day 1 on 7/12/2023     SUBJECTIVE  History of Present Illness:   Mirta HUMPHREY Reinier is here today for routine radiation follow up and review of MRI of the brain done end of May. She says she is feeling very well and continues on bevacizumab on a clinical trial with osimertinib. CT of CAP stable.  She denies any headaches, seizures, visual changes or neurologic deficits. She has some short term memory issues at times and some slowed processing at times. No problems with balance or falls. She has occasional  diarrhea controlled with immodium.  Denies cough, SOB, hemoptysis, N/V, abdominal pain or bony pain.     Review of Systems:    Review of Systems   All other systems reviewed and are negative.    Performance Status:   The Karnofsky performance scale today is 90, Able to carry on normal activity; minor signs or symptoms of disease (ECOG equivalent 0).      OBJECTIVE    Current Outpatient Medications:     atorvastatin (Lipitor) 10 mg tablet, Take 1 tablet (10 mg) by mouth once daily., Disp: , Rfl:     BEVACIZUMAB IV, Infuse into a venous catheter every 21 (twenty-one) days., Disp: , Rfl:     CALCIUM ORAL, Take by mouth., Disp: , Rfl:     cholecalciferol (Vitamin D-3) 50 MCG (2000 UT) tablet, Take 1 tablet (2,000 Units) by mouth once daily., Disp: , Rfl:     levothyroxine (Synthroid) 112 mcg tablet, Take 1 tablet (112 mcg) by mouth once daily in the morning. Take before meals. But take additional 1/2 tab Weds and Sun, Disp: , Rfl:     loperamide (Imodium) 2 mg capsule, Take 1 capsule (2 mg) by mouth once daily., Disp: , Rfl:     multivit-min/ferrous fumarate (MULTI VITAMIN ORAL), Take by mouth., Disp: , Rfl:     mv-mn/lutein/zeax/bilber/hb277 (MACULAR HEALTH FORMULA ORAL), Take 1 tablet by mouth once daily. MACULAR SUPPORT, Disp: , Rfl:     sertraline (Zoloft) 50 mg tablet, Take 1 tablet (50 mg) by mouth once daily., Disp: 90 tablet, Rfl: 3    Study [id number] osimertinib 80 mg tablet, Take 1 tablet (80 mg total) by mouth once daily for 21 doses.  Swallow whole., Disp: 30 tablet, Rfl: 0     Physical Exam  Constitutional:       Appearance: Normal appearance.   HENT:      Head: Normocephalic and atraumatic.      Nose: Nose normal.      Mouth/Throat:      Mouth: Mucous membranes are moist.      Pharynx: Oropharynx is clear.   Eyes:      Extraocular Movements: Extraocular movements intact.      Conjunctiva/sclera: Conjunctivae normal.      Pupils: Pupils are equal, round, and reactive to light.   Cardiovascular:      Rate and  Rhythm: Normal rate and regular rhythm.      Heart sounds: Normal heart sounds.   Pulmonary:      Effort: Pulmonary effort is normal.      Breath sounds: Normal breath sounds.   Abdominal:      Palpations: Abdomen is soft.   Musculoskeletal:         General: No swelling. Normal range of motion.      Cervical back: Normal range of motion and neck supple.   Lymphadenopathy:      Cervical: No cervical adenopathy.   Skin:     General: Skin is warm and dry.      Comments: Raynauds in hands   Neurological:      General: No focal deficit present.      Mental Status: She is alert.      Cranial Nerves: No cranial nerve deficit.      Sensory: No sensory deficit.      Motor: No weakness.      Coordination: Coordination normal.      Gait: Gait normal.   Psychiatric:         Mood and Affect: Mood normal.         Behavior: Behavior normal.         Thought Content: Thought content normal.         Judgment: Judgment normal.         RESULTS:  Narrative & Impression   Interpreted By:  Vince Boss,   STUDY:  MR BRAIN W AND WO IV CONTRAST; ;  5/28/2024 11:54 am      INDICATION:  Signs/Symptoms:clinical trial disease assessment.      COMPARISON:  MRI brain from 03/08/2024.      ACCESSION NUMBER(S):  [id number]      ORDERING CLINICIAN:  ADA ANTON      TECHNIQUE:  MRI of the brain was performed with the acquisition of axial  diffusion-weighted, axial T1, axial FLAIR, axial T2 gradient echo,  axial T2 fat saturated, axial T1 fat saturated postcontrast sequence,  and volumetric axial T1 post contrast sequence with multiplanar  reformats.      Contrast: 10 mL of Dotarem was injected intravenously.      FINDINGS:  There is a 4 mm T1 hyperintense lesion located within the left  precentral gyrus which is unchanged. There is associated stable  susceptibility artifact along the periphery which probably reflects  old blood products. Stable small focus of susceptibility artifact  located within the right cerebellum. Otherwise, no discrete  lesions  are seen within intracranial compartment to suggest metastases.      There are few scattered foci of T2 hyperintensity within the  bilateral cerebral hemispheric white matter which are nonspecific.  There is no acute intracranial hemorrhage or infarct. There is no  abnormal extra-axial fluid collection.      The ventricles, sulci, and basilar cisterns are unchanged in size,  shape, and configuration.      There is mild mucosal thickening located within the ethmoid air  cells, otherwise the visualized paranasal sinuses and mastoid air  cells are essentially clear.      IMPRESSION:  1. Stable 4 mm T1 hyperintense lesion located within the left  precentral gyrus which may reflect a treated metastasis.      2. No new intracranial lesions.      This study was interpreted at [facility].         ASSESSMENT:  69 y.o. female with metastatic lung cancer to the brain s/p gamma knife to multiple lesions.  She seems to be doing well and no adverse effects from gamma knife and excellent response to treatment and no new lesions identified. She will continue getting CT scans and brain MRIs every 2 mo per the clinical trial and these appts to be arranged per clinical trials team.  Radiation follow up after her next MRI of brain.  She will call me with any questions  or concerns      Amada Bradley CNP  [phone]

## 2024-06-10 ENCOUNTER — TELEPHONE (OUTPATIENT)
Dept: ADMISSION | Facility: HOSPITAL | Age: 69
End: 2024-06-10
Payer: MEDICARE

## 2024-06-10 NOTE — TELEPHONE ENCOUNTER
Patient called in requesting to speak with Makayla clinical trials nurse.  Mirta had to reschedule her next infusion and has questions with timing and concerns between treatments.  If you can please call her back at [phone]. Thank you

## 2024-06-11 ENCOUNTER — LAB (OUTPATIENT)
Dept: LAB | Facility: LAB | Age: 69
End: 2024-06-11
Payer: MEDICARE

## 2024-06-11 ENCOUNTER — OFFICE VISIT (OUTPATIENT)
Dept: ENDOCRINOLOGY | Facility: CLINIC | Age: 69
End: 2024-06-11
Payer: MEDICARE

## 2024-06-11 VITALS
DIASTOLIC BLOOD PRESSURE: 64 MMHG | SYSTOLIC BLOOD PRESSURE: 108 MMHG | HEIGHT: 62 IN | RESPIRATION RATE: 16 BRPM | WEIGHT: 109.2 LBS | HEART RATE: 64 BPM | BODY MASS INDEX: 20.09 KG/M2

## 2024-06-11 DIAGNOSIS — E03.9 HYPOTHYROIDISM, UNSPECIFIED TYPE: Primary | ICD-10-CM

## 2024-06-11 DIAGNOSIS — E03.9 HYPOTHYROIDISM, UNSPECIFIED TYPE: ICD-10-CM

## 2024-06-11 LAB
T3FREE SERPL-MCNC: 2.5 PG/ML (ref 2.3–4.2)
T4 FREE SERPL-MCNC: 1.29 NG/DL (ref 0.78–1.48)
TSH SERPL-ACNC: 2.76 MIU/L (ref 0.44–3.98)

## 2024-06-11 ASSESSMENT — ENCOUNTER SYMPTOMS
PALPITATIONS: 0
HEADACHES: 0
COUGH: 0
FATIGUE: 0
VOMITING: 0
DIARRHEA: 0
NAUSEA: 0
SHORTNESS OF BREATH: 0
CHILLS: 0
FEVER: 0

## 2024-06-11 NOTE — PROGRESS NOTES
Endocrinology New Patient Consult  Subjective   Patient ID: Mirta Hills is a 69 y.o. female who presents for Hypothyroidism. Patient was referred by Dr. Bush.     PCP: Andrea Bush MD    HPI  69-year-old referred by Dr. Bush for evaluation of hypothyroidism.  She is a past patient of Dr. Montenegro.  She has been on thyroid medication for approximately 30 years.  She takes it in the morning on an empty stomach separately from her vitamins.  She had been on the same dose for quite some time until the last few months when her TSH kan.  Her dose has been adjusted and she is currently taking 112 mcg 8 pills/week.  Shortly after the adjustment her TSH was 2 in May.  She has been tired and achy on and off but is currently undergoing treatment for lung cancer with a study drug.  She does note loose bowel movements and GI issues since being on the study drug.  She did lose weight when she was initially diagnosed but more recently her weight has been stable.  She has noted some hair loss when her TSH was elevated but has not been taking biotin.      Review of Systems   Constitutional:  Negative for chills, fatigue and fever.   Respiratory:  Negative for cough and shortness of breath.    Cardiovascular:  Negative for chest pain and palpitations.   Gastrointestinal:  Negative for diarrhea, nausea and vomiting.   Neurological:  Negative for headaches.       Patient Active Problem List   Diagnosis    Anxiety disorder    Breast density    Fecal occult blood test positive    Fecal smearing    Hyperlipidemia, mixed    Hypothyroidism    Lung cancer (Multi)    Osteoporosis    Puncture wound of right hand without foreign body    Urinary urgency    Vaginal vault prolapse    Vitamin D deficiency    Secondary malignant neoplasm of brain (Multi)    Dry ARMD    Right lower lobe lung mass    Primary adenocarcinoma of upper lobe of right lung (Multi)    Hypothyroidism due to Hashimoto's thyroiditis    Osteopenia of neck of femur    Pure  hypercholesterolemia    Adenocarcinoma of right lung (Multi)    Pyuria       Past Medical History:   Diagnosis Date    Chronic rhinitis 2014    Rhinitis    Encounter for other screening for malignant neoplasm of breast     Breast cancer screening    Encounter for screening for malignant neoplasm of cervix     Screening for cervical cancer    Laceration without foreign body of scalp, initial encounter 2014    Scalp laceration    Personal history of diseases of the skin and subcutaneous tissue 2014    History of dermatitis    Personal history of other benign neoplasm 2015    History of uterine leiomyoma        Past Surgical History:   Procedure Laterality Date    HERNIA REPAIR  2015    Hernia Repair    HYSTERECTOMY  2013    Hysterectomy        Social History     Tobacco Use   Smoking Status Former    Current packs/day: 0.00    Average packs/day: 1 pack/day for 3.0 years (3.0 ttl pk-yrs)    Types: Cigarettes    Start date: 1971    Quit date: 1974    Years since quittin.4    Passive exposure: Past   Smokeless Tobacco Never      Social History     Substance and Sexual Activity   Alcohol Use Yes    Alcohol/week: 2.0 standard drinks of alcohol    Types: 2 Glasses of wine per week      Marital status:   Employment: Retired small business owner    Family History   Problem Relation Name Age of Onset    Hyperlipidemia Mother      Alzheimer's disease Mother      Hyperlipidemia Father      Cancer Maternal Grandmother      Cancer Other aunt     Hypertension Other      Coronary artery disease Other     Mother and daughters : Hypothyroidism    Home Meds:  Current Outpatient Medications   Medication Instructions    atorvastatin (LIPITOR) 10 mg, oral, Daily    BEVACIZUMAB IV intravenous, Every 21 days    CALCIUM ORAL oral    cholecalciferol (Vitamin D-3) 50 MCG (2000) tablet 1 tablet, oral, Daily    levothyroxine (SYNTHROID) 112 mcg, oral, Daily before breakfast, But take  "additional 1/2 tab Weds and Sun    loperamide (IMODIUM) 2 mg, oral, Daily    multivit-min/ferrous fumarate (MULTI VITAMIN ORAL) oral    mv-mn/lutein/zeax/bilber/hb277 (MACULAR HEALTH FORMULA ORAL) 1 tablet, oral, Daily, MACULAR SUPPORT    sertraline (ZOLOFT) 50 mg, oral, Daily    Study [id number] osimertinib 80 mg, oral, Daily, Swallow whole.        No Known Allergies     Objective   Vitals:    06/11/24 1416   BP: 108/64   Pulse: 64   Resp: 16      Vitals:    06/11/24 1416   Weight: 49.5 kg (109 lb 3.2 oz)      Body mass index is 20.3 kg/m².   Physical Exam  Constitutional:       Appearance: Normal appearance. She is normal weight.   HENT:      Head: Normocephalic and atraumatic.   Neck:      Thyroid: No thyroid mass, thyromegaly or thyroid tenderness.      Comments: Small thyroid gland  Cardiovascular:      Rate and Rhythm: Normal rate and regular rhythm.      Heart sounds: No murmur heard.     No gallop.   Pulmonary:      Effort: Pulmonary effort is normal.      Breath sounds: Normal breath sounds.   Abdominal:      Palpations: Abdomen is soft.      Comments: benign   Neurological:      General: No focal deficit present.      Mental Status: She is alert and oriented to person, place, and time.      Deep Tendon Reflexes: Reflexes are normal and symmetric.   Psychiatric:         Behavior: Behavior is cooperative.         Labs:  Lab Results   Component Value Date    HGBA1C 5.2 03/11/2024    TSH 2.32 05/13/2024    FREET4 1.41 04/22/2024      No results found for: \"PR1\", \"THYROIDPAB\", \"TSI\"     Assessment/Plan   Problem List Items Addressed This Visit       Hypothyroidism - Primary    Relevant Orders    Thyroxine, Free    Thyroid Stimulating Hormone    Triiodothyronine, Free     69-year-old here for evaluation of hypothyroidism.  We discussed her course.  We reviewed modifiers to thyroid hormone dosing.  Her weight has not changed significantly but her GI issues on her study drug most likely plays a role.  We did discuss " her current daily dose is approximately 128 mcg so based on today's labs we could go up or down on the dosing to get a tablet size she could take daily.  We discussed checking her thyroid levels more closely as she has been until she is back to being stable.  I encouraged her to call or message with any concerns or questions.  We did discuss the effect of biotin on thyroid levels and that she can take it for her hair issues but she would just need to hold it 3 to 5 days before any thyroid blood work.  I encouraged her to call or message with any concerns or questions otherwise we will see her back in 6 months with blood work in between  Electronically signed by:  Ramya Webber MD 06/11/24  2:53 PM

## 2024-06-11 NOTE — PATIENT INSTRUCTIONS
Thyroid blood work today  Adjustment based on levels  Follow-up in 6 months with blood work in between based on today's levels  Please call or message with any concerns or questions

## 2024-06-12 DIAGNOSIS — E03.9 HYPOTHYROIDISM, UNSPECIFIED TYPE: Primary | ICD-10-CM

## 2024-06-12 RX ORDER — LEVOTHYROXINE SODIUM 137 UG/1
137 TABLET ORAL
Qty: 90 TABLET | Refills: 1 | Status: SHIPPED | OUTPATIENT
Start: 2024-06-12 | End: 2025-06-12

## 2024-06-14 ENCOUNTER — TREATMENT (OUTPATIENT)
Dept: PHYSICAL THERAPY | Facility: CLINIC | Age: 69
End: 2024-06-14
Payer: MEDICARE

## 2024-06-14 DIAGNOSIS — N81.9 VAGINAL VAULT PROLAPSE: ICD-10-CM

## 2024-06-14 PROCEDURE — 97535 SELF CARE MNGMENT TRAINING: CPT | Mod: GP

## 2024-06-14 PROCEDURE — 97140 MANUAL THERAPY 1/> REGIONS: CPT | Mod: GP

## 2024-06-14 NOTE — PROGRESS NOTES
Physical Therapy    Discharge Summary    Name: Mirta Hills  MRN: [id number]  : 1955  Date: 24    Discharge Summary: PT    Discharge Information: Date of discharge 24, Date of last visit 24, Date of evaluation 24, Number of attended visits 2, Referred by Dr. Temple, and Referred for vaginal vault prolapse     Discharge Status: unknown    Rehab Discharge Reason: Failed to schedule and/or keep follow-up appointment(s)    PHYSICAL THERAPY   TREATMENT NOTE    Patient Name:  Mirta Hills   Patient MRN: [id number]  Date: 24    Time Calculation  Start Time: 1031  Stop Time: 1115  Time Calculation (min): 44 min    Insurance:  Visit number: 2  Insurance Type: Medicare   Approved # of visits: MN  Authorization Needed: no  Cert Date Ends On: 24    General   Reason for visit: pelvic floor dysfunction  Referred by: Dr. Temple     Therapy diagnoses:   1. Vaginal vault prolapse  Follow Up In Physical Therapy           Assessment:    Patient's primary complaints are bowel urgency and difficulty cleaning with fecal seepage. Given circumferential hemorrhoids, seepage likely a function of this. Anticipate rectal retraining for endurance and the jim bottle will help with both of these things. She has fairly coordinated PF muscles with decreased endurance.     Plan:  Will likely trial biofeedback at next visit. Continue hourly rectal contractions with seepage exercises and jim bottles.    Subjective  She has not been doing many exercises due to hectic life with doctors appointments. Her biggest complaint remains fecal leakage.   Pain (0-10): 0    Precautions  PMH: HLD, hypothyroidism, osteoporosis, vitamin D deficiency, vaginal vault prolapse, lunge cancer, anxiety   Fall Risk: no    Objective    Treatment Performed:   Therapeutic Exercise:    minutes    Self Care: 30 minutes  Discussed fecal seepage, jim bottle, review HEP and bladder irritants, prolapse exercise modifications   Manual  Therapy: 14 minutes  External and internal assessment explained. Verbal consent obtained to proceed with rectal exam.    External Rectal Observation:  Voluntary Contraction: +  Voluntary Relaxation: +  Involuntary Contraction: +   Involuntary Relaxation: +  Significant circumferential hemorrhoids   No visible prolapse or internal hemorrhoids with bearing down     Rectal palpation external: no trigger points   1 o'clock (ischiocavernosus)  2 o'clock (bulbocavernosus)  3 o'clock (superficial transverse perineal)  4 o'clock (pubococcygeus)  5 o'clock (iliococcygeus)  6 o'clock (coccyx)  7 o'clock (iliococcygeus)  8 o'clock (pubococcygeus)  9 o'clock (superficial transverse perineal)  10 o'clock (bulbocavernosus)  11 o'clock (ischiocavernosus)  12 o'clock (pubic symphysis inferior angle)  Obturator:  Piriformis:    Rectal palpation internal: no trigger points    1 o'clock (ischiocavernosus)  2 o'clock (bulbocavernosus)  3 o'clock (superficial transverse perineal)  4 o'clock (pubococcygeus)  5 o'clock (iliococcygeus)  6 o'clock (coccyx)  7 o'clock (iliococcygeus)  8 o'clock (pubococcygeus)  9 o'clock (superficial transverse perineal)  10 o'clock (bulbocavernosus)  11 o'clock (ischiocavernosus)  12 o'clock (pubic symphysis inferior angle)  Obturator:  Piriformis:    Pelvic Floor MMT Grade  0/zero: no palpable contraction/squeeze  1/trace: flicker of squeeze or contraction  2/poor: squeeze pressure asymmetrical or felt at various points- no lift or displacement  3/fair: squeeze pressure (contraction) and lift or displacement  4/good: squeeze pressure (contraction) and lift or displacement from anterior, posterior, and side walls  5/strong: full circumference of finger compressed, displaced with an inward pull    [place]cock PERF(Power/Endurance/Repetitions/Fast Twitch)  4/10/1/5    Neuromuscular Re-education:   minutes    Gait Training:    minutes    Modalities: minutes

## 2024-06-22 ENCOUNTER — APPOINTMENT (OUTPATIENT)
Dept: RADIOLOGY | Facility: CLINIC | Age: 69
End: 2024-06-22
Payer: MEDICARE

## 2024-06-24 ENCOUNTER — APPOINTMENT (OUTPATIENT)
Dept: HEMATOLOGY/ONCOLOGY | Facility: HOSPITAL | Age: 69
End: 2024-06-24
Payer: MEDICARE

## 2024-06-25 DIAGNOSIS — C34.90 MALIGNANT NEOPLASM OF LUNG, UNSPECIFIED LATERALITY, UNSPECIFIED PART OF LUNG (MULTI): Primary | ICD-10-CM

## 2024-06-25 RX ORDER — DIPHENHYDRAMINE HYDROCHLORIDE 50 MG/ML
50 INJECTION INTRAMUSCULAR; INTRAVENOUS AS NEEDED
OUTPATIENT
Start: 2024-06-25

## 2024-06-25 RX ORDER — EPINEPHRINE 0.3 MG/.3ML
0.3 INJECTION SUBCUTANEOUS EVERY 5 MIN PRN
OUTPATIENT
Start: 2024-06-25

## 2024-06-25 RX ORDER — PROCHLORPERAZINE EDISYLATE 5 MG/ML
10 INJECTION INTRAMUSCULAR; INTRAVENOUS EVERY 6 HOURS PRN
OUTPATIENT
Start: 2024-06-25

## 2024-06-25 RX ORDER — FAMOTIDINE 10 MG/ML
20 INJECTION INTRAVENOUS ONCE AS NEEDED
OUTPATIENT
Start: 2024-06-25

## 2024-06-25 RX ORDER — ALBUTEROL SULFATE 0.83 MG/ML
3 SOLUTION RESPIRATORY (INHALATION) AS NEEDED
OUTPATIENT
Start: 2024-06-25

## 2024-06-25 RX ORDER — PROCHLORPERAZINE MALEATE 10 MG
10 TABLET ORAL EVERY 6 HOURS PRN
OUTPATIENT
Start: 2024-06-25

## 2024-06-28 ENCOUNTER — APPOINTMENT (OUTPATIENT)
Dept: RADIOLOGY | Facility: CLINIC | Age: 69
End: 2024-06-28
Payer: MEDICARE

## 2024-07-01 ENCOUNTER — INFUSION (OUTPATIENT)
Dept: HEMATOLOGY/ONCOLOGY | Facility: HOSPITAL | Age: 69
End: 2024-07-01
Payer: MEDICARE

## 2024-07-01 ENCOUNTER — OFFICE VISIT (OUTPATIENT)
Dept: HEMATOLOGY/ONCOLOGY | Facility: HOSPITAL | Age: 69
End: 2024-07-01
Payer: MEDICARE

## 2024-07-01 ENCOUNTER — LAB (OUTPATIENT)
Dept: LAB | Facility: HOSPITAL | Age: 69
End: 2024-07-01
Payer: MEDICARE

## 2024-07-01 ENCOUNTER — EDUCATION (OUTPATIENT)
Dept: HEMATOLOGY/ONCOLOGY | Facility: HOSPITAL | Age: 69
End: 2024-07-01

## 2024-07-01 VITALS
OXYGEN SATURATION: 100 % | RESPIRATION RATE: 15 BRPM | WEIGHT: 108 LBS | HEART RATE: 63 BPM | DIASTOLIC BLOOD PRESSURE: 74 MMHG | BODY MASS INDEX: 20.08 KG/M2 | TEMPERATURE: 97.7 F | SYSTOLIC BLOOD PRESSURE: 133 MMHG

## 2024-07-01 DIAGNOSIS — C34.90 MALIGNANT NEOPLASM OF LUNG, UNSPECIFIED LATERALITY, UNSPECIFIED PART OF LUNG (MULTI): Primary | ICD-10-CM

## 2024-07-01 DIAGNOSIS — C34.90 MALIGNANT NEOPLASM OF LUNG, UNSPECIFIED LATERALITY, UNSPECIFIED PART OF LUNG (MULTI): ICD-10-CM

## 2024-07-01 DIAGNOSIS — E03.9 HYPOTHYROIDISM, UNSPECIFIED TYPE: ICD-10-CM

## 2024-07-01 LAB
ALBUMIN SERPL BCP-MCNC: 4.1 G/DL (ref 3.4–5)
ALP SERPL-CCNC: 51 U/L (ref 33–136)
ALT SERPL W P-5'-P-CCNC: 13 U/L (ref 7–45)
ANION GAP SERPL CALC-SCNC: 12 MMOL/L (ref 10–20)
AST SERPL W P-5'-P-CCNC: 17 U/L (ref 9–39)
BASOPHILS # BLD AUTO: 0.03 X10*3/UL (ref 0–0.1)
BASOPHILS NFR BLD AUTO: 0.7 %
BILIRUB SERPL-MCNC: 0.3 MG/DL (ref 0–1.2)
BUN SERPL-MCNC: 23 MG/DL (ref 6–23)
CALCIUM SERPL-MCNC: 9.4 MG/DL (ref 8.6–10.3)
CHLORIDE SERPL-SCNC: 106 MMOL/L (ref 98–107)
CK SERPL-CCNC: 51 U/L (ref 0–215)
CO2 SERPL-SCNC: 29 MMOL/L (ref 21–32)
CREAT SERPL-MCNC: 1.19 MG/DL (ref 0.5–1.05)
EGFRCR SERPLBLD CKD-EPI 2021: 49 ML/MIN/1.73M*2
EOSINOPHIL # BLD AUTO: 0.11 X10*3/UL (ref 0–0.4)
EOSINOPHIL NFR BLD AUTO: 2.7 %
ERYTHROCYTE [DISTWIDTH] IN BLOOD BY AUTOMATED COUNT: 14.3 % (ref 11.5–14.5)
GLUCOSE SERPL-MCNC: 116 MG/DL (ref 74–99)
HCT VFR BLD AUTO: 42.6 % (ref 36–46)
HGB BLD-MCNC: 13.4 G/DL (ref 12–16)
IMM GRANULOCYTES # BLD AUTO: 0.02 X10*3/UL (ref 0–0.5)
IMM GRANULOCYTES NFR BLD AUTO: 0.5 % (ref 0–0.9)
LYMPHOCYTES # BLD AUTO: 0.62 X10*3/UL (ref 0.8–3)
LYMPHOCYTES NFR BLD AUTO: 15.1 %
MAGNESIUM SERPL-MCNC: 2.03 MG/DL (ref 1.6–2.4)
MCH RBC QN AUTO: 29.7 PG (ref 26–34)
MCHC RBC AUTO-ENTMCNC: 31.5 G/DL (ref 32–36)
MCV RBC AUTO: 95 FL (ref 80–100)
MONOCYTES # BLD AUTO: 0.34 X10*3/UL (ref 0.05–0.8)
MONOCYTES NFR BLD AUTO: 8.3 %
NEUTROPHILS # BLD AUTO: 2.98 X10*3/UL (ref 1.6–5.5)
NEUTROPHILS NFR BLD AUTO: 72.7 %
NRBC BLD-RTO: 0 /100 WBCS (ref 0–0)
PLATELET # BLD AUTO: 159 X10*3/UL (ref 150–450)
POTASSIUM SERPL-SCNC: 4.3 MMOL/L (ref 3.5–5.3)
PROT SERPL-MCNC: 6.3 G/DL (ref 6.4–8.2)
RBC # BLD AUTO: 4.51 X10*6/UL (ref 4–5.2)
SODIUM SERPL-SCNC: 143 MMOL/L (ref 136–145)
TSH SERPL-ACNC: 1.28 MIU/L (ref 0.44–3.98)
WBC # BLD AUTO: 4.1 X10*3/UL (ref 4.4–11.3)

## 2024-07-01 PROCEDURE — 83735 ASSAY OF MAGNESIUM: CPT

## 2024-07-01 PROCEDURE — 84075 ASSAY ALKALINE PHOSPHATASE: CPT

## 2024-07-01 PROCEDURE — 99214 OFFICE O/P EST MOD 30 MIN: CPT | Performed by: CLINICAL NURSE SPECIALIST

## 2024-07-01 PROCEDURE — 36415 COLL VENOUS BLD VENIPUNCTURE: CPT

## 2024-07-01 PROCEDURE — 85025 COMPLETE CBC W/AUTO DIFF WBC: CPT

## 2024-07-01 PROCEDURE — 2560000001 HC RX 256 EXPERIMENTAL DRUGS: Performed by: INTERNAL MEDICINE

## 2024-07-01 PROCEDURE — 84443 ASSAY THYROID STIM HORMONE: CPT | Performed by: FAMILY MEDICINE

## 2024-07-01 PROCEDURE — 82550 ASSAY OF CK (CPK): CPT | Performed by: INTERNAL MEDICINE

## 2024-07-01 PROCEDURE — 96413 CHEMO IV INFUSION 1 HR: CPT

## 2024-07-01 RX ORDER — ALBUTEROL SULFATE 0.83 MG/ML
3 SOLUTION RESPIRATORY (INHALATION) AS NEEDED
Status: DISCONTINUED | OUTPATIENT
Start: 2024-07-01 | End: 2024-07-01 | Stop reason: HOSPADM

## 2024-07-01 RX ORDER — HEPARIN 100 UNIT/ML
500 SYRINGE INTRAVENOUS AS NEEDED
Status: DISCONTINUED | OUTPATIENT
Start: 2024-07-01 | End: 2024-07-01 | Stop reason: HOSPADM

## 2024-07-01 RX ORDER — EPINEPHRINE 0.3 MG/.3ML
0.3 INJECTION SUBCUTANEOUS EVERY 5 MIN PRN
Status: DISCONTINUED | OUTPATIENT
Start: 2024-07-01 | End: 2024-07-01 | Stop reason: HOSPADM

## 2024-07-01 RX ORDER — FAMOTIDINE 10 MG/ML
20 INJECTION INTRAVENOUS ONCE AS NEEDED
Status: DISCONTINUED | OUTPATIENT
Start: 2024-07-01 | End: 2024-07-01 | Stop reason: HOSPADM

## 2024-07-01 RX ORDER — PROCHLORPERAZINE MALEATE 10 MG
10 TABLET ORAL EVERY 6 HOURS PRN
Status: DISCONTINUED | OUTPATIENT
Start: 2024-07-01 | End: 2024-07-01 | Stop reason: HOSPADM

## 2024-07-01 RX ORDER — HEPARIN 100 UNIT/ML
500 SYRINGE INTRAVENOUS AS NEEDED
OUTPATIENT
Start: 2024-07-01

## 2024-07-01 RX ORDER — HEPARIN SODIUM,PORCINE/PF 10 UNIT/ML
50 SYRINGE (ML) INTRAVENOUS AS NEEDED
OUTPATIENT
Start: 2024-07-01

## 2024-07-01 RX ORDER — DIPHENHYDRAMINE HYDROCHLORIDE 50 MG/ML
50 INJECTION INTRAMUSCULAR; INTRAVENOUS AS NEEDED
Status: DISCONTINUED | OUTPATIENT
Start: 2024-07-01 | End: 2024-07-01 | Stop reason: HOSPADM

## 2024-07-01 RX ORDER — PROCHLORPERAZINE EDISYLATE 5 MG/ML
10 INJECTION INTRAMUSCULAR; INTRAVENOUS EVERY 6 HOURS PRN
Status: DISCONTINUED | OUTPATIENT
Start: 2024-07-01 | End: 2024-07-01 | Stop reason: HOSPADM

## 2024-07-01 ASSESSMENT — ENCOUNTER SYMPTOMS
COUGH: 0
SWOLLEN GLANDS: 0
DIAPHORESIS: 0
ABDOMINAL PAIN: 0
ARTHRALGIAS: 0
MYALGIAS: 0
NUMBNESS: 0
VOMITING: 0
ANOREXIA: 0
WEAKNESS: 0
JOINT SWELLING: 0
CHANGE IN BOWEL HABIT: 1
VISUAL CHANGE: 0
NECK PAIN: 0
SORE THROAT: 0
NAUSEA: 0
CHILLS: 0
FATIGUE: 1
FEVER: 0
HEADACHES: 0
VERTIGO: 0

## 2024-07-01 ASSESSMENT — PAIN SCALES - GENERAL: PAINLEVEL: 0-NO PAIN

## 2024-07-01 NOTE — PROGRESS NOTES
Patient ID: Mirta Hills is a 69 y.o. female.    DIAGNOSIS     Adenocarcinoma of the lung.  Date of diagnosis is [place] 15, 2023 from a level 7 lymph node biopsy/fine-needle aspiration.  Immunohistochemistry positive for TTF-1.        STAGING     Clinical T2a (right lower lobe mass measuring 3.6 cm), clinical N2 (PET positivity and subcarinal region), M1C disease (presence of brain metastases, ribs, right acetabulum, liver, brain), stage IVb disease        CURRENT SITES OF DISEASE     Right lower lobe, right hilum of the lung, ipsilateral mediastinum, liver, bones, brain        MOLECULAR GENOMICS     TEST: Focused Solid Tumor DNA/RNA Panel   SPECIMEN: Supernatant, LYMPH NODE 7, [id number]   DISEASE DIAGNOSIS: Adenocarcinoma   Estimated Tumor Content: 40%   COLLECTION DATE: 6/15/2023     MICROSATELLITE STATUS: Microsatellite  Instability-High (MSI-H) is NOT DETECTED.     DISEASE ASSOCIATED GENOMIC FINDINGS:   EGFR p.[id number] ([id number] c.[id number])    TP53 p.N247I (NM_[id number] c.740A>T)     DISEASE RELEVANT ALTERATIONS NOT DETECTED:   Negative for ALK fusion.   Negative for BRAF V600E.   Negative for ERBB2 activating mutation   Negative for KRAS G12C.   Negative for  MET exon 14 skipping mutation.   Negative for NTRK fusion.   Negative for RET fusion.   Negative for ROS1 fusion.        Circulating tumor DNA sent on June 19, 2023  EGFR p.[id number], Variant allelic fraction of 1.6%  TP53 N247I Missense variant, loss of function, variant allelic fraction 1.1%  MSI stable        SERUM TUMOR MARKER     Slightly elevated CEA and CA 19.9 June 2023        PRIOR THERAPY     1- Gamma knife radiosurgery to brain lesions completed on June 28, 2023.        CURRENT THERAPY     Osimertinib on study  on clinical trial EA [id number] which is a randomized trial of osimertinib with or without bevacizumab.  She has been randomized to the bevacizumab arm- Cycle 1 Day 1 is 7/12/2023        CURRENT ONCOLOGICAL PROBLEMS     1-brain  metastases, symptomatic, word finding difficulties, mild unsteadiness on her feet but no falls, Resolved July 3, 2023  2-anterior chest pain, increases with deep inspiration, Improved July 3, 2023  3-mild intermittent grade 1 diarrhea, mild hair loss, mild leukopenia, mild elevation in creatinine, mild fatigue.  Most likely all osimertinib related August 7, 2023        HISTORY OF PRESENT ILLNESS     This is a 69-year-old patient.  She states that towards Thanksgiving of 2022 she started feeling some pain in her right shoulder.  Eventually got better however the same pain came back in January or February 2023.  Ultimately some x-rays were done of  the shoulder that were generally negative.  She then developed some pain in her sternum a month or 2 later with deep inspiration and ultimately underwent imaging in May 2023.  Chest CT without contrast was done on June 1, 2023.  This showed a 3.4 cm mass  within the superior segment of the right lower lobe with some associated satellite pulmonary nodules.  Additionally there were some other subcentimeter pulmonary nodules.  There was evidence of mediastinal lymphadenopathy.  Subcarinal lymph node measured  1.4 cm in short axis.She was seen by pulmonary medicine on June 5, 2023 a combined PET/CT was ordered on June 6, 2023 showing a relatively intense area of hypermetabolic activity along the superior posteromedial aspect of the right lower lobe corresponding  to the known right lower lobe lung mass.  There was foci of hypermetabolic activity in the right supraclavicular region, subcarinal and right hilar regions.  There was area of uptake within the posterior upper ribs as well as right acetabulum suspicious  for osseous metastatic disease.  There was a punctate area of mild hypermetabolic activity in the right hepatic lobe concerning for hepatic metastases.  Small focus in the subcutaneous region just anterior to the lower aspect of the sternum could be inflammatory   versus metastatic.  She undergoes a bronchoscopy dated [place] 15, 2023.  There were no endobronchial lesions.  Primary cytology with rapid onsite evaluation was suggestive of malignancy in level 7, final lymph nodes were pathology was pending.  MRI of the  brain with and without contrast dated June 16, 2023 showed approximately 20 enhancing nodules concerning for metastatic disease.  Specifically there was a 1.5 cm nodule in the right cerebellar hemisphere.  Most of the other nodules were less than 1 cm.   Patient also notes that she has some difficulty expressing words over the past 1 to 2 weeks prior to her first visit with us as well as some mild unsteadiness on her legs where she feels she is drifting towards the right side.        PAST MEDICAL HISTORY     1-hypothyroidism  2-hysterectomy 1988  3-abdominal hernia surgery 1995  4-some hearing difficulties        SOCIAL HISTORY     Patient lives with her significant other.  She has 2 daughter.  Lives in [facility].  Smoked for only 3 years during college and during this time it was a pack per day.  She has retired.  She had a retail store.        CURRENT MEDS     See medication list, meds reviewed        ALLERGIES     Patient denies any drug allergies        FAMILY HISTORY     Patient's father had bladder cancer at the age of 86.  She has 1 brother with no cancer.  She has 2 children    Subjective    Today I am meeting with Mirta prior to treatment with Avastin, on study.  She continues on Tagrisso, and is doing well with the exception of grade 1 loose stools, for which she takes one imodium every morning.  She is planning a summer of trips with her boyfriend.  At last visit we had some concern about her blood pressure, but today it is 133/74.  She is active and feeling well.      Cancer  Associated symptoms include a change in bowel habit and fatigue. Pertinent negatives include no abdominal pain, anorexia, arthralgias, chest pain, chills, congestion, coughing,  diaphoresis, fever, headaches, joint swelling, myalgias, nausea, neck pain, numbness, rash, sore throat, swollen glands, urinary symptoms, vertigo, visual change, vomiting or weakness.     Objective    BSA: 1.46 meters squared  /74 (BP Location: Left arm, Patient Position: Sitting, BP Cuff Size: Adult)   Pulse 63   Temp 36.5 °C (97.7 °F) (Temporal)   Resp 15   Wt 49 kg (108 lb)   SpO2 100%   BMI 20.08 kg/m²      Physical Exam  Constitutional:       General: She is not in acute distress.     Appearance: She is not ill-appearing, toxic-appearing or diaphoretic.   HENT:      Nose: No congestion or rhinorrhea.      Mouth/Throat:      Pharynx: No oropharyngeal exudate or posterior oropharyngeal erythema.   Eyes:      General: No scleral icterus.     Conjunctiva/sclera: Conjunctivae normal.   Cardiovascular:      Rate and Rhythm: Normal rate and regular rhythm.      Pulses: Normal pulses.      Heart sounds: Normal heart sounds. No murmur heard.     No friction rub. No gallop.   Pulmonary:      Effort: Pulmonary effort is normal. No respiratory distress.      Breath sounds: Normal breath sounds. No stridor. No wheezing, rhonchi or rales.   Chest:      Chest wall: No tenderness.   Abdominal:      General: There is no distension.      Palpations: There is no mass.      Tenderness: There is no abdominal tenderness. There is no guarding or rebound.   Musculoskeletal:      Cervical back: No tenderness.      Right lower leg: No edema.      Left lower leg: No edema.   Lymphadenopathy:      Cervical: No cervical adenopathy.   Skin:     Coloration: Skin is not jaundiced or pale.      Findings: No bruising, erythema, lesion or rash.   Neurological:      General: No focal deficit present.      Mental Status: She is oriented to person, place, and time.   Psychiatric:         Mood and Affect: Mood normal.         Behavior: Behavior normal.       Performance Status:  Asymptomatic    === 05/28/24 ===    CT CHEST ABDOMEN  PELVIS W IV CONTRAST    - Impression -  CHEST:  1. Stable right lower lobe superior segment spiculated mass measuring  2.8 x 2.4 cm. Stable few other subcentimeter parenchymal and fissural  based pulmonary nodules. No new suspicious pulmonary nodules.  2. No new suspicious intrathoracic lymphadenopathy.    ABDOMEN-PELVIS:  1. No new concerning findings in the abdomen or pelvis.  2. Stable prominent nonspecific right obturator lymph node measuring  1 cm.  3. Hepatic steatosis.  4. Splenomegaly as in prior.    Osseous structures:    1.       Unchanged multiple metastatic osseous lesions. No new  suspicious or destructive osseous lesions.    MACRO:  None    Signed by: Olman Templeton 5/29/2024 8:06 AM  Dictation workstation:   [id number]   Assessment/Plan     This is a patient with EGFR mutation positive adenocarcinoma of the lung on a clinical trial of Tagrisso and bevacizumab.   She has grade 1 diarrhea, and grade 1 fatigue.  Her systolic blood pressure was up a little the past few visits- but is mild, grade one elevation at 133/74 today.  If it reaches grade 2 (systolic over 150) we will consider antihypertensives.   She does not, obviously, need them today.    She is in agreement with the plan.       Cancer Staging   No matching staging information was found for the patient.      Oncology History   Lung cancer (Multi)   7/12/2023 -  Research Study Participant    ([facility]) [id number] Arm B - Bevacizumab / Osimertinib, 21 Day Cycles  Plan Provider: Humberto Landin MD  Treatment goal: [No plan goal]  Line of treatment: [No plan line of treatment]  Associated studies: [id number] (Osimertinib) +/- Bevacizumab as Initial Treatment for EGFR-Mutant Lung Cancer     9/1/2023 Initial Diagnosis    Lung cancer (CMS/HCC)          GONZALO Forbes-CNS

## 2024-07-01 NOTE — PROGRESS NOTES
-Patient presents to the clinic today for study beva  -Patient tolerated infusion well  -Patient discharged in stable condition. Reviewed calendar/next appointment, all questions answered.   -Patient ambulated out of clinic with ease  -Notes/Plan for next visit-

## 2024-07-02 NOTE — RESEARCH NOTES
Research Note Treatment Day    Mirta Hills is here on 07/01/2024 for treatment on [id number]. Today is C1D17. Procedures completed per protocol. AE's and con-meds reviewed with patient. Patient is aware of treatment plan.    [x]   Received treatment as planned   OR  []    Treatment delayed; patient calendar updated as required   Treatment delayed because:    []   AE    []   Physician Discretion    []   Clinical Deterioration or Progression     []   Other

## 2024-07-08 DIAGNOSIS — I42.7 CARDIOTOXICITY (MULTI): ICD-10-CM

## 2024-07-08 DIAGNOSIS — C34.90 MALIGNANT NEOPLASM OF LUNG, UNSPECIFIED LATERALITY, UNSPECIFIED PART OF LUNG (MULTI): ICD-10-CM

## 2024-07-12 ENCOUNTER — APPOINTMENT (OUTPATIENT)
Dept: PHYSICAL THERAPY | Facility: CLINIC | Age: 69
End: 2024-07-12
Payer: MEDICARE

## 2024-07-15 ENCOUNTER — APPOINTMENT (OUTPATIENT)
Dept: HEMATOLOGY/ONCOLOGY | Facility: HOSPITAL | Age: 69
End: 2024-07-15
Payer: MEDICARE

## 2024-07-22 ENCOUNTER — OFFICE VISIT (OUTPATIENT)
Dept: HEMATOLOGY/ONCOLOGY | Facility: HOSPITAL | Age: 69
End: 2024-07-22
Payer: MEDICARE

## 2024-07-22 ENCOUNTER — LAB (OUTPATIENT)
Dept: LAB | Facility: HOSPITAL | Age: 69
End: 2024-07-22
Payer: MEDICARE

## 2024-07-22 ENCOUNTER — TELEPHONE (OUTPATIENT)
Dept: PRIMARY CARE | Facility: CLINIC | Age: 69
End: 2024-07-22

## 2024-07-22 ENCOUNTER — INFUSION (OUTPATIENT)
Dept: HEMATOLOGY/ONCOLOGY | Facility: HOSPITAL | Age: 69
End: 2024-07-22
Payer: MEDICARE

## 2024-07-22 ENCOUNTER — EDUCATION (OUTPATIENT)
Dept: HEMATOLOGY/ONCOLOGY | Facility: HOSPITAL | Age: 69
End: 2024-07-22
Payer: MEDICARE

## 2024-07-22 VITALS
DIASTOLIC BLOOD PRESSURE: 71 MMHG | SYSTOLIC BLOOD PRESSURE: 138 MMHG | OXYGEN SATURATION: 100 % | BODY MASS INDEX: 20 KG/M2 | WEIGHT: 107.58 LBS | RESPIRATION RATE: 20 BRPM | TEMPERATURE: 97 F | HEART RATE: 72 BPM

## 2024-07-22 DIAGNOSIS — Z00.00 MEDICARE ANNUAL WELLNESS VISIT, SUBSEQUENT: Primary | ICD-10-CM

## 2024-07-22 DIAGNOSIS — E03.9 HYPOTHYROIDISM, UNSPECIFIED TYPE: ICD-10-CM

## 2024-07-22 DIAGNOSIS — C34.90 MALIGNANT NEOPLASM OF LUNG, UNSPECIFIED LATERALITY, UNSPECIFIED PART OF LUNG (MULTI): ICD-10-CM

## 2024-07-22 DIAGNOSIS — C34.90 MALIGNANT NEOPLASM OF LUNG, UNSPECIFIED LATERALITY, UNSPECIFIED PART OF LUNG (MULTI): Primary | ICD-10-CM

## 2024-07-22 DIAGNOSIS — E03.9 HYPOTHYROIDISM, UNSPECIFIED TYPE: Primary | ICD-10-CM

## 2024-07-22 DIAGNOSIS — F41.9 ANXIETY DISORDER, UNSPECIFIED TYPE: ICD-10-CM

## 2024-07-22 LAB
ALBUMIN SERPL BCP-MCNC: 4.2 G/DL (ref 3.4–5)
ALP SERPL-CCNC: 52 U/L (ref 33–136)
ALT SERPL W P-5'-P-CCNC: 13 U/L (ref 7–45)
ANION GAP SERPL CALC-SCNC: 12 MMOL/L (ref 10–20)
APPEARANCE UR: CLEAR
AST SERPL W P-5'-P-CCNC: 18 U/L (ref 9–39)
BASOPHILS # BLD AUTO: 0.02 X10*3/UL (ref 0–0.1)
BASOPHILS NFR BLD AUTO: 0.5 %
BILIRUB SERPL-MCNC: 0.5 MG/DL (ref 0–1.2)
BILIRUB UR STRIP.AUTO-MCNC: NEGATIVE MG/DL
BUN SERPL-MCNC: 22 MG/DL (ref 6–23)
CALCIUM SERPL-MCNC: 9.4 MG/DL (ref 8.6–10.3)
CHLORIDE SERPL-SCNC: 104 MMOL/L (ref 98–107)
CK SERPL-CCNC: 51 U/L (ref 0–215)
CO2 SERPL-SCNC: 29 MMOL/L (ref 21–32)
COLOR UR: YELLOW
CREAT SERPL-MCNC: 1.24 MG/DL (ref 0.5–1.05)
EGFRCR SERPLBLD CKD-EPI 2021: 47 ML/MIN/1.73M*2
EOSINOPHIL # BLD AUTO: 0.08 X10*3/UL (ref 0–0.4)
EOSINOPHIL NFR BLD AUTO: 1.9 %
ERYTHROCYTE [DISTWIDTH] IN BLOOD BY AUTOMATED COUNT: 14.3 % (ref 11.5–14.5)
GLUCOSE SERPL-MCNC: 76 MG/DL (ref 74–99)
GLUCOSE UR STRIP.AUTO-MCNC: NORMAL MG/DL
HCT VFR BLD AUTO: 41.6 % (ref 36–46)
HGB BLD-MCNC: 13.6 G/DL (ref 12–16)
IMM GRANULOCYTES # BLD AUTO: 0.02 X10*3/UL (ref 0–0.5)
IMM GRANULOCYTES NFR BLD AUTO: 0.5 % (ref 0–0.9)
KETONES UR STRIP.AUTO-MCNC: NEGATIVE MG/DL
LEUKOCYTE ESTERASE UR QL STRIP.AUTO: ABNORMAL
LYMPHOCYTES # BLD AUTO: 0.65 X10*3/UL (ref 0.8–3)
LYMPHOCYTES NFR BLD AUTO: 15.4 %
MAGNESIUM SERPL-MCNC: 2.31 MG/DL (ref 1.6–2.4)
MCH RBC QN AUTO: 30.4 PG (ref 26–34)
MCHC RBC AUTO-ENTMCNC: 32.7 G/DL (ref 32–36)
MCV RBC AUTO: 93 FL (ref 80–100)
MONOCYTES # BLD AUTO: 0.34 X10*3/UL (ref 0.05–0.8)
MONOCYTES NFR BLD AUTO: 8 %
MUCOUS THREADS #/AREA URNS AUTO: ABNORMAL /LPF
NEUTROPHILS # BLD AUTO: 3.12 X10*3/UL (ref 1.6–5.5)
NEUTROPHILS NFR BLD AUTO: 73.7 %
NITRITE UR QL STRIP.AUTO: NEGATIVE
NRBC BLD-RTO: 0 /100 WBCS (ref 0–0)
PH UR STRIP.AUTO: 6.5 [PH]
PLATELET # BLD AUTO: 161 X10*3/UL (ref 150–450)
POTASSIUM SERPL-SCNC: 4.9 MMOL/L (ref 3.5–5.3)
PROT SERPL-MCNC: 6.6 G/DL (ref 6.4–8.2)
PROT UR STRIP.AUTO-MCNC: ABNORMAL MG/DL
RBC # BLD AUTO: 4.47 X10*6/UL (ref 4–5.2)
RBC # UR STRIP.AUTO: NEGATIVE /UL
RBC #/AREA URNS AUTO: ABNORMAL /HPF
SODIUM SERPL-SCNC: 140 MMOL/L (ref 136–145)
SP GR UR STRIP.AUTO: 1.02
SQUAMOUS #/AREA URNS AUTO: ABNORMAL /HPF
T4 FREE SERPL-MCNC: 1.8 NG/DL (ref 0.78–1.48)
TRANS CELLS #/AREA UR COMP ASSIST: ABNORMAL /HPF
TSH SERPL-ACNC: 0.17 MIU/L (ref 0.44–3.98)
UROBILINOGEN UR STRIP.AUTO-MCNC: NORMAL MG/DL
WBC # BLD AUTO: 4.2 X10*3/UL (ref 4.4–11.3)
WBC #/AREA URNS AUTO: ABNORMAL /HPF

## 2024-07-22 PROCEDURE — 85025 COMPLETE CBC W/AUTO DIFF WBC: CPT

## 2024-07-22 PROCEDURE — 81003 URINALYSIS AUTO W/O SCOPE: CPT | Performed by: CLINICAL NURSE SPECIALIST

## 2024-07-22 PROCEDURE — 96413 CHEMO IV INFUSION 1 HR: CPT

## 2024-07-22 PROCEDURE — 99214 OFFICE O/P EST MOD 30 MIN: CPT | Performed by: CLINICAL NURSE SPECIALIST

## 2024-07-22 PROCEDURE — 83735 ASSAY OF MAGNESIUM: CPT

## 2024-07-22 PROCEDURE — 82550 ASSAY OF CK (CPK): CPT | Performed by: CLINICAL NURSE SPECIALIST

## 2024-07-22 PROCEDURE — 84443 ASSAY THYROID STIM HORMONE: CPT | Performed by: INTERNAL MEDICINE

## 2024-07-22 PROCEDURE — 80053 COMPREHEN METABOLIC PANEL: CPT

## 2024-07-22 PROCEDURE — 84439 ASSAY OF FREE THYROXINE: CPT | Performed by: INTERNAL MEDICINE

## 2024-07-22 PROCEDURE — 81001 URINALYSIS AUTO W/SCOPE: CPT | Performed by: CLINICAL NURSE SPECIALIST

## 2024-07-22 PROCEDURE — 2560000001 HC RX 256 EXPERIMENTAL DRUGS: Performed by: CLINICAL NURSE SPECIALIST

## 2024-07-22 PROCEDURE — 36415 COLL VENOUS BLD VENIPUNCTURE: CPT

## 2024-07-22 RX ORDER — ALBUTEROL SULFATE 0.83 MG/ML
3 SOLUTION RESPIRATORY (INHALATION) AS NEEDED
Status: DISCONTINUED | OUTPATIENT
Start: 2024-07-22 | End: 2024-07-22 | Stop reason: HOSPADM

## 2024-07-22 RX ORDER — EPINEPHRINE 0.3 MG/.3ML
0.3 INJECTION SUBCUTANEOUS EVERY 5 MIN PRN
Status: CANCELLED | OUTPATIENT
Start: 2024-07-22

## 2024-07-22 RX ORDER — PROCHLORPERAZINE MALEATE 10 MG
10 TABLET ORAL EVERY 6 HOURS PRN
Status: DISCONTINUED | OUTPATIENT
Start: 2024-07-22 | End: 2024-07-22 | Stop reason: HOSPADM

## 2024-07-22 RX ORDER — EPINEPHRINE 0.3 MG/.3ML
0.3 INJECTION SUBCUTANEOUS EVERY 5 MIN PRN
Status: DISCONTINUED | OUTPATIENT
Start: 2024-07-22 | End: 2024-07-22 | Stop reason: HOSPADM

## 2024-07-22 RX ORDER — DIPHENHYDRAMINE HYDROCHLORIDE 50 MG/ML
50 INJECTION INTRAMUSCULAR; INTRAVENOUS AS NEEDED
Status: CANCELLED | OUTPATIENT
Start: 2024-07-22

## 2024-07-22 RX ORDER — ALBUTEROL SULFATE 0.83 MG/ML
3 SOLUTION RESPIRATORY (INHALATION) AS NEEDED
Status: CANCELLED | OUTPATIENT
Start: 2024-07-22

## 2024-07-22 RX ORDER — SERTRALINE HYDROCHLORIDE 50 MG/1
75 TABLET, FILM COATED ORAL DAILY
Qty: 135 TABLET | Refills: 3 | Status: SHIPPED | OUTPATIENT
Start: 2024-07-22

## 2024-07-22 RX ORDER — FAMOTIDINE 10 MG/ML
20 INJECTION INTRAVENOUS ONCE AS NEEDED
Status: DISCONTINUED | OUTPATIENT
Start: 2024-07-22 | End: 2024-07-22 | Stop reason: HOSPADM

## 2024-07-22 RX ORDER — PROCHLORPERAZINE EDISYLATE 5 MG/ML
10 INJECTION INTRAMUSCULAR; INTRAVENOUS EVERY 6 HOURS PRN
Status: DISCONTINUED | OUTPATIENT
Start: 2024-07-22 | End: 2024-07-22 | Stop reason: HOSPADM

## 2024-07-22 RX ORDER — PROCHLORPERAZINE MALEATE 10 MG
10 TABLET ORAL EVERY 6 HOURS PRN
Status: CANCELLED | OUTPATIENT
Start: 2024-07-22

## 2024-07-22 RX ORDER — FAMOTIDINE 10 MG/ML
20 INJECTION INTRAVENOUS ONCE AS NEEDED
Status: CANCELLED | OUTPATIENT
Start: 2024-07-22

## 2024-07-22 RX ORDER — PROCHLORPERAZINE EDISYLATE 5 MG/ML
10 INJECTION INTRAMUSCULAR; INTRAVENOUS EVERY 6 HOURS PRN
Status: CANCELLED | OUTPATIENT
Start: 2024-07-22

## 2024-07-22 RX ORDER — DIPHENHYDRAMINE HYDROCHLORIDE 50 MG/ML
50 INJECTION INTRAMUSCULAR; INTRAVENOUS AS NEEDED
Status: DISCONTINUED | OUTPATIENT
Start: 2024-07-22 | End: 2024-07-22 | Stop reason: HOSPADM

## 2024-07-22 ASSESSMENT — PAIN SCALES - GENERAL: PAINLEVEL: 0-NO PAIN

## 2024-07-22 NOTE — TELEPHONE ENCOUNTER
1) Updated Rx sent     2) did change Medicare wellness to subsequent - sorry - I didn't see any obvious prior documentation in her chart    Thanks

## 2024-07-22 NOTE — PROGRESS NOTES
Patient came for her Study Bevacizumab. She tolerated her infusion without any issue. She was discharged stable.

## 2024-07-22 NOTE — RESEARCH NOTES
Research Note Treatment Day    Mirta Hills is here today for treatment on [id number]. Today is C18. Procedures completed per protocol. AE's and con-meds reviewed with patient. Patient is aware of treatment plan. Patient endorsed 2 small healing skin lesions on lower and upper abdomen for about 2 months.     [x]   Received treatment as planned   OR  []    Treatment delayed; patient calendar updated as required   Treatment delayed because:    []   AE    []   Physician Discretion    []   Clinical Deterioration or Progression     []   Other    Education Documentation  Treatment Plan and Schedule, taught by Makayla Joseph RN at 7/22/2024 12:02 PM.  Learner: Patient  Readiness: Acceptance  Method: Explanation  Response: Verbalizes Understanding    Education Comments  No comments found.

## 2024-07-22 NOTE — TELEPHONE ENCOUNTER
"1)  Patient is taking Sertraline 75mg daily.   She had asked that it be increased with the last order but it was only sent in for 50mg.   She states she is now out of medication.  She is requesting that a new RX be sent in for 1.5 tab daily to the [facility].    2)  She received a bill for the Medicare Wellness exam that she had back in March.  It looks like it was billed as an \"Initial Exam\" with Medicare and she already had done this.  Are you able to update the cold to a subsequent visit?  "

## 2024-07-27 ASSESSMENT — ENCOUNTER SYMPTOMS
VERTIGO: 0
FATIGUE: 1
SWOLLEN GLANDS: 0
ANOREXIA: 0
SORE THROAT: 0
CHANGE IN BOWEL HABIT: 1
WEAKNESS: 0
ABDOMINAL PAIN: 0
COUGH: 0
FEVER: 0
NUMBNESS: 0
VISUAL CHANGE: 0
HEADACHES: 0
NECK PAIN: 0
JOINT SWELLING: 0
ARTHRALGIAS: 0
CHILLS: 0
DIAPHORESIS: 0
MYALGIAS: 0
VOMITING: 0
NAUSEA: 0

## 2024-07-27 NOTE — PROGRESS NOTES
Patient ID: Mirta Hills is a 69 y.o. female.    DIAGNOSIS     Adenocarcinoma of the lung.  Date of diagnosis is [place] 15, 2023 from a level 7 lymph node biopsy/fine-needle aspiration.  Immunohistochemistry positive for TTF-1.        STAGING     Clinical T2a (right lower lobe mass measuring 3.6 cm), clinical N2 (PET positivity and subcarinal region), M1C disease (presence of brain metastases, ribs, right acetabulum, liver, brain), stage IVb disease        CURRENT SITES OF DISEASE     Right lower lobe, right hilum of the lung, ipsilateral mediastinum, liver, bones, brain        MOLECULAR GENOMICS     TEST: Focused Solid Tumor DNA/RNA Panel   SPECIMEN: Supernatant, LYMPH NODE 7, [id number] A   DISEASE DIAGNOSIS: Adenocarcinoma   Estimated Tumor Content: 40%   COLLECTION DATE: 6/15/2023     MICROSATELLITE STATUS: Microsatellite  Instability-High (MSI-H) is NOT DETECTED.     DISEASE ASSOCIATED GENOMIC FINDINGS:   EGFR p.[id number] ([id number] c.[id number])    TP53 p.N247I (NM_000546 c.740A>T)     DISEASE RELEVANT ALTERATIONS NOT DETECTED:   Negative for ALK fusion.   Negative for BRAF V600E.   Negative for ERBB2 activating mutation   Negative for KRAS G12C.   Negative for  MET exon 14 skipping mutation.   Negative for NTRK fusion.   Negative for RET fusion.   Negative for ROS1 fusion.        Circulating tumor DNA sent on June 19, 2023  EGFR p.[id number], Variant allelic fraction of 1.6%  TP53 N247I Missense variant, loss of function, variant allelic fraction 1.1%  MSI stable        SERUM TUMOR MARKER     Slightly elevated CEA and CA 19.9 June 2023        PRIOR THERAPY     1- Gamma knife radiosurgery to brain lesions completed on June 28, 2023.        CURRENT THERAPY     Osimertinib on study  on clinical trial EA 5182 which is a randomized trial of osimertinib with or without bevacizumab.  She has been randomized to the bevacizumab arm- Cycle 1 Day 1 is 7/12/2023        CURRENT ONCOLOGICAL PROBLEMS     1-brain  metastases, symptomatic, word finding difficulties, mild unsteadiness on her feet but no falls, Resolved July 3, 2023  2-anterior chest pain, increases with deep inspiration, Improved July 3, 2023  3-mild intermittent grade 1 diarrhea, mild hair loss, mild leukopenia, mild elevation in creatinine, mild fatigue.  Most likely all osimertinib related August 7, 2023        HISTORY OF PRESENT ILLNESS     This is a 69-year-old patient.  She states that towards Thanksgiving of 2022 she started feeling some pain in her right shoulder.  Eventually got better however the same pain came back in January or February 2023.  Ultimately some x-rays were done of  the shoulder that were generally negative.  She then developed some pain in her sternum a month or 2 later with deep inspiration and ultimately underwent imaging in May 2023.  Chest CT without contrast was done on June 1, 2023.  This showed a 3.4 cm mass  within the superior segment of the right lower lobe with some associated satellite pulmonary nodules.  Additionally there were some other subcentimeter pulmonary nodules.  There was evidence of mediastinal lymphadenopathy.  Subcarinal lymph node measured  1.4 cm in short axis.She was seen by pulmonary medicine on June 5, 2023 a combined PET/CT was ordered on June 6, 2023 showing a relatively intense area of hypermetabolic activity along the superior posteromedial aspect of the right lower lobe corresponding  to the known right lower lobe lung mass.  There was foci of hypermetabolic activity in the right supraclavicular region, subcarinal and right hilar regions.  There was area of uptake within the posterior upper ribs as well as right acetabulum suspicious  for osseous metastatic disease.  There was a punctate area of mild hypermetabolic activity in the right hepatic lobe concerning for hepatic metastases.  Small focus in the subcutaneous region just anterior to the lower aspect of the sternum could be inflammatory   versus metastatic.  She undergoes a bronchoscopy dated [place] 15, 2023.  There were no endobronchial lesions.  Primary cytology with rapid onsite evaluation was suggestive of malignancy in level 7, final lymph nodes were pathology was pending.  MRI of the  brain with and without contrast dated June 16, 2023 showed approximately 20 enhancing nodules concerning for metastatic disease.  Specifically there was a 1.5 cm nodule in the right cerebellar hemisphere.  Most of the other nodules were less than 1 cm.   Patient also notes that she has some difficulty expressing words over the past 1 to 2 weeks prior to her first visit with us as well as some mild unsteadiness on her legs where she feels she is drifting towards the right side.        PAST MEDICAL HISTORY     1-hypothyroidism  2-hysterectomy 1988  3-abdominal hernia surgery 1995  4-some hearing difficulties        SOCIAL HISTORY     Patient lives with her significant other.  She has 2 daughter.  Lives in [facility].  Smoked for only 3 years during college and during this time it was a pack per day.  She has retired.  She had a retail store.        CURRENT MEDS     See medication list, meds reviewed        ALLERGIES     Patient denies any drug allergies        FAMILY HISTORY     Patient's father had bladder cancer at the age of 86.  She has 1 brother with no cancer.  She has 2 children    Subjective    Today I am meeting with Mirta prior to treatment with Avastin, on study.  She continues on Tagrisso, and is doing well with the exception of grade 1 loose stools, for which she takes one imodium every morning.  She notices some hair thinning.  She is planning a summer of trips with her boyfriend.  At last visit we had some concern about her blood pressure, but today it is 138/71.  She is active and feeling well.      Cancer  Associated symptoms include a change in bowel habit and fatigue. Pertinent negatives include no abdominal pain, anorexia, arthralgias, chest pain,  chills, congestion, coughing, diaphoresis, fever, headaches, joint swelling, myalgias, nausea, neck pain, numbness, rash, sore throat, swollen glands, urinary symptoms, vertigo, visual change, vomiting or weakness.     Objective    BSA: 1.46 meters squared  /71 (BP Location: Right arm, Patient Position: Sitting, BP Cuff Size: Small adult)   Pulse 72   Temp 36.1 °C (97 °F) (Temporal)   Resp 20   Wt 48.8 kg (107 lb 9.4 oz)   SpO2 100%   BMI 20.00 kg/m²      Physical Exam  Constitutional:       General: She is not in acute distress.     Appearance: She is not ill-appearing, toxic-appearing or diaphoretic.   HENT:      Nose: No congestion or rhinorrhea.      Mouth/Throat:      Pharynx: No oropharyngeal exudate or posterior oropharyngeal erythema.   Eyes:      General: No scleral icterus.     Conjunctiva/sclera: Conjunctivae normal.   Cardiovascular:      Rate and Rhythm: Normal rate and regular rhythm.      Pulses: Normal pulses.      Heart sounds: Normal heart sounds. No murmur heard.     No friction rub. No gallop.   Pulmonary:      Effort: Pulmonary effort is normal. No respiratory distress.      Breath sounds: Normal breath sounds. No stridor. No wheezing, rhonchi or rales.   Chest:      Chest wall: No tenderness.   Abdominal:      General: There is no distension.      Palpations: There is no mass.      Tenderness: There is no abdominal tenderness. There is no guarding or rebound.   Musculoskeletal:      Cervical back: No tenderness.      Right lower leg: No edema.      Left lower leg: No edema.   Lymphadenopathy:      Cervical: No cervical adenopathy.   Skin:     Coloration: Skin is not jaundiced or pale.      Findings: No bruising, erythema, lesion or rash.   Neurological:      General: No focal deficit present.      Mental Status: She is oriented to person, place, and time.   Psychiatric:         Mood and Affect: Mood normal.         Behavior: Behavior normal.         Performance  Status:  Asymptomatic    === 05/28/24 ===    CT CHEST ABDOMEN PELVIS W IV CONTRAST    - Impression -  CHEST:  1. Stable right lower lobe superior segment spiculated mass measuring  2.8 x 2.4 cm. Stable few other subcentimeter parenchymal and fissural  based pulmonary nodules. No new suspicious pulmonary nodules.  2. No new suspicious intrathoracic lymphadenopathy.    ABDOMEN-PELVIS:  1. No new concerning findings in the abdomen or pelvis.  2. Stable prominent nonspecific right obturator lymph node measuring  1 cm.  3. Hepatic steatosis.  4. Splenomegaly as in prior.    Osseous structures:    1.       Unchanged multiple metastatic osseous lesions. No new  suspicious or destructive osseous lesions.    MACRO:  None    Signed by: Olman Templeton 5/29/2024 8:06 AM  Dictation workstation:   [id number]   Assessment/Plan     This is a patient with EGFR mutation positive adenocarcinoma of the lung on a clinical trial of Tagrisso and bevacizumab.   She has grade 1 diarrhea, and grade 1 fatigue.  Her systolic blood pressure was up a little the past few visits- but is mild, grade one elevation at 138/71 today.  If it reaches grade 2 (systolic over 150) we will consider antihypertensives.   She does not, obviously, need them today.    She is in agreement with the plan.       Cancer Staging   No matching staging information was found for the patient.      Oncology History   Lung cancer (Multi)   7/12/2023 -  Research Study Participant    ([facility]) [id number] Arm B - Bevacizumab / Osimertinib, 21 Day Cycles  Plan Provider: Humberto Landin MD  Treatment goal: [No plan goal]  Line of treatment: [No plan line of treatment]  Associated studies: [id number] (Osimertinib) +/- Bevacizumab as Initial Treatment for EGFR-Mutant Lung Cancer     9/1/2023 Initial Diagnosis    Lung cancer (CMS/HCC)          GONZALO Forbes-CNS

## 2024-08-05 ENCOUNTER — APPOINTMENT (OUTPATIENT)
Dept: ENDOCRINOLOGY | Facility: CLINIC | Age: 69
End: 2024-08-05
Payer: MEDICARE

## 2024-08-05 ENCOUNTER — APPOINTMENT (OUTPATIENT)
Dept: HEMATOLOGY/ONCOLOGY | Facility: HOSPITAL | Age: 69
End: 2024-08-05
Payer: MEDICARE

## 2024-08-08 ENCOUNTER — HOSPITAL ENCOUNTER (OUTPATIENT)
Dept: RADIOLOGY | Facility: HOSPITAL | Age: 69
Discharge: HOME | End: 2024-08-08
Payer: MEDICARE

## 2024-08-08 DIAGNOSIS — C34.90 MALIGNANT NEOPLASM OF LUNG, UNSPECIFIED LATERALITY, UNSPECIFIED PART OF LUNG (MULTI): ICD-10-CM

## 2024-08-08 PROCEDURE — A9575 INJ GADOTERATE MEGLUMI 0.1ML: HCPCS | Performed by: INTERNAL MEDICINE

## 2024-08-08 PROCEDURE — 2550000001 HC RX 255 CONTRASTS: Performed by: INTERNAL MEDICINE

## 2024-08-08 PROCEDURE — 70553 MRI BRAIN STEM W/O & W/DYE: CPT

## 2024-08-08 PROCEDURE — 74177 CT ABD & PELVIS W/CONTRAST: CPT

## 2024-08-08 RX ORDER — GADOTERATE MEGLUMINE 376.9 MG/ML
9 INJECTION INTRAVENOUS
Status: COMPLETED | OUTPATIENT
Start: 2024-08-08 | End: 2024-08-08

## 2024-08-12 ENCOUNTER — INFUSION (OUTPATIENT)
Dept: HEMATOLOGY/ONCOLOGY | Facility: HOSPITAL | Age: 69
End: 2024-08-12
Payer: MEDICARE

## 2024-08-12 ENCOUNTER — LAB (OUTPATIENT)
Dept: LAB | Facility: HOSPITAL | Age: 69
End: 2024-08-12
Payer: MEDICARE

## 2024-08-12 ENCOUNTER — APPOINTMENT (OUTPATIENT)
Dept: HEMATOLOGY/ONCOLOGY | Facility: HOSPITAL | Age: 69
End: 2024-08-12
Payer: MEDICARE

## 2024-08-12 ENCOUNTER — OFFICE VISIT (OUTPATIENT)
Dept: HEMATOLOGY/ONCOLOGY | Facility: HOSPITAL | Age: 69
End: 2024-08-12
Payer: MEDICARE

## 2024-08-12 ENCOUNTER — EDUCATION (OUTPATIENT)
Dept: HEMATOLOGY/ONCOLOGY | Facility: HOSPITAL | Age: 69
End: 2024-08-12
Payer: MEDICARE

## 2024-08-12 VITALS
BODY MASS INDEX: 19.75 KG/M2 | SYSTOLIC BLOOD PRESSURE: 121 MMHG | OXYGEN SATURATION: 100 % | WEIGHT: 106.26 LBS | DIASTOLIC BLOOD PRESSURE: 75 MMHG | HEART RATE: 70 BPM | RESPIRATION RATE: 18 BRPM | TEMPERATURE: 98.1 F

## 2024-08-12 DIAGNOSIS — C34.90 MALIGNANT NEOPLASM OF LUNG, UNSPECIFIED LATERALITY, UNSPECIFIED PART OF LUNG (MULTI): ICD-10-CM

## 2024-08-12 DIAGNOSIS — C34.90 MALIGNANT NEOPLASM OF LUNG, UNSPECIFIED LATERALITY, UNSPECIFIED PART OF LUNG (MULTI): Primary | ICD-10-CM

## 2024-08-12 DIAGNOSIS — E03.9 HYPOTHYROIDISM, UNSPECIFIED TYPE: ICD-10-CM

## 2024-08-12 LAB
ALBUMIN SERPL BCP-MCNC: 4.4 G/DL (ref 3.4–5)
ALP SERPL-CCNC: 54 U/L (ref 33–136)
ALT SERPL W P-5'-P-CCNC: 12 U/L (ref 7–45)
ANION GAP SERPL CALC-SCNC: 13 MMOL/L (ref 10–20)
AST SERPL W P-5'-P-CCNC: 16 U/L (ref 9–39)
BASOPHILS # BLD AUTO: 0.04 X10*3/UL (ref 0–0.1)
BASOPHILS NFR BLD AUTO: 0.9 %
BILIRUB SERPL-MCNC: 0.4 MG/DL (ref 0–1.2)
BUN SERPL-MCNC: 23 MG/DL (ref 6–23)
CALCIUM SERPL-MCNC: 9.5 MG/DL (ref 8.6–10.3)
CHLORIDE SERPL-SCNC: 102 MMOL/L (ref 98–107)
CK SERPL-CCNC: 34 U/L (ref 0–215)
CO2 SERPL-SCNC: 28 MMOL/L (ref 21–32)
CREAT SERPL-MCNC: 1.17 MG/DL (ref 0.5–1.05)
EGFRCR SERPLBLD CKD-EPI 2021: 50 ML/MIN/1.73M*2
EOSINOPHIL # BLD AUTO: 0.11 X10*3/UL (ref 0–0.4)
EOSINOPHIL NFR BLD AUTO: 2.5 %
ERYTHROCYTE [DISTWIDTH] IN BLOOD BY AUTOMATED COUNT: 14.2 % (ref 11.5–14.5)
GLUCOSE SERPL-MCNC: 86 MG/DL (ref 74–99)
HCT VFR BLD AUTO: 43.8 % (ref 36–46)
HGB BLD-MCNC: 14.3 G/DL (ref 12–16)
IMM GRANULOCYTES # BLD AUTO: 0.03 X10*3/UL (ref 0–0.5)
IMM GRANULOCYTES NFR BLD AUTO: 0.7 % (ref 0–0.9)
LYMPHOCYTES # BLD AUTO: 0.71 X10*3/UL (ref 0.8–3)
LYMPHOCYTES NFR BLD AUTO: 16 %
MAGNESIUM SERPL-MCNC: 2.01 MG/DL (ref 1.6–2.4)
MCH RBC QN AUTO: 29.9 PG (ref 26–34)
MCHC RBC AUTO-ENTMCNC: 32.6 G/DL (ref 32–36)
MCV RBC AUTO: 91 FL (ref 80–100)
MONOCYTES # BLD AUTO: 0.36 X10*3/UL (ref 0.05–0.8)
MONOCYTES NFR BLD AUTO: 8.1 %
NEUTROPHILS # BLD AUTO: 3.18 X10*3/UL (ref 1.6–5.5)
NEUTROPHILS NFR BLD AUTO: 71.8 %
NRBC BLD-RTO: 0 /100 WBCS (ref 0–0)
PLATELET # BLD AUTO: 161 X10*3/UL (ref 150–450)
POTASSIUM SERPL-SCNC: 4.5 MMOL/L (ref 3.5–5.3)
PROT SERPL-MCNC: 6.7 G/DL (ref 6.4–8.2)
RBC # BLD AUTO: 4.79 X10*6/UL (ref 4–5.2)
SODIUM SERPL-SCNC: 138 MMOL/L (ref 136–145)
TSH SERPL-ACNC: 0.54 MIU/L (ref 0.44–3.98)
WBC # BLD AUTO: 4.4 X10*3/UL (ref 4.4–11.3)

## 2024-08-12 PROCEDURE — 82550 ASSAY OF CK (CPK): CPT

## 2024-08-12 PROCEDURE — 85025 COMPLETE CBC W/AUTO DIFF WBC: CPT

## 2024-08-12 PROCEDURE — 99215 OFFICE O/P EST HI 40 MIN: CPT | Performed by: CLINICAL NURSE SPECIALIST

## 2024-08-12 PROCEDURE — 36415 COLL VENOUS BLD VENIPUNCTURE: CPT

## 2024-08-12 PROCEDURE — 2560000001 HC RX 256 EXPERIMENTAL DRUGS: Performed by: INTERNAL MEDICINE

## 2024-08-12 PROCEDURE — 84075 ASSAY ALKALINE PHOSPHATASE: CPT

## 2024-08-12 PROCEDURE — 1126F AMNT PAIN NOTED NONE PRSNT: CPT | Performed by: CLINICAL NURSE SPECIALIST

## 2024-08-12 PROCEDURE — 83735 ASSAY OF MAGNESIUM: CPT

## 2024-08-12 PROCEDURE — 1159F MED LIST DOCD IN RCRD: CPT | Performed by: CLINICAL NURSE SPECIALIST

## 2024-08-12 PROCEDURE — 96413 CHEMO IV INFUSION 1 HR: CPT

## 2024-08-12 PROCEDURE — 84443 ASSAY THYROID STIM HORMONE: CPT | Performed by: INTERNAL MEDICINE

## 2024-08-12 RX ORDER — ALBUTEROL SULFATE 0.83 MG/ML
3 SOLUTION RESPIRATORY (INHALATION) AS NEEDED
Status: DISCONTINUED | OUTPATIENT
Start: 2024-08-12 | End: 2024-08-12 | Stop reason: HOSPADM

## 2024-08-12 RX ORDER — PROCHLORPERAZINE EDISYLATE 5 MG/ML
10 INJECTION INTRAMUSCULAR; INTRAVENOUS EVERY 6 HOURS PRN
Status: CANCELLED | OUTPATIENT
Start: 2024-08-12

## 2024-08-12 RX ORDER — DIPHENHYDRAMINE HYDROCHLORIDE 50 MG/ML
50 INJECTION INTRAMUSCULAR; INTRAVENOUS AS NEEDED
Status: CANCELLED | OUTPATIENT
Start: 2024-08-12

## 2024-08-12 RX ORDER — FAMOTIDINE 10 MG/ML
20 INJECTION INTRAVENOUS ONCE AS NEEDED
Status: DISCONTINUED | OUTPATIENT
Start: 2024-08-12 | End: 2024-08-12 | Stop reason: HOSPADM

## 2024-08-12 RX ORDER — EPINEPHRINE 0.3 MG/.3ML
0.3 INJECTION SUBCUTANEOUS EVERY 5 MIN PRN
Status: CANCELLED | OUTPATIENT
Start: 2024-08-12

## 2024-08-12 RX ORDER — ALBUTEROL SULFATE 0.83 MG/ML
3 SOLUTION RESPIRATORY (INHALATION) AS NEEDED
Status: CANCELLED | OUTPATIENT
Start: 2024-08-12

## 2024-08-12 RX ORDER — DIPHENHYDRAMINE HYDROCHLORIDE 50 MG/ML
50 INJECTION INTRAMUSCULAR; INTRAVENOUS AS NEEDED
Status: DISCONTINUED | OUTPATIENT
Start: 2024-08-12 | End: 2024-08-12 | Stop reason: HOSPADM

## 2024-08-12 RX ORDER — FAMOTIDINE 10 MG/ML
20 INJECTION INTRAVENOUS ONCE AS NEEDED
Status: CANCELLED | OUTPATIENT
Start: 2024-08-12

## 2024-08-12 RX ORDER — PROCHLORPERAZINE MALEATE 10 MG
10 TABLET ORAL EVERY 6 HOURS PRN
Status: CANCELLED | OUTPATIENT
Start: 2024-08-12

## 2024-08-12 RX ORDER — EPINEPHRINE 0.3 MG/.3ML
0.3 INJECTION SUBCUTANEOUS EVERY 5 MIN PRN
Status: DISCONTINUED | OUTPATIENT
Start: 2024-08-12 | End: 2024-08-12 | Stop reason: HOSPADM

## 2024-08-12 RX ORDER — PROCHLORPERAZINE MALEATE 10 MG
10 TABLET ORAL EVERY 6 HOURS PRN
Status: DISCONTINUED | OUTPATIENT
Start: 2024-08-12 | End: 2024-08-12 | Stop reason: HOSPADM

## 2024-08-12 RX ORDER — PROCHLORPERAZINE EDISYLATE 5 MG/ML
10 INJECTION INTRAMUSCULAR; INTRAVENOUS EVERY 6 HOURS PRN
Status: DISCONTINUED | OUTPATIENT
Start: 2024-08-12 | End: 2024-08-12 | Stop reason: HOSPADM

## 2024-08-12 ASSESSMENT — PAIN SCALES - GENERAL: PAINLEVEL: 0-NO PAIN

## 2024-08-12 NOTE — RESEARCH NOTES
Research Note Treatment Day    Mirta Hills is here today for treatment on [id number]. Today is C19. Procedures completed per protocol. AE's and con-meds reviewed with patient. Patient is aware of treatment plan.    [x]   Received treatment as planned   OR  []    Treatment delayed; patient calendar updated as required   Treatment delayed because:    []   AE    []   Physician Discretion    []   Clinical Deterioration or Progression     []   Other

## 2024-08-12 NOTE — PROGRESS NOTES
Patient arrived to Infusion for her study bevacizumab.  Labs and vitals were good.  She tolerated her treatment without any issue.She was discharged stable.

## 2024-08-13 ENCOUNTER — TELEPHONE (OUTPATIENT)
Dept: ADMISSION | Facility: HOSPITAL | Age: 69
End: 2024-08-13
Payer: MEDICARE

## 2024-08-13 NOTE — TELEPHONE ENCOUNTER
I called Mirta to verify she knew appointments were changed for infusion and Moni Cortez visit to 9/6 as she requested. She said thank you. No further needs at this time.

## 2024-08-13 NOTE — TELEPHONE ENCOUNTER
This RN placed call to patient in regards to message received about changing appointment 9/4 to 9/6. RN was calling patient to inform them that this appointment was changed by main campus . No answer at this time. RN will follow up to confirm that patient is aware of this change.

## 2024-08-13 NOTE — TELEPHONE ENCOUNTER
Mirta called nurse line and said she saw Moni Cortez yesterday and her infusion and appointment with Moni Cortez scheduled on 9/4 was supposed to be changed to the 9/6. Can someone please change this and update tx plan. Pt also did talk to scheduling but was disconnected.

## 2024-08-20 ASSESSMENT — ENCOUNTER SYMPTOMS
COUGH: 0
FATIGUE: 1
WEAKNESS: 0
NECK PAIN: 0
CHILLS: 0
ANOREXIA: 0
VOMITING: 0
HEADACHES: 0
FEVER: 0
JOINT SWELLING: 0
SWOLLEN GLANDS: 0
NAUSEA: 0
NUMBNESS: 0
ABDOMINAL PAIN: 0
CHANGE IN BOWEL HABIT: 1
VERTIGO: 0
ARTHRALGIAS: 0
MYALGIAS: 0
DIAPHORESIS: 0
VISUAL CHANGE: 0
SORE THROAT: 0

## 2024-08-20 NOTE — PROGRESS NOTES
Patient ID: Mirta Hills is a 69 y.o. female.    DIAGNOSIS     Adenocarcinoma of the lung.  Date of diagnosis is [place] 15, 2023 from a level 7 lymph node biopsy/fine-needle aspiration.  Immunohistochemistry positive for TTF-1.        STAGING     Clinical T2a (right lower lobe mass measuring 3.6 cm), clinical N2 (PET positivity and subcarinal region), M1C disease (presence of brain metastases, ribs, right acetabulum, liver, brain), stage IVb disease        CURRENT SITES OF DISEASE     Right lower lobe, right hilum of the lung, ipsilateral mediastinum, liver, bones, brain        MOLECULAR GENOMICS     TEST: Focused Solid Tumor DNA/RNA Panel   SPECIMEN: Supernatant, LYMPH NODE 7, [id number]   DISEASE DIAGNOSIS: Adenocarcinoma   Estimated Tumor Content: 40%   COLLECTION DATE: 6/15/2023     MICROSATELLITE STATUS: Microsatellite  Instability-High (MSI-H) is NOT DETECTED.     DISEASE ASSOCIATED GENOMIC FINDINGS:   EGFR p.[id number] ([id number] c.[id number])    TP53 p.N247I ([id number] c.740A>T)     DISEASE RELEVANT ALTERATIONS NOT DETECTED:   Negative for ALK fusion.   Negative for BRAF V600E.   Negative for ERBB2 activating mutation   Negative for KRAS G12C.   Negative for  MET exon 14 skipping mutation.   Negative for NTRK fusion.   Negative for RET fusion.   Negative for ROS1 fusion.        Circulating tumor DNA sent on June 19, 2023  EGFR p.[id number], Variant allelic fraction of 1.6%  TP53 N247I Missense variant, loss of function, variant allelic fraction 1.1%  MSI stable        SERUM TUMOR MARKER     Slightly elevated CEA and CA 19.9 June 2023        PRIOR THERAPY     1- Gamma knife radiosurgery to brain lesions completed on June 28, 2023.        CURRENT THERAPY     Osimertinib on study  on clinical trial [id number] which is a randomized trial of osimertinib with or without bevacizumab.  She has been randomized to the bevacizumab arm- Cycle 1 Day 1 is 7/12/2023        CURRENT ONCOLOGICAL PROBLEMS     1-brain  metastases, symptomatic, word finding difficulties, mild unsteadiness on her feet but no falls, Resolved July 3, 2023  2-anterior chest pain, increases with deep inspiration, Improved July 3, 2023  3-mild intermittent grade 1 diarrhea, mild hair loss, mild leukopenia, mild elevation in creatinine, mild fatigue.  Most likely all osimertinib related August 7, 2023        HISTORY OF PRESENT ILLNESS     This is a 69-year-old patient.  She states that towards Thanksgiving of 2022 she started feeling some pain in her right shoulder.  Eventually got better however the same pain came back in January or February 2023.  Ultimately some x-rays were done of  the shoulder that were generally negative.  She then developed some pain in her sternum a month or 2 later with deep inspiration and ultimately underwent imaging in May 2023.  Chest CT without contrast was done on June 1, 2023.  This showed a 3.4 cm mass  within the superior segment of the right lower lobe with some associated satellite pulmonary nodules.  Additionally there were some other subcentimeter pulmonary nodules.  There was evidence of mediastinal lymphadenopathy.  Subcarinal lymph node measured  1.4 cm in short axis.She was seen by pulmonary medicine on June 5, 2023 a combined PET/CT was ordered on June 6, 2023 showing a relatively intense area of hypermetabolic activity along the superior posteromedial aspect of the right lower lobe corresponding  to the known right lower lobe lung mass.  There was foci of hypermetabolic activity in the right supraclavicular region, subcarinal and right hilar regions.  There was area of uptake within the posterior upper ribs as well as right acetabulum suspicious  for osseous metastatic disease.  There was a punctate area of mild hypermetabolic activity in the right hepatic lobe concerning for hepatic metastases.  Small focus in the subcutaneous region just anterior to the lower aspect of the sternum could be inflammatory   versus metastatic.  She undergoes a bronchoscopy dated [place] 15, 2023.  There were no endobronchial lesions.  Primary cytology with rapid onsite evaluation was suggestive of malignancy in level 7, final lymph nodes were pathology was pending.  MRI of the  brain with and without contrast dated June 16, 2023 showed approximately 20 enhancing nodules concerning for metastatic disease.  Specifically there was a 1.5 cm nodule in the right cerebellar hemisphere.  Most of the other nodules were less than 1 cm.   Patient also notes that she has some difficulty expressing words over the past 1 to 2 weeks prior to her first visit with us as well as some mild unsteadiness on her legs where she feels she is drifting towards the right side.        PAST MEDICAL HISTORY     1-hypothyroidism  2-hysterectomy 1988  3-abdominal hernia surgery 1995  4-some hearing difficulties        SOCIAL HISTORY     Patient lives with her significant other.  She has 2 daughter.  Lives in [facility].  Smoked for only 3 years during college and during this time it was a pack per day.  She has retired.  She had a retail store.        CURRENT MEDS     See medication list, meds reviewed        ALLERGIES     Patient denies any drug allergies        FAMILY HISTORY     Patient's father had bladder cancer at the age of 86.  She has 1 brother with no cancer.  She has 2 children    Subjective    Today I am meeting with Mirta prior to treatment with Avastin, on study.  She continues on Tagrisso, and is doing well with the exception of grade 1 loose stools, for which she takes one imodium every morning.  She notices some hair thinning.  She has just returned from her summer of trips with her boyfriend.  Blood pressure remains excellent- 121/75.  She may have a little more fatigue.      Cancer  Associated symptoms include a change in bowel habit and fatigue. Pertinent negatives include no abdominal pain, anorexia, arthralgias, chest pain, chills, congestion,  coughing, diaphoresis, fever, headaches, joint swelling, myalgias, nausea, neck pain, numbness, rash, sore throat, swollen glands, urinary symptoms, vertigo, visual change, vomiting or weakness.     Objective    BSA: 1.45 meters squared  /75 (BP Location: Right arm, Patient Position: Sitting, BP Cuff Size: Small adult)   Pulse 70   Temp 36.7 °C (98.1 °F) (Temporal)   Resp 18   Wt 48.2 kg (106 lb 4.2 oz)   SpO2 100%   BMI 19.75 kg/m²      Physical Exam  Constitutional:       General: She is not in acute distress.     Appearance: She is not ill-appearing, toxic-appearing or diaphoretic.   HENT:      Nose: No congestion or rhinorrhea.      Mouth/Throat:      Pharynx: No oropharyngeal exudate or posterior oropharyngeal erythema.   Eyes:      General: No scleral icterus.     Conjunctiva/sclera: Conjunctivae normal.   Cardiovascular:      Rate and Rhythm: Normal rate and regular rhythm.      Pulses: Normal pulses.      Heart sounds: Normal heart sounds. No murmur heard.     No friction rub. No gallop.   Pulmonary:      Effort: Pulmonary effort is normal. No respiratory distress.      Breath sounds: Normal breath sounds. No stridor. No wheezing, rhonchi or rales.   Chest:      Chest wall: No tenderness.   Abdominal:      General: There is no distension.      Palpations: There is no mass.      Tenderness: There is no abdominal tenderness. There is no guarding or rebound.   Musculoskeletal:      Cervical back: No tenderness.      Right lower leg: No edema.      Left lower leg: No edema.   Lymphadenopathy:      Cervical: No cervical adenopathy.   Skin:     Coloration: Skin is not jaundiced or pale.      Findings: No bruising, erythema, lesion or rash.   Neurological:      General: No focal deficit present.      Mental Status: She is oriented to person, place, and time.   Psychiatric:         Mood and Affect: Mood normal.         Behavior: Behavior normal.     === 08/08/24 ===    CT CHEST ABDOMEN PELVIS W IV  CONTRAST    - Impression -  CHEST:  1.  Overall stable disease in chest with spiculated mass in the  superior segment of right lower lobe and small subcentimeter  pulmonary nodules.  2. No evidence of new pulmonary nodules or new disease in chest.    ABDOMEN-PELVIS:  1.  There is mild interval prominence of hypodense lesion in hepatic  segment 4 measuring up to 1.6 x 1.4 cm. Recommend attention on  follow-up imaging.  2. Redemonstration of hypodense lesion in the spleen, slightly  increased in size compared to prior CT examination dated 01/18/2024.  Recommend continued attention on follow-up imaging.  3. Redemonstration of multiple osseous lesions, similar compared to  prior imaging.  4. Otherwise no evidence of new metastatic disease in the abdomen and  pelvis.  5. Stable splenomegaly.    MACRO:  None    Signed by: Vrena Miranda 8/9/2024 12:39 PM  Dictation workstation:   [id number]     Performance Status:  Asymptomatic  Assessment/Plan     This is a patient with EGFR mutation positive adenocarcinoma of the lung on a clinical trial of Tagrisso and bevacizumab.   She has grade 1 diarrhea, and grade 1 fatigue.  Her systolic blood pressure was up a little the past few visits- but is 121/75 today.  Scan done for today's visit is stable overall.  She is tolerating both the tagrisso and the avastin quiet well.  Continue on protocol.         Cancer Staging   No matching staging information was found for the patient.      Oncology History   Lung cancer (Multi)   7/12/2023 -  Research Study Participant    ([facility]) [id number] Arm B - Bevacizumab / Osimertinib, 21 Day Cycles  Plan Provider: Humberto Landin MD  Treatment goal: [No plan goal]  Line of treatment: [No plan line of treatment]  Associated studies: [id number] (Osimertinib) +/- Bevacizumab as Initial Treatment for EGFR-Mutant Lung Cancer     9/1/2023 Initial Diagnosis    Lung cancer (CMS/HCC)          GONZALO Forbes-CNS

## 2024-08-23 ENCOUNTER — HOSPITAL ENCOUNTER (OUTPATIENT)
Dept: RADIATION ONCOLOGY | Facility: CLINIC | Age: 69
Setting detail: RADIATION/ONCOLOGY SERIES
Discharge: HOME | End: 2024-08-23
Payer: MEDICARE

## 2024-08-23 VITALS
SYSTOLIC BLOOD PRESSURE: 139 MMHG | DIASTOLIC BLOOD PRESSURE: 74 MMHG | WEIGHT: 106.5 LBS | BODY MASS INDEX: 20.11 KG/M2 | HEART RATE: 86 BPM | RESPIRATION RATE: 16 BRPM | HEIGHT: 61 IN | TEMPERATURE: 95.8 F | OXYGEN SATURATION: 94 %

## 2024-08-23 DIAGNOSIS — C34.11 PRIMARY ADENOCARCINOMA OF UPPER LOBE OF RIGHT LUNG (MULTI): Primary | ICD-10-CM

## 2024-08-23 DIAGNOSIS — C79.31 SECONDARY MALIGNANT NEOPLASM OF BRAIN (MULTI): ICD-10-CM

## 2024-08-23 PROCEDURE — 99214 OFFICE O/P EST MOD 30 MIN: CPT | Performed by: NURSE PRACTITIONER

## 2024-08-23 ASSESSMENT — PAIN SCALES - GENERAL: PAINLEVEL: 0-NO PAIN

## 2024-08-23 NOTE — PROGRESS NOTES
Radiation Oncology Follow-Up    Patient Name:  Mirta Hills  MRN:  [id number]  :  1955    Referring Provider: Ileana Bradley, APR*  Primary Care Provider: Andrea Bush MD  Care Team: Patient Care Team:  Andrea Bush MD as PCP - General (Family Medicine)  Humberto Landin MD as Consulting Physician (Hematology and Oncology)  Makayla Joseph, RN as Registered Nurse (Hematology and Oncology)  Yusef Chavez MD as Surgeon (Ophthalmology)  Del Gimenez MD as Referring Physician (Endocrinology)  Nancy Presley APRN-CNP as Nurse Practitioner (Gynecology)  Virginia Paz MD as Consulting Physician (Dermatology)  Preethi Dimas RN as Registered Nurse (Hematology and Oncology)    Date of Service: 2024   69 y.o. female with adenocarcinoma of the lung.  Date of diagnosis is [place] 15, 2023 from a level 7 lymph node biopsy/fine-needle aspiration.  Immunohistochemistry positive  for TTF-1. Clinical T2a (right lower lobe mass measuring 3.6 cm), clinical N2 (PET positivity and subcarinal region), M1C disease (presence of brain metastases, ribs, right acetabulum, liver, brain), stage IVb disease.     Treatment Summary:     - Towards  of  she started feeling some pain in her right shoulder.  Eventually got better however the same pain came back in January or 2023.  Ultimately some x-rays were done of the shoulder that were generally negative.       - She then developed some pain in her sternum a month or 2 later with deep inspiration and ultimately underwent imaging in May 2023.  Chest CT without contrast was done on 2023.  This showed a 3.4 cm mass within the superior segment of the right  lower lobe with some associated satellite pulmonary nodules.  Additionally there were some other subcentimeter pulmonary nodules.  There was evidence of mediastinal lymphadenopathy.  Subcarinal lymph node measured 1.4 cm in short axis.     - She was seen by pulmonary  medicine on June 5, 2023 a combined PET/CT was ordered on June 6, 2023 showing a relatively intense area of hypermetabolic activity along the superior posteromedial aspect of the right lower lobe corresponding to the known  right lower lobe lung mass.  There was foci of hypermetabolic activity in the right supraclavicular region, subcarinal and right hilar regions.  There was area of uptake within the posterior upper ribs as well as right acetabulum suspicious for osseous  metastatic disease.  There was a punctate area of mild hypermetabolic activity in the right hepatic lobe concerning for hepatic metastases.  Small focus in the subcutaneous region just anterior to the lower aspect of the sternum could be inflammatory  versus metastatic.       - She underwent a bronchoscopy dated [place] 15, 2023.  There were no endobronchial lesions.  Primary cytology with rapid onsite evaluation was suggestive of malignancy in level 7, final lymph nodes were pathology was pending.      -  MRI of the brain with and without contrast dated June 16, 2023 showed approximately 20 enhancing nodules concerning for metastatic disease.  Specifically there was a 1.5 cm nodule in the right cerebellar hemisphere.  Most of the other nodules were  less than 1 cm.      -  Gamma knife radiosurgery to multiple brain lesions (total of 9) completed on June 28, 2023.     - Clinical trial EA 5182 which is a randomized trial of osimertinib with or without bevacizumab.  She has been randomized to the bevacizumab arm- Cycle 1 Day 1 on 7/12/2023     SUBJECTIVE  History of Present Illness:   Mirta Hills is here today for routine radiation follow up and review of MRI of the brain done 8/8/24. No new brain lesions. She says she is feeling very well and continues on bevacizumab on a clinical trial with osimertinib. CT of CAP stable.  She denies any headaches, seizures, visual changes or neurologic deficits. She has some short term memory issues at times and  some slowed processing at times. No problems with balance or falls. She has occasional diarrhea controlled with immodium.  Denies cough, SOB, hemoptysis, N/V, abdominal pain or bony pain.     Review of Systems:    Review of Systems   All other systems reviewed and are negative.    Performance Status:   The Karnofsky performance scale today is 90, Able to carry on normal activity; minor signs or symptoms of disease (ECOG equivalent 0).      OBJECTIVE    Current Outpatient Medications:     atorvastatin (Lipitor) 10 mg tablet, Take 1 tablet (10 mg) by mouth once daily., Disp: , Rfl:     BEVACIZUMAB IV, Infuse into a venous catheter every 21 (twenty-one) days., Disp: , Rfl:     CALCIUM ORAL, Take by mouth., Disp: , Rfl:     cholecalciferol (Vitamin D-3) 50 MCG (2000 UT) tablet, Take 1 tablet (2,000 Units) by mouth once daily., Disp: , Rfl:     levothyroxine (Synthroid) 137 mcg tablet, Take 1 tablet (137 mcg) by mouth once daily in the morning. Take before meals. But take additional 1/2 tab Weds and Sun, Disp: 90 tablet, Rfl: 1    loperamide (Imodium) 2 mg capsule, Take 1 capsule (2 mg) by mouth once daily., Disp: , Rfl:     multivit-min/ferrous fumarate (MULTI VITAMIN ORAL), Take by mouth., Disp: , Rfl:     mv-mn/lutein/zeax/bilber/hb277 (MACULAR HEALTH FORMULA ORAL), Take 1 tablet by mouth once daily. MACULAR SUPPORT, Disp: , Rfl:     sertraline (Zoloft) 50 mg tablet, Take 1.5 tablets (75 mg) by mouth once daily., Disp: 135 tablet, Rfl: 3    Study [id number] osimertinib 80 mg tablet, Take 1 tablet (80 mg total) by mouth once daily for 21 doses.  Swallow whole., Disp: 30 tablet, Rfl: 0     Physical Exam  Constitutional:       Appearance: Normal appearance.   HENT:      Head: Normocephalic and atraumatic.      Nose: Nose normal.      Mouth/Throat:      Mouth: Mucous membranes are moist.      Pharynx: Oropharynx is clear.   Eyes:      Extraocular Movements: Extraocular movements intact.      Conjunctiva/sclera: Conjunctivae  normal.      Pupils: Pupils are equal, round, and reactive to light.   Cardiovascular:      Rate and Rhythm: Normal rate and regular rhythm.      Heart sounds: Normal heart sounds.   Pulmonary:      Effort: Pulmonary effort is normal.      Breath sounds: Normal breath sounds.   Abdominal:      Palpations: Abdomen is soft.   Musculoskeletal:         General: No swelling. Normal range of motion.      Cervical back: Normal range of motion and neck supple.   Lymphadenopathy:      Cervical: No cervical adenopathy.   Skin:     General: Skin is warm and dry.      Comments: Raynauds in hands   Neurological:      General: No focal deficit present.      Mental Status: She is alert.      Cranial Nerves: No cranial nerve deficit.      Sensory: No sensory deficit.      Motor: No weakness.      Coordination: Coordination normal.      Gait: Gait normal.   Psychiatric:         Mood and Affect: Mood normal.         Behavior: Behavior normal.         Thought Content: Thought content normal.         Judgment: Judgment normal.         RESULTS:  CT chest abdomen pelvis w IV contrast    Result Date: 8/9/2024  Interpreted By:  Verna Miranda, STUDY: CT CHEST ABDOMEN PELVIS W IV CONTRAST;  8/8/2024 10:03 am   INDICATION: Signs/Symptoms:metastatic disease eval.   COMPARISON: CT chest abdomen pelvis dated 05/28/2024   ACCESSION NUMBER(S): [id number]   ORDERING CLINICIAN: ADA ANTON   TECHNIQUE: CT of the chest, abdomen, and pelvis was performed after administration of intravenous contrast. Contiguous axial images were obtained at 3 mm slice thickness through the chest, abdomen and pelvis. Coronal and sagittal reconstructions at 3 mm slice thickness were performed. 75 ml of contrast 350 mg iohexol were administered intravenously without immediate complication.   FINDINGS: CHEST:   LUNG/PLEURA/LARGE AIRWAYS: There is persistent evidence of a spiculated lesion in the superior segment of right lower lobe measuring up to 0.7 x 2.5 cm and  is similar compared to prior CT examination (as per my measurements). Again noted is a triangular shaped pulmonary nodule along the right major fissure measuring up to 7 mm, similar compared to prior CT examination and is most likely favored to represent a lymph node. There is a stable calcified 3 mm pulmonary nodule in the right middle lobe. There is a 3 mm noncalcified pulmonary nodule in the right upper lobe (axial image 133/319) similar compared to prior CT examination. There is also stable 2 mm subpleural pulmonary nodule in the lateral aspect of the left lower lobe, similar compared to prior CT examination. There is also similar 4 mm subpleural pulmonary nodule along the lateral aspect of the left lower lobe, similar compared to prior CT examination. No new suspicious pulmonary nodule is noted. No evidence of pleural effusions or pneumothorax.   VESSELS: Thoracic aorta and pulmonary artery are unremarkable. Dense atherosclerotic calcifications of the coronary arteries are noted.   HEART: Heart is normal in size. No pericardial effusion.   MEDIASTINUM AND RAI: There is a stable 5 mm lymph node in the right supraclavicular region, similar compared to prior CT examination. No evidence of suspicious lymph nodes in the mediastinum as per CT criteria. Thoracic esophagus appears grossly unremarkable.   CHEST WALL AND LOWER NECK: Chest wall soft tissues are unremarkable. Thyroid gland is unremarkable.   ABDOMEN:   LIVER: Liver is normal in size and morphology. There is suggestion of mild hepatic steatosis. There is a focal hypodensity in the peripheral aspect of the left lobe of liver in hepatic segment 4 B measuring up to 1.6 x 1.4 cm. This is more pronounced on today's examination compared to prior CT chest dated 01/18/2024. There is also another 4 mm hypodensity in the right lobe of liver, too small to characterize.   BILE DUCTS: No intrahepatic or extrahepatic biliary ductal dilatation.   GALLBLADDER: Gallbladder  is moderately distended and does not demonstrate calcified stones in the lumen.   PANCREAS: The pancreas appears unremarkable without evidence of ductal dilatation or masses.   SPLEEN: Spleen is enlarged measuring up to 15 cm in CC dimension. Again noted is a 0.8 cm hypodense lesion in the spleen. This is slightly increased in size compared to prior CT examination dating back to 01/18/2024 where it measured up to 6 mm.   ADRENAL GLANDS: The right adrenal gland is not distinctly visualized. However left adrenal gland appears grossly unremarkable.   KIDNEYS AND URETERS: Bilateral kidneys enhance symmetrically. There is no hydronephrosis.   PELVIS:   BLADDER: Urinary bladder is not well distended limiting evaluation.   REPRODUCTIVE ORGANS: No pelvic mass lesion.   BOWEL: Stomach is not well distended limiting evaluation. Small bowel loops are normal in course and caliber and does not demonstrate segmental distention to suggest obstruction. Large bowel demonstrates small stool volume and appears grossly unremarkable. Appendix is visualized and appears unremarkable.     VESSELS: Abdominal aorta and IVC appears grossly unremarkable.   PERITONEUM/RETROPERITONEUM/LYMPH NODES: There is no free or loculated fluid collection, no free intraperitoneal air. The retroperitoneum appears normal.  No abdominopelvic lymphadenopathy is present.   BONE AND SOFT TISSUE: There is persistent evidence of sclerotic osseous lesion in the inferior sternum and sclerotic lesion in the right pedicle of T3 vertebral body. There is also sclerotic lesion in the posterior aspect of right 3rd rib. There is also focal area of sclerosis along the posterior aspect of right 11th rib. These are consistent with patient's known metastasis. Abdominal wall soft tissues appear grossly unremarkable.       CHEST: 1.  Overall stable disease in chest with spiculated mass in the superior segment of right lower lobe and small subcentimeter pulmonary nodules. 2. No  evidence of new pulmonary nodules or new disease in chest.   ABDOMEN-PELVIS: 1.  There is mild interval prominence of hypodense lesion in hepatic segment 4 measuring up to 1.6 x 1.4 cm. Recommend attention on follow-up imaging. 2. Redemonstration of hypodense lesion in the spleen, slightly increased in size compared to prior CT examination dated 01/18/2024. Recommend continued attention on follow-up imaging. 3. Redemonstration of multiple osseous lesions, similar compared to prior imaging. 4. Otherwise no evidence of new metastatic disease in the abdomen and pelvis. 5. Stable splenomegaly.   MACRO: None   Signed by: Verna Miranda 8/9/2024 12:39 PM Dictation workstation:   [id number]    MR brain w and wo IV contrast    Result Date: 8/8/2024  Interpreted By:  Mary Willis, STUDY: MR BRAIN W AND WO IV CONTRAST;  8/8/2024 9:48 am   INDICATION: Signs/Symptoms:metastatic disease evaluation.   COMPARISON: May 28, 2024   ACCESSION NUMBER(S): [id number]   ORDERING CLINICIAN: ADA ANTON   TECHNIQUE: Axial T2, FLAIR, DWI, gradient echo T2 and  T1 weighted images of brain were acquired. Post contrast T1 weighted images were acquired after administration of 9 mL Dotarem gadolinium based intravenous contrast.   FINDINGS: CSF Spaces: The ventricles, sulci and basal cisterns are within normal limits.   Parenchyma: There is no diffusion restriction abnormality to suggest acute infarct.  There is again an approximate 4-5 mm focus of increased signal on precontrast T1 weighted imaging within the medial left precentral gyrus with associated susceptibility artifact. Punctate susceptibility artifact is again noted in the right cerebellum and left frontal white matter, possibly related to remote blood products or mineralization. There are minimal hyperintensities on FLAIR imaging scattered in the white matter. No abnormal parenchymal enhancement is identified. There is no mass effect or midline shift.   Paranasal Sinuses and  Mastoids: There is very mild mucosal thickening within scattered paranasal sinuses. The mastoid air cells are clear.       Findings are very similar from the previous examination. No new enhancing lesions are identified.     MACRO: None   Signed by: Mary Willis 8/8/2024 9:57 AM Dictation workstation:   [id number]    ASSESSMENT:  69 y.o. female with metastatic lung cancer to the brain s/p gamma knife to multiple lesions.  She seems to be doing well and no adverse effects from gamma knife and excellent response to treatment and no new lesions identified. She will continue getting CT scans and brain MRIs every 2 mo per the clinical trial and these appts to be arranged per clinical trials team.  Radiation follow up now on prn basis. She will call me with any questions  or concerns      Amada Bradley CNP  [phone]

## 2024-08-29 ENCOUNTER — HOSPITAL ENCOUNTER (OUTPATIENT)
Dept: CARDIOLOGY | Facility: CLINIC | Age: 69
Discharge: HOME | End: 2024-08-29
Payer: MEDICARE

## 2024-08-29 DIAGNOSIS — I42.7 CARDIOTOXICITY (MULTI): ICD-10-CM

## 2024-08-29 PROCEDURE — 93306 TTE W/DOPPLER COMPLETE: CPT

## 2024-09-02 LAB
AORTIC VALVE MEAN GRADIENT: 2.5 MMHG
AORTIC VALVE PEAK VELOCITY: 1.05 M/S
AV PEAK GRADIENT: 4.4 MMHG
AVA (PEAK VEL): 2.61 CM2
AVA (VTI): 2.27 CM2
EJECTION FRACTION APICAL 4 CHAMBER: 62
EJECTION FRACTION: 65 %
LEFT ATRIUM VOLUME AREA LENGTH INDEX BSA: 20.5 ML/M2
LEFT VENTRICLE INTERNAL DIMENSION DIASTOLE: 3.82 CM (ref 3.5–6)
LEFT VENTRICULAR OUTFLOW TRACT DIAMETER: 2.29 CM
MITRAL VALVE E/A RATIO: 0.74
RIGHT VENTRICLE FREE WALL PEAK S': 11.81 CM/S
TRICUSPID ANNULAR PLANE SYSTOLIC EXCURSION: 1.9 CM

## 2024-09-03 DIAGNOSIS — E78.00 PURE HYPERCHOLESTEROLEMIA: ICD-10-CM

## 2024-09-03 DIAGNOSIS — C34.90 MALIGNANT NEOPLASM OF LUNG, UNSPECIFIED LATERALITY, UNSPECIFIED PART OF LUNG (MULTI): Primary | ICD-10-CM

## 2024-09-03 DIAGNOSIS — C34.90 MALIGNANT NEOPLASM OF LUNG, UNSPECIFIED LATERALITY, UNSPECIFIED PART OF LUNG (MULTI): ICD-10-CM

## 2024-09-03 RX ORDER — DIPHENHYDRAMINE HYDROCHLORIDE 50 MG/ML
50 INJECTION INTRAMUSCULAR; INTRAVENOUS AS NEEDED
Status: CANCELLED | OUTPATIENT
Start: 2024-09-06

## 2024-09-03 RX ORDER — ATORVASTATIN CALCIUM 10 MG/1
10 TABLET, FILM COATED ORAL DAILY
Qty: 90 TABLET | Refills: 3 | Status: SHIPPED | OUTPATIENT
Start: 2024-09-03

## 2024-09-03 RX ORDER — PROCHLORPERAZINE EDISYLATE 5 MG/ML
10 INJECTION INTRAMUSCULAR; INTRAVENOUS EVERY 6 HOURS PRN
Status: CANCELLED | OUTPATIENT
Start: 2024-09-06

## 2024-09-03 RX ORDER — PROCHLORPERAZINE MALEATE 10 MG
10 TABLET ORAL EVERY 6 HOURS PRN
Status: CANCELLED | OUTPATIENT
Start: 2024-09-06

## 2024-09-03 RX ORDER — FAMOTIDINE 10 MG/ML
20 INJECTION INTRAVENOUS ONCE AS NEEDED
Status: CANCELLED | OUTPATIENT
Start: 2024-09-06

## 2024-09-03 RX ORDER — ALBUTEROL SULFATE 0.83 MG/ML
3 SOLUTION RESPIRATORY (INHALATION) AS NEEDED
Status: CANCELLED | OUTPATIENT
Start: 2024-09-06

## 2024-09-03 RX ORDER — EPINEPHRINE 0.3 MG/.3ML
0.3 INJECTION SUBCUTANEOUS EVERY 5 MIN PRN
Status: CANCELLED | OUTPATIENT
Start: 2024-09-06

## 2024-09-04 ENCOUNTER — APPOINTMENT (OUTPATIENT)
Dept: HEMATOLOGY/ONCOLOGY | Facility: HOSPITAL | Age: 69
End: 2024-09-04
Payer: MEDICARE

## 2024-09-05 ENCOUNTER — APPOINTMENT (OUTPATIENT)
Dept: DERMATOLOGY | Facility: CLINIC | Age: 69
End: 2024-09-05
Payer: MEDICARE

## 2024-09-05 DIAGNOSIS — L65.0 TELOGEN EFFLUVIUM: ICD-10-CM

## 2024-09-05 DIAGNOSIS — L82.1 SEBORRHEIC KERATOSIS: ICD-10-CM

## 2024-09-05 DIAGNOSIS — L60.9 NAIL DISORDER: ICD-10-CM

## 2024-09-05 DIAGNOSIS — D22.9 MULTIPLE BENIGN NEVI: ICD-10-CM

## 2024-09-05 DIAGNOSIS — Z12.83 SCREENING EXAM FOR SKIN CANCER: ICD-10-CM

## 2024-09-05 DIAGNOSIS — L81.4 LENTIGO: ICD-10-CM

## 2024-09-05 DIAGNOSIS — D48.5 NEOPLASM OF UNCERTAIN BEHAVIOR OF SKIN: Primary | ICD-10-CM

## 2024-09-05 PROCEDURE — 1159F MED LIST DOCD IN RCRD: CPT | Performed by: DERMATOLOGY

## 2024-09-05 PROCEDURE — 99213 OFFICE O/P EST LOW 20 MIN: CPT | Performed by: DERMATOLOGY

## 2024-09-05 PROCEDURE — 1036F TOBACCO NON-USER: CPT | Performed by: DERMATOLOGY

## 2024-09-05 ASSESSMENT — DERMATOLOGY QUALITY OF LIFE (QOL) ASSESSMENT
RATE HOW BOTHERED YOU ARE BY EFFECTS OF YOUR SKIN PROBLEMS ON YOUR ACTIVITIES (EG, GOING OUT, ACCOMPLISHING WHAT YOU WANT, WORK ACTIVITIES OR YOUR RELATIONSHIPS WITH OTHERS): 0 - NEVER BOTHERED
ARE THERE EXCLUSIONS OR EXCEPTIONS FOR THE QUALITY OF LIFE ASSESSMENT: NO
DATE THE QUALITY-OF-LIFE ASSESSMENT WAS COMPLETED: 67088
RATE HOW BOTHERED YOU ARE BY SYMPTOMS OF YOUR SKIN PROBLEM (EG, ITCHING, STINGING BURNING, HURTING OR SKIN IRRITATION): 0 - NEVER BOTHERED
RATE HOW EMOTIONALLY BOTHERED YOU ARE BY YOUR SKIN PROBLEM (FOR EXAMPLE, WORRY, EMBARRASSMENT, FRUSTRATION): 0 - NEVER BOTHERED

## 2024-09-05 ASSESSMENT — DERMATOLOGY PATIENT ASSESSMENT
ARE YOU ON BIRTH CONTROL: NO
DO YOU USE A TANNING BED: YES, PREVIOUSLY
DO YOU HAVE IRREGULAR MENSTRUAL CYCLES: NO
DO YOU USE SUNSCREEN: OCCASIONALLY
DO YOU HAVE ANY NEW OR CHANGING LESIONS: NO
ARE YOU AN ORGAN TRANSPLANT RECIPIENT: NO
ARE YOU TRYING TO GET PREGNANT: NO
HAVE YOU HAD OR DO YOU HAVE VASCULAR DISEASE: YES
HAVE YOU HAD OR DO YOU HAVE A STAPH INFECTION: NO

## 2024-09-05 ASSESSMENT — ITCH NUMERIC RATING SCALE: HOW SEVERE IS YOUR ITCHING?: 0

## 2024-09-05 ASSESSMENT — PATIENT GLOBAL ASSESSMENT (PGA): PATIENT GLOBAL ASSESSMENT: PATIENT GLOBAL ASSESSMENT:  1 - CLEAR

## 2024-09-05 NOTE — PROGRESS NOTES
Subjective     Mirta Hills is a 69 y.o. female who presents for the following: Skin Check (Recheck Right lower leg spot, and nails on right hand. Pt wearing artificial nails today. No concerns voiced by Mirta today.). Last derm visit 3/12 /24 for Full Skin Exam - NUB on right shin photographed for monitoring. Previously fungal culture collected 2/19/24 for onycholysis. Yeast on the calcofluor stain but no growth on culture.     She has a history of adenocarcinoma of the lung diagnosed 6/15/23. She is currently on osimertinib (Tagrisso) and bevacizumab (Avastin) on clinic trial [id number]; she has been randomized to the arm that is also receiving bevacizumab, received cycle 1 7/12/23. Tolerating medications well.     Review of Systems:  No other skin or systemic complaints other than what is documented elsewhere in the note.    The following portions of the chart were reviewed this encounter and updated as appropriate:         Skin Cancer History  No skin cancer on file.      Specialty Problems          Dermatology Problems    Puncture wound of right hand without foreign body        Objective   Well appearing patient in no apparent distress; mood and affect are within normal limits.    A full examination was performed including scalp, head, eyes, ears, nose, lips, neck, chest, axillae, abdomen, back, buttocks, bilateral upper extremities, bilateral lower extremities, hands, feet, fingers, toes, fingernails, and toenails. All findings within normal limits unless otherwise noted below.    Assessment/Plan   1. Neoplasm of uncertain behavior of skin  Right Lower Leg - Anterior  0.6x0.5 cm brown oval shaped macule with some corkscrew changes to pigment cords and early smudging of pigment pattern     Favor thin seborrheic keratosis but also consider melanocytic lesion. Photograph for monitroing - stable    2. Screening exam for skin cancer    Full body skin exam  -No lesions concerning for malignancy on the remainder the  skin exam today   - The ugly duckling sign was discussed. Monitor for any skin lesions that are different in color, shape, or size than others on body  -Sun protection was discussed. Recommend SPF 30+, hats with brims, sun protective clothing, and avoiding sun exposure between 10 AM and 2 PM whenever possible  -Recommend regular skin exams or sooner if new or changing lesions       Related Procedures  Follow Up In Dermatology - Established Patient  Follow Up In Dermatology - Established Patient    3. Multiple benign nevi  Brown and tan macules and papules with reassuring findings on dermoscopy    -These lesions have benign, reassuring patterns on dermoscopy  -Recommend continued self observation, and to contact the office if any changes in nevi are noticed    4. Lentigo  Tan macules    -Benign appearing on exam  -Reassurance, recommend observation    5. Seborrheic keratosis  Stuck on, waxy macule(s)/papule(s)/plaque(s) with comedo-like openings and milia like cysts    -Discussed the nature of the diagnosis  -Reassurance, recommend continued observation    6. Nail disorder  Right Hand - Posterior  Nail polish on, patient reports resolved    She was seen 2/19/24 for onycholysis, traumatic favored related to artificial nails. Fungal culture collected and showed yeast on stain, culture negative    7. Telogen effluvium  Scalp  Normal scalp exam    - related to thyroid changes in July    -Discussed the nature of the condition, discussed natural process of anagen, catagen and telogen phases and common triggers of effluvium  -Discussed the expected course, this is most often self-resolving. After a period of several months of intense shedding, the shedding will gradually taper off and then the hair will begin to regrow.    -Reassurance  - start minoxidil to help shorten period of shedding        Follow up 1 year Full Skin Exam

## 2024-09-05 NOTE — PATIENT INSTRUCTIONS
Recommend Minoxidil (Rogaine) 5% foam  Apply ONCE per day  [facility] / [employer] / [employer] [facility] [employer] have the best prices

## 2024-09-06 ENCOUNTER — LAB (OUTPATIENT)
Dept: LAB | Facility: HOSPITAL | Age: 69
End: 2024-09-06
Payer: MEDICARE

## 2024-09-06 ENCOUNTER — OFFICE VISIT (OUTPATIENT)
Dept: HEMATOLOGY/ONCOLOGY | Facility: HOSPITAL | Age: 69
End: 2024-09-06
Payer: MEDICARE

## 2024-09-06 ENCOUNTER — INFUSION (OUTPATIENT)
Dept: HEMATOLOGY/ONCOLOGY | Facility: HOSPITAL | Age: 69
End: 2024-09-06
Payer: MEDICARE

## 2024-09-06 VITALS
OXYGEN SATURATION: 100 % | DIASTOLIC BLOOD PRESSURE: 68 MMHG | BODY MASS INDEX: 20.12 KG/M2 | WEIGHT: 108.2 LBS | TEMPERATURE: 96.3 F | SYSTOLIC BLOOD PRESSURE: 118 MMHG | HEART RATE: 69 BPM | RESPIRATION RATE: 17 BRPM

## 2024-09-06 DIAGNOSIS — C34.90 MALIGNANT NEOPLASM OF LUNG, UNSPECIFIED LATERALITY, UNSPECIFIED PART OF LUNG (MULTI): ICD-10-CM

## 2024-09-06 DIAGNOSIS — E03.9 HYPOTHYROIDISM, UNSPECIFIED TYPE: ICD-10-CM

## 2024-09-06 LAB
ALBUMIN SERPL BCP-MCNC: 4.2 G/DL (ref 3.4–5)
ALP SERPL-CCNC: 51 U/L (ref 33–136)
ALT SERPL W P-5'-P-CCNC: 15 U/L (ref 7–45)
ANION GAP SERPL CALC-SCNC: 13 MMOL/L (ref 10–20)
APPEARANCE UR: CLEAR
AST SERPL W P-5'-P-CCNC: 20 U/L (ref 9–39)
BASOPHILS # BLD AUTO: 0.03 X10*3/UL (ref 0–0.1)
BASOPHILS NFR BLD AUTO: 0.7 %
BILIRUB SERPL-MCNC: 0.4 MG/DL (ref 0–1.2)
BILIRUB UR STRIP.AUTO-MCNC: NEGATIVE MG/DL
BUN SERPL-MCNC: 23 MG/DL (ref 6–23)
CALCIUM SERPL-MCNC: 9.1 MG/DL (ref 8.6–10.3)
CHLORIDE SERPL-SCNC: 104 MMOL/L (ref 98–107)
CK SERPL-CCNC: 69 U/L (ref 0–215)
CO2 SERPL-SCNC: 28 MMOL/L (ref 21–32)
COLOR UR: ABNORMAL
CREAT SERPL-MCNC: 1.2 MG/DL (ref 0.5–1.05)
EGFRCR SERPLBLD CKD-EPI 2021: 48 ML/MIN/1.73M*2
EOSINOPHIL # BLD AUTO: 0.09 X10*3/UL (ref 0–0.4)
EOSINOPHIL NFR BLD AUTO: 2.2 %
ERYTHROCYTE [DISTWIDTH] IN BLOOD BY AUTOMATED COUNT: 14.4 % (ref 11.5–14.5)
GLUCOSE SERPL-MCNC: 69 MG/DL (ref 74–99)
GLUCOSE UR STRIP.AUTO-MCNC: NORMAL MG/DL
HCT VFR BLD AUTO: 44.2 % (ref 36–46)
HGB BLD-MCNC: 14.1 G/DL (ref 12–16)
IMM GRANULOCYTES # BLD AUTO: 0.02 X10*3/UL (ref 0–0.5)
IMM GRANULOCYTES NFR BLD AUTO: 0.5 % (ref 0–0.9)
KETONES UR STRIP.AUTO-MCNC: NEGATIVE MG/DL
LEUKOCYTE ESTERASE UR QL STRIP.AUTO: ABNORMAL
LYMPHOCYTES # BLD AUTO: 0.61 X10*3/UL (ref 0.8–3)
LYMPHOCYTES NFR BLD AUTO: 15.1 %
MAGNESIUM SERPL-MCNC: 2.11 MG/DL (ref 1.6–2.4)
MCH RBC QN AUTO: 30.2 PG (ref 26–34)
MCHC RBC AUTO-ENTMCNC: 31.9 G/DL (ref 32–36)
MCV RBC AUTO: 95 FL (ref 80–100)
MONOCYTES # BLD AUTO: 0.28 X10*3/UL (ref 0.05–0.8)
MONOCYTES NFR BLD AUTO: 6.9 %
MUCOUS THREADS #/AREA URNS AUTO: ABNORMAL /LPF
NEUTROPHILS # BLD AUTO: 3.01 X10*3/UL (ref 1.6–5.5)
NEUTROPHILS NFR BLD AUTO: 74.6 %
NITRITE UR QL STRIP.AUTO: NEGATIVE
NRBC BLD-RTO: 0 /100 WBCS (ref 0–0)
PH UR STRIP.AUTO: 6.5 [PH]
PLATELET # BLD AUTO: 155 X10*3/UL (ref 150–450)
POTASSIUM SERPL-SCNC: 4.8 MMOL/L (ref 3.5–5.3)
PROT SERPL-MCNC: 6.6 G/DL (ref 6.4–8.2)
PROT UR STRIP.AUTO-MCNC: NEGATIVE MG/DL
RBC # BLD AUTO: 4.67 X10*6/UL (ref 4–5.2)
RBC # UR STRIP.AUTO: NEGATIVE /UL
RBC #/AREA URNS AUTO: ABNORMAL /HPF
SODIUM SERPL-SCNC: 140 MMOL/L (ref 136–145)
SP GR UR STRIP.AUTO: 1.01
SQUAMOUS #/AREA URNS AUTO: ABNORMAL /HPF
TSH SERPL-ACNC: 1 MIU/L (ref 0.44–3.98)
UROBILINOGEN UR STRIP.AUTO-MCNC: NORMAL MG/DL
WBC # BLD AUTO: 4 X10*3/UL (ref 4.4–11.3)
WBC #/AREA URNS AUTO: >50 /HPF

## 2024-09-06 PROCEDURE — 99214 OFFICE O/P EST MOD 30 MIN: CPT | Performed by: CLINICAL NURSE SPECIALIST

## 2024-09-06 PROCEDURE — 96413 CHEMO IV INFUSION 1 HR: CPT

## 2024-09-06 PROCEDURE — 81001 URINALYSIS AUTO W/SCOPE: CPT | Performed by: CLINICAL NURSE SPECIALIST

## 2024-09-06 PROCEDURE — 85025 COMPLETE CBC W/AUTO DIFF WBC: CPT

## 2024-09-06 PROCEDURE — 80053 COMPREHEN METABOLIC PANEL: CPT

## 2024-09-06 PROCEDURE — 82550 ASSAY OF CK (CPK): CPT | Performed by: CLINICAL NURSE SPECIALIST

## 2024-09-06 PROCEDURE — 83735 ASSAY OF MAGNESIUM: CPT

## 2024-09-06 PROCEDURE — 36415 COLL VENOUS BLD VENIPUNCTURE: CPT

## 2024-09-06 PROCEDURE — 2560000001 HC RX 256 EXPERIMENTAL DRUGS: Performed by: CLINICAL NURSE SPECIALIST

## 2024-09-06 PROCEDURE — 84443 ASSAY THYROID STIM HORMONE: CPT | Performed by: INTERNAL MEDICINE

## 2024-09-06 RX ORDER — FAMOTIDINE 10 MG/ML
20 INJECTION INTRAVENOUS ONCE AS NEEDED
Status: DISCONTINUED | OUTPATIENT
Start: 2024-09-06 | End: 2024-09-06 | Stop reason: HOSPADM

## 2024-09-06 RX ORDER — PROCHLORPERAZINE MALEATE 10 MG
10 TABLET ORAL EVERY 6 HOURS PRN
Status: DISCONTINUED | OUTPATIENT
Start: 2024-09-06 | End: 2024-09-06 | Stop reason: HOSPADM

## 2024-09-06 RX ORDER — ALBUTEROL SULFATE 0.83 MG/ML
3 SOLUTION RESPIRATORY (INHALATION) AS NEEDED
Status: DISCONTINUED | OUTPATIENT
Start: 2024-09-06 | End: 2024-09-06 | Stop reason: HOSPADM

## 2024-09-06 RX ORDER — PROCHLORPERAZINE EDISYLATE 5 MG/ML
10 INJECTION INTRAMUSCULAR; INTRAVENOUS EVERY 6 HOURS PRN
Status: DISCONTINUED | OUTPATIENT
Start: 2024-09-06 | End: 2024-09-06 | Stop reason: HOSPADM

## 2024-09-06 RX ORDER — EPINEPHRINE 0.3 MG/.3ML
0.3 INJECTION SUBCUTANEOUS EVERY 5 MIN PRN
Status: DISCONTINUED | OUTPATIENT
Start: 2024-09-06 | End: 2024-09-06 | Stop reason: HOSPADM

## 2024-09-06 RX ORDER — DIPHENHYDRAMINE HYDROCHLORIDE 50 MG/ML
50 INJECTION INTRAMUSCULAR; INTRAVENOUS AS NEEDED
Status: DISCONTINUED | OUTPATIENT
Start: 2024-09-06 | End: 2024-09-06 | Stop reason: HOSPADM

## 2024-09-06 ASSESSMENT — ENCOUNTER SYMPTOMS
ARTHRALGIAS: 0
JOINT SWELLING: 0
CHILLS: 0
ABDOMINAL PAIN: 0
HEADACHES: 0
WEAKNESS: 0
SORE THROAT: 0
VOMITING: 0
NUMBNESS: 0
FEVER: 0
MYALGIAS: 0
NECK PAIN: 0
FATIGUE: 1
CHANGE IN BOWEL HABIT: 1
DIAPHORESIS: 0
NAUSEA: 0
VISUAL CHANGE: 0
VERTIGO: 0
COUGH: 0
SWOLLEN GLANDS: 0
ANOREXIA: 0

## 2024-09-06 ASSESSMENT — PAIN SCALES - GENERAL: PAINLEVEL: 0-NO PAIN

## 2024-09-06 NOTE — PROGRESS NOTES
Patient arrived to Infusion for her study Bevacizumab. Patient has no complaint at this time. She tolerated her infusion without any issue. She was discharged stable.

## 2024-09-06 NOTE — PROGRESS NOTES
Patient ID: Mirta Hills is a 69 y.o. female.    DIAGNOSIS     Adenocarcinoma of the lung.  Date of diagnosis is [place] 15, 2023 from a level 7 lymph node biopsy/fine-needle aspiration.  Immunohistochemistry positive for TTF-1.        STAGING     Clinical T2a (right lower lobe mass measuring 3.6 cm), clinical N2 (PET positivity and subcarinal region), M1C disease (presence of brain metastases, ribs, right acetabulum, liver, brain), stage IVb disease        CURRENT SITES OF DISEASE     Right lower lobe, right hilum of the lung, ipsilateral mediastinum, liver, bones, brain        MOLECULAR GENOMICS     TEST: Focused Solid Tumor DNA/RNA Panel   SPECIMEN: Supernatant, LYMPH NODE 7, [id number] A   DISEASE DIAGNOSIS: Adenocarcinoma   Estimated Tumor Content: 40%   COLLECTION DATE: 6/15/2023     MICROSATELLITE STATUS: Microsatellite  Instability-High (MSI-H) is NOT DETECTED.     DISEASE ASSOCIATED GENOMIC FINDINGS:   EGFR p.[id number] ([id number] c.[id number])    TP53 p.N247I ([id number] c.740A>T)     DISEASE RELEVANT ALTERATIONS NOT DETECTED:   Negative for ALK fusion.   Negative for BRAF V600E.   Negative for ERBB2 activating mutation   Negative for KRAS G12C.   Negative for  MET exon 14 skipping mutation.   Negative for NTRK fusion.   Negative for RET fusion.   Negative for ROS1 fusion.        Circulating tumor DNA sent on June 19, 2023  EGFR p.[id number], Variant allelic fraction of 1.6%  TP53 N247I Missense variant, loss of function, variant allelic fraction 1.1%  MSI stable        SERUM TUMOR MARKER     Slightly elevated CEA and CA 19.9 June 2023        PRIOR THERAPY     1- Gamma knife radiosurgery to brain lesions completed on June 28, 2023.        CURRENT THERAPY     Osimertinib on study  on clinical trial EA [id number] which is a randomized trial of osimertinib with or without bevacizumab.  She has been randomized to the bevacizumab arm- Cycle 1 Day 1 is 7/12/2023        CURRENT ONCOLOGICAL PROBLEMS     1-brain  metastases, symptomatic, word finding difficulties, mild unsteadiness on her feet but no falls, Resolved July 3, 2023  2-anterior chest pain, increases with deep inspiration, Improved July 3, 2023  3-mild intermittent grade 1 diarrhea, mild hair loss, mild leukopenia, mild elevation in creatinine, mild fatigue.  Most likely all osimertinib related August 7, 2023        HISTORY OF PRESENT ILLNESS     This is a 69-year-old patient.  She states that towards Thanksgiving of 2022 she started feeling some pain in her right shoulder.  Eventually got better however the same pain came back in January or February 2023.  Ultimately some x-rays were done of  the shoulder that were generally negative.  She then developed some pain in her sternum a month or 2 later with deep inspiration and ultimately underwent imaging in May 2023.  Chest CT without contrast was done on June 1, 2023.  This showed a 3.4 cm mass  within the superior segment of the right lower lobe with some associated satellite pulmonary nodules.  Additionally there were some other subcentimeter pulmonary nodules.  There was evidence of mediastinal lymphadenopathy.  Subcarinal lymph node measured  1.4 cm in short axis.She was seen by pulmonary medicine on June 5, 2023 a combined PET/CT was ordered on June 6, 2023 showing a relatively intense area of hypermetabolic activity along the superior posteromedial aspect of the right lower lobe corresponding  to the known right lower lobe lung mass.  There was foci of hypermetabolic activity in the right supraclavicular region, subcarinal and right hilar regions.  There was area of uptake within the posterior upper ribs as well as right acetabulum suspicious  for osseous metastatic disease.  There was a punctate area of mild hypermetabolic activity in the right hepatic lobe concerning for hepatic metastases.  Small focus in the subcutaneous region just anterior to the lower aspect of the sternum could be inflammatory   versus metastatic.  She undergoes a bronchoscopy dated [place] 15, 2023.  There were no endobronchial lesions.  Primary cytology with rapid onsite evaluation was suggestive of malignancy in level 7, final lymph nodes were pathology was pending.  MRI of the  brain with and without contrast dated June 16, 2023 showed approximately 20 enhancing nodules concerning for metastatic disease.  Specifically there was a 1.5 cm nodule in the right cerebellar hemisphere.  Most of the other nodules were less than 1 cm.   Patient also notes that she has some difficulty expressing words over the past 1 to 2 weeks prior to her first visit with us as well as some mild unsteadiness on her legs where she feels she is drifting towards the right side.        PAST MEDICAL HISTORY     1-hypothyroidism  2-hysterectomy 1988  3-abdominal hernia surgery 1995  4-some hearing difficulties        SOCIAL HISTORY     Patient lives with her significant other.  She has 2 daughter.  Lives in [facility] [place].  Smoked for only 3 years during college and during this time it was a pack per day.  She has retired.  She had a retail store.        CURRENT MEDS     See medication list, meds reviewed        ALLERGIES     Patient denies any drug allergies        FAMILY HISTORY     Patient's father had bladder cancer at the age of 86.  She has 1 brother with no cancer.  She has 2 children    Subjective    Today I am meeting with Mirta prior to treatment with Avastin, on study.  She continues on Tagrisso, and is doing well with the exception of grade 1 loose stools, for which she takes one imodium every morning.  She notices some hair thinning.  She has planned autumn trip to [facility] to visit daughter and to the [facility].  Blood pressure remains excellent- 118/68 today.        Cancer  Associated symptoms include a change in bowel habit and fatigue. Pertinent negatives include no abdominal pain, anorexia, arthralgias, chest pain, chills, congestion, coughing, diaphoresis, fever,  headaches, joint swelling, myalgias, nausea, neck pain, numbness, rash, sore throat, swollen glands, urinary symptoms, vertigo, visual change, vomiting or weakness.     Objective    BSA: 1.46 meters squared  /68 (BP Location: Left arm, Patient Position: Sitting)   Pulse 69   Temp 35.7 °C (96.3 °F) (Temporal)   Resp 17   Wt 49.1 kg (108 lb 3.2 oz)   SpO2 100%   BMI 20.12 kg/m²      Physical Exam  Constitutional:       General: She is not in acute distress.     Appearance: She is not ill-appearing, toxic-appearing or diaphoretic.   HENT:      Nose: No congestion or rhinorrhea.      Mouth/Throat:      Pharynx: No oropharyngeal exudate or posterior oropharyngeal erythema.   Eyes:      General: No scleral icterus.     Conjunctiva/sclera: Conjunctivae normal.   Cardiovascular:      Rate and Rhythm: Normal rate and regular rhythm.      Pulses: Normal pulses.      Heart sounds: Normal heart sounds. No murmur heard.     No friction rub. No gallop.   Pulmonary:      Effort: Pulmonary effort is normal. No respiratory distress.      Breath sounds: Normal breath sounds. No stridor. No wheezing, rhonchi or rales.   Chest:      Chest wall: No tenderness.   Abdominal:      General: There is no distension.      Palpations: There is no mass.      Tenderness: There is no abdominal tenderness. There is no guarding or rebound.   Musculoskeletal:      Cervical back: No tenderness.      Right lower leg: No edema.      Left lower leg: No edema.   Lymphadenopathy:      Cervical: No cervical adenopathy.   Skin:     Coloration: Skin is not jaundiced or pale.      Findings: No bruising, erythema, lesion or rash.   Neurological:      General: No focal deficit present.      Mental Status: She is oriented to person, place, and time.   Psychiatric:         Mood and Affect: Mood normal.         Behavior: Behavior normal.     === 08/08/24 ===    CT CHEST ABDOMEN PELVIS W IV CONTRAST    - Impression -  CHEST:  1.  Overall stable disease  in chest with spiculated mass in the  superior segment of right lower lobe and small subcentimeter  pulmonary nodules.  2. No evidence of new pulmonary nodules or new disease in chest.    ABDOMEN-PELVIS:  1.  There is mild interval prominence of hypodense lesion in hepatic  segment 4 measuring up to 1.6 x 1.4 cm. Recommend attention on  follow-up imaging.  2. Redemonstration of hypodense lesion in the spleen, slightly  increased in size compared to prior CT examination dated 01/18/2024.  Recommend continued attention on follow-up imaging.  3. Redemonstration of multiple osseous lesions, similar compared to  prior imaging.  4. Otherwise no evidence of new metastatic disease in the abdomen and  pelvis.  5. Stable splenomegaly.    MACRO:  None    Signed by: Verna Miranda 8/9/2024 12:39 PM  Dictation workstation:   [id number]     Performance Status:  Asymptomatic  Assessment/Plan     This is a patient with EGFR mutation positive adenocarcinoma of the lung on a clinical trial of Tagrisso and bevacizumab.   She has grade 1 diarrhea, and grade 1 fatigue.  Her systolic blood pressure was up a little the past few visits- but is 118/68 today.   She is tolerating both the tagrisso and the avastin quiet well.  Continue on protocol.         Cancer Staging   No matching staging information was found for the patient.      Oncology History   Lung cancer (Multi)   7/12/2023 -  Research Study Participant    ([facility]) [id number] Arm B - Bevacizumab / Osimertinib, 21 Day Cycles  Plan Provider: Humberto Landin MD  Treatment goal: [No plan goal]  Line of treatment: [No plan line of treatment]  Associated studies: SCS1262 (Osimertinib) +/- Bevacizumab as Initial Treatment for EGFR-Mutant Lung Cancer     9/1/2023 Initial Diagnosis    Lung cancer (CMS/HCC)          GONZALO Forbes-CNS

## 2024-09-09 ENCOUNTER — HOSPITAL ENCOUNTER (OUTPATIENT)
Dept: RADIOLOGY | Facility: CLINIC | Age: 69
End: 2024-09-09
Payer: MEDICARE

## 2024-09-09 ENCOUNTER — EDUCATION (OUTPATIENT)
Dept: HEMATOLOGY/ONCOLOGY | Facility: HOSPITAL | Age: 69
End: 2024-09-09
Payer: MEDICARE

## 2024-09-09 NOTE — PROGRESS NOTES
Research Note Treatment Day    Mirta Hills is here 09/06/2024 for treatment on OD9041. Today is C20. Procedures completed per protocol. AE's and con-meds reviewed with patient. Patient is aware of treatment plan.    [x]   Received treatment as planned   OR  []    Treatment delayed; patient calendar updated as required   Treatment delayed because:    []   AE    []   Physician Discretion    []   Clinical Deterioration or Progression     []   Other    Education Documentation  Skin and Nail Changes, taught by Makayla Jsoeph RN at 9/9/2024  2:20 PM.  Learner: Patient  Readiness: Acceptance  Method: Explanation  Response: Verbalizes Understanding    Education Comments  No comments found.

## 2024-09-19 DIAGNOSIS — E03.9 HYPOTHYROIDISM, UNSPECIFIED TYPE: ICD-10-CM

## 2024-09-19 RX ORDER — LEVOTHYROXINE SODIUM 137 UG/1
137 TABLET ORAL
Qty: 90 TABLET | Refills: 0 | Status: SHIPPED | OUTPATIENT
Start: 2024-09-19 | End: 2025-09-19

## 2024-09-26 ENCOUNTER — HOSPITAL ENCOUNTER (OUTPATIENT)
Dept: RADIOLOGY | Facility: HOSPITAL | Age: 69
Discharge: HOME | End: 2024-09-26
Payer: MEDICARE

## 2024-09-26 DIAGNOSIS — C34.90 MALIGNANT NEOPLASM OF LUNG, UNSPECIFIED LATERALITY, UNSPECIFIED PART OF LUNG (MULTI): ICD-10-CM

## 2024-09-26 PROCEDURE — 2550000001 HC RX 255 CONTRASTS: Performed by: INTERNAL MEDICINE

## 2024-09-26 PROCEDURE — 70553 MRI BRAIN STEM W/O & W/DYE: CPT

## 2024-09-26 PROCEDURE — A9575 INJ GADOTERATE MEGLUMI 0.1ML: HCPCS | Performed by: INTERNAL MEDICINE

## 2024-09-26 PROCEDURE — 74177 CT ABD & PELVIS W/CONTRAST: CPT

## 2024-09-26 RX ORDER — GADOTERATE MEGLUMINE 376.9 MG/ML
9 INJECTION INTRAVENOUS
Status: COMPLETED | OUTPATIENT
Start: 2024-09-26 | End: 2024-09-26

## 2024-09-27 DIAGNOSIS — C34.90 MALIGNANT NEOPLASM OF LUNG, UNSPECIFIED LATERALITY, UNSPECIFIED PART OF LUNG (MULTI): Primary | ICD-10-CM

## 2024-09-27 RX ORDER — ALBUTEROL SULFATE 0.83 MG/ML
3 SOLUTION RESPIRATORY (INHALATION) AS NEEDED
Status: CANCELLED | OUTPATIENT
Start: 2024-09-30

## 2024-09-27 RX ORDER — PROCHLORPERAZINE EDISYLATE 5 MG/ML
10 INJECTION INTRAMUSCULAR; INTRAVENOUS EVERY 6 HOURS PRN
Status: CANCELLED | OUTPATIENT
Start: 2024-09-30

## 2024-09-27 RX ORDER — EPINEPHRINE 0.3 MG/.3ML
0.3 INJECTION SUBCUTANEOUS EVERY 5 MIN PRN
Status: CANCELLED | OUTPATIENT
Start: 2024-09-30

## 2024-09-27 RX ORDER — FAMOTIDINE 10 MG/ML
20 INJECTION INTRAVENOUS ONCE AS NEEDED
Status: CANCELLED | OUTPATIENT
Start: 2024-09-30

## 2024-09-27 RX ORDER — PROCHLORPERAZINE MALEATE 10 MG
10 TABLET ORAL EVERY 6 HOURS PRN
Status: CANCELLED | OUTPATIENT
Start: 2024-09-30

## 2024-09-27 RX ORDER — DIPHENHYDRAMINE HYDROCHLORIDE 50 MG/ML
50 INJECTION INTRAMUSCULAR; INTRAVENOUS AS NEEDED
Status: CANCELLED | OUTPATIENT
Start: 2024-09-30

## 2024-09-30 ENCOUNTER — LAB (OUTPATIENT)
Dept: LAB | Facility: HOSPITAL | Age: 69
End: 2024-09-30
Payer: MEDICARE

## 2024-09-30 ENCOUNTER — INFUSION (OUTPATIENT)
Dept: HEMATOLOGY/ONCOLOGY | Facility: HOSPITAL | Age: 69
End: 2024-09-30
Payer: MEDICARE

## 2024-09-30 ENCOUNTER — TELEPHONE (OUTPATIENT)
Dept: HEMATOLOGY/ONCOLOGY | Facility: HOSPITAL | Age: 69
End: 2024-09-30
Payer: MEDICARE

## 2024-09-30 ENCOUNTER — EDUCATION (OUTPATIENT)
Dept: HEMATOLOGY/ONCOLOGY | Facility: HOSPITAL | Age: 69
End: 2024-09-30
Payer: MEDICARE

## 2024-09-30 ENCOUNTER — OFFICE VISIT (OUTPATIENT)
Dept: HEMATOLOGY/ONCOLOGY | Facility: HOSPITAL | Age: 69
End: 2024-09-30
Payer: MEDICARE

## 2024-09-30 VITALS
RESPIRATION RATE: 22 BRPM | OXYGEN SATURATION: 99 % | BODY MASS INDEX: 19.96 KG/M2 | SYSTOLIC BLOOD PRESSURE: 150 MMHG | TEMPERATURE: 98.1 F | WEIGHT: 107.36 LBS | DIASTOLIC BLOOD PRESSURE: 78 MMHG | HEART RATE: 80 BPM

## 2024-09-30 DIAGNOSIS — C34.90 MALIGNANT NEOPLASM OF LUNG, UNSPECIFIED LATERALITY, UNSPECIFIED PART OF LUNG (MULTI): ICD-10-CM

## 2024-09-30 DIAGNOSIS — C34.90 MALIGNANT NEOPLASM OF LUNG, UNSPECIFIED LATERALITY, UNSPECIFIED PART OF LUNG (MULTI): Primary | ICD-10-CM

## 2024-09-30 DIAGNOSIS — C34.31 MALIGNANT NEOPLASM OF LOWER LOBE OF RIGHT LUNG (MULTI): ICD-10-CM

## 2024-09-30 LAB
ALBUMIN SERPL BCP-MCNC: 4.2 G/DL (ref 3.4–5)
ALP SERPL-CCNC: 52 U/L (ref 33–136)
ALT SERPL W P-5'-P-CCNC: 17 U/L (ref 7–45)
ANION GAP SERPL CALC-SCNC: 13 MMOL/L (ref 10–20)
AST SERPL W P-5'-P-CCNC: 22 U/L (ref 9–39)
BASOPHILS # BLD AUTO: 0.03 X10*3/UL (ref 0–0.1)
BASOPHILS NFR BLD AUTO: 0.7 %
BILIRUB SERPL-MCNC: 0.4 MG/DL (ref 0–1.2)
BUN SERPL-MCNC: 23 MG/DL (ref 6–23)
CALCIUM SERPL-MCNC: 9.6 MG/DL (ref 8.6–10.3)
CHLORIDE SERPL-SCNC: 106 MMOL/L (ref 98–107)
CK SERPL-CCNC: 52 U/L (ref 0–215)
CO2 SERPL-SCNC: 27 MMOL/L (ref 21–32)
CREAT SERPL-MCNC: 1.24 MG/DL (ref 0.5–1.05)
EGFRCR SERPLBLD CKD-EPI 2021: 47 ML/MIN/1.73M*2
EOSINOPHIL # BLD AUTO: 0.13 X10*3/UL (ref 0–0.4)
EOSINOPHIL NFR BLD AUTO: 3.1 %
ERYTHROCYTE [DISTWIDTH] IN BLOOD BY AUTOMATED COUNT: 14.7 % (ref 11.5–14.5)
GLUCOSE SERPL-MCNC: 121 MG/DL (ref 74–99)
HCT VFR BLD AUTO: 43.8 % (ref 36–46)
HGB BLD-MCNC: 13.7 G/DL (ref 12–16)
IMM GRANULOCYTES # BLD AUTO: 0.02 X10*3/UL (ref 0–0.5)
IMM GRANULOCYTES NFR BLD AUTO: 0.5 % (ref 0–0.9)
LYMPHOCYTES # BLD AUTO: 0.7 X10*3/UL (ref 0.8–3)
LYMPHOCYTES NFR BLD AUTO: 16.5 %
MAGNESIUM SERPL-MCNC: 2.2 MG/DL (ref 1.6–2.4)
MCH RBC QN AUTO: 30 PG (ref 26–34)
MCHC RBC AUTO-ENTMCNC: 31.3 G/DL (ref 32–36)
MCV RBC AUTO: 96 FL (ref 80–100)
MONOCYTES # BLD AUTO: 0.31 X10*3/UL (ref 0.05–0.8)
MONOCYTES NFR BLD AUTO: 7.3 %
NEUTROPHILS # BLD AUTO: 3.06 X10*3/UL (ref 1.6–5.5)
NEUTROPHILS NFR BLD AUTO: 71.9 %
NRBC BLD-RTO: 0 /100 WBCS (ref 0–0)
PLATELET # BLD AUTO: 156 X10*3/UL (ref 150–450)
POTASSIUM SERPL-SCNC: 4.8 MMOL/L (ref 3.5–5.3)
PROT SERPL-MCNC: 6.7 G/DL (ref 6.4–8.2)
RBC # BLD AUTO: 4.57 X10*6/UL (ref 4–5.2)
SODIUM SERPL-SCNC: 141 MMOL/L (ref 136–145)
WBC # BLD AUTO: 4.3 X10*3/UL (ref 4.4–11.3)

## 2024-09-30 PROCEDURE — 84075 ASSAY ALKALINE PHOSPHATASE: CPT

## 2024-09-30 PROCEDURE — 83735 ASSAY OF MAGNESIUM: CPT

## 2024-09-30 PROCEDURE — 96413 CHEMO IV INFUSION 1 HR: CPT

## 2024-09-30 PROCEDURE — 2560000001 HC RX 256 EXPERIMENTAL DRUGS: Performed by: CLINICAL NURSE SPECIALIST

## 2024-09-30 PROCEDURE — 85025 COMPLETE CBC W/AUTO DIFF WBC: CPT

## 2024-09-30 PROCEDURE — 36415 COLL VENOUS BLD VENIPUNCTURE: CPT

## 2024-09-30 PROCEDURE — 99215 OFFICE O/P EST HI 40 MIN: CPT | Performed by: INTERNAL MEDICINE

## 2024-09-30 PROCEDURE — 82550 ASSAY OF CK (CPK): CPT

## 2024-09-30 RX ORDER — EPINEPHRINE 0.3 MG/.3ML
0.3 INJECTION SUBCUTANEOUS EVERY 5 MIN PRN
Status: DISCONTINUED | OUTPATIENT
Start: 2024-09-30 | End: 2024-09-30 | Stop reason: HOSPADM

## 2024-09-30 RX ORDER — ALBUTEROL SULFATE 0.83 MG/ML
3 SOLUTION RESPIRATORY (INHALATION) AS NEEDED
Status: DISCONTINUED | OUTPATIENT
Start: 2024-09-30 | End: 2024-09-30 | Stop reason: HOSPADM

## 2024-09-30 RX ORDER — PROCHLORPERAZINE EDISYLATE 5 MG/ML
10 INJECTION INTRAMUSCULAR; INTRAVENOUS EVERY 6 HOURS PRN
Status: DISCONTINUED | OUTPATIENT
Start: 2024-09-30 | End: 2024-09-30 | Stop reason: HOSPADM

## 2024-09-30 RX ORDER — DIPHENHYDRAMINE HYDROCHLORIDE 50 MG/ML
50 INJECTION INTRAMUSCULAR; INTRAVENOUS AS NEEDED
Status: DISCONTINUED | OUTPATIENT
Start: 2024-09-30 | End: 2024-09-30 | Stop reason: HOSPADM

## 2024-09-30 RX ORDER — FAMOTIDINE 10 MG/ML
20 INJECTION INTRAVENOUS ONCE AS NEEDED
Status: DISCONTINUED | OUTPATIENT
Start: 2024-09-30 | End: 2024-09-30 | Stop reason: HOSPADM

## 2024-09-30 RX ORDER — PROCHLORPERAZINE MALEATE 10 MG
10 TABLET ORAL EVERY 6 HOURS PRN
Status: DISCONTINUED | OUTPATIENT
Start: 2024-09-30 | End: 2024-09-30 | Stop reason: HOSPADM

## 2024-09-30 ASSESSMENT — PAIN SCALES - GENERAL: PAINLEVEL: 0-NO PAIN

## 2024-09-30 NOTE — PROGRESS NOTES
Patient ID: Mirta Hills is a 69 y.o. female.    DIAGNOSIS     Adenocarcinoma of the lung.  Date of diagnosis is [place] 15, 2023 from a level 7 lymph node biopsy/fine-needle aspiration.  Immunohistochemistry positive for TTF-1.        STAGING     Clinical T2a (right lower lobe mass measuring 3.6 cm), clinical N2 (PET positivity and subcarinal region), M1C disease (presence of brain metastases, ribs, right acetabulum, liver, brain), stage IVb disease        CURRENT SITES OF DISEASE     Right lower lobe, right hilum of the lung, ipsilateral mediastinum, liver, bones, brain        MOLECULAR GENOMICS     TEST: Focused Solid Tumor DNA/RNA Panel   SPECIMEN: Supernatant, LYMPH NODE 7, [id number] A   DISEASE DIAGNOSIS: Adenocarcinoma   Estimated Tumor Content: 40%   COLLECTION DATE: 6/15/2023     MICROSATELLITE STATUS: Microsatellite  Instability-High (MSI-H) is NOT DETECTED.     DISEASE ASSOCIATED GENOMIC FINDINGS:   EGFR p.[id number] ([id number] c.[id number])    TP53 p.N247I (NM_000546 c.740A>T)     DISEASE RELEVANT ALTERATIONS NOT DETECTED:   Negative for ALK fusion.   Negative for BRAF V600E.   Negative for ERBB2 activating mutation   Negative for KRAS G12C.   Negative for  MET exon 14 skipping mutation.   Negative for NTRK fusion.   Negative for RET fusion.   Negative for ROS1 fusion.        Circulating tumor DNA sent on June 19, 2023  EGFR p.[id number], Variant allelic fraction of 1.6%  TP53 N247I Missense variant, loss of function, variant allelic fraction 1.1%  MSI stable        SERUM TUMOR MARKER     Slightly elevated CEA and CA 19.9 June 2023        PRIOR THERAPY     1- Gamma knife radiosurgery to brain lesions completed on June 28, 2023.        CURRENT THERAPY     Osimertinib on study  on clinical trial EA [id number] which is a randomized trial of osimertinib with or without bevacizumab.  She has been randomized to the bevacizumab arm- Cycle 1 Day 1 is 7/12/2023        CURRENT ONCOLOGICAL PROBLEMS     1-brain  metastases, symptomatic, word finding difficulties, mild unsteadiness on her feet but no falls, Resolved July 3, 2023  2-anterior chest pain, increases with deep inspiration, Improved July 3, 2023  3-mild intermittent grade 1 diarrhea, mild hair loss, mild leukopenia, mild elevation in creatinine, mild fatigue.  Most likely all osimertinib related August 7, 2023        HISTORY OF PRESENT ILLNESS     This is a 69-year-old patient.  She states that towards Thanksgiving of 2022 she started feeling some pain in her right shoulder.  Eventually got better however the same pain came back in January or February 2023.  Ultimately some x-rays were done of  the shoulder that were generally negative.  She then developed some pain in her sternum a month or 2 later with deep inspiration and ultimately underwent imaging in May 2023.  Chest CT without contrast was done on June 1, 2023.  This showed a 3.4 cm mass  within the superior segment of the right lower lobe with some associated satellite pulmonary nodules.  Additionally there were some other subcentimeter pulmonary nodules.  There was evidence of mediastinal lymphadenopathy.  Subcarinal lymph node measured  1.4 cm in short axis.She was seen by pulmonary medicine on June 5, 2023 a combined PET/CT was ordered on June 6, 2023 showing a relatively intense area of hypermetabolic activity along the superior posteromedial aspect of the right lower lobe corresponding  to the known right lower lobe lung mass.  There was foci of hypermetabolic activity in the right supraclavicular region, subcarinal and right hilar regions.  There was area of uptake within the posterior upper ribs as well as right acetabulum suspicious  for osseous metastatic disease.  There was a punctate area of mild hypermetabolic activity in the right hepatic lobe concerning for hepatic metastases.  Small focus in the subcutaneous region just anterior to the lower aspect of the sternum could be inflammatory   versus metastatic.  She undergoes a bronchoscopy dated [place] 15, 2023.  There were no endobronchial lesions.  Primary cytology with rapid onsite evaluation was suggestive of malignancy in level 7, final lymph nodes were pathology was pending.  MRI of the  brain with and without contrast dated June 16, 2023 showed approximately 20 enhancing nodules concerning for metastatic disease.  Specifically there was a 1.5 cm nodule in the right cerebellar hemisphere.  Most of the other nodules were less than 1 cm.   Patient also notes that she has some difficulty expressing words over the past 1 to 2 weeks prior to her first visit with us as well as some mild unsteadiness on her legs where she feels she is drifting towards the right side.        PAST MEDICAL HISTORY     1-hypothyroidism  2-hysterectomy 1988  3-abdominal hernia surgery 1995  4-some hearing difficulties        SOCIAL HISTORY     Patient lives with her significant other.  She has 2 daughter.  Lives in [facility] [facility].  Smoked for only 3 years during college and during this time it was a pack per day.  She has retired.  She had a retail store.        CURRENT MEDS     See medication list, meds reviewed        ALLERGIES     Patient denies any drug allergies        FAMILY HISTORY     Patient's father had bladder cancer at the age of 86.  She has 1 brother with no cancer.  She has 2 children         Subjective    Cancer  Associated symptoms include a change in bowel habit. Pertinent negatives include no abdominal pain, anorexia, arthralgias, chest pain, chills, congestion, coughing, diaphoresis, fatigue, fever, headaches, joint swelling, myalgias, nausea, neck pain, numbness, rash, sore throat, swollen glands, urinary symptoms, vertigo, visual change, vomiting or weakness.       Patient is clinically doing well.  On and off mild diarrhea after 1 or twice a day, breathing is the same, no new skin lesions,      Objective    BSA: 1.45 meters squared  /78 (BP Location:  Right arm, Patient Position: Sitting, BP Cuff Size: Small adult) Comment: I LET BRUCE HOOPER KNOW  Pulse 80   Temp 36.7 °C (98.1 °F) (Temporal)   Resp 22   Wt 48.7 kg (107 lb 5.8 oz)   SpO2 99%   BMI 19.96 kg/m²      Physical Exam  Constitutional:       General: She is not in acute distress.     Appearance: Normal appearance. She is not ill-appearing, toxic-appearing or diaphoretic.   HENT:      Nose: No congestion or rhinorrhea.      Mouth/Throat:      Pharynx: No oropharyngeal exudate or posterior oropharyngeal erythema.   Eyes:      General: No scleral icterus.     Conjunctiva/sclera: Conjunctivae normal.   Cardiovascular:      Rate and Rhythm: Normal rate and regular rhythm.      Pulses: Normal pulses.      Heart sounds: Normal heart sounds. No murmur heard.     No friction rub. No gallop.   Pulmonary:      Effort: Pulmonary effort is normal. No respiratory distress.      Breath sounds: Normal breath sounds. No stridor. No wheezing, rhonchi or rales.   Chest:      Chest wall: No tenderness.   Abdominal:      General: Abdomen is flat. Bowel sounds are normal. There is no distension.      Palpations: Abdomen is soft. There is no mass.      Tenderness: There is no abdominal tenderness. There is no guarding or rebound.   Musculoskeletal:      Cervical back: No tenderness.      Right lower leg: No edema.      Left lower leg: No edema.   Lymphadenopathy:      Cervical: No cervical adenopathy.   Skin:     General: Skin is warm.      Coloration: Skin is not jaundiced.      Findings: No bruising.   Neurological:      General: No focal deficit present.      Mental Status: She is oriented to person, place, and time.   Psychiatric:         Mood and Affect: Mood normal.         Behavior: Behavior normal.         Performance Status:  Asymptomatic     Latest Reference Range & Units 09/30/24 11:56   GLUCOSE 74 - 99 mg/dL 121 (H)   SODIUM 136 - 145 mmol/L 141   POTASSIUM 3.5 - 5.3 mmol/L 4.8   CHLORIDE 98 - 107 mmol/L 106    Bicarbonate 21 - 32 mmol/L 27   Anion Gap 10 - 20 mmol/L 13   Blood Urea Nitrogen 6 - 23 mg/dL 23   Creatinine 0.50 - 1.05 mg/dL 1.24 (H)   EGFR >60 mL/min/1.73m*2 47 (L)   Calcium 8.6 - 10.3 mg/dL 9.6   Albumin 3.4 - 5.0 g/dL 4.2   Alkaline Phosphatase 33 - 136 U/L 52   ALT 7 - 45 U/L 17   AST 9 - 39 U/L 22   Bilirubin Total 0.0 - 1.2 mg/dL 0.4   Total Protein 6.4 - 8.2 g/dL 6.7   MAGNESIUM 1.60 - 2.40 mg/dL 2.20   WBC 4.4 - 11.3 x10*3/uL 4.3 (L)   nRBC 0.0 - 0.0 /100 WBCs 0.0   RBC 4.00 - 5.20 x10*6/uL 4.57   HEMOGLOBIN 12.0 - 16.0 g/dL 13.7   HEMATOCRIT 36.0 - 46.0 % 43.8   MCV 80 - 100 fL 96   MCH 26.0 - 34.0 pg 30.0   MCHC 32.0 - 36.0 g/dL 31.3 (L)   RED CELL DISTRIBUTION WIDTH 11.5 - 14.5 % 14.7 (H)   Platelets 150 - 450 x10*3/uL 156   Neutrophils % 40.0 - 80.0 % 71.9   Immature Granulocytes %, Automated 0.0 - 0.9 % 0.5   Lymphocytes % 13.0 - 44.0 % 16.5   Monocytes % 2.0 - 10.0 % 7.3   Eosinophils % 0.0 - 6.0 % 3.1   Basophils % 0.0 - 2.0 % 0.7   Neutrophils Absolute 1.60 - 5.50 x10*3/uL 3.06   Immature Granulocytes Absolute, Automated 0.00 - 0.50 x10*3/uL 0.02   Lymphocytes Absolute 0.80 - 3.00 x10*3/uL 0.70 (L)   Monocytes Absolute 0.05 - 0.80 x10*3/uL 0.31   Eosinophils Absolute 0.00 - 0.40 x10*3/uL 0.13   Basophils Absolute 0.00 - 0.10 x10*3/uL 0.03   (H): Data is abnormally high  (L): Data is abnormally low      CT CHEST ABDOMEN PELVIS W IV CONTRAST; 9/26/2024   IMPRESSION:  CHEST ABDOMEN AND PELVIS:  Essentially stable exam including presumably neoplastic mass in the  right lower lobe and multiple sclerotic presumably metastatic osseous  lesions. No significant change from 08/08/2024.  colonic fecal  retention.    MR BRAIN W AND WO IV CONTRAST; 9/26/2024   IMPRESSION:  Overall no appreciable change from August 8, 2024.      Assessment/Plan     This is a very nice 69-year-old patient with EGFR mutation positive adenocarcinoma of the lung.  She is on a clinical trial of an EGFR inhibitor with  bevacizumab and has been on this treatment for over a year starting July 12, 2023.  Her most recent scans show no evidence of disease progression.  She has minor GI toxicity and will continue on the current dose.  I personally reviewed her most recent CT scan of the chest and MRI of the brain.    Cancer Staging   No matching staging information was found for the patient.      Oncology History   Lung cancer (Multi)   7/12/2023 -  Research Study Participant    ([facility]) [id number] Arm B - Bevacizumab / Osimertinib, 21 Day Cycles  Plan Provider: Humberto Landin MD  Treatment goal: [No plan goal]  Line of treatment: [No plan line of treatment]  Associated studies: [id number] (Osimertinib) +/- Bevacizumab as Initial Treatment for EGFR-Mutant Lung Cancer     9/1/2023 Initial Diagnosis    Lung cancer (CMS/HCC)                   Humberto Landin MD

## 2024-09-30 NOTE — RESEARCH NOTES
Research Note Treatment Day    Mirta Hills is here today for treatment on TH3079. Today is C21. Procedures completed per protocol. AE's and con-meds reviewed with patient. Patient is aware of treatment plan. Patient instructed to take BP at home and report back results. Dr. Chairez reviewed scan results with patient and family.     [x]   Received treatment as planned   OR  []    Treatment delayed; patient calendar updated as required   Treatment delayed because:    []   AE    []   Physician Discretion    []   Clinical Deterioration or Progression     []   Other    Education Documentation  Treatment Plan and Schedule, taught by Makayla Joseph RN at 9/30/2024  3:53 PM.  Learner: Patient  Readiness: Acceptance  Method: Explanation  Response: Verbalizes Understanding    Education Comments  No comments found.

## 2024-09-30 NOTE — PROGRESS NOTES
Patient arrived to Infusion for her Study Bevacizumab. Patient has been feeling good. She tolerated her treatment without any issue. She was discharged stable.

## 2024-09-30 NOTE — TELEPHONE ENCOUNTER
Patient notified MD will still be able to review scan with her. Patient verbalized understanding and states no further needs at this time.

## 2024-10-06 ASSESSMENT — ENCOUNTER SYMPTOMS
CHILLS: 0
NAUSEA: 0
ANOREXIA: 0
SORE THROAT: 0
MYALGIAS: 0
SWOLLEN GLANDS: 0
CHANGE IN BOWEL HABIT: 1
ARTHRALGIAS: 0
DIAPHORESIS: 0
VERTIGO: 0
COUGH: 0
WEAKNESS: 0
FEVER: 0
JOINT SWELLING: 0
FATIGUE: 0
NECK PAIN: 0
HEADACHES: 0
NUMBNESS: 0
VISUAL CHANGE: 0
ABDOMINAL PAIN: 0
VOMITING: 0

## 2024-10-07 DIAGNOSIS — C34.90 MALIGNANT NEOPLASM OF LUNG, UNSPECIFIED LATERALITY, UNSPECIFIED PART OF LUNG (MULTI): ICD-10-CM

## 2024-10-15 DIAGNOSIS — C34.90 MALIGNANT NEOPLASM OF LUNG, UNSPECIFIED LATERALITY, UNSPECIFIED PART OF LUNG (MULTI): Primary | ICD-10-CM

## 2024-10-15 RX ORDER — PROCHLORPERAZINE MALEATE 10 MG
10 TABLET ORAL EVERY 6 HOURS PRN
OUTPATIENT
Start: 2024-10-21

## 2024-10-15 RX ORDER — DIPHENHYDRAMINE HYDROCHLORIDE 50 MG/ML
50 INJECTION INTRAMUSCULAR; INTRAVENOUS AS NEEDED
OUTPATIENT
Start: 2024-10-21

## 2024-10-15 RX ORDER — FAMOTIDINE 10 MG/ML
20 INJECTION INTRAVENOUS ONCE AS NEEDED
OUTPATIENT
Start: 2024-10-21

## 2024-10-15 RX ORDER — ALBUTEROL SULFATE 0.83 MG/ML
3 SOLUTION RESPIRATORY (INHALATION) AS NEEDED
OUTPATIENT
Start: 2024-10-21

## 2024-10-15 RX ORDER — PROCHLORPERAZINE EDISYLATE 5 MG/ML
10 INJECTION INTRAMUSCULAR; INTRAVENOUS EVERY 6 HOURS PRN
OUTPATIENT
Start: 2024-10-21

## 2024-10-15 RX ORDER — EPINEPHRINE 0.3 MG/.3ML
0.3 INJECTION SUBCUTANEOUS EVERY 5 MIN PRN
OUTPATIENT
Start: 2024-10-21

## 2024-10-21 ENCOUNTER — OFFICE VISIT (OUTPATIENT)
Dept: HEMATOLOGY/ONCOLOGY | Facility: HOSPITAL | Age: 69
End: 2024-10-21
Payer: MEDICARE

## 2024-10-21 ENCOUNTER — TELEPHONE (OUTPATIENT)
Dept: ADMISSION | Facility: HOSPITAL | Age: 69
End: 2024-10-21
Payer: MEDICARE

## 2024-10-21 ENCOUNTER — INFUSION (OUTPATIENT)
Dept: HEMATOLOGY/ONCOLOGY | Facility: HOSPITAL | Age: 69
End: 2024-10-21
Payer: MEDICARE

## 2024-10-21 ENCOUNTER — LAB (OUTPATIENT)
Dept: LAB | Facility: HOSPITAL | Age: 69
End: 2024-10-21
Payer: MEDICARE

## 2024-10-21 VITALS
RESPIRATION RATE: 17 BRPM | BODY MASS INDEX: 19.84 KG/M2 | DIASTOLIC BLOOD PRESSURE: 81 MMHG | WEIGHT: 106.7 LBS | HEART RATE: 71 BPM | SYSTOLIC BLOOD PRESSURE: 129 MMHG | OXYGEN SATURATION: 100 % | TEMPERATURE: 97.7 F

## 2024-10-21 DIAGNOSIS — C34.90 MALIGNANT NEOPLASM OF LUNG, UNSPECIFIED LATERALITY, UNSPECIFIED PART OF LUNG (MULTI): ICD-10-CM

## 2024-10-21 LAB
ALBUMIN SERPL BCP-MCNC: 4.3 G/DL (ref 3.4–5)
ALP SERPL-CCNC: 58 U/L (ref 33–136)
ALT SERPL W P-5'-P-CCNC: 14 U/L (ref 7–45)
ANION GAP SERPL CALC-SCNC: 13 MMOL/L (ref 10–20)
APPEARANCE UR: CLEAR
AST SERPL W P-5'-P-CCNC: 18 U/L (ref 9–39)
BASOPHILS # BLD AUTO: 0.04 X10*3/UL (ref 0–0.1)
BASOPHILS NFR BLD AUTO: 0.9 %
BILIRUB SERPL-MCNC: 0.5 MG/DL (ref 0–1.2)
BILIRUB UR STRIP.AUTO-MCNC: NEGATIVE MG/DL
BUN SERPL-MCNC: 24 MG/DL (ref 6–23)
CALCIUM SERPL-MCNC: 9.5 MG/DL (ref 8.6–10.3)
CHLORIDE SERPL-SCNC: 103 MMOL/L (ref 98–107)
CK SERPL-CCNC: 47 U/L (ref 0–215)
CO2 SERPL-SCNC: 29 MMOL/L (ref 21–32)
COLOR UR: YELLOW
CREAT SERPL-MCNC: 1.32 MG/DL (ref 0.5–1.05)
EGFRCR SERPLBLD CKD-EPI 2021: 43 ML/MIN/1.73M*2
EOSINOPHIL # BLD AUTO: 0.22 X10*3/UL (ref 0–0.4)
EOSINOPHIL NFR BLD AUTO: 4.7 %
ERYTHROCYTE [DISTWIDTH] IN BLOOD BY AUTOMATED COUNT: 14.8 % (ref 11.5–14.5)
GLUCOSE SERPL-MCNC: 87 MG/DL (ref 74–99)
GLUCOSE UR STRIP.AUTO-MCNC: NORMAL MG/DL
HCT VFR BLD AUTO: 45.4 % (ref 36–46)
HGB BLD-MCNC: 14.5 G/DL (ref 12–16)
IMM GRANULOCYTES # BLD AUTO: 0.04 X10*3/UL (ref 0–0.5)
IMM GRANULOCYTES NFR BLD AUTO: 0.9 % (ref 0–0.9)
KETONES UR STRIP.AUTO-MCNC: NEGATIVE MG/DL
LEUKOCYTE ESTERASE UR QL STRIP.AUTO: ABNORMAL
LYMPHOCYTES # BLD AUTO: 0.71 X10*3/UL (ref 0.8–3)
LYMPHOCYTES NFR BLD AUTO: 15.3 %
MAGNESIUM SERPL-MCNC: 2.12 MG/DL (ref 1.6–2.4)
MCH RBC QN AUTO: 30.3 PG (ref 26–34)
MCHC RBC AUTO-ENTMCNC: 31.9 G/DL (ref 32–36)
MCV RBC AUTO: 95 FL (ref 80–100)
MONOCYTES # BLD AUTO: 0.32 X10*3/UL (ref 0.05–0.8)
MONOCYTES NFR BLD AUTO: 6.9 %
MUCOUS THREADS #/AREA URNS AUTO: ABNORMAL /LPF
NEUTROPHILS # BLD AUTO: 3.32 X10*3/UL (ref 1.6–5.5)
NEUTROPHILS NFR BLD AUTO: 71.3 %
NITRITE UR QL STRIP.AUTO: NEGATIVE
NRBC BLD-RTO: 0 /100 WBCS (ref 0–0)
PH UR STRIP.AUTO: 6 [PH]
PLATELET # BLD AUTO: 151 X10*3/UL (ref 150–450)
POTASSIUM SERPL-SCNC: 4.6 MMOL/L (ref 3.5–5.3)
PROT SERPL-MCNC: 6.6 G/DL (ref 6.4–8.2)
PROT UR STRIP.AUTO-MCNC: NEGATIVE MG/DL
RBC # BLD AUTO: 4.79 X10*6/UL (ref 4–5.2)
RBC # UR STRIP.AUTO: NEGATIVE /UL
RBC #/AREA URNS AUTO: ABNORMAL /HPF
SODIUM SERPL-SCNC: 140 MMOL/L (ref 136–145)
SP GR UR STRIP.AUTO: 1.02
SQUAMOUS #/AREA URNS AUTO: ABNORMAL /HPF
UROBILINOGEN UR STRIP.AUTO-MCNC: NORMAL MG/DL
WBC # BLD AUTO: 4.7 X10*3/UL (ref 4.4–11.3)
WBC #/AREA URNS AUTO: ABNORMAL /HPF

## 2024-10-21 PROCEDURE — 83735 ASSAY OF MAGNESIUM: CPT

## 2024-10-21 PROCEDURE — 82550 ASSAY OF CK (CPK): CPT

## 2024-10-21 PROCEDURE — 96413 CHEMO IV INFUSION 1 HR: CPT

## 2024-10-21 PROCEDURE — 2560000001 HC RX 256 EXPERIMENTAL DRUGS: Performed by: INTERNAL MEDICINE

## 2024-10-21 PROCEDURE — 85025 COMPLETE CBC W/AUTO DIFF WBC: CPT

## 2024-10-21 PROCEDURE — 84075 ASSAY ALKALINE PHOSPHATASE: CPT

## 2024-10-21 PROCEDURE — 36415 COLL VENOUS BLD VENIPUNCTURE: CPT

## 2024-10-21 PROCEDURE — 81001 URINALYSIS AUTO W/SCOPE: CPT

## 2024-10-21 PROCEDURE — 99214 OFFICE O/P EST MOD 30 MIN: CPT | Mod: 25 | Performed by: CLINICAL NURSE SPECIALIST

## 2024-10-21 RX ORDER — PROCHLORPERAZINE MALEATE 10 MG
10 TABLET ORAL EVERY 6 HOURS PRN
Status: DISCONTINUED | OUTPATIENT
Start: 2024-10-21 | End: 2024-10-21 | Stop reason: HOSPADM

## 2024-10-21 RX ORDER — FAMOTIDINE 10 MG/ML
20 INJECTION INTRAVENOUS ONCE AS NEEDED
Status: DISCONTINUED | OUTPATIENT
Start: 2024-10-21 | End: 2024-10-21 | Stop reason: HOSPADM

## 2024-10-21 RX ORDER — EPINEPHRINE 0.3 MG/.3ML
0.3 INJECTION SUBCUTANEOUS EVERY 5 MIN PRN
Status: DISCONTINUED | OUTPATIENT
Start: 2024-10-21 | End: 2024-10-21 | Stop reason: HOSPADM

## 2024-10-21 RX ORDER — PROCHLORPERAZINE EDISYLATE 5 MG/ML
10 INJECTION INTRAMUSCULAR; INTRAVENOUS EVERY 6 HOURS PRN
Status: DISCONTINUED | OUTPATIENT
Start: 2024-10-21 | End: 2024-10-21 | Stop reason: HOSPADM

## 2024-10-21 RX ORDER — ALBUTEROL SULFATE 0.83 MG/ML
3 SOLUTION RESPIRATORY (INHALATION) AS NEEDED
Status: DISCONTINUED | OUTPATIENT
Start: 2024-10-21 | End: 2024-10-21 | Stop reason: HOSPADM

## 2024-10-21 RX ORDER — DIPHENHYDRAMINE HYDROCHLORIDE 50 MG/ML
50 INJECTION INTRAMUSCULAR; INTRAVENOUS AS NEEDED
Status: DISCONTINUED | OUTPATIENT
Start: 2024-10-21 | End: 2024-10-21 | Stop reason: HOSPADM

## 2024-10-21 ASSESSMENT — ENCOUNTER SYMPTOMS
CHILLS: 0
NECK PAIN: 0
JOINT SWELLING: 0
FATIGUE: 0
ANOREXIA: 0
NUMBNESS: 0
HEADACHES: 0
COUGH: 0
NAUSEA: 0
ABDOMINAL PAIN: 0
VISUAL CHANGE: 0
MYALGIAS: 0
SORE THROAT: 0
SWOLLEN GLANDS: 0
VOMITING: 0
VERTIGO: 0
WEAKNESS: 0
CHANGE IN BOWEL HABIT: 1
FEVER: 0
DIAPHORESIS: 0
ARTHRALGIAS: 0

## 2024-10-21 ASSESSMENT — PAIN SCALES - GENERAL: PAINLEVEL_OUTOF10: 0-NO PAIN

## 2024-10-21 NOTE — PROGRESS NOTES
Patient ID: Mirta Hills is a 69 y.o. female.    DIAGNOSIS     Adenocarcinoma of the lung.  Date of diagnosis is [place] 15, 2023 from a level 7 lymph node biopsy/fine-needle aspiration.  Immunohistochemistry positive for TTF-1.        STAGING     Clinical T2a (right lower lobe mass measuring 3.6 cm), clinical N2 (PET positivity and subcarinal region), M1C disease (presence of brain metastases, ribs, right acetabulum, liver, brain), stage IVb disease        CURRENT SITES OF DISEASE     Right lower lobe, right hilum of the lung, ipsilateral mediastinum, liver, bones, brain        MOLECULAR GENOMICS     TEST: Focused Solid Tumor DNA/RNA Panel   SPECIMEN: Supernatant, LYMPH NODE 7, [id number] A   DISEASE DIAGNOSIS: Adenocarcinoma   Estimated Tumor Content: 40%   COLLECTION DATE: 6/15/2023     MICROSATELLITE STATUS: Microsatellite  Instability-High (MSI-H) is NOT DETECTED.     DISEASE ASSOCIATED GENOMIC FINDINGS:   EGFR p.[id number] ([id number] c.[id number])    TP53 p.N247I (NM_000546 c.740A>T)     DISEASE RELEVANT ALTERATIONS NOT DETECTED:   Negative for ALK fusion.   Negative for BRAF V600E.   Negative for ERBB2 activating mutation   Negative for KRAS G12C.   Negative for  MET exon 14 skipping mutation.   Negative for NTRK fusion.   Negative for RET fusion.   Negative for ROS1 fusion.        Circulating tumor DNA sent on June 19, 2023  EGFR p.[id number], Variant allelic fraction of 1.6%  TP53 N247I Missense variant, loss of function, variant allelic fraction 1.1%  MSI stable        SERUM TUMOR MARKER     Slightly elevated CEA and CA 19.9 June 2023        PRIOR THERAPY     1- Gamma knife radiosurgery to brain lesions completed on June 28, 2023.        CURRENT THERAPY     Osimertinib on study  on clinical trial EA [id number] which is a randomized trial of osimertinib with or without bevacizumab.  She has been randomized to the bevacizumab arm- Cycle 1 Day 1 is 7/12/2023        CURRENT ONCOLOGICAL PROBLEMS     1-brain  metastases, symptomatic, word finding difficulties, mild unsteadiness on her feet but no falls, Resolved July 3, 2023  2-anterior chest pain, increases with deep inspiration, Improved July 3, 2023  3-mild intermittent grade 1 diarrhea, mild hair loss, mild leukopenia, mild elevation in creatinine, mild fatigue.  Most likely all osimertinib related August 7, 2023        HISTORY OF PRESENT ILLNESS     This is a 69-year-old patient.  She states that towards Thanksgiving of 2022 she started feeling some pain in her right shoulder.  Eventually got better however the same pain came back in January or February 2023.  Ultimately some x-rays were done of  the shoulder that were generally negative.  She then developed some pain in her sternum a month or 2 later with deep inspiration and ultimately underwent imaging in May 2023.  Chest CT without contrast was done on June 1, 2023.  This showed a 3.4 cm mass  within the superior segment of the right lower lobe with some associated satellite pulmonary nodules.  Additionally there were some other subcentimeter pulmonary nodules.  There was evidence of mediastinal lymphadenopathy.  Subcarinal lymph node measured  1.4 cm in short axis.She was seen by pulmonary medicine on June 5, 2023 a combined PET/CT was ordered on June 6, 2023 showing a relatively intense area of hypermetabolic activity along the superior posteromedial aspect of the right lower lobe corresponding  to the known right lower lobe lung mass.  There was foci of hypermetabolic activity in the right supraclavicular region, subcarinal and right hilar regions.  There was area of uptake within the posterior upper ribs as well as right acetabulum suspicious  for osseous metastatic disease.  There was a punctate area of mild hypermetabolic activity in the right hepatic lobe concerning for hepatic metastases.  Small focus in the subcutaneous region just anterior to the lower aspect of the sternum could be inflammatory   versus metastatic.  She undergoes a bronchoscopy dated [place] 15, 2023.  There were no endobronchial lesions.  Primary cytology with rapid onsite evaluation was suggestive of malignancy in level 7, final lymph nodes were pathology was pending.  MRI of the  brain with and without contrast dated June 16, 2023 showed approximately 20 enhancing nodules concerning for metastatic disease.  Specifically there was a 1.5 cm nodule in the right cerebellar hemisphere.  Most of the other nodules were less than 1 cm.   Patient also notes that she has some difficulty expressing words over the past 1 to 2 weeks prior to her first visit with us as well as some mild unsteadiness on her legs where she feels she is drifting towards the right side.        PAST MEDICAL HISTORY     1-hypothyroidism  2-hysterectomy 1988  3-abdominal hernia surgery 1995  4-some hearing difficulties        SOCIAL HISTORY     Patient lives with her significant other.  She has 2 daughter.  Lives in [facility].  Smoked for only 3 years during college and during this time it was a pack per day.  She has retired.  She had a retail store.        CURRENT MEDS     See medication list, meds reviewed        ALLERGIES     Patient denies any drug allergies        FAMILY HISTORY     Patient's father had bladder cancer at the age of 86.  She has 1 brother with no cancer.  She has 2 children         Subjective    Today I am meeting with Mirta prior to treatment with Avastin.  She is feeling well with no new problems.  Managing loose stools with preventative imodium.  She is eager to proceed.  Quality of life is excellent.     Cancer  Associated symptoms include a change in bowel habit. Pertinent negatives include no abdominal pain, anorexia, arthralgias, chest pain, chills, congestion, coughing, diaphoresis, fatigue, fever, headaches, joint swelling, myalgias, nausea, neck pain, numbness, rash, sore throat, swollen glands, urinary symptoms, vertigo, visual change, vomiting  or weakness.       Objective    BSA: 1.45 meters squared  /81 (BP Location: Right arm, Patient Position: Sitting)   Pulse 71   Temp 36.5 °C (97.7 °F) (Temporal)   Resp 17   Wt 48.4 kg (106 lb 11.2 oz)   SpO2 100%   BMI 19.84 kg/m²      Physical Exam  Constitutional:       General: She is not in acute distress.     Appearance: Normal appearance. She is not ill-appearing, toxic-appearing or diaphoretic.   HENT:      Nose: No congestion or rhinorrhea.      Mouth/Throat:      Pharynx: No oropharyngeal exudate or posterior oropharyngeal erythema.   Eyes:      General: No scleral icterus.     Conjunctiva/sclera: Conjunctivae normal.   Cardiovascular:      Rate and Rhythm: Normal rate and regular rhythm.      Pulses: Normal pulses.      Heart sounds: Normal heart sounds. No murmur heard.     No friction rub. No gallop.   Pulmonary:      Effort: Pulmonary effort is normal. No respiratory distress.      Breath sounds: Normal breath sounds. No stridor. No wheezing, rhonchi or rales.   Chest:      Chest wall: No tenderness.   Abdominal:      General: Abdomen is flat. Bowel sounds are normal. There is no distension.      Palpations: Abdomen is soft. There is no mass.      Tenderness: There is no abdominal tenderness. There is no guarding or rebound.   Musculoskeletal:      Cervical back: No tenderness.      Right lower leg: No edema.      Left lower leg: No edema.   Lymphadenopathy:      Cervical: No cervical adenopathy.   Skin:     General: Skin is warm.      Coloration: Skin is not jaundiced.      Findings: No bruising.   Neurological:      General: No focal deficit present.      Mental Status: She is oriented to person, place, and time.   Psychiatric:         Mood and Affect: Mood normal.         Behavior: Behavior normal.         Performance Status:  Asymptomatic    CT CHEST ABDOMEN PELVIS W IV CONTRAST; 9/26/2024   IMPRESSION:  CHEST ABDOMEN AND PELVIS:  Essentially stable exam including presumably neoplastic  mass in the  right lower lobe and multiple sclerotic presumably metastatic osseous  lesions. No significant change from 08/08/2024.  colonic fecal  retention.    MR BRAIN W AND WO IV CONTRAST; 9/26/2024   IMPRESSION:  Overall no appreciable change from August 8, 2024.      Assessment/Plan     This is a very nice 69-year-old patient with EGFR mutation positive adenocarcinoma of the lung.  She is on a clinical trial of an EGFR inhibitor with bevacizumab and has been on this treatment for over a year starting July 12, 2023.  Her most recent scans show no evidence of disease progression.  She has minor GI toxicity and will continue on the current dose.     Cancer Staging   No matching staging information was found for the patient.      Oncology History   Lung cancer (Multi)   7/12/2023 -  Research Study Participant    ([facility]) [id number] Arm B - Bevacizumab / Osimertinib, 21 Day Cycles  Plan Provider: Humberto Landin MD  Treatment goal: [No plan goal]  Line of treatment: [No plan line of treatment]  Associated studies: [id number] (Osimertinib) +/- Bevacizumab as Initial Treatment for EGFR-Mutant Lung Cancer     9/1/2023 Initial Diagnosis    Lung cancer (CMS/HCC)                   GONZALO Medrano-CNS

## 2024-10-21 NOTE — TELEPHONE ENCOUNTER
Mirta is scheduled for a FUV and infusion today.  She lost her voice and wants to make sure she can still come in today as planned.   She feels completely fine.  She does not have any other symptoms.   She has taken 3 covid tests the past week which have all been negative.   No further questions or concerns at this time.

## 2024-10-21 NOTE — PROGRESS NOTES
Patient arrived to Infusion for her Study bevacizumab. . She tolerated her infusion without any issue. She was discharged stable.

## 2024-10-22 ENCOUNTER — EDUCATION (OUTPATIENT)
Dept: HEMATOLOGY/ONCOLOGY | Facility: HOSPITAL | Age: 69
End: 2024-10-22
Payer: MEDICARE

## 2024-10-22 NOTE — RESEARCH NOTES
Research Note Treatment Day    Mirta Hills is here today for treatment on [id number]. Today is C22. Procedures completed per protocol. AE's and con-meds reviewed with patient. Patient is aware of treatment plan. Patient has complaints of laryngitis for past 3 days which is improving.     [x]   Received treatment as planned   OR  []    Treatment delayed; patient calendar updated as required   Treatment delayed because:    []   AE    []   Physician Discretion    []   Clinical Deterioration or Progression     []   Other    Education Documentation  Treatment Plan and Schedule, taught by Makayla Joseph RN at 10/22/2024  2:37 PM.  Learner: Patient  Readiness: Acceptance  Method: Explanation  Response: Verbalizes Understanding    Education Comments  No comments found.

## 2024-11-08 DIAGNOSIS — C34.90 MALIGNANT NEOPLASM OF LUNG, UNSPECIFIED LATERALITY, UNSPECIFIED PART OF LUNG (MULTI): Primary | ICD-10-CM

## 2024-11-08 RX ORDER — PROCHLORPERAZINE EDISYLATE 5 MG/ML
10 INJECTION INTRAMUSCULAR; INTRAVENOUS EVERY 6 HOURS PRN
Status: CANCELLED | OUTPATIENT
Start: 2024-11-11

## 2024-11-08 RX ORDER — DIPHENHYDRAMINE HYDROCHLORIDE 50 MG/ML
50 INJECTION INTRAMUSCULAR; INTRAVENOUS AS NEEDED
Status: CANCELLED | OUTPATIENT
Start: 2024-11-11

## 2024-11-08 RX ORDER — EPINEPHRINE 0.3 MG/.3ML
0.3 INJECTION SUBCUTANEOUS EVERY 5 MIN PRN
Status: CANCELLED | OUTPATIENT
Start: 2024-11-11

## 2024-11-08 RX ORDER — ALBUTEROL SULFATE 0.83 MG/ML
3 SOLUTION RESPIRATORY (INHALATION) AS NEEDED
Status: CANCELLED | OUTPATIENT
Start: 2024-11-11

## 2024-11-08 RX ORDER — PROCHLORPERAZINE MALEATE 10 MG
10 TABLET ORAL EVERY 6 HOURS PRN
Status: CANCELLED | OUTPATIENT
Start: 2024-11-11

## 2024-11-08 RX ORDER — FAMOTIDINE 10 MG/ML
20 INJECTION INTRAVENOUS ONCE AS NEEDED
Status: CANCELLED | OUTPATIENT
Start: 2024-11-11

## 2024-11-11 ENCOUNTER — OFFICE VISIT (OUTPATIENT)
Dept: HEMATOLOGY/ONCOLOGY | Facility: HOSPITAL | Age: 69
End: 2024-11-11
Payer: MEDICARE

## 2024-11-11 ENCOUNTER — DOCUMENTATION (OUTPATIENT)
Dept: HEMATOLOGY/ONCOLOGY | Facility: HOSPITAL | Age: 69
End: 2024-11-11
Payer: MEDICARE

## 2024-11-11 ENCOUNTER — LAB (OUTPATIENT)
Dept: LAB | Facility: HOSPITAL | Age: 69
End: 2024-11-11
Payer: MEDICARE

## 2024-11-11 ENCOUNTER — INFUSION (OUTPATIENT)
Dept: HEMATOLOGY/ONCOLOGY | Facility: HOSPITAL | Age: 69
End: 2024-11-11
Payer: MEDICARE

## 2024-11-11 VITALS
RESPIRATION RATE: 18 BRPM | BODY MASS INDEX: 19.51 KG/M2 | HEART RATE: 70 BPM | TEMPERATURE: 97.2 F | OXYGEN SATURATION: 100 % | WEIGHT: 104.94 LBS | DIASTOLIC BLOOD PRESSURE: 61 MMHG | SYSTOLIC BLOOD PRESSURE: 146 MMHG

## 2024-11-11 DIAGNOSIS — C34.90 MALIGNANT NEOPLASM OF LUNG, UNSPECIFIED LATERALITY, UNSPECIFIED PART OF LUNG (MULTI): ICD-10-CM

## 2024-11-11 DIAGNOSIS — C34.90 MALIGNANT NEOPLASM OF LUNG, UNSPECIFIED LATERALITY, UNSPECIFIED PART OF LUNG (MULTI): Primary | ICD-10-CM

## 2024-11-11 LAB
ALBUMIN SERPL BCP-MCNC: 4.5 G/DL (ref 3.4–5)
ALP SERPL-CCNC: 58 U/L (ref 33–136)
ALT SERPL W P-5'-P-CCNC: 14 U/L (ref 7–45)
ANION GAP SERPL CALC-SCNC: 13 MMOL/L (ref 10–20)
AST SERPL W P-5'-P-CCNC: 18 U/L (ref 9–39)
BASOPHILS # BLD AUTO: 0.03 X10*3/UL (ref 0–0.1)
BASOPHILS NFR BLD AUTO: 0.6 %
BILIRUB SERPL-MCNC: 0.5 MG/DL (ref 0–1.2)
BUN SERPL-MCNC: 23 MG/DL (ref 6–23)
CALCIUM SERPL-MCNC: 9.7 MG/DL (ref 8.6–10.3)
CHLORIDE SERPL-SCNC: 103 MMOL/L (ref 98–107)
CK SERPL-CCNC: 44 U/L (ref 0–215)
CO2 SERPL-SCNC: 29 MMOL/L (ref 21–32)
CREAT SERPL-MCNC: 1.27 MG/DL (ref 0.5–1.05)
EGFRCR SERPLBLD CKD-EPI 2021: 45 ML/MIN/1.73M*2
EOSINOPHIL # BLD AUTO: 0.14 X10*3/UL (ref 0–0.4)
EOSINOPHIL NFR BLD AUTO: 2.9 %
ERYTHROCYTE [DISTWIDTH] IN BLOOD BY AUTOMATED COUNT: 14.6 % (ref 11.5–14.5)
GLUCOSE SERPL-MCNC: 77 MG/DL (ref 74–99)
HCT VFR BLD AUTO: 45.8 % (ref 36–46)
HGB BLD-MCNC: 14.7 G/DL (ref 12–16)
IMM GRANULOCYTES # BLD AUTO: 0.03 X10*3/UL (ref 0–0.5)
IMM GRANULOCYTES NFR BLD AUTO: 0.6 % (ref 0–0.9)
LYMPHOCYTES # BLD AUTO: 0.73 X10*3/UL (ref 0.8–3)
LYMPHOCYTES NFR BLD AUTO: 15.2 %
MAGNESIUM SERPL-MCNC: 2.27 MG/DL (ref 1.6–2.4)
MCH RBC QN AUTO: 30.2 PG (ref 26–34)
MCHC RBC AUTO-ENTMCNC: 32.1 G/DL (ref 32–36)
MCV RBC AUTO: 94 FL (ref 80–100)
MONOCYTES # BLD AUTO: 0.34 X10*3/UL (ref 0.05–0.8)
MONOCYTES NFR BLD AUTO: 7.1 %
NEUTROPHILS # BLD AUTO: 3.54 X10*3/UL (ref 1.6–5.5)
NEUTROPHILS NFR BLD AUTO: 73.6 %
NRBC BLD-RTO: 0 /100 WBCS (ref 0–0)
PLATELET # BLD AUTO: 160 X10*3/UL (ref 150–450)
POTASSIUM SERPL-SCNC: 4.5 MMOL/L (ref 3.5–5.3)
PROT SERPL-MCNC: 7.1 G/DL (ref 6.4–8.2)
RBC # BLD AUTO: 4.86 X10*6/UL (ref 4–5.2)
SODIUM SERPL-SCNC: 140 MMOL/L (ref 136–145)
WBC # BLD AUTO: 4.8 X10*3/UL (ref 4.4–11.3)

## 2024-11-11 PROCEDURE — 80053 COMPREHEN METABOLIC PANEL: CPT

## 2024-11-11 PROCEDURE — 99214 OFFICE O/P EST MOD 30 MIN: CPT | Performed by: CLINICAL NURSE SPECIALIST

## 2024-11-11 PROCEDURE — 83735 ASSAY OF MAGNESIUM: CPT

## 2024-11-11 PROCEDURE — 36415 COLL VENOUS BLD VENIPUNCTURE: CPT

## 2024-11-11 PROCEDURE — 84075 ASSAY ALKALINE PHOSPHATASE: CPT

## 2024-11-11 PROCEDURE — 82550 ASSAY OF CK (CPK): CPT

## 2024-11-11 PROCEDURE — 85025 COMPLETE CBC W/AUTO DIFF WBC: CPT

## 2024-11-11 PROCEDURE — 96365 THER/PROPH/DIAG IV INF INIT: CPT | Mod: INF

## 2024-11-11 PROCEDURE — 2560000001 HC RX 256 EXPERIMENTAL DRUGS: Mod: JG | Performed by: CLINICAL NURSE SPECIALIST

## 2024-11-11 RX ORDER — PROCHLORPERAZINE EDISYLATE 5 MG/ML
10 INJECTION INTRAMUSCULAR; INTRAVENOUS EVERY 6 HOURS PRN
Status: DISCONTINUED | OUTPATIENT
Start: 2024-11-11 | End: 2024-11-11 | Stop reason: HOSPADM

## 2024-11-11 RX ORDER — PROCHLORPERAZINE MALEATE 10 MG
10 TABLET ORAL EVERY 6 HOURS PRN
Status: DISCONTINUED | OUTPATIENT
Start: 2024-11-11 | End: 2024-11-11 | Stop reason: HOSPADM

## 2024-11-11 RX ORDER — FAMOTIDINE 10 MG/ML
20 INJECTION INTRAVENOUS ONCE AS NEEDED
Status: DISCONTINUED | OUTPATIENT
Start: 2024-11-11 | End: 2024-11-11 | Stop reason: HOSPADM

## 2024-11-11 RX ORDER — EPINEPHRINE 0.3 MG/.3ML
0.3 INJECTION SUBCUTANEOUS EVERY 5 MIN PRN
Status: DISCONTINUED | OUTPATIENT
Start: 2024-11-11 | End: 2024-11-11 | Stop reason: HOSPADM

## 2024-11-11 RX ORDER — DIPHENHYDRAMINE HYDROCHLORIDE 50 MG/ML
50 INJECTION INTRAMUSCULAR; INTRAVENOUS AS NEEDED
Status: DISCONTINUED | OUTPATIENT
Start: 2024-11-11 | End: 2024-11-11 | Stop reason: HOSPADM

## 2024-11-11 RX ORDER — ALBUTEROL SULFATE 0.83 MG/ML
3 SOLUTION RESPIRATORY (INHALATION) AS NEEDED
Status: DISCONTINUED | OUTPATIENT
Start: 2024-11-11 | End: 2024-11-11 | Stop reason: HOSPADM

## 2024-11-11 ASSESSMENT — ENCOUNTER SYMPTOMS
ARTHRALGIAS: 0
NUMBNESS: 0
ABDOMINAL PAIN: 0
VISUAL CHANGE: 0
NECK PAIN: 0
SORE THROAT: 0
CHILLS: 0
DIAPHORESIS: 0
CHANGE IN BOWEL HABIT: 1
VERTIGO: 0
JOINT SWELLING: 0
ANOREXIA: 0
FEVER: 0
HEADACHES: 0
SWOLLEN GLANDS: 0
FATIGUE: 0
NAUSEA: 0
VOMITING: 0
COUGH: 0
MYALGIAS: 0
WEAKNESS: 0

## 2024-11-11 ASSESSMENT — PAIN SCALES - GENERAL: PAINLEVEL_OUTOF10: 0-NO PAIN

## 2024-11-11 NOTE — PROGRESS NOTES
Patient arrives for [id number] Bevacizumab treatment.   Tolerated infusion well and discharged in stable condition.

## 2024-11-11 NOTE — PROGRESS NOTES
Patient ID: Mirta Hills is a 69 y.o. female.    DIAGNOSIS     Adenocarcinoma of the lung.  Date of diagnosis is [place] 15, 2023 from a level 7 lymph node biopsy/fine-needle aspiration.  Immunohistochemistry positive for TTF-1.        STAGING     Clinical T2a (right lower lobe mass measuring 3.6 cm), clinical N2 (PET positivity and subcarinal region), M1C disease (presence of brain metastases, ribs, right acetabulum, liver, brain), stage IVb disease        CURRENT SITES OF DISEASE     Right lower lobe, right hilum of the lung, ipsilateral mediastinum, liver, bones, brain        MOLECULAR GENOMICS     TEST: Focused Solid Tumor DNA/RNA Panel   SPECIMEN: Supernatant, LYMPH NODE 7, [id number] A   DISEASE DIAGNOSIS: Adenocarcinoma   Estimated Tumor Content: 40%   COLLECTION DATE: 6/15/2023     MICROSATELLITE STATUS: Microsatellite  Instability-High (MSI-H) is NOT DETECTED.     DISEASE ASSOCIATED GENOMIC FINDINGS:   EGFR p.[id number] ([id number] c.[id number])    TP53 p.N247I (NM_000546 c.740A>T)     DISEASE RELEVANT ALTERATIONS NOT DETECTED:   Negative for ALK fusion.   Negative for BRAF V600E.   Negative for ERBB2 activating mutation   Negative for KRAS G12C.   Negative for  MET exon 14 skipping mutation.   Negative for NTRK fusion.   Negative for RET fusion.   Negative for ROS1 fusion.        Circulating tumor DNA sent on June 19, 2023  EGFR p.[id number], Variant allelic fraction of 1.6%  TP53 N247I Missense variant, loss of function, variant allelic fraction 1.1%  MSI stable        SERUM TUMOR MARKER     Slightly elevated CEA and CA 19.9 June 2023        PRIOR THERAPY     1- Gamma knife radiosurgery to brain lesions completed on June 28, 2023.        CURRENT THERAPY     Osimertinib on study  on clinical trial [id number] which is a randomized trial of osimertinib with or without bevacizumab.  She has been randomized to the bevacizumab arm- Cycle 1 Day 1 is 7/12/2023        CURRENT ONCOLOGICAL PROBLEMS     1-brain  metastases, symptomatic, word finding difficulties, mild unsteadiness on her feet but no falls, Resolved July 3, 2023  2-anterior chest pain, increases with deep inspiration, Improved July 3, 2023  3-mild intermittent grade 1 diarrhea, mild hair loss, mild leukopenia, mild elevation in creatinine, mild fatigue.  Most likely all osimertinib related August 7, 2023        HISTORY OF PRESENT ILLNESS     This is a 69-year-old patient.  She states that towards Thanksgiving of 2022 she started feeling some pain in her right shoulder.  Eventually got better however the same pain came back in January or February 2023.  Ultimately some x-rays were done of  the shoulder that were generally negative.  She then developed some pain in her sternum a month or 2 later with deep inspiration and ultimately underwent imaging in May 2023.  Chest CT without contrast was done on June 1, 2023.  This showed a 3.4 cm mass  within the superior segment of the right lower lobe with some associated satellite pulmonary nodules.  Additionally there were some other subcentimeter pulmonary nodules.  There was evidence of mediastinal lymphadenopathy.  Subcarinal lymph node measured  1.4 cm in short axis.She was seen by pulmonary medicine on June 5, 2023 a combined PET/CT was ordered on June 6, 2023 showing a relatively intense area of hypermetabolic activity along the superior posteromedial aspect of the right lower lobe corresponding  to the known right lower lobe lung mass.  There was foci of hypermetabolic activity in the right supraclavicular region, subcarinal and right hilar regions.  There was area of uptake within the posterior upper ribs as well as right acetabulum suspicious  for osseous metastatic disease.  There was a punctate area of mild hypermetabolic activity in the right hepatic lobe concerning for hepatic metastases.  Small focus in the subcutaneous region just anterior to the lower aspect of the sternum could be inflammatory   versus metastatic.  She undergoes a bronchoscopy dated [place] 15, 2023.  There were no endobronchial lesions.  Primary cytology with rapid onsite evaluation was suggestive of malignancy in level 7, final lymph nodes were pathology was pending.  MRI of the  brain with and without contrast dated June 16, 2023 showed approximately 20 enhancing nodules concerning for metastatic disease.  Specifically there was a 1.5 cm nodule in the right cerebellar hemisphere.  Most of the other nodules were less than 1 cm.   Patient also notes that she has some difficulty expressing words over the past 1 to 2 weeks prior to her first visit with us as well as some mild unsteadiness on her legs where she feels she is drifting towards the right side.        PAST MEDICAL HISTORY     1-hypothyroidism  2-hysterectomy 1988  3-abdominal hernia surgery 1995  4-some hearing difficulties        SOCIAL HISTORY     Patient lives with her significant other.  She has 2 daughter.  [facility]  Smoked for only 3 years during college and during this time it was a pack per day.  She has retired.  She had a retail store.        CURRENT MEDS     See medication list, meds reviewed        ALLERGIES     Patient denies any drug allergies        FAMILY HISTORY     Patient's father had bladder cancer at the age of 86.  She has 1 brother with no cancer.  She has 2 children         Subjective    Today I am meeting with Mirta prior to treatment with Avastin.  She is feeling well with no new problems.  Managing loose stools with preventative imodium.  She is eager to proceed.  Quality of life is excellent.     Cancer  Associated symptoms include a change in bowel habit. Pertinent negatives include no abdominal pain, anorexia, arthralgias, chest pain, chills, congestion, coughing, diaphoresis, fatigue, fever, headaches, joint swelling, myalgias, nausea, neck pain, numbness, rash, sore throat, swollen glands, urinary symptoms, vertigo, visual change, vomiting  or weakness.       Objective    BSA: There is no height or weight on file to calculate BSA.  There were no vitals taken for this visit.     Physical Exam  Constitutional:       General: She is not in acute distress.     Appearance: Normal appearance. She is not ill-appearing, toxic-appearing or diaphoretic.   HENT:      Nose: No congestion or rhinorrhea.      Mouth/Throat:      Pharynx: No oropharyngeal exudate or posterior oropharyngeal erythema.   Eyes:      General: No scleral icterus.     Conjunctiva/sclera: Conjunctivae normal.   Cardiovascular:      Rate and Rhythm: Normal rate and regular rhythm.      Pulses: Normal pulses.      Heart sounds: Normal heart sounds. No murmur heard.     No friction rub. No gallop.   Pulmonary:      Effort: Pulmonary effort is normal. No respiratory distress.      Breath sounds: Normal breath sounds. No stridor. No wheezing, rhonchi or rales.   Chest:      Chest wall: No tenderness.   Abdominal:      General: Abdomen is flat. Bowel sounds are normal. There is no distension.      Palpations: Abdomen is soft. There is no mass.      Tenderness: There is no abdominal tenderness. There is no guarding or rebound.   Musculoskeletal:      Cervical back: No tenderness.      Right lower leg: No edema.      Left lower leg: No edema.   Lymphadenopathy:      Cervical: No cervical adenopathy.   Skin:     General: Skin is warm.      Coloration: Skin is not jaundiced.      Findings: No bruising.   Neurological:      General: No focal deficit present.      Mental Status: She is oriented to person, place, and time.   Psychiatric:         Mood and Affect: Mood normal.         Behavior: Behavior normal.       Performance Status:  Asymptomatic    CT CHEST ABDOMEN PELVIS W IV CONTRAST; 9/26/2024   IMPRESSION:  CHEST ABDOMEN AND PELVIS:  Essentially stable exam including presumably neoplastic mass in the  right lower lobe and multiple sclerotic presumably metastatic osseous  lesions. No significant  change from 08/08/2024.  colonic fecal  retention.    MR BRAIN W AND WO IV CONTRAST; 9/26/2024   IMPRESSION:  Overall no appreciable change from August 8, 2024.      Assessment/Plan     This is a very nice 69-year-old patient with EGFR mutation positive adenocarcinoma of the lung.  She is on a clinical trial of an EGFR inhibitor with bevacizumab and has been on this treatment for over a year starting July 12, 2023.  Her most recent scans show no evidence of disease progression.  She has minor, grade 1 GI toxicity and will continue on the current dose.  Blood pressure is good.   Cancer Staging   No matching staging information was found for the patient.      Oncology History   Lung cancer (Multi)   7/12/2023 -  Research Study Participant    ([facility]) [id number] Arm B - Bevacizumab / Osimertinib, 21 Day Cycles  Plan Provider: Humberto Landin MD  Treatment goal: [No plan goal]  Line of treatment: [No plan line of treatment]  Associated studies: [id number] (Osimertinib) +/- Bevacizumab as Initial Treatment for EGFR-Mutant Lung Cancer     9/1/2023 Initial Diagnosis    Lung cancer (CMS/HCC)                   GONZALO Medrano-CNS

## 2024-11-11 NOTE — PROGRESS NOTES
Research Note Treatment Day    Mirta Hills is here today for treatment on [id number]. Today is C23. Procedures completed per protocol. AE's and con-meds reviewed with patient. Patient is aware of treatment plan. No new complaints or adverse events to report.     [x]   Received treatment as planned   OR  []    Treatment delayed; patient calendar updated as required   Treatment delayed because:    []   AE    []   Physician Discretion    []   Clinical Deterioration or Progression     []   Other    Education Documentation  Treatment Plan and Schedule, taught by Makayla Joseph RN at 11/11/2024  2:35 PM.  Learner: Patient  Readiness: Acceptance  Method: Explanation  Response: Verbalizes Understanding    Education Comments  No comments found.

## 2024-11-12 ENCOUNTER — TELEPHONE (OUTPATIENT)
Dept: PRIMARY CARE | Facility: CLINIC | Age: 69
End: 2024-11-12
Payer: MEDICARE

## 2024-11-12 ENCOUNTER — TELEPHONE (OUTPATIENT)
Dept: ENDOCRINOLOGY | Facility: CLINIC | Age: 69
End: 2024-11-12
Payer: MEDICARE

## 2024-11-12 DIAGNOSIS — E03.9 HYPOTHYROIDISM, UNSPECIFIED TYPE: Primary | ICD-10-CM

## 2024-11-20 DIAGNOSIS — I42.7 CARDIOTOXICITY (MULTI): ICD-10-CM

## 2024-11-20 DIAGNOSIS — C34.90 MALIGNANT NEOPLASM OF LUNG, UNSPECIFIED LATERALITY, UNSPECIFIED PART OF LUNG (MULTI): ICD-10-CM

## 2024-11-25 DIAGNOSIS — E03.9 HYPOTHYROIDISM, UNSPECIFIED TYPE: ICD-10-CM

## 2024-11-25 RX ORDER — LEVOTHYROXINE SODIUM 137 UG/1
TABLET ORAL
Qty: 90 TABLET | Refills: 1 | Status: SHIPPED | OUTPATIENT
Start: 2024-11-25

## 2024-11-26 DIAGNOSIS — C34.90 MALIGNANT NEOPLASM OF LUNG, UNSPECIFIED LATERALITY, UNSPECIFIED PART OF LUNG (MULTI): Primary | ICD-10-CM

## 2024-11-26 RX ORDER — PROCHLORPERAZINE MALEATE 10 MG
10 TABLET ORAL EVERY 6 HOURS PRN
OUTPATIENT
Start: 2024-12-02

## 2024-11-26 RX ORDER — FAMOTIDINE 10 MG/ML
20 INJECTION INTRAVENOUS ONCE AS NEEDED
OUTPATIENT
Start: 2024-12-02

## 2024-11-26 RX ORDER — PROCHLORPERAZINE EDISYLATE 5 MG/ML
10 INJECTION INTRAMUSCULAR; INTRAVENOUS EVERY 6 HOURS PRN
OUTPATIENT
Start: 2024-12-02

## 2024-11-26 RX ORDER — DIPHENHYDRAMINE HYDROCHLORIDE 50 MG/ML
50 INJECTION INTRAMUSCULAR; INTRAVENOUS AS NEEDED
OUTPATIENT
Start: 2024-12-02

## 2024-11-26 RX ORDER — ALBUTEROL SULFATE 0.83 MG/ML
3 SOLUTION RESPIRATORY (INHALATION) AS NEEDED
OUTPATIENT
Start: 2024-12-02

## 2024-11-26 RX ORDER — EPINEPHRINE 0.3 MG/.3ML
0.3 INJECTION SUBCUTANEOUS EVERY 5 MIN PRN
OUTPATIENT
Start: 2024-12-02

## 2024-12-02 ENCOUNTER — INFUSION (OUTPATIENT)
Dept: HEMATOLOGY/ONCOLOGY | Facility: HOSPITAL | Age: 69
End: 2024-12-02
Payer: MEDICARE

## 2024-12-02 ENCOUNTER — OFFICE VISIT (OUTPATIENT)
Dept: HEMATOLOGY/ONCOLOGY | Facility: HOSPITAL | Age: 69
End: 2024-12-02
Payer: MEDICARE

## 2024-12-02 ENCOUNTER — LAB (OUTPATIENT)
Dept: LAB | Facility: HOSPITAL | Age: 69
End: 2024-12-02
Payer: MEDICARE

## 2024-12-02 ENCOUNTER — EDUCATION (OUTPATIENT)
Dept: HEMATOLOGY/ONCOLOGY | Facility: HOSPITAL | Age: 69
End: 2024-12-02

## 2024-12-02 VITALS
SYSTOLIC BLOOD PRESSURE: 124 MMHG | TEMPERATURE: 97.9 F | HEART RATE: 77 BPM | DIASTOLIC BLOOD PRESSURE: 78 MMHG | RESPIRATION RATE: 18 BRPM | BODY MASS INDEX: 19.88 KG/M2 | WEIGHT: 106.92 LBS | OXYGEN SATURATION: 100 %

## 2024-12-02 DIAGNOSIS — C34.90 MALIGNANT NEOPLASM OF LUNG, UNSPECIFIED LATERALITY, UNSPECIFIED PART OF LUNG (MULTI): ICD-10-CM

## 2024-12-02 LAB
ALBUMIN SERPL BCP-MCNC: 4.1 G/DL (ref 3.4–5)
ALP SERPL-CCNC: 55 U/L (ref 33–136)
ALT SERPL W P-5'-P-CCNC: 13 U/L (ref 7–45)
ANION GAP SERPL CALC-SCNC: 12 MMOL/L (ref 10–20)
APPEARANCE UR: CLEAR
AST SERPL W P-5'-P-CCNC: 18 U/L (ref 9–39)
BASOPHILS # BLD AUTO: 0.02 X10*3/UL (ref 0–0.1)
BASOPHILS NFR BLD AUTO: 0.5 %
BILIRUB SERPL-MCNC: 0.4 MG/DL (ref 0–1.2)
BILIRUB UR STRIP.AUTO-MCNC: NEGATIVE MG/DL
BUN SERPL-MCNC: 25 MG/DL (ref 6–23)
CALCIUM SERPL-MCNC: 9.3 MG/DL (ref 8.6–10.3)
CHLORIDE SERPL-SCNC: 104 MMOL/L (ref 98–107)
CK SERPL-CCNC: 38 U/L (ref 0–215)
CO2 SERPL-SCNC: 29 MMOL/L (ref 21–32)
COLOR UR: YELLOW
CREAT SERPL-MCNC: 1.22 MG/DL (ref 0.5–1.05)
EGFRCR SERPLBLD CKD-EPI 2021: 48 ML/MIN/1.73M*2
EOSINOPHIL # BLD AUTO: 0.09 X10*3/UL (ref 0–0.4)
EOSINOPHIL NFR BLD AUTO: 2.1 %
ERYTHROCYTE [DISTWIDTH] IN BLOOD BY AUTOMATED COUNT: 14.3 % (ref 11.5–14.5)
GLUCOSE SERPL-MCNC: 102 MG/DL (ref 74–99)
GLUCOSE UR STRIP.AUTO-MCNC: NORMAL MG/DL
HCT VFR BLD AUTO: 43.3 % (ref 36–46)
HGB BLD-MCNC: 13.9 G/DL (ref 12–16)
IMM GRANULOCYTES # BLD AUTO: 0.04 X10*3/UL (ref 0–0.5)
IMM GRANULOCYTES NFR BLD AUTO: 0.9 % (ref 0–0.9)
KETONES UR STRIP.AUTO-MCNC: NEGATIVE MG/DL
LEUKOCYTE ESTERASE UR QL STRIP.AUTO: ABNORMAL
LYMPHOCYTES # BLD AUTO: 0.66 X10*3/UL (ref 0.8–3)
LYMPHOCYTES NFR BLD AUTO: 15.3 %
MAGNESIUM SERPL-MCNC: 2.12 MG/DL (ref 1.6–2.4)
MCH RBC QN AUTO: 30.2 PG (ref 26–34)
MCHC RBC AUTO-ENTMCNC: 32.1 G/DL (ref 32–36)
MCV RBC AUTO: 94 FL (ref 80–100)
MONOCYTES # BLD AUTO: 0.31 X10*3/UL (ref 0.05–0.8)
MONOCYTES NFR BLD AUTO: 7.2 %
MUCOUS THREADS #/AREA URNS AUTO: ABNORMAL /LPF
NEUTROPHILS # BLD AUTO: 3.2 X10*3/UL (ref 1.6–5.5)
NEUTROPHILS NFR BLD AUTO: 74 %
NITRITE UR QL STRIP.AUTO: NEGATIVE
NRBC BLD-RTO: 0 /100 WBCS (ref 0–0)
PH UR STRIP.AUTO: 5.5 [PH]
PLATELET # BLD AUTO: 150 X10*3/UL (ref 150–450)
POTASSIUM SERPL-SCNC: 4.6 MMOL/L (ref 3.5–5.3)
PROT SERPL-MCNC: 6.4 G/DL (ref 6.4–8.2)
PROT UR STRIP.AUTO-MCNC: ABNORMAL MG/DL
RBC # BLD AUTO: 4.61 X10*6/UL (ref 4–5.2)
RBC # UR STRIP.AUTO: NEGATIVE /UL
RBC #/AREA URNS AUTO: ABNORMAL /HPF
SODIUM SERPL-SCNC: 140 MMOL/L (ref 136–145)
SP GR UR STRIP.AUTO: 1.02
SQUAMOUS #/AREA URNS AUTO: ABNORMAL /HPF
UROBILINOGEN UR STRIP.AUTO-MCNC: NORMAL MG/DL
WBC # BLD AUTO: 4.3 X10*3/UL (ref 4.4–11.3)
WBC #/AREA URNS AUTO: ABNORMAL /HPF

## 2024-12-02 PROCEDURE — 96413 CHEMO IV INFUSION 1 HR: CPT

## 2024-12-02 PROCEDURE — 85025 COMPLETE CBC W/AUTO DIFF WBC: CPT

## 2024-12-02 PROCEDURE — 81001 URINALYSIS AUTO W/SCOPE: CPT

## 2024-12-02 PROCEDURE — 83735 ASSAY OF MAGNESIUM: CPT

## 2024-12-02 PROCEDURE — 82550 ASSAY OF CK (CPK): CPT

## 2024-12-02 PROCEDURE — 80053 COMPREHEN METABOLIC PANEL: CPT

## 2024-12-02 PROCEDURE — 36415 COLL VENOUS BLD VENIPUNCTURE: CPT

## 2024-12-02 PROCEDURE — 2560000001 HC RX 256 EXPERIMENTAL DRUGS: Mod: JG | Performed by: INTERNAL MEDICINE

## 2024-12-02 PROCEDURE — 84075 ASSAY ALKALINE PHOSPHATASE: CPT

## 2024-12-02 PROCEDURE — 99214 OFFICE O/P EST MOD 30 MIN: CPT | Performed by: CLINICAL NURSE SPECIALIST

## 2024-12-02 RX ORDER — DIPHENHYDRAMINE HYDROCHLORIDE 50 MG/ML
50 INJECTION INTRAMUSCULAR; INTRAVENOUS AS NEEDED
Status: DISCONTINUED | OUTPATIENT
Start: 2024-12-02 | End: 2024-12-02 | Stop reason: HOSPADM

## 2024-12-02 RX ORDER — ALBUTEROL SULFATE 0.83 MG/ML
3 SOLUTION RESPIRATORY (INHALATION) AS NEEDED
Status: DISCONTINUED | OUTPATIENT
Start: 2024-12-02 | End: 2024-12-02 | Stop reason: HOSPADM

## 2024-12-02 RX ORDER — FAMOTIDINE 10 MG/ML
20 INJECTION INTRAVENOUS ONCE AS NEEDED
Status: DISCONTINUED | OUTPATIENT
Start: 2024-12-02 | End: 2024-12-02 | Stop reason: HOSPADM

## 2024-12-02 RX ORDER — EPINEPHRINE 0.3 MG/.3ML
0.3 INJECTION SUBCUTANEOUS EVERY 5 MIN PRN
Status: DISCONTINUED | OUTPATIENT
Start: 2024-12-02 | End: 2024-12-02 | Stop reason: HOSPADM

## 2024-12-02 RX ORDER — PROCHLORPERAZINE EDISYLATE 5 MG/ML
10 INJECTION INTRAMUSCULAR; INTRAVENOUS EVERY 6 HOURS PRN
Status: DISCONTINUED | OUTPATIENT
Start: 2024-12-02 | End: 2024-12-02 | Stop reason: HOSPADM

## 2024-12-02 RX ORDER — PROCHLORPERAZINE MALEATE 10 MG
10 TABLET ORAL EVERY 6 HOURS PRN
Status: DISCONTINUED | OUTPATIENT
Start: 2024-12-02 | End: 2024-12-02 | Stop reason: HOSPADM

## 2024-12-02 ASSESSMENT — ENCOUNTER SYMPTOMS
ARTHRALGIAS: 0
VISUAL CHANGE: 0
HEADACHES: 0
VOMITING: 0
SWOLLEN GLANDS: 0
MYALGIAS: 0
NUMBNESS: 0
NECK PAIN: 0
VERTIGO: 0
SORE THROAT: 0
COUGH: 0
ABDOMINAL PAIN: 0
FATIGUE: 0
NAUSEA: 0
DIAPHORESIS: 0
FEVER: 0
CHILLS: 0
WEAKNESS: 0
JOINT SWELLING: 0
ANOREXIA: 0
CHANGE IN BOWEL HABIT: 1

## 2024-12-02 ASSESSMENT — PAIN SCALES - GENERAL: PAINLEVEL_OUTOF10: 0-NO PAIN

## 2024-12-02 NOTE — RESEARCH NOTES
Research Note Treatment Day    Mirta Hills is here today for treatment on [id number]. Today is C24. Procedures completed per protocol. AE's and con-meds reviewed with patient. Patient is aware of treatment plan. No new complaints.     [x]   Received treatment as planned   OR  []    Treatment delayed; patient calendar updated as required   Treatment delayed because:    []   AE    []   Physician Discretion    []   Clinical Deterioration or Progression     []   Other    Education Documentation  Treatment Plan and Schedule, taught by Makayla Joseph RN at 12/2/2024  3:29 PM.  Learner: Patient  Readiness: Acceptance  Method: Explanation  Response: Verbalizes Understanding    Education Comments  No comments found.

## 2024-12-02 NOTE — PROGRESS NOTES
Patient arrives to Infusion for her Study Bevacizumab. She tolerated her treatment without any issue. All questions were answered. She was discharged stable.

## 2024-12-02 NOTE — PROGRESS NOTES
Patient ID: Mirta Hills is a 69 y.o. female.    DIAGNOSIS     Adenocarcinoma of the lung.  Date of diagnosis is [place] 15, 2023 from a level 7 lymph node biopsy/fine-needle aspiration.  Immunohistochemistry positive for TTF-1.        STAGING     Clinical T2a (right lower lobe mass measuring 3.6 cm), clinical N2 (PET positivity and subcarinal region), M1C disease (presence of brain metastases, ribs, right acetabulum, liver, brain), stage IVb disease        CURRENT SITES OF DISEASE     Right lower lobe, right hilum of the lung, ipsilateral mediastinum, liver, bones, brain        MOLECULAR GENOMICS     TEST: Focused Solid Tumor DNA/RNA Panel   SPECIMEN: Supernatant, LYMPH NODE 7, [id number] A   DISEASE DIAGNOSIS: Adenocarcinoma   Estimated Tumor Content: 40%   COLLECTION DATE: 6/15/2023     MICROSATELLITE STATUS: Microsatellite  Instability-High (MSI-H) is NOT DETECTED.     DISEASE ASSOCIATED GENOMIC FINDINGS:   EGFR p.[id number] ([id number] c.[id number])    TP53 p.N247I ([id number] c.740A>T)     DISEASE RELEVANT ALTERATIONS NOT DETECTED:   Negative for ALK fusion.   Negative for BRAF V600E.   Negative for ERBB2 activating mutation   Negative for KRAS G12C.   Negative for  MET exon 14 skipping mutation.   Negative for NTRK fusion.   Negative for RET fusion.   Negative for ROS1 fusion.        Circulating tumor DNA sent on June 19, 2023  EGFR p.[id number], Variant allelic fraction of 1.6%  TP53 N247I Missense variant, loss of function, variant allelic fraction 1.1%  MSI stable        SERUM TUMOR MARKER     Slightly elevated CEA and CA 19.9 June 2023        PRIOR THERAPY     1- Gamma knife radiosurgery to brain lesions completed on June 28, 2023.        CURRENT THERAPY     Osimertinib on study  on clinical trial [id number] which is a randomized trial of osimertinib with or without bevacizumab.  She has been randomized to the bevacizumab arm- Cycle 1 Day 1 is 7/12/2023        CURRENT ONCOLOGICAL PROBLEMS     1-brain  metastases, symptomatic, word finding difficulties, mild unsteadiness on her feet but no falls, Resolved July 3, 2023  2-anterior chest pain, increases with deep inspiration, Improved July 3, 2023  3-mild intermittent grade 1 diarrhea, mild hair loss, mild leukopenia, mild elevation in creatinine, mild fatigue.  Most likely all osimertinib related August 7, 2023        HISTORY OF PRESENT ILLNESS     This is a 69-year-old patient.  She states that towards Thanksgiving of 2022 she started feeling some pain in her right shoulder.  Eventually got better however the same pain came back in January or February 2023.  Ultimately some x-rays were done of  the shoulder that were generally negative.  She then developed some pain in her sternum a month or 2 later with deep inspiration and ultimately underwent imaging in May 2023.  Chest CT without contrast was done on June 1, 2023.  This showed a 3.4 cm mass  within the superior segment of the right lower lobe with some associated satellite pulmonary nodules.  Additionally there were some other subcentimeter pulmonary nodules.  There was evidence of mediastinal lymphadenopathy.  Subcarinal lymph node measured  1.4 cm in short axis.She was seen by pulmonary medicine on June 5, 2023 a combined PET/CT was ordered on June 6, 2023 showing a relatively intense area of hypermetabolic activity along the superior posteromedial aspect of the right lower lobe corresponding  to the known right lower lobe lung mass.  There was foci of hypermetabolic activity in the right supraclavicular region, subcarinal and right hilar regions.  There was area of uptake within the posterior upper ribs as well as right acetabulum suspicious  for osseous metastatic disease.  There was a punctate area of mild hypermetabolic activity in the right hepatic lobe concerning for hepatic metastases.  Small focus in the subcutaneous region just anterior to the lower aspect of the sternum could be inflammatory   versus metastatic.  She undergoes a bronchoscopy dated [place] 15, 2023.  There were no endobronchial lesions.  Primary cytology with rapid onsite evaluation was suggestive of malignancy in level 7, final lymph nodes were pathology was pending.  MRI of the  brain with and without contrast dated June 16, 2023 showed approximately 20 enhancing nodules concerning for metastatic disease.  Specifically there was a 1.5 cm nodule in the right cerebellar hemisphere.  Most of the other nodules were less than 1 cm.   Patient also notes that she has some difficulty expressing words over the past 1 to 2 weeks prior to her first visit with us as well as some mild unsteadiness on her legs where she feels she is drifting towards the right side.        PAST MEDICAL HISTORY     1-hypothyroidism  2-hysterectomy 1988  3-abdominal hernia surgery 1995  4-some hearing difficulties        SOCIAL HISTORY     Patient lives with her significant other.  She has 2 daughter.  Lives in [facility].  Smoked for only 3 years during college and during this time it was a pack per day.  She has retired.  She had a retail store.        CURRENT MEDS     See medication list, meds reviewed        ALLERGIES     Patient denies any drug allergies        FAMILY HISTORY     Patient's father had bladder cancer at the age of 86.  She has 1 brother with no cancer.  She has 2 children         Subjective    Today I am meeting with Mirta prior to treatment with Avastin.  She is feeling well with no new problems.  Managing loose stools with preventative imodium.  She is eager to proceed.  Quality of life is excellent.     Cancer  Associated symptoms include a change in bowel habit. Pertinent negatives include no abdominal pain, anorexia, arthralgias, chest pain, chills, congestion, coughing, diaphoresis, fatigue, fever, headaches, joint swelling, myalgias, nausea, neck pain, numbness, rash, sore throat, swollen glands, urinary symptoms, vertigo, visual change, vomiting  or weakness.       Objective    BSA: There is no height or weight on file to calculate BSA.  There were no vitals taken for this visit.     Physical Exam  Constitutional:       General: She is not in acute distress.     Appearance: Normal appearance. She is not ill-appearing, toxic-appearing or diaphoretic.   HENT:      Nose: No congestion or rhinorrhea.      Mouth/Throat:      Pharynx: No oropharyngeal exudate or posterior oropharyngeal erythema.   Eyes:      General: No scleral icterus.     Conjunctiva/sclera: Conjunctivae normal.   Cardiovascular:      Rate and Rhythm: Normal rate and regular rhythm.      Pulses: Normal pulses.      Heart sounds: Normal heart sounds. No murmur heard.     No friction rub. No gallop.   Pulmonary:      Effort: Pulmonary effort is normal. No respiratory distress.      Breath sounds: Normal breath sounds. No stridor. No wheezing, rhonchi or rales.   Chest:      Chest wall: No tenderness.   Abdominal:      General: Abdomen is flat. Bowel sounds are normal. There is no distension.      Palpations: Abdomen is soft. There is no mass.      Tenderness: There is no abdominal tenderness. There is no guarding or rebound.   Musculoskeletal:      Cervical back: No tenderness.      Right lower leg: No edema.      Left lower leg: No edema.   Lymphadenopathy:      Cervical: No cervical adenopathy.   Skin:     General: Skin is warm.      Coloration: Skin is not jaundiced.      Findings: No bruising.   Neurological:      General: No focal deficit present.      Mental Status: She is oriented to person, place, and time.   Psychiatric:         Mood and Affect: Mood normal.         Behavior: Behavior normal.       Performance Status:  Asymptomatic    CT CHEST ABDOMEN PELVIS W IV CONTRAST; 9/26/2024   IMPRESSION:  CHEST ABDOMEN AND PELVIS:  Essentially stable exam including presumably neoplastic mass in the  right lower lobe and multiple sclerotic presumably metastatic osseous  lesions. No significant  change from 08/08/2024.  colonic fecal  retention.    MR BRAIN W AND WO IV CONTRAST; 9/26/2024   IMPRESSION:  Overall no appreciable change from August 8, 2024.      Assessment/Plan     This is a very nice 69-year-old patient with EGFR mutation positive adenocarcinoma of the lung.  She is on a clinical trial of an EGFR inhibitor with bevacizumab and has been on this treatment for over a year starting July 12, 2023.  Her most recent scans show no evidence of disease progression.  She has minor, grade 1 GI toxicity and will continue on the current dose.  Blood pressure is good.   Cancer Staging   No matching staging information was found for the patient.      Oncology History   Lung cancer (Multi)   7/12/2023 -  Research Study Participant    ([facility]) [id number] Arm B - Bevacizumab / Osimertinib, 21 Day Cycles  Plan Provider: Humberto Landin MD  Treatment goal: [No plan goal]  Line of treatment: [No plan line of treatment]  Associated studies: [id number] (Osimertinib) +/- Bevacizumab as Initial Treatment for EGFR-Mutant Lung Cancer     9/1/2023 Initial Diagnosis    Lung cancer (CMS/HCC)                   GONZALO Medrano-CNS

## 2024-12-09 ENCOUNTER — HOSPITAL ENCOUNTER (OUTPATIENT)
Dept: RADIOLOGY | Facility: HOSPITAL | Age: 69
Discharge: HOME | End: 2024-12-09
Payer: MEDICARE

## 2024-12-09 DIAGNOSIS — C34.90 MALIGNANT NEOPLASM OF LUNG, UNSPECIFIED LATERALITY, UNSPECIFIED PART OF LUNG (MULTI): ICD-10-CM

## 2024-12-09 PROCEDURE — A9575 INJ GADOTERATE MEGLUMI 0.1ML: HCPCS | Performed by: INTERNAL MEDICINE

## 2024-12-09 PROCEDURE — 2550000001 HC RX 255 CONTRASTS: Performed by: INTERNAL MEDICINE

## 2024-12-09 PROCEDURE — 70553 MRI BRAIN STEM W/O & W/DYE: CPT

## 2024-12-09 PROCEDURE — 74177 CT ABD & PELVIS W/CONTRAST: CPT

## 2024-12-09 RX ORDER — GADOTERATE MEGLUMINE 376.9 MG/ML
9 INJECTION INTRAVENOUS
Status: COMPLETED | OUTPATIENT
Start: 2024-12-09 | End: 2024-12-09

## 2024-12-12 ENCOUNTER — APPOINTMENT (OUTPATIENT)
Dept: ENDOCRINOLOGY | Facility: CLINIC | Age: 69
End: 2024-12-12
Payer: MEDICARE

## 2024-12-16 ENCOUNTER — HOSPITAL ENCOUNTER (OUTPATIENT)
Dept: CARDIOLOGY | Facility: HOSPITAL | Age: 69
Discharge: HOME | End: 2024-12-16
Payer: MEDICARE

## 2024-12-16 DIAGNOSIS — Z51.81 ENCOUNTER FOR THERAPEUTIC DRUG LEVEL MONITORING: ICD-10-CM

## 2024-12-16 DIAGNOSIS — I42.7 CARDIOTOXICITY (MULTI): ICD-10-CM

## 2024-12-16 LAB
AORTIC VALVE MEAN GRADIENT: 3 MMHG
AORTIC VALVE PEAK VELOCITY: 1.22 M/S
AV PEAK GRADIENT: 6 MMHG
AVA (PEAK VEL): 3.03 CM2
AVA (VTI): 3.19 CM2
EJECTION FRACTION APICAL 4 CHAMBER: 61.1
EJECTION FRACTION: 58 %
LEFT ATRIUM VOLUME AREA LENGTH INDEX BSA: 29.7 ML/M2
LEFT VENTRICLE INTERNAL DIMENSION DIASTOLE: 4.03 CM (ref 3.5–6)
LEFT VENTRICULAR OUTFLOW TRACT DIAMETER: 2.8 CM
MITRAL VALVE E/A RATIO: 0.85
RIGHT VENTRICLE FREE WALL PEAK S': 10.6 CM/S
RIGHT VENTRICLE PEAK SYSTOLIC PRESSURE: 17.4 MMHG
TRICUSPID ANNULAR PLANE SYSTOLIC EXCURSION: 2.3 CM

## 2024-12-16 PROCEDURE — 93306 TTE W/DOPPLER COMPLETE: CPT

## 2024-12-18 ENCOUNTER — TELEPHONE (OUTPATIENT)
Dept: PRIMARY CARE | Facility: CLINIC | Age: 69
End: 2024-12-18

## 2024-12-18 ENCOUNTER — LAB (OUTPATIENT)
Dept: LAB | Facility: LAB | Age: 69
End: 2024-12-18
Payer: MEDICARE

## 2024-12-18 DIAGNOSIS — E03.9 HYPOTHYROIDISM, UNSPECIFIED TYPE: ICD-10-CM

## 2024-12-18 DIAGNOSIS — Z13.6 SCREENING FOR HEART DISEASE: Primary | ICD-10-CM

## 2024-12-18 DIAGNOSIS — E03.9 HYPOTHYROIDISM, UNSPECIFIED TYPE: Primary | ICD-10-CM

## 2024-12-18 DIAGNOSIS — Z00.00 HEALTH MAINTENANCE EXAMINATION: ICD-10-CM

## 2024-12-18 LAB
T4 FREE SERPL-MCNC: 1.2 NG/DL (ref 0.61–1.12)
TSH SERPL-ACNC: 0.33 MIU/L (ref 0.44–3.98)

## 2024-12-18 NOTE — TELEPHONE ENCOUNTER
Order placed - she did have coronary calcifications on a previous chest CT so certainly a reasonable test to pursue - Thanks!

## 2024-12-23 ENCOUNTER — INFUSION (OUTPATIENT)
Dept: HEMATOLOGY/ONCOLOGY | Facility: HOSPITAL | Age: 69
End: 2024-12-23
Payer: MEDICARE

## 2024-12-23 ENCOUNTER — LAB (OUTPATIENT)
Dept: LAB | Facility: HOSPITAL | Age: 69
End: 2024-12-23
Payer: MEDICARE

## 2024-12-23 ENCOUNTER — OFFICE VISIT (OUTPATIENT)
Dept: HEMATOLOGY/ONCOLOGY | Facility: HOSPITAL | Age: 69
End: 2024-12-23
Payer: MEDICARE

## 2024-12-23 ENCOUNTER — EDUCATION (OUTPATIENT)
Dept: HEMATOLOGY/ONCOLOGY | Facility: HOSPITAL | Age: 69
End: 2024-12-23

## 2024-12-23 VITALS
OXYGEN SATURATION: 97 % | TEMPERATURE: 97.3 F | RESPIRATION RATE: 18 BRPM | SYSTOLIC BLOOD PRESSURE: 136 MMHG | HEART RATE: 68 BPM | BODY MASS INDEX: 19.39 KG/M2 | DIASTOLIC BLOOD PRESSURE: 65 MMHG | WEIGHT: 104.28 LBS

## 2024-12-23 DIAGNOSIS — C34.90 MALIGNANT NEOPLASM OF LUNG, UNSPECIFIED LATERALITY, UNSPECIFIED PART OF LUNG (MULTI): Primary | ICD-10-CM

## 2024-12-23 DIAGNOSIS — C34.90 MALIGNANT NEOPLASM OF LUNG, UNSPECIFIED LATERALITY, UNSPECIFIED PART OF LUNG (MULTI): ICD-10-CM

## 2024-12-23 DIAGNOSIS — E03.9 HYPOTHYROIDISM, UNSPECIFIED TYPE: Primary | ICD-10-CM

## 2024-12-23 DIAGNOSIS — E03.9 HYPOTHYROIDISM, UNSPECIFIED TYPE: ICD-10-CM

## 2024-12-23 LAB
ALBUMIN SERPL BCP-MCNC: 4.3 G/DL (ref 3.4–5)
ALP SERPL-CCNC: 59 U/L (ref 33–136)
ALT SERPL W P-5'-P-CCNC: 16 U/L (ref 7–45)
ANION GAP SERPL CALC-SCNC: 12 MMOL/L (ref 10–20)
APPEARANCE UR: CLEAR
AST SERPL W P-5'-P-CCNC: 18 U/L (ref 9–39)
BASOPHILS # BLD AUTO: 0.03 X10*3/UL (ref 0–0.1)
BASOPHILS NFR BLD AUTO: 0.7 %
BILIRUB SERPL-MCNC: 0.4 MG/DL (ref 0–1.2)
BILIRUB UR STRIP.AUTO-MCNC: NEGATIVE MG/DL
BUN SERPL-MCNC: 27 MG/DL (ref 6–23)
CALCIUM SERPL-MCNC: 9.2 MG/DL (ref 8.6–10.3)
CHLORIDE SERPL-SCNC: 106 MMOL/L (ref 98–107)
CK SERPL-CCNC: 49 U/L (ref 0–215)
CO2 SERPL-SCNC: 28 MMOL/L (ref 21–32)
COLOR UR: YELLOW
CREAT SERPL-MCNC: 1.24 MG/DL (ref 0.5–1.05)
EGFRCR SERPLBLD CKD-EPI 2021: 47 ML/MIN/1.73M*2
EOSINOPHIL # BLD AUTO: 0.13 X10*3/UL (ref 0–0.4)
EOSINOPHIL NFR BLD AUTO: 2.9 %
ERYTHROCYTE [DISTWIDTH] IN BLOOD BY AUTOMATED COUNT: 14.4 % (ref 11.5–14.5)
GLUCOSE SERPL-MCNC: 90 MG/DL (ref 74–99)
GLUCOSE UR STRIP.AUTO-MCNC: NORMAL MG/DL
HCT VFR BLD AUTO: 44 % (ref 36–46)
HGB BLD-MCNC: 14 G/DL (ref 12–16)
IMM GRANULOCYTES # BLD AUTO: 0.04 X10*3/UL (ref 0–0.5)
IMM GRANULOCYTES NFR BLD AUTO: 0.9 % (ref 0–0.9)
KETONES UR STRIP.AUTO-MCNC: NEGATIVE MG/DL
LEUKOCYTE ESTERASE UR QL STRIP.AUTO: ABNORMAL
LYMPHOCYTES # BLD AUTO: 0.69 X10*3/UL (ref 0.8–3)
LYMPHOCYTES NFR BLD AUTO: 15.6 %
MAGNESIUM SERPL-MCNC: 2.2 MG/DL (ref 1.6–2.4)
MCH RBC QN AUTO: 29.9 PG (ref 26–34)
MCHC RBC AUTO-ENTMCNC: 31.8 G/DL (ref 32–36)
MCV RBC AUTO: 94 FL (ref 80–100)
MONOCYTES # BLD AUTO: 0.34 X10*3/UL (ref 0.05–0.8)
MONOCYTES NFR BLD AUTO: 7.7 %
MUCOUS THREADS #/AREA URNS AUTO: ABNORMAL /LPF
NEUTROPHILS # BLD AUTO: 3.2 X10*3/UL (ref 1.6–5.5)
NEUTROPHILS NFR BLD AUTO: 72.2 %
NITRITE UR QL STRIP.AUTO: NEGATIVE
NRBC BLD-RTO: 0 /100 WBCS (ref 0–0)
PH UR STRIP.AUTO: 5.5 [PH]
PLATELET # BLD AUTO: 151 X10*3/UL (ref 150–450)
POTASSIUM SERPL-SCNC: 4.6 MMOL/L (ref 3.5–5.3)
PROT SERPL-MCNC: 6.5 G/DL (ref 6.4–8.2)
PROT UR STRIP.AUTO-MCNC: ABNORMAL MG/DL
RBC # BLD AUTO: 4.68 X10*6/UL (ref 4–5.2)
RBC # UR STRIP.AUTO: NEGATIVE /UL
RBC #/AREA URNS AUTO: ABNORMAL /HPF
SODIUM SERPL-SCNC: 141 MMOL/L (ref 136–145)
SP GR UR STRIP.AUTO: 1.02
SQUAMOUS #/AREA URNS AUTO: ABNORMAL /HPF
T4 FREE SERPL-MCNC: 1.69 NG/DL (ref 0.78–1.48)
TSH SERPL-ACNC: 0.28 MIU/L (ref 0.44–3.98)
UROBILINOGEN UR STRIP.AUTO-MCNC: NORMAL MG/DL
WBC # BLD AUTO: 4.4 X10*3/UL (ref 4.4–11.3)
WBC #/AREA URNS AUTO: ABNORMAL /HPF

## 2024-12-23 PROCEDURE — 99215 OFFICE O/P EST HI 40 MIN: CPT | Mod: 25 | Performed by: INTERNAL MEDICINE

## 2024-12-23 PROCEDURE — 82550 ASSAY OF CK (CPK): CPT

## 2024-12-23 PROCEDURE — 85025 COMPLETE CBC W/AUTO DIFF WBC: CPT

## 2024-12-23 PROCEDURE — 80053 COMPREHEN METABOLIC PANEL: CPT

## 2024-12-23 PROCEDURE — 84443 ASSAY THYROID STIM HORMONE: CPT

## 2024-12-23 PROCEDURE — 36415 COLL VENOUS BLD VENIPUNCTURE: CPT

## 2024-12-23 PROCEDURE — 2560000001 HC RX 256 EXPERIMENTAL DRUGS: Mod: JG | Performed by: INTERNAL MEDICINE

## 2024-12-23 PROCEDURE — 83735 ASSAY OF MAGNESIUM: CPT

## 2024-12-23 PROCEDURE — 81001 URINALYSIS AUTO W/SCOPE: CPT

## 2024-12-23 PROCEDURE — 96365 THER/PROPH/DIAG IV INF INIT: CPT | Mod: INF

## 2024-12-23 PROCEDURE — 84439 ASSAY OF FREE THYROXINE: CPT

## 2024-12-23 RX ORDER — ALBUTEROL SULFATE 0.83 MG/ML
3 SOLUTION RESPIRATORY (INHALATION) AS NEEDED
Status: DISCONTINUED | OUTPATIENT
Start: 2024-12-23 | End: 2024-12-23 | Stop reason: HOSPADM

## 2024-12-23 RX ORDER — DIPHENHYDRAMINE HYDROCHLORIDE 50 MG/ML
50 INJECTION INTRAMUSCULAR; INTRAVENOUS AS NEEDED
Status: DISCONTINUED | OUTPATIENT
Start: 2024-12-23 | End: 2024-12-23 | Stop reason: HOSPADM

## 2024-12-23 RX ORDER — PROCHLORPERAZINE MALEATE 10 MG
10 TABLET ORAL EVERY 6 HOURS PRN
Status: DISCONTINUED | OUTPATIENT
Start: 2024-12-23 | End: 2024-12-23 | Stop reason: HOSPADM

## 2024-12-23 RX ORDER — EPINEPHRINE 0.3 MG/.3ML
0.3 INJECTION SUBCUTANEOUS EVERY 5 MIN PRN
Status: CANCELLED | OUTPATIENT
Start: 2024-12-23

## 2024-12-23 RX ORDER — PROCHLORPERAZINE MALEATE 10 MG
10 TABLET ORAL EVERY 6 HOURS PRN
Status: CANCELLED | OUTPATIENT
Start: 2024-12-23

## 2024-12-23 RX ORDER — DIPHENHYDRAMINE HYDROCHLORIDE 50 MG/ML
50 INJECTION INTRAMUSCULAR; INTRAVENOUS AS NEEDED
Status: CANCELLED | OUTPATIENT
Start: 2024-12-23

## 2024-12-23 RX ORDER — PROCHLORPERAZINE EDISYLATE 5 MG/ML
10 INJECTION INTRAMUSCULAR; INTRAVENOUS EVERY 6 HOURS PRN
Status: DISCONTINUED | OUTPATIENT
Start: 2024-12-23 | End: 2024-12-23 | Stop reason: HOSPADM

## 2024-12-23 RX ORDER — ALBUTEROL SULFATE 0.83 MG/ML
3 SOLUTION RESPIRATORY (INHALATION) AS NEEDED
Status: CANCELLED | OUTPATIENT
Start: 2024-12-23

## 2024-12-23 RX ORDER — FAMOTIDINE 10 MG/ML
20 INJECTION INTRAVENOUS ONCE AS NEEDED
Status: CANCELLED | OUTPATIENT
Start: 2024-12-23

## 2024-12-23 RX ORDER — EPINEPHRINE 0.3 MG/.3ML
0.3 INJECTION SUBCUTANEOUS EVERY 5 MIN PRN
Status: DISCONTINUED | OUTPATIENT
Start: 2024-12-23 | End: 2024-12-23 | Stop reason: HOSPADM

## 2024-12-23 RX ORDER — PROCHLORPERAZINE EDISYLATE 5 MG/ML
10 INJECTION INTRAMUSCULAR; INTRAVENOUS EVERY 6 HOURS PRN
Status: CANCELLED | OUTPATIENT
Start: 2024-12-23

## 2024-12-23 RX ORDER — FAMOTIDINE 10 MG/ML
20 INJECTION INTRAVENOUS ONCE AS NEEDED
Status: DISCONTINUED | OUTPATIENT
Start: 2024-12-23 | End: 2024-12-23 | Stop reason: HOSPADM

## 2024-12-23 ASSESSMENT — ENCOUNTER SYMPTOMS
FEVER: 0
CHILLS: 0
ANOREXIA: 0
NUMBNESS: 0
HEADACHES: 0
NAUSEA: 0
ARTHRALGIAS: 0
DIAPHORESIS: 0
VISUAL CHANGE: 0
FATIGUE: 0
ABDOMINAL PAIN: 0
SWOLLEN GLANDS: 0
VOMITING: 0
MYALGIAS: 0
SORE THROAT: 0
NECK PAIN: 0
WEAKNESS: 0
VERTIGO: 0
JOINT SWELLING: 0
CHANGE IN BOWEL HABIT: 1
COUGH: 0

## 2024-12-23 ASSESSMENT — PAIN SCALES - GENERAL: PAINLEVEL_OUTOF10: 0-NO PAIN

## 2024-12-23 NOTE — PROGRESS NOTES
Pt arrived ambulatory to infusion for treatment of study bevacizumab.  Denies any new or worsening symptoms. Tolerated infusion without issue. Discharged in stable condition.

## 2024-12-23 NOTE — RESEARCH NOTES
Research Note Treatment Day    Mirta Hills is here today for treatment on [id number]. Today is C25. Procedures completed per protocol. AE's and con-meds reviewed with patient. Patient is aware of treatment plan. Patient states for the past 2 months, she has had spots of discoloration on her lower abdomen bilaterally and top of hips, flat, non pruritic areas, dark, black in color. Dr Landin examined the areas and attributed them to Bevacizumab. Platelets 151. Approved to continue with treatment.     [x]   Received treatment as planned   OR  []    Treatment delayed; patient calendar updated as required   Treatment delayed because:    []   AE    []   Physician Discretion    []   Clinical Deterioration or Progression     []   Other    Education Documentation  Nutrition/Diet, taught by Makayla Joseph, RN at 12/23/2024  1:35 PM.  Learner: Patient  Readiness: Acceptance  Method: Explanation  Response: Verbalizes Understanding    Education Comments  No comments found.

## 2024-12-23 NOTE — PROGRESS NOTES
Patient ID: Mirta Hills is a 69 y.o. female.    DIAGNOSIS     Adenocarcinoma of the lung.  Date of diagnosis is [place] 15, 2023 from a level 7 lymph node biopsy/fine-needle aspiration.  Immunohistochemistry positive for TTF-1.        STAGING     Clinical T2a (right lower lobe mass measuring 3.6 cm), clinical N2 (PET positivity and subcarinal region), M1C disease (presence of brain metastases, ribs, right acetabulum, liver, brain), stage IVb disease        CURRENT SITES OF DISEASE     Right lower lobe, right hilum of the lung, ipsilateral mediastinum, liver, bones, brain        MOLECULAR GENOMICS     TEST: Focused Solid Tumor DNA/RNA Panel   SPECIMEN: Supernatant, LYMPH NODE 7, [id number] A   DISEASE DIAGNOSIS: Adenocarcinoma   Estimated Tumor Content: 40%   COLLECTION DATE: 6/15/2023     MICROSATELLITE STATUS: Microsatellite  Instability-High (MSI-H) is NOT DETECTED.     DISEASE ASSOCIATED GENOMIC FINDINGS:   EGFR p.[id number] ([id number] c.[id number])    TP53 p.N247I (NM_000546 c.740A>T)     DISEASE RELEVANT ALTERATIONS NOT DETECTED:   Negative for ALK fusion.   Negative for BRAF V600E.   Negative for ERBB2 activating mutation   Negative for KRAS G12C.   Negative for  MET exon 14 skipping mutation.   Negative for NTRK fusion.   Negative for RET fusion.   Negative for ROS1 fusion.        Circulating tumor DNA sent on June 19, 2023  EGFR p.[id number], Variant allelic fraction of 1.6%  TP53 N247I Missense variant, loss of function, variant allelic fraction 1.1%  MSI stable        SERUM TUMOR MARKER     Slightly elevated CEA and CA 19.9 June 2023        PRIOR THERAPY     1- Gamma knife radiosurgery to brain lesions completed on June 28, 2023.        CURRENT THERAPY     Osimertinib on study  on clinical trial [id number] which is a randomized trial of osimertinib with or without bevacizumab.  She has been randomized to the bevacizumab arm- Cycle 1 Day 1 is 7/12/2023        CURRENT ONCOLOGICAL PROBLEMS     1-brain  metastases, symptomatic, word finding difficulties, mild unsteadiness on her feet but no falls, Resolved July 3, 2023  2-anterior chest pain, increases with deep inspiration, Improved July 3, 2023  3-mild intermittent grade 1 diarrhea, mild hair loss, mild leukopenia, mild elevation in creatinine, mild fatigue.  Most likely all osimertinib related August 7, 2023  4-fluctuating weight over the past year since late 2023 ranging between 110 and 106 pounds.  Down to 104 pounds December 23, 2024.  Discussed increasing caloric intake.        HISTORY OF PRESENT ILLNESS     This is a 69-year-old patient.  She states that towards Thanksgiving of 2022 she started feeling some pain in her right shoulder.  Eventually got better however the same pain came back in January or February 2023.  Ultimately some x-rays were done of  the shoulder that were generally negative.  She then developed some pain in her sternum a month or 2 later with deep inspiration and ultimately underwent imaging in May 2023.  Chest CT without contrast was done on June 1, 2023.  This showed a 3.4 cm mass  within the superior segment of the right lower lobe with some associated satellite pulmonary nodules.  Additionally there were some other subcentimeter pulmonary nodules.  There was evidence of mediastinal lymphadenopathy.  Subcarinal lymph node measured  1.4 cm in short axis.She was seen by pulmonary medicine on June 5, 2023 a combined PET/CT was ordered on June 6, 2023 showing a relatively intense area of hypermetabolic activity along the superior posteromedial aspect of the right lower lobe corresponding  to the known right lower lobe lung mass.  There was foci of hypermetabolic activity in the right supraclavicular region, subcarinal and right hilar regions.  There was area of uptake within the posterior upper ribs as well as right acetabulum suspicious  for osseous metastatic disease.  There was a punctate area of mild hypermetabolic activity in the  right hepatic lobe concerning for hepatic metastases.  Small focus in the subcutaneous region just anterior to the lower aspect of the sternum could be inflammatory  versus metastatic.  She undergoes a bronchoscopy dated [place] 15, 2023.  There were no endobronchial lesions.  Primary cytology with rapid onsite evaluation was suggestive of malignancy in level 7, final lymph nodes were pathology was pending.  MRI of the  brain with and without contrast dated June 16, 2023 showed approximately 20 enhancing nodules concerning for metastatic disease.  Specifically there was a 1.5 cm nodule in the right cerebellar hemisphere.  Most of the other nodules were less than 1 cm.   Patient also notes that she has some difficulty expressing words over the past 1 to 2 weeks prior to her first visit with us as well as some mild unsteadiness on her legs where she feels she is drifting towards the right side.        PAST MEDICAL HISTORY     1-hypothyroidism  2-hysterectomy 1988  3-abdominal hernia surgery 1995  4-some hearing difficulties        SOCIAL HISTORY     Patient lives with her significant other.  She has 2 daughter.  Lives in [facility].  Smoked for only 3 years during college and during this time it was a pack per day.  She has retired.  She had a retail store.        CURRENT MEDS     See medication list, meds reviewed        ALLERGIES     Patient denies any drug allergies        FAMILY HISTORY     Patient's father had bladder cancer at the age of 86.  She has 1 brother with no cancer.  She has 2 children       Subjective    Cancer  Associated symptoms include a change in bowel habit. Pertinent negatives include no abdominal pain, anorexia, arthralgias, chest pain, chills, congestion, coughing, diaphoresis, fatigue, fever, headaches, joint swelling, myalgias, nausea, neck pain, numbness, rash, sore throat, swollen glands, urinary symptoms, vertigo, visual change, vomiting or weakness.       Patient clinically doing very  well, intermittent grade 1 diarrhea up to 3-4 stools per day.  Otherwise clinically doing well.  May have lost 2 pounds over the past month.  She has noticed some mild right posterior upper chest pain that comes and goes and with some movements but not there persistently.    Objective    BSA: 1.43 meters squared  /65 (BP Location: Left arm, Patient Position: Sitting, BP Cuff Size: Small adult)   Pulse 68   Temp 36.3 °C (97.3 °F) (Temporal)   Resp 18   Wt 47.3 kg (104 lb 4.4 oz)   SpO2 97%   BMI 19.39 kg/m²       11/20/2023 12/11/2023 1/3/2024 1/29/2024 2/2/2024 2/19/2024   Vitals         Weight (lb) 110.67  110.23  106.26  108.69  110.01  110.01       3/11/2024 3/26/2024 4/1/2024 4/2/2024 4/5/2024 4/22/2024 5/13/2024   Vitals          Weight (lb) 110.89  110.2  111.99  109  111.11  112.21  106.7    Weight (lb)  110.2            5/28/2024 6/3/2024 6/7/2024 6/11/2024 7/1/2024 7/22/2024 8/12/2024   Vitals          Weight (lb) 106  111.1  108.91  109.2  108  107.58  106.26       8/23/2024 9/6/2024 9/30/2024 10/21/2024 11/11/2024 12/2/2024   Vitals         Weight (lb) 106.5  108.2  107.36  106.7  104.94  106.92       12/23/2024   Vitals    Weight (lb) 104.28        Physical Exam  Constitutional:       General: She is not in acute distress.     Appearance: Normal appearance. She is not ill-appearing, toxic-appearing or diaphoretic.   HENT:      Nose: No congestion or rhinorrhea.      Mouth/Throat:      Pharynx: No oropharyngeal exudate or posterior oropharyngeal erythema.   Eyes:      General: No scleral icterus.     Conjunctiva/sclera: Conjunctivae normal.   Cardiovascular:      Rate and Rhythm: Normal rate and regular rhythm.      Pulses: Normal pulses.      Heart sounds: Normal heart sounds. No murmur heard.     No friction rub.   Pulmonary:      Effort: Pulmonary effort is normal. No respiratory distress.      Breath sounds: Normal breath sounds. No stridor. No wheezing, rhonchi or rales.   Chest:       Chest wall: No tenderness.   Abdominal:      General: Abdomen is flat. Bowel sounds are normal. There is no distension.      Palpations: Abdomen is soft. There is no mass.      Tenderness: There is no abdominal tenderness. There is no guarding or rebound.   Musculoskeletal:      Cervical back: No tenderness.      Right lower leg: No edema.      Left lower leg: No edema.   Lymphadenopathy:      Cervical: No cervical adenopathy.   Skin:     General: Skin is warm.      Coloration: Skin is not jaundiced.   Neurological:      General: No focal deficit present.      Mental Status: She is oriented to person, place, and time.   Psychiatric:         Mood and Affect: Mood normal.         Behavior: Behavior normal.         Performance Status:  Asymptomatic     Latest Reference Range & Units 12/23/24 10:39   GLUCOSE 74 - 99 mg/dL 90   SODIUM 136 - 145 mmol/L 141   POTASSIUM 3.5 - 5.3 mmol/L 4.6   CHLORIDE 98 - 107 mmol/L 106   Bicarbonate 21 - 32 mmol/L 28   Anion Gap 10 - 20 mmol/L 12   Blood Urea Nitrogen 6 - 23 mg/dL 27 (H)   Creatinine 0.50 - 1.05 mg/dL 1.24 (H)   EGFR >60 mL/min/1.73m*2 47 (L)   Calcium 8.6 - 10.3 mg/dL 9.2   Albumin 3.4 - 5.0 g/dL 4.3   Alkaline Phosphatase 33 - 136 U/L 59   ALT 7 - 45 U/L 16   AST 9 - 39 U/L 18   Bilirubin Total 0.0 - 1.2 mg/dL 0.4   Total Protein 6.4 - 8.2 g/dL 6.5   MAGNESIUM 1.60 - 2.40 mg/dL 2.20   WBC 4.4 - 11.3 x10*3/uL 4.4   nRBC 0.0 - 0.0 /100 WBCs 0.0   RBC 4.00 - 5.20 x10*6/uL 4.68   HEMOGLOBIN 12.0 - 16.0 g/dL 14.0   HEMATOCRIT 36.0 - 46.0 % 44.0   MCV 80 - 100 fL 94   MCH 26.0 - 34.0 pg 29.9   MCHC 32.0 - 36.0 g/dL 31.8 (L)   RED CELL DISTRIBUTION WIDTH 11.5 - 14.5 % 14.4   Platelets 150 - 450 x10*3/uL 151   Neutrophils % 40.0 - 80.0 % 72.2   Immature Granulocytes %, Automated 0.0 - 0.9 % 0.9   Lymphocytes % 13.0 - 44.0 % 15.6   Monocytes % 2.0 - 10.0 % 7.7   Eosinophils % 0.0 - 6.0 % 2.9   Basophils % 0.0 - 2.0 % 0.7   Neutrophils Absolute 1.60 - 5.50 x10*3/uL 3.20    Immature Granulocytes Absolute, Automated 0.00 - 0.50 x10*3/uL 0.04   Lymphocytes Absolute 0.80 - 3.00 x10*3/uL 0.69 (L)   Monocytes Absolute 0.05 - 0.80 x10*3/uL 0.34   Eosinophils Absolute 0.00 - 0.40 x10*3/uL 0.13   Basophils Absolute 0.00 - 0.10 x10*3/uL 0.03   (H): Data is abnormally high  (L): Data is abnormally low    CT CHEST ABDOMEN PELVIS W IV CONTRAST; 12/9/2024   IMPRESSION:  CHEST  1.  Stable irregular opacity at the superior segment of the right  lower lobe but follow-up as clinically warranted.  2. Stable bony sclerotic lesions.  3. Overall no significant interval change.      ABDOMEN - PELVIS  1.  Stable hepatic hypodensity at segment 1.  2. Stable splenic hypodensity and splenomegaly. No evidence of  additional metastatic disease or interval change.    MR BRAIN W AND WO IV CONTRAST; 12/9/2024   IMPRESSION:  There has been no significant interval change when compared with the  prior MRI dated 09/26/2024 as noted above.      Assessment/Plan     Patient overall clinically doing well on above-mentioned clinical trial for EGFR exon 19 mutation positive adenocarcinoma of the lung.  No evidence of disease progression clinically or radiographically based on recent imaging of the brain and chest.  We will continue her treatment.    Cancer Staging   No matching staging information was found for the patient.      Oncology History   Lung cancer (Multi)   7/12/2023 -  Research Study Participant    ([facility]) [id number] Arm B - Bevacizumab / Osimertinib, 21 Day Cycles  Plan Provider: Humberto Landin MD  Treatment goal: [No plan goal]  Line of treatment: [No plan line of treatment]  Associated studies: [id number] (Osimertinib) +/- Bevacizumab as Initial Treatment for EGFR-Mutant Lung Cancer     9/1/2023 Initial Diagnosis    Lung cancer (CMS/HCC)                   Humberto Landin MD

## 2024-12-24 NOTE — RESEARCH NOTES
Patient given healthy eating nutrition booklet and discussed with Dr. Landin trending weight loss and need to increase calories in diet.

## 2025-01-10 DIAGNOSIS — C34.90 MALIGNANT NEOPLASM OF LUNG, UNSPECIFIED LATERALITY, UNSPECIFIED PART OF LUNG (MULTI): Primary | ICD-10-CM

## 2025-01-10 RX ORDER — DIPHENHYDRAMINE HYDROCHLORIDE 50 MG/ML
50 INJECTION INTRAMUSCULAR; INTRAVENOUS AS NEEDED
OUTPATIENT
Start: 2025-01-13

## 2025-01-10 RX ORDER — ALBUTEROL SULFATE 0.83 MG/ML
3 SOLUTION RESPIRATORY (INHALATION) AS NEEDED
OUTPATIENT
Start: 2025-01-13

## 2025-01-10 RX ORDER — EPINEPHRINE 0.3 MG/.3ML
0.3 INJECTION SUBCUTANEOUS EVERY 5 MIN PRN
OUTPATIENT
Start: 2025-01-13

## 2025-01-10 RX ORDER — PROCHLORPERAZINE EDISYLATE 5 MG/ML
10 INJECTION INTRAMUSCULAR; INTRAVENOUS EVERY 6 HOURS PRN
OUTPATIENT
Start: 2025-01-13

## 2025-01-10 RX ORDER — FAMOTIDINE 10 MG/ML
20 INJECTION INTRAVENOUS ONCE AS NEEDED
OUTPATIENT
Start: 2025-01-13

## 2025-01-10 RX ORDER — PROCHLORPERAZINE MALEATE 10 MG
10 TABLET ORAL EVERY 6 HOURS PRN
OUTPATIENT
Start: 2025-01-13

## 2025-01-12 DIAGNOSIS — C34.90 MALIGNANT NEOPLASM OF LUNG, UNSPECIFIED LATERALITY, UNSPECIFIED PART OF LUNG (MULTI): ICD-10-CM

## 2025-01-13 ENCOUNTER — EDUCATION (OUTPATIENT)
Dept: HEMATOLOGY/ONCOLOGY | Facility: HOSPITAL | Age: 70
End: 2025-01-13

## 2025-01-13 ENCOUNTER — OFFICE VISIT (OUTPATIENT)
Dept: HEMATOLOGY/ONCOLOGY | Facility: HOSPITAL | Age: 70
End: 2025-01-13
Payer: MEDICARE

## 2025-01-13 ENCOUNTER — INFUSION (OUTPATIENT)
Dept: HEMATOLOGY/ONCOLOGY | Facility: HOSPITAL | Age: 70
End: 2025-01-13
Payer: MEDICARE

## 2025-01-13 ENCOUNTER — LAB (OUTPATIENT)
Dept: LAB | Facility: HOSPITAL | Age: 70
End: 2025-01-13
Payer: MEDICARE

## 2025-01-13 VITALS
WEIGHT: 105.82 LBS | DIASTOLIC BLOOD PRESSURE: 69 MMHG | BODY MASS INDEX: 19.67 KG/M2 | TEMPERATURE: 96.8 F | RESPIRATION RATE: 16 BRPM | OXYGEN SATURATION: 100 % | SYSTOLIC BLOOD PRESSURE: 142 MMHG | HEART RATE: 71 BPM

## 2025-01-13 DIAGNOSIS — E03.9 HYPOTHYROIDISM, UNSPECIFIED TYPE: ICD-10-CM

## 2025-01-13 DIAGNOSIS — C34.31 MALIGNANT NEOPLASM OF LOWER LOBE OF RIGHT LUNG (MULTI): Primary | ICD-10-CM

## 2025-01-13 DIAGNOSIS — C34.90 MALIGNANT NEOPLASM OF LUNG, UNSPECIFIED LATERALITY, UNSPECIFIED PART OF LUNG (MULTI): ICD-10-CM

## 2025-01-13 LAB
ALBUMIN SERPL BCP-MCNC: 4 G/DL (ref 3.4–5)
ALP SERPL-CCNC: 52 U/L (ref 33–136)
ALT SERPL W P-5'-P-CCNC: 15 U/L (ref 7–45)
ANION GAP SERPL CALC-SCNC: 13 MMOL/L (ref 10–20)
APPEARANCE UR: CLEAR
AST SERPL W P-5'-P-CCNC: 20 U/L (ref 9–39)
BASOPHILS # BLD AUTO: 0.02 X10*3/UL (ref 0–0.1)
BASOPHILS NFR BLD AUTO: 0.5 %
BILIRUB SERPL-MCNC: 0.4 MG/DL (ref 0–1.2)
BILIRUB UR STRIP.AUTO-MCNC: NEGATIVE MG/DL
BUN SERPL-MCNC: 25 MG/DL (ref 6–23)
CALCIUM SERPL-MCNC: 9.4 MG/DL (ref 8.6–10.3)
CHLORIDE SERPL-SCNC: 108 MMOL/L (ref 98–107)
CK SERPL-CCNC: 58 U/L (ref 0–215)
CO2 SERPL-SCNC: 26 MMOL/L (ref 21–32)
COLOR UR: ABNORMAL
CREAT SERPL-MCNC: 1.25 MG/DL (ref 0.5–1.05)
EGFRCR SERPLBLD CKD-EPI 2021: 46 ML/MIN/1.73M*2
EOSINOPHIL # BLD AUTO: 0.19 X10*3/UL (ref 0–0.4)
EOSINOPHIL NFR BLD AUTO: 4.5 %
ERYTHROCYTE [DISTWIDTH] IN BLOOD BY AUTOMATED COUNT: 14.5 % (ref 11.5–14.5)
GLUCOSE SERPL-MCNC: 112 MG/DL (ref 74–99)
GLUCOSE UR STRIP.AUTO-MCNC: NORMAL MG/DL
HCT VFR BLD AUTO: 42.1 % (ref 36–46)
HGB BLD-MCNC: 13.6 G/DL (ref 12–16)
HYALINE CASTS #/AREA URNS AUTO: ABNORMAL /LPF
IMM GRANULOCYTES # BLD AUTO: 0.02 X10*3/UL (ref 0–0.5)
IMM GRANULOCYTES NFR BLD AUTO: 0.5 % (ref 0–0.9)
KETONES UR STRIP.AUTO-MCNC: NEGATIVE MG/DL
LEUKOCYTE ESTERASE UR QL STRIP.AUTO: ABNORMAL
LYMPHOCYTES # BLD AUTO: 0.58 X10*3/UL (ref 0.8–3)
LYMPHOCYTES NFR BLD AUTO: 13.7 %
MAGNESIUM SERPL-MCNC: 1.99 MG/DL (ref 1.6–2.4)
MCH RBC QN AUTO: 30.4 PG (ref 26–34)
MCHC RBC AUTO-ENTMCNC: 32.3 G/DL (ref 32–36)
MCV RBC AUTO: 94 FL (ref 80–100)
MONOCYTES # BLD AUTO: 0.27 X10*3/UL (ref 0.05–0.8)
MONOCYTES NFR BLD AUTO: 6.4 %
MUCOUS THREADS #/AREA URNS AUTO: ABNORMAL /LPF
NEUTROPHILS # BLD AUTO: 3.16 X10*3/UL (ref 1.6–5.5)
NEUTROPHILS NFR BLD AUTO: 74.4 %
NITRITE UR QL STRIP.AUTO: NEGATIVE
NRBC BLD-RTO: 0 /100 WBCS (ref 0–0)
PH UR STRIP.AUTO: 5 [PH]
PLATELET # BLD AUTO: 156 X10*3/UL (ref 150–450)
POTASSIUM SERPL-SCNC: 4.7 MMOL/L (ref 3.5–5.3)
PROT SERPL-MCNC: 6.3 G/DL (ref 6.4–8.2)
PROT UR STRIP.AUTO-MCNC: ABNORMAL MG/DL
RBC # BLD AUTO: 4.47 X10*6/UL (ref 4–5.2)
RBC # UR STRIP.AUTO: NEGATIVE /UL
RBC #/AREA URNS AUTO: ABNORMAL /HPF
SODIUM SERPL-SCNC: 142 MMOL/L (ref 136–145)
SP GR UR STRIP.AUTO: 1.02
SQUAMOUS #/AREA URNS AUTO: ABNORMAL /HPF
T4 FREE SERPL-MCNC: 1.59 NG/DL (ref 0.78–1.48)
TRANS CELLS #/AREA UR COMP ASSIST: ABNORMAL /HPF
TSH SERPL-ACNC: 0.36 MIU/L (ref 0.44–3.98)
UROBILINOGEN UR STRIP.AUTO-MCNC: NORMAL MG/DL
WBC # BLD AUTO: 4.2 X10*3/UL (ref 4.4–11.3)
WBC #/AREA URNS AUTO: ABNORMAL /HPF
WBC CLUMPS #/AREA URNS AUTO: ABNORMAL /HPF

## 2025-01-13 PROCEDURE — 82040 ASSAY OF SERUM ALBUMIN: CPT

## 2025-01-13 PROCEDURE — 81001 URINALYSIS AUTO W/SCOPE: CPT

## 2025-01-13 PROCEDURE — 99214 OFFICE O/P EST MOD 30 MIN: CPT | Performed by: CLINICAL NURSE SPECIALIST

## 2025-01-13 PROCEDURE — 36415 COLL VENOUS BLD VENIPUNCTURE: CPT

## 2025-01-13 PROCEDURE — 84443 ASSAY THYROID STIM HORMONE: CPT

## 2025-01-13 PROCEDURE — 83735 ASSAY OF MAGNESIUM: CPT

## 2025-01-13 PROCEDURE — 82550 ASSAY OF CK (CPK): CPT

## 2025-01-13 PROCEDURE — 84439 ASSAY OF FREE THYROXINE: CPT

## 2025-01-13 PROCEDURE — 96365 THER/PROPH/DIAG IV INF INIT: CPT | Mod: INF

## 2025-01-13 PROCEDURE — 85025 COMPLETE CBC W/AUTO DIFF WBC: CPT

## 2025-01-13 PROCEDURE — 2560000001 HC RX 256 EXPERIMENTAL DRUGS: Mod: JG,TB | Performed by: CLINICAL NURSE SPECIALIST

## 2025-01-13 RX ORDER — PROCHLORPERAZINE EDISYLATE 5 MG/ML
10 INJECTION INTRAMUSCULAR; INTRAVENOUS EVERY 6 HOURS PRN
Status: DISCONTINUED | OUTPATIENT
Start: 2025-01-13 | End: 2025-01-13 | Stop reason: HOSPADM

## 2025-01-13 RX ORDER — ALBUTEROL SULFATE 0.83 MG/ML
3 SOLUTION RESPIRATORY (INHALATION) AS NEEDED
Status: DISCONTINUED | OUTPATIENT
Start: 2025-01-13 | End: 2025-01-13 | Stop reason: HOSPADM

## 2025-01-13 RX ORDER — EPINEPHRINE 0.3 MG/.3ML
0.3 INJECTION SUBCUTANEOUS EVERY 5 MIN PRN
Status: DISCONTINUED | OUTPATIENT
Start: 2025-01-13 | End: 2025-01-13 | Stop reason: HOSPADM

## 2025-01-13 RX ORDER — PROCHLORPERAZINE MALEATE 10 MG
10 TABLET ORAL EVERY 6 HOURS PRN
Status: DISCONTINUED | OUTPATIENT
Start: 2025-01-13 | End: 2025-01-13 | Stop reason: HOSPADM

## 2025-01-13 RX ORDER — FAMOTIDINE 10 MG/ML
20 INJECTION INTRAVENOUS ONCE AS NEEDED
Status: DISCONTINUED | OUTPATIENT
Start: 2025-01-13 | End: 2025-01-13 | Stop reason: HOSPADM

## 2025-01-13 RX ORDER — DIPHENHYDRAMINE HYDROCHLORIDE 50 MG/ML
50 INJECTION INTRAMUSCULAR; INTRAVENOUS AS NEEDED
Status: DISCONTINUED | OUTPATIENT
Start: 2025-01-13 | End: 2025-01-13 | Stop reason: HOSPADM

## 2025-01-13 RX ADMIN — Medication 80 MG: at 14:54

## 2025-01-13 RX ADMIN — Medication 757.5 MG: at 13:24

## 2025-01-13 ASSESSMENT — ENCOUNTER SYMPTOMS
FEVER: 0
VOMITING: 0
SWOLLEN GLANDS: 0
MYALGIAS: 0
VERTIGO: 0
DIAPHORESIS: 0
VISUAL CHANGE: 0
NAUSEA: 0
HEADACHES: 0
COUGH: 0
NUMBNESS: 0
CHILLS: 0
ARTHRALGIAS: 0
ABDOMINAL PAIN: 0
JOINT SWELLING: 0
NECK PAIN: 0
ANOREXIA: 0
CHANGE IN BOWEL HABIT: 1
FATIGUE: 0
WEAKNESS: 0
SORE THROAT: 0

## 2025-01-13 ASSESSMENT — PAIN SCALES - GENERAL: PAINLEVEL_OUTOF10: 0-NO PAIN

## 2025-01-13 NOTE — PROGRESS NOTES
Patient ID: Mirta Hills is a 69 y.o. female.    DIAGNOSIS     Adenocarcinoma of the lung.  Date of diagnosis is [place] 15, 2023 from a level 7 lymph node biopsy/fine-needle aspiration.  Immunohistochemistry positive for TTF-1.        STAGING     Clinical T2a (right lower lobe mass measuring 3.6 cm), clinical N2 (PET positivity and subcarinal region), M1C disease (presence of brain metastases, ribs, right acetabulum, liver, brain), stage IVb disease        CURRENT SITES OF DISEASE     Right lower lobe, right hilum of the lung, ipsilateral mediastinum, liver, bones, brain        MOLECULAR GENOMICS     TEST: Focused Solid Tumor DNA/RNA Panel   SPECIMEN: Supernatant, LYMPH NODE 7, [id number]   DISEASE DIAGNOSIS: Adenocarcinoma   Estimated Tumor Content: 40%   COLLECTION DATE: 6/15/2023     MICROSATELLITE STATUS: Microsatellite  Instability-High (MSI-H) is NOT DETECTED.     DISEASE ASSOCIATED GENOMIC FINDINGS:   EGFR p.[id number] ([id number] c.[id number])    TP53 p.N247I ([id number] c.740A>T)     DISEASE RELEVANT ALTERATIONS NOT DETECTED:   Negative for ALK fusion.   Negative for BRAF V600E.   Negative for ERBB2 activating mutation   Negative for KRAS G12C.   Negative for  MET exon 14 skipping mutation.   Negative for NTRK fusion.   Negative for RET fusion.   Negative for ROS1 fusion.        Circulating tumor DNA sent on June 19, 2023  EGFR p.[id number], Variant allelic fraction of 1.6%  TP53 N247I Missense variant, loss of function, variant allelic fraction 1.1%  MSI stable        SERUM TUMOR MARKER     Slightly elevated CEA and CA 19.9 June 2023        PRIOR THERAPY     1- Gamma knife radiosurgery to brain lesions completed on June 28, 2023.        CURRENT THERAPY     Osimertinib on study  on clinical trial EA 5182 which is a randomized trial of osimertinib with or without bevacizumab.  She has been randomized to the bevacizumab arm- Cycle 1 Day 1 is 7/12/2023        CURRENT ONCOLOGICAL PROBLEMS     1-brain  metastases, symptomatic, word finding difficulties, mild unsteadiness on her feet but no falls, Resolved July 3, 2023  2-anterior chest pain, increases with deep inspiration, Improved July 3, 2023  3-mild intermittent grade 1 diarrhea, mild hair loss, mild leukopenia, mild elevation in creatinine, mild fatigue.  Most likely all osimertinib related August 7, 2023  4-fluctuating weight over the past year since late 2023 ranging between 110 and 106 pounds.  Down to 104 pounds December 23, 2024.  Discussed increasing caloric intake.        HISTORY OF PRESENT ILLNESS     This is a 69-year-old patient.  She states that towards Thanksgiving of 2022 she started feeling some pain in her right shoulder.  Eventually got better however the same pain came back in January or February 2023.  Ultimately some x-rays were done of  the shoulder that were generally negative.  She then developed some pain in her sternum a month or 2 later with deep inspiration and ultimately underwent imaging in May 2023.  Chest CT without contrast was done on June 1, 2023.  This showed a 3.4 cm mass  within the superior segment of the right lower lobe with some associated satellite pulmonary nodules.  Additionally there were some other subcentimeter pulmonary nodules.  There was evidence of mediastinal lymphadenopathy.  Subcarinal lymph node measured  1.4 cm in short axis.She was seen by pulmonary medicine on June 5, 2023 a combined PET/CT was ordered on June 6, 2023 showing a relatively intense area of hypermetabolic activity along the superior posteromedial aspect of the right lower lobe corresponding  to the known right lower lobe lung mass.  There was foci of hypermetabolic activity in the right supraclavicular region, subcarinal and right hilar regions.  There was area of uptake within the posterior upper ribs as well as right acetabulum suspicious  for osseous metastatic disease.  There was a punctate area of mild hypermetabolic activity in the  right hepatic lobe concerning for hepatic metastases.  Small focus in the subcutaneous region just anterior to the lower aspect of the sternum could be inflammatory  versus metastatic.  She undergoes a bronchoscopy dated [place] 15, 2023.  There were no endobronchial lesions.  Primary cytology with rapid onsite evaluation was suggestive of malignancy in level 7, final lymph nodes were pathology was pending.  MRI of the  brain with and without contrast dated June 16, 2023 showed approximately 20 enhancing nodules concerning for metastatic disease.  Specifically there was a 1.5 cm nodule in the right cerebellar hemisphere.  Most of the other nodules were less than 1 cm.   Patient also notes that she has some difficulty expressing words over the past 1 to 2 weeks prior to her first visit with us as well as some mild unsteadiness on her legs where she feels she is drifting towards the right side.        PAST MEDICAL HISTORY     1-hypothyroidism  2-hysterectomy 1988  3-abdominal hernia surgery 1995  4-some hearing difficulties        SOCIAL HISTORY     Patient lives with her significant other.  She has 2 daughter.  L[place] in [facility].  Smoked for only 3 years during college and during this time it was a pack per day.  She has retired.  She had a retail store.        CURRENT MEDS     See medication list, meds reviewed        ALLERGIES     Patient denies any drug allergies        FAMILY HISTORY     Patient's father had bladder cancer at the age of 86.  She has 1 brother with no cancer.  She has 2 children       Subjective    Today I am meeting with Mirta Hills.  She is feeling well, has no new problems and is exercising.  Continues with grade 1 loose stools, using prophylactic lomotil.      Cancer  Associated symptoms include a change in bowel habit. Pertinent negatives include no abdominal pain, anorexia, arthralgias, chest pain, chills, congestion, coughing, diaphoresis, fatigue, fever, headaches, joint swelling, myalgias,  nausea, neck pain, numbness, rash, sore throat, swollen glands, urinary symptoms, vertigo, visual change, vomiting or weakness.         Objective    BSA: 1.44 meters squared  /69   Pulse 71   Temp 36 °C (96.8 °F) (Core)   Resp 16   Wt 48 kg (105 lb 13.1 oz)   SpO2 100%   BMI 19.67 kg/m²       11/20/2023 12/11/2023 1/3/2024 1/29/2024 2/2/2024 2/19/2024   Vitals         Weight (lb) 110.67  110.23  106.26  108.69  110.01  110.01       3/11/2024 3/26/2024 4/1/2024 4/2/2024 4/5/2024 4/22/2024 5/13/2024   Vitals          Weight (lb) 110.89  110.2  111.99  109  111.11  112.21  106.7    Weight (lb)  110.2            5/28/2024 6/3/2024 6/7/2024 6/11/2024 7/1/2024 7/22/2024 8/12/2024   Vitals          Weight (lb) 106  111.1  108.91  109.2  108  107.58  106.26       8/23/2024 9/6/2024 9/30/2024 10/21/2024 11/11/2024 12/2/2024   Vitals         Weight (lb) 106.5  108.2  107.36  106.7  104.94  106.92       12/23/2024   Vitals    Weight (lb) 104.28        Physical Exam  Constitutional:       General: She is not in acute distress.     Appearance: Normal appearance. She is not ill-appearing, toxic-appearing or diaphoretic.   HENT:      Nose: No congestion or rhinorrhea.      Mouth/Throat:      Pharynx: No oropharyngeal exudate or posterior oropharyngeal erythema.   Eyes:      General: No scleral icterus.     Conjunctiva/sclera: Conjunctivae normal.   Cardiovascular:      Rate and Rhythm: Normal rate and regular rhythm.      Pulses: Normal pulses.      Heart sounds: Normal heart sounds. No murmur heard.     No friction rub.   Pulmonary:      Effort: Pulmonary effort is normal. No respiratory distress.      Breath sounds: Normal breath sounds. No stridor. No wheezing, rhonchi or rales.   Chest:      Chest wall: No tenderness.   Abdominal:      General: Abdomen is flat. Bowel sounds are normal. There is no distension.      Palpations: Abdomen is soft. There is no mass.      Tenderness: There is no abdominal tenderness.  There is no guarding or rebound.   Musculoskeletal:      Cervical back: No tenderness.      Right lower leg: No edema.      Left lower leg: No edema.   Lymphadenopathy:      Cervical: No cervical adenopathy.   Skin:     General: Skin is warm.      Coloration: Skin is not jaundiced.   Neurological:      General: No focal deficit present.      Mental Status: She is oriented to person, place, and time.   Psychiatric:         Mood and Affect: Mood normal.         Behavior: Behavior normal.         Performance Status:  Asymptomatic     Latest Reference Range & Units 12/23/24 10:39   GLUCOSE 74 - 99 mg/dL 90   SODIUM 136 - 145 mmol/L 141   POTASSIUM 3.5 - 5.3 mmol/L 4.6   CHLORIDE 98 - 107 mmol/L 106   Bicarbonate 21 - 32 mmol/L 28   Anion Gap 10 - 20 mmol/L 12   Blood Urea Nitrogen 6 - 23 mg/dL 27 (H)   Creatinine 0.50 - 1.05 mg/dL 1.24 (H)   EGFR >60 mL/min/1.73m*2 47 (L)   Calcium 8.6 - 10.3 mg/dL 9.2   Albumin 3.4 - 5.0 g/dL 4.3   Alkaline Phosphatase 33 - 136 U/L 59   ALT 7 - 45 U/L 16   AST 9 - 39 U/L 18   Bilirubin Total 0.0 - 1.2 mg/dL 0.4   Total Protein 6.4 - 8.2 g/dL 6.5   MAGNESIUM 1.60 - 2.40 mg/dL 2.20   WBC 4.4 - 11.3 x10*3/uL 4.4   nRBC 0.0 - 0.0 /100 WBCs 0.0   RBC 4.00 - 5.20 x10*6/uL 4.68   HEMOGLOBIN 12.0 - 16.0 g/dL 14.0   HEMATOCRIT 36.0 - 46.0 % 44.0   MCV 80 - 100 fL 94   MCH 26.0 - 34.0 pg 29.9   MCHC 32.0 - 36.0 g/dL 31.8 (L)   RED CELL DISTRIBUTION WIDTH 11.5 - 14.5 % 14.4   Platelets 150 - 450 x10*3/uL 151   Neutrophils % 40.0 - 80.0 % 72.2   Immature Granulocytes %, Automated 0.0 - 0.9 % 0.9   Lymphocytes % 13.0 - 44.0 % 15.6   Monocytes % 2.0 - 10.0 % 7.7   Eosinophils % 0.0 - 6.0 % 2.9   Basophils % 0.0 - 2.0 % 0.7   Neutrophils Absolute 1.60 - 5.50 x10*3/uL 3.20   Immature Granulocytes Absolute, Automated 0.00 - 0.50 x10*3/uL 0.04   Lymphocytes Absolute 0.80 - 3.00 x10*3/uL 0.69 (L)   Monocytes Absolute 0.05 - 0.80 x10*3/uL 0.34   Eosinophils Absolute 0.00 - 0.40 x10*3/uL 0.13   Basophils  Absolute 0.00 - 0.10 x10*3/uL 0.03   (H): Data is abnormally high  (L): Data is abnormally low    CT CHEST ABDOMEN PELVIS W IV CONTRAST; 12/9/2024   IMPRESSION:  CHEST  1.  Stable irregular opacity at the superior segment of the right  lower lobe but follow-up as clinically warranted.  2. Stable bony sclerotic lesions.  3. Overall no significant interval change.      ABDOMEN - PELVIS  1.  Stable hepatic hypodensity at segment 1.  2. Stable splenic hypodensity and splenomegaly. No evidence of  additional metastatic disease or interval change.    MR BRAIN W AND WO IV CONTRAST; 12/9/2024   IMPRESSION:  There has been no significant interval change when compared with the  prior MRI dated 09/26/2024 as noted above.      Assessment/Plan     Patient overall clinically doing well on above-mentioned clinical trial for EGFR exon 19 mutation positive adenocarcinoma of the lung.  No evidence of disease progression clinically or radiographically based on recent imaging of the brain and chest.  We will continue her treatment.     Cancer Staging   No matching staging information was found for the patient.      Oncology History   Lung cancer (Multi)   7/12/2023 -  Research Study Participant    ([facility]) [id number] Arm B - Bevacizumab / Osimertinib, 21 Day Cycles  Plan Provider: Humberto Landin MD  Treatment goal: [No plan goal]  Line of treatment: [No plan line of treatment]  Associated studies: [id number] (Osimertinib) +/- Bevacizumab as Initial Treatment for EGFR-Mutant Lung Cancer     9/1/2023 Initial Diagnosis    Lung cancer (CMS/HCC)                   GONZALO Medrano-CNS

## 2025-01-13 NOTE — PROGRESS NOTES
Patient arrives to Infusion for her Study Beva. BP was good.  She tolerated her infusion without any issue. She was discharged stable.

## 2025-01-13 NOTE — RESEARCH NOTES
Research Note Treatment Day    Mirta Hills is here today for treatment on [id number]. Today is C26. Procedures completed per protocol. AE's and con-meds reviewed with patient. Patient is aware of treatment plan.    [x]   Received treatment as planned   OR  []    Treatment delayed; patient calendar updated as required   Treatment delayed because:    []   AE    []   Physician Discretion    []   Clinical Deterioration or Progression     []   Other    Education Documentation  Treatment Plan and Schedule, taught by Makayla Joseph RN at 1/13/2025  5:28 PM.  Learner: Patient  Readiness: Acceptance  Method: Explanation  Response: Verbalizes Understanding    Education Comments  No comments found.

## 2025-01-28 ENCOUNTER — TELEPHONE (OUTPATIENT)
Dept: HEMATOLOGY/ONCOLOGY | Facility: HOSPITAL | Age: 70
End: 2025-01-28
Payer: MEDICARE

## 2025-01-28 NOTE — TELEPHONE ENCOUNTER
RN called and spoke with patient in regards to question received about upcoming appointments.    Patient was concerned that she would be seeing Dr. Landin on 2/3 prior to her scans versus seeing him on 2/24 after her scans. RN reviewed Dr. Landin's schedule and mentioned that the reason was due to the fact that Dr. Landin will be out of office on 2/24.     Patient verbalized understanding and was appreciative of phone call. RN encouraged patient to call back if she has any more questions.

## 2025-02-03 ENCOUNTER — INFUSION (OUTPATIENT)
Dept: HEMATOLOGY/ONCOLOGY | Facility: HOSPITAL | Age: 70
End: 2025-02-03
Payer: MEDICARE

## 2025-02-03 ENCOUNTER — OFFICE VISIT (OUTPATIENT)
Dept: HEMATOLOGY/ONCOLOGY | Facility: HOSPITAL | Age: 70
End: 2025-02-03
Payer: MEDICARE

## 2025-02-03 ENCOUNTER — LAB (OUTPATIENT)
Dept: LAB | Facility: HOSPITAL | Age: 70
End: 2025-02-03
Payer: MEDICARE

## 2025-02-03 ENCOUNTER — EDUCATION (OUTPATIENT)
Dept: HEMATOLOGY/ONCOLOGY | Facility: HOSPITAL | Age: 70
End: 2025-02-03

## 2025-02-03 VITALS
OXYGEN SATURATION: 100 % | TEMPERATURE: 97.2 F | SYSTOLIC BLOOD PRESSURE: 137 MMHG | RESPIRATION RATE: 18 BRPM | HEART RATE: 71 BPM | BODY MASS INDEX: 19.51 KG/M2 | WEIGHT: 104.94 LBS | DIASTOLIC BLOOD PRESSURE: 77 MMHG

## 2025-02-03 DIAGNOSIS — C34.90 MALIGNANT NEOPLASM OF LUNG, UNSPECIFIED LATERALITY, UNSPECIFIED PART OF LUNG (MULTI): Primary | ICD-10-CM

## 2025-02-03 DIAGNOSIS — C34.90 MALIGNANT NEOPLASM OF LUNG, UNSPECIFIED LATERALITY, UNSPECIFIED PART OF LUNG (MULTI): ICD-10-CM

## 2025-02-03 DIAGNOSIS — C34.31 MALIGNANT NEOPLASM OF LOWER LOBE OF RIGHT LUNG (MULTI): Primary | ICD-10-CM

## 2025-02-03 LAB
ALBUMIN SERPL BCP-MCNC: 4.4 G/DL (ref 3.4–5)
ALP SERPL-CCNC: 60 U/L (ref 33–136)
ALT SERPL W P-5'-P-CCNC: 16 U/L (ref 7–45)
ANION GAP SERPL CALC-SCNC: 12 MMOL/L (ref 10–20)
AST SERPL W P-5'-P-CCNC: 21 U/L (ref 9–39)
BASOPHILS # BLD AUTO: 0.03 X10*3/UL (ref 0–0.1)
BASOPHILS NFR BLD AUTO: 0.7 %
BILIRUB SERPL-MCNC: 0.5 MG/DL (ref 0–1.2)
BUN SERPL-MCNC: 25 MG/DL (ref 6–23)
CALCIUM SERPL-MCNC: 10.2 MG/DL (ref 8.6–10.3)
CHLORIDE SERPL-SCNC: 104 MMOL/L (ref 98–107)
CK SERPL-CCNC: 56 U/L (ref 0–215)
CO2 SERPL-SCNC: 30 MMOL/L (ref 21–32)
CREAT SERPL-MCNC: 1.22 MG/DL (ref 0.5–1.05)
EGFRCR SERPLBLD CKD-EPI 2021: 47 ML/MIN/1.73M*2
EOSINOPHIL # BLD AUTO: 0.16 X10*3/UL (ref 0–0.4)
EOSINOPHIL NFR BLD AUTO: 3.5 %
ERYTHROCYTE [DISTWIDTH] IN BLOOD BY AUTOMATED COUNT: 14.6 % (ref 11.5–14.5)
GLUCOSE SERPL-MCNC: 76 MG/DL (ref 74–99)
HCT VFR BLD AUTO: 45.7 % (ref 36–46)
HGB BLD-MCNC: 14.6 G/DL (ref 12–16)
IMM GRANULOCYTES # BLD AUTO: 0.03 X10*3/UL (ref 0–0.5)
IMM GRANULOCYTES NFR BLD AUTO: 0.7 % (ref 0–0.9)
LYMPHOCYTES # BLD AUTO: 0.69 X10*3/UL (ref 0.8–3)
LYMPHOCYTES NFR BLD AUTO: 15 %
MAGNESIUM SERPL-MCNC: 2.21 MG/DL (ref 1.6–2.4)
MCH RBC QN AUTO: 29.8 PG (ref 26–34)
MCHC RBC AUTO-ENTMCNC: 31.9 G/DL (ref 32–36)
MCV RBC AUTO: 93 FL (ref 80–100)
MONOCYTES # BLD AUTO: 0.32 X10*3/UL (ref 0.05–0.8)
MONOCYTES NFR BLD AUTO: 6.9 %
NEUTROPHILS # BLD AUTO: 3.38 X10*3/UL (ref 1.6–5.5)
NEUTROPHILS NFR BLD AUTO: 73.2 %
NRBC BLD-RTO: 0 /100 WBCS (ref 0–0)
PLATELET # BLD AUTO: 164 X10*3/UL (ref 150–450)
POTASSIUM SERPL-SCNC: 4.8 MMOL/L (ref 3.5–5.3)
PROT SERPL-MCNC: 6.9 G/DL (ref 6.4–8.2)
RBC # BLD AUTO: 4.9 X10*6/UL (ref 4–5.2)
SODIUM SERPL-SCNC: 141 MMOL/L (ref 136–145)
WBC # BLD AUTO: 4.6 X10*3/UL (ref 4.4–11.3)

## 2025-02-03 PROCEDURE — 99215 OFFICE O/P EST HI 40 MIN: CPT | Performed by: INTERNAL MEDICINE

## 2025-02-03 PROCEDURE — 85025 COMPLETE CBC W/AUTO DIFF WBC: CPT

## 2025-02-03 PROCEDURE — 83735 ASSAY OF MAGNESIUM: CPT

## 2025-02-03 PROCEDURE — 84075 ASSAY ALKALINE PHOSPHATASE: CPT

## 2025-02-03 PROCEDURE — 2560000001 HC RX 256 EXPERIMENTAL DRUGS: Mod: TB | Performed by: CLINICAL NURSE SPECIALIST

## 2025-02-03 PROCEDURE — 82550 ASSAY OF CK (CPK): CPT

## 2025-02-03 PROCEDURE — 36415 COLL VENOUS BLD VENIPUNCTURE: CPT

## 2025-02-03 PROCEDURE — 96413 CHEMO IV INFUSION 1 HR: CPT

## 2025-02-03 RX ORDER — DIPHENHYDRAMINE HYDROCHLORIDE 50 MG/ML
50 INJECTION INTRAMUSCULAR; INTRAVENOUS AS NEEDED
Status: CANCELLED | OUTPATIENT
Start: 2025-02-03

## 2025-02-03 RX ORDER — PROCHLORPERAZINE EDISYLATE 5 MG/ML
10 INJECTION INTRAMUSCULAR; INTRAVENOUS EVERY 6 HOURS PRN
Status: CANCELLED | OUTPATIENT
Start: 2025-02-03

## 2025-02-03 RX ORDER — PROCHLORPERAZINE EDISYLATE 5 MG/ML
10 INJECTION INTRAMUSCULAR; INTRAVENOUS EVERY 6 HOURS PRN
Status: DISCONTINUED | OUTPATIENT
Start: 2025-02-03 | End: 2025-02-03 | Stop reason: HOSPADM

## 2025-02-03 RX ORDER — DIPHENHYDRAMINE HYDROCHLORIDE 50 MG/ML
50 INJECTION INTRAMUSCULAR; INTRAVENOUS AS NEEDED
Status: DISCONTINUED | OUTPATIENT
Start: 2025-02-03 | End: 2025-02-03 | Stop reason: HOSPADM

## 2025-02-03 RX ORDER — FAMOTIDINE 10 MG/ML
20 INJECTION INTRAVENOUS ONCE AS NEEDED
Status: CANCELLED | OUTPATIENT
Start: 2025-02-03

## 2025-02-03 RX ORDER — ALBUTEROL SULFATE 0.83 MG/ML
3 SOLUTION RESPIRATORY (INHALATION) AS NEEDED
Status: DISCONTINUED | OUTPATIENT
Start: 2025-02-03 | End: 2025-02-03 | Stop reason: HOSPADM

## 2025-02-03 RX ORDER — EPINEPHRINE 0.3 MG/.3ML
0.3 INJECTION SUBCUTANEOUS EVERY 5 MIN PRN
Status: CANCELLED | OUTPATIENT
Start: 2025-02-03

## 2025-02-03 RX ORDER — PROCHLORPERAZINE MALEATE 10 MG
10 TABLET ORAL EVERY 6 HOURS PRN
Status: CANCELLED | OUTPATIENT
Start: 2025-02-03

## 2025-02-03 RX ORDER — EPINEPHRINE 0.3 MG/.3ML
0.3 INJECTION SUBCUTANEOUS EVERY 5 MIN PRN
Status: DISCONTINUED | OUTPATIENT
Start: 2025-02-03 | End: 2025-02-03 | Stop reason: HOSPADM

## 2025-02-03 RX ORDER — PROCHLORPERAZINE MALEATE 10 MG
10 TABLET ORAL EVERY 6 HOURS PRN
Status: DISCONTINUED | OUTPATIENT
Start: 2025-02-03 | End: 2025-02-03 | Stop reason: HOSPADM

## 2025-02-03 RX ORDER — ALBUTEROL SULFATE 0.83 MG/ML
3 SOLUTION RESPIRATORY (INHALATION) AS NEEDED
Status: CANCELLED | OUTPATIENT
Start: 2025-02-03

## 2025-02-03 RX ORDER — FAMOTIDINE 10 MG/ML
20 INJECTION INTRAVENOUS ONCE AS NEEDED
Status: DISCONTINUED | OUTPATIENT
Start: 2025-02-03 | End: 2025-02-03 | Stop reason: HOSPADM

## 2025-02-03 RX ADMIN — Medication 757.5 MG: at 14:16

## 2025-02-03 ASSESSMENT — ENCOUNTER SYMPTOMS
FEVER: 0
CHILLS: 0
HEADACHES: 0
VISUAL CHANGE: 0
SWOLLEN GLANDS: 0
COUGH: 0
ANOREXIA: 0
DIAPHORESIS: 0
ABDOMINAL PAIN: 0
VERTIGO: 0
FATIGUE: 0
NUMBNESS: 0
NAUSEA: 0
MYALGIAS: 0
CHANGE IN BOWEL HABIT: 1
ARTHRALGIAS: 0
WEAKNESS: 0
SORE THROAT: 0
JOINT SWELLING: 0
NECK PAIN: 0
VOMITING: 0

## 2025-02-03 ASSESSMENT — PAIN SCALES - GENERAL: PAINLEVEL_OUTOF10: 0-NO PAIN

## 2025-02-03 NOTE — PROGRESS NOTES
Patient arrives to Infusion for her infusion, study Bevacizumab. She tolerated her treatment without any issue. FELICIA Joseph will call her  about taking her Study Osimertinib once she gets the patient's  CK result. Patient was aware. She was discharged stable.

## 2025-02-03 NOTE — PROGRESS NOTES
Patient ID: Mirta Hills is a 70 y.o. female.    DIAGNOSIS     Adenocarcinoma of the lung.  Date of diagnosis is [place] 15, 2023 from a level 7 lymph node biopsy/fine-needle aspiration.  Immunohistochemistry positive for TTF-1.        STAGING     Clinical T2a (right lower lobe mass measuring 3.6 cm), clinical N2 (PET positivity and subcarinal region), M1C disease (presence of brain metastases, ribs, right acetabulum, liver, brain), stage IVb disease        CURRENT SITES OF DISEASE     Right lower lobe, right hilum of the lung, ipsilateral mediastinum, liver, bones, brain        MOLECULAR GENOMICS     TEST: Focused Solid Tumor DNA/RNA Panel   SPECIMEN: Supernatant, LYMPH NODE 7, [id number] A   DISEASE DIAGNOSIS: Adenocarcinoma   Estimated Tumor Content: 40%   COLLECTION DATE: 6/15/2023     MICROSATELLITE STATUS: Microsatellite  Instability-High (MSI-H) is NOT DETECTED.     DISEASE ASSOCIATED GENOMIC FINDINGS:   EGFR p.[id number] ([id number] c.[id number])    TP53 p.N247I ([id number] c.740A>T)     DISEASE RELEVANT ALTERATIONS NOT DETECTED:   Negative for ALK fusion.   Negative for BRAF V600E.   Negative for ERBB2 activating mutation   Negative for KRAS G12C.   Negative for  MET exon 14 skipping mutation.   Negative for NTRK fusion.   Negative for RET fusion.   Negative for ROS1 fusion.        Circulating tumor DNA sent on June 19, 2023  EGFR p.[id number], Variant allelic fraction of 1.6%  TP53 N247I Missense variant, loss of function, variant allelic fraction 1.1%  MSI stable        SERUM TUMOR MARKER     Slightly elevated CEA and CA 19.9 June 2023        PRIOR THERAPY     1- Gamma knife radiosurgery to brain lesions completed on June 28, 2023.        CURRENT THERAPY     1- Osimertinib on study  on clinical trial EA [id number] which is a randomized trial of osimertinib with or without bevacizumab.  She has been randomized to the bevacizumab arm- Cycle 1 Day 1 is 7/12/2023     2-brain metastases, last follow-up MRI  December 9, 2024, no progression       CURRENT ONCOLOGICAL PROBLEMS     1-brain metastases, symptomatic, word finding difficulties, mild unsteadiness on her feet but no falls, Resolved July 3, 2023  2-anterior chest pain, increases with deep inspiration, Improved July 3, 2023  3-mild intermittent grade 1 diarrhea, mild hair loss, mild leukopenia, mild elevation in creatinine, mild fatigue.  Most likely all osimertinib related August 7, 2023  4-fluctuating weight over the past year since late 2023 ranging between 110 and 106 pounds.  Down to 104 pounds December 23, 2024.  Discussed increasing caloric intake.          HISTORY OF PRESENT ILLNESS     This is a 70-year-old patient.  She states that towards Thanksgiving of 2022 she started feeling some pain in her right shoulder.  Eventually got better however the same pain came back in January or February 2023.  Ultimately some x-rays were done of  the shoulder that were generally negative.  She then developed some pain in her sternum a month or 2 later with deep inspiration and ultimately underwent imaging in May 2023.  Chest CT without contrast was done on June 1, 2023.  This showed a 3.4 cm mass  within the superior segment of the right lower lobe with some associated satellite pulmonary nodules.  Additionally there were some other subcentimeter pulmonary nodules.  There was evidence of mediastinal lymphadenopathy.  Subcarinal lymph node measured  1.4 cm in short axis.She was seen by pulmonary medicine on June 5, 2023 a combined PET/CT was ordered on June 6, 2023 showing a relatively intense area of hypermetabolic activity along the superior posteromedial aspect of the right lower lobe corresponding  to the known right lower lobe lung mass.  There was foci of hypermetabolic activity in the right supraclavicular region, subcarinal and right hilar regions.  There was area of uptake within the posterior upper ribs as well as right acetabulum suspicious  for osseous  metastatic disease.  There was a punctate area of mild hypermetabolic activity in the right hepatic lobe concerning for hepatic metastases.  Small focus in the subcutaneous region just anterior to the lower aspect of the sternum could be inflammatory  versus metastatic.  She undergoes a bronchoscopy dated [place] 15, 2023.  There were no endobronchial lesions.  Primary cytology with rapid onsite evaluation was suggestive of malignancy in level 7, final lymph nodes were pathology was pending.  MRI of the  brain with and without contrast dated June 16, 2023 showed approximately 20 enhancing nodules concerning for metastatic disease.  Specifically there was a 1.5 cm nodule in the right cerebellar hemisphere.  Most of the other nodules were less than 1 cm.   Patient also notes that she has some difficulty expressing words over the past 1 to 2 weeks prior to her first visit with us as well as some mild unsteadiness on her legs where she feels she is drifting towards the right side.        PAST MEDICAL HISTORY     1-hypothyroidism  2-hysterectomy 1988  3-abdominal hernia surgery 1995  4-some hearing difficulties        SOCIAL HISTORY     Patient lives with her significant other.  She has 2 daughter.  L[place] in [facility] [place].  Smoked for only 3 years during college and during this time it was a pack per day.  She has retired.  She had a retail store.        CURRENT MEDS     See medication list, meds reviewed        ALLERGIES     Patient denies any drug allergies        FAMILY HISTORY     Patient's father had bladder cancer at the age of 86.  She has 1 brother with no cancer.  She has 2 children             Subjective    Cancer  Associated symptoms include a change in bowel habit. Pertinent negatives include no abdominal pain, anorexia, arthralgias, chest pain, chills, congestion, coughing, diaphoresis, fatigue, fever, headaches, joint swelling, myalgias, nausea, neck pain, numbness, rash, sore throat, swollen glands, urinary  symptoms, vertigo, visual change, vomiting or weakness.       Occasional intermittent diarrhea, no other symptoms      Objective    BSA: 1.44 meters squared  /77 (BP Location: Left arm, Patient Position: Sitting, BP Cuff Size: Small adult)   Pulse 71   Temp 36.2 °C (97.2 °F) (Temporal)   Resp 18   Wt 47.6 kg (104 lb 15 oz)   SpO2 100%   BMI 19.51 kg/m²      Physical Exam  Constitutional:       General: She is not in acute distress.     Appearance: Normal appearance. She is not ill-appearing, toxic-appearing or diaphoretic.   HENT:      Nose: No congestion or rhinorrhea.      Mouth/Throat:      Pharynx: No oropharyngeal exudate or posterior oropharyngeal erythema.   Eyes:      General: No scleral icterus.     Conjunctiva/sclera: Conjunctivae normal.   Cardiovascular:      Rate and Rhythm: Normal rate and regular rhythm.      Pulses: Normal pulses.      Heart sounds: Normal heart sounds. No murmur heard.     No friction rub. No gallop.   Pulmonary:      Effort: Pulmonary effort is normal. No respiratory distress.      Breath sounds: Normal breath sounds. No stridor. No wheezing, rhonchi or rales.   Chest:      Chest wall: No tenderness.   Abdominal:      General: Abdomen is flat. Bowel sounds are normal. There is no distension.      Palpations: Abdomen is soft. There is no mass.      Tenderness: There is no abdominal tenderness. There is no guarding or rebound.   Musculoskeletal:      Cervical back: No tenderness.      Right lower leg: No edema.      Left lower leg: No edema.   Lymphadenopathy:      Cervical: No cervical adenopathy.   Skin:     General: Skin is warm.      Coloration: Skin is not jaundiced.   Neurological:      General: No focal deficit present.      Mental Status: She is oriented to person, place, and time.         Performance Status:  Asymptomatic     Latest Reference Range & Units 01/13/25 10:47   GLUCOSE 74 - 99 mg/dL 112 (H)   SODIUM 136 - 145 mmol/L 142   POTASSIUM 3.5 - 5.3 mmol/L  4.7   CHLORIDE 98 - 107 mmol/L 108 (H)   Bicarbonate 21 - 32 mmol/L 26   Anion Gap 10 - 20 mmol/L 13   Blood Urea Nitrogen 6 - 23 mg/dL 25 (H)   Creatinine 0.50 - 1.05 mg/dL 1.25 (H)   EGFR >60 mL/min/1.73m*2 46 (L)   Calcium 8.6 - 10.3 mg/dL 9.4   Albumin 3.4 - 5.0 g/dL 4.0   Alkaline Phosphatase 33 - 136 U/L 52   ALT 7 - 45 U/L 15   AST 9 - 39 U/L 20   Bilirubin Total 0.0 - 1.2 mg/dL 0.4   Creatine Kinase 0 - 215 U/L 58   Total Protein 6.4 - 8.2 g/dL 6.3 (L)   MAGNESIUM 1.60 - 2.40 mg/dL 1.99   Thyroxine, Free 0.78 - 1.48 ng/dL 1.59 (H)   Thyroid Stimulating Hormone 0.44 - 3.98 mIU/L 0.36 (L)   WBC 4.4 - 11.3 x10*3/uL 4.2 (L)   nRBC 0.0 - 0.0 /100 WBCs 0.0   RBC 4.00 - 5.20 x10*6/uL 4.47   HEMOGLOBIN 12.0 - 16.0 g/dL 13.6   HEMATOCRIT 36.0 - 46.0 % 42.1   MCV 80 - 100 fL 94   MCH 26.0 - 34.0 pg 30.4   MCHC 32.0 - 36.0 g/dL 32.3   RED CELL DISTRIBUTION WIDTH 11.5 - 14.5 % 14.5   Platelets 150 - 450 x10*3/uL 156   Neutrophils % 40.0 - 80.0 % 74.4   Immature Granulocytes %, Automated 0.0 - 0.9 % 0.5   Lymphocytes % 13.0 - 44.0 % 13.7   Monocytes % 2.0 - 10.0 % 6.4   Eosinophils % 0.0 - 6.0 % 4.5   Basophils % 0.0 - 2.0 % 0.5   Neutrophils Absolute 1.60 - 5.50 x10*3/uL 3.16   Immature Granulocytes Absolute, Automated 0.00 - 0.50 x10*3/uL 0.02   Lymphocytes Absolute 0.80 - 3.00 x10*3/uL 0.58 (L)   Monocytes Absolute 0.05 - 0.80 x10*3/uL 0.27   Eosinophils Absolute 0.00 - 0.40 x10*3/uL 0.19   Basophils Absolute 0.00 - 0.10 x10*3/uL 0.02   (H): Data is abnormally high  (L): Data is abnormally low      Assessment/Plan     This is a very nice 70-year-old patient with the EGFR mutation positive adenocarcinoma of the lung diagnosed now over a year and a half ago on osimertinib with the bevacizumab on a clinical trial.  She has no evidence of disease progression, intermittent grade 1 diarrhea.  Continue per protocol      Cancer Staging   No matching staging information was found for the patient.      Oncology History   Lung  cancer (Multi)   7/12/2023 -  Research Study Participant    ([facility]) [id number] Arm B - Bevacizumab / Osimertinib, 21 Day Cycles  Plan Provider: Humberto Landin MD  Treatment goal: [No plan goal]  Line of treatment: [No plan line of treatment]  Associated studies: [id number] (Osimertinib) +/- Bevacizumab as Initial Treatment for EGFR-Mutant Lung Cancer     9/1/2023 Initial Diagnosis    Lung cancer (CMS/HCC)                   Humberto Landin MD

## 2025-02-03 NOTE — RESEARCH NOTES
Research Note Treatment Day    Mirta Hills is here today for treatment on [id number]. Today is C27D. Procedures completed per protocol. AE's and con-meds reviewed with patient. Patient is aware of treatment plan. Dr. Chairez encourage patient to intake more calories in her diet.     [x]   Received treatment as planned   OR  []    Treatment delayed; patient calendar updated as required   Treatment delayed because:    []   AE    []   Physician Discretion    []   Clinical Deterioration or Progression     []   Other    Education Documentation  Treatment Plan and Schedule, taught by Makayla Joseph RN at 2/3/2025  2:27 PM.  Learner: Patient  Readiness: Acceptance  Method: Explanation  Response: Verbalizes Understanding    Education Comments  No comments found.

## 2025-02-10 ENCOUNTER — APPOINTMENT (OUTPATIENT)
Dept: RADIOLOGY | Facility: HOSPITAL | Age: 70
End: 2025-02-10
Payer: MEDICARE

## 2025-02-13 ENCOUNTER — APPOINTMENT (OUTPATIENT)
Dept: RADIOLOGY | Facility: HOSPITAL | Age: 70
End: 2025-02-13
Payer: MEDICARE

## 2025-02-13 ENCOUNTER — HOSPITAL ENCOUNTER (OUTPATIENT)
Dept: RADIOLOGY | Facility: HOSPITAL | Age: 70
Discharge: HOME | End: 2025-02-13
Payer: MEDICARE

## 2025-02-13 DIAGNOSIS — C34.90 MALIGNANT NEOPLASM OF LUNG, UNSPECIFIED LATERALITY, UNSPECIFIED PART OF LUNG (MULTI): ICD-10-CM

## 2025-02-13 PROCEDURE — 2550000001 HC RX 255 CONTRASTS: Performed by: INTERNAL MEDICINE

## 2025-02-13 PROCEDURE — 71260 CT THORAX DX C+: CPT

## 2025-02-13 RX ADMIN — IOHEXOL 75 ML: 350 INJECTION, SOLUTION INTRAVENOUS at 11:57

## 2025-02-14 ENCOUNTER — HOSPITAL ENCOUNTER (OUTPATIENT)
Dept: RADIOLOGY | Facility: HOSPITAL | Age: 70
Discharge: HOME | End: 2025-02-14
Payer: MEDICARE

## 2025-02-14 ENCOUNTER — TELEPHONE (OUTPATIENT)
Dept: PRIMARY CARE | Facility: CLINIC | Age: 70
End: 2025-02-14
Payer: MEDICARE

## 2025-02-14 VITALS — WEIGHT: 104 LBS | BODY MASS INDEX: 19.33 KG/M2

## 2025-02-14 DIAGNOSIS — C34.90 MALIGNANT NEOPLASM OF LUNG, UNSPECIFIED LATERALITY, UNSPECIFIED PART OF LUNG (MULTI): ICD-10-CM

## 2025-02-14 DIAGNOSIS — M25.561 ACUTE PAIN OF RIGHT KNEE: Primary | ICD-10-CM

## 2025-02-14 PROCEDURE — 70553 MRI BRAIN STEM W/O & W/DYE: CPT

## 2025-02-14 PROCEDURE — 2550000001 HC RX 255 CONTRASTS: Performed by: INTERNAL MEDICINE

## 2025-02-14 PROCEDURE — A9575 INJ GADOTERATE MEGLUMI 0.1ML: HCPCS | Performed by: INTERNAL MEDICINE

## 2025-02-14 RX ORDER — GADOTERATE MEGLUMINE 376.9 MG/ML
9 INJECTION INTRAVENOUS
Status: COMPLETED | OUTPATIENT
Start: 2025-02-14 | End: 2025-02-14

## 2025-02-14 RX ADMIN — GADOTERATE MEGLUMINE 9 ML: 376.9 INJECTION INTRAVENOUS at 13:37

## 2025-02-14 NOTE — TELEPHONE ENCOUNTER
Patient called with some concerns about a left knee problem - no trauma  She is concerned given her lung cancer history that the pain she is experiencing could somehow be connected to her lung cancer diagnosis  There is no swelling or redness or warmth reported  She does feel pain is mostly around the kneecap area  It does tend to give out on her once in awhile but does not necessarily click or pop  Notices  it more likely when she gets up after being seated for a while  Also can hurt when she is bending  She has not tried anything for this  Does note that she would have peace of mind to get an x-ray of her knee - I certainly think that is reasonable however do feel 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

## 2025-02-17 ENCOUNTER — DOCUMENTATION (OUTPATIENT)
Dept: HEMATOLOGY/ONCOLOGY | Facility: HOSPITAL | Age: 70
End: 2025-02-17
Payer: MEDICARE

## 2025-02-19 DIAGNOSIS — C34.90 MALIGNANT NEOPLASM OF LUNG, UNSPECIFIED LATERALITY, UNSPECIFIED PART OF LUNG (MULTI): Primary | ICD-10-CM

## 2025-02-19 RX ORDER — FAMOTIDINE 10 MG/ML
20 INJECTION, SOLUTION INTRAVENOUS ONCE AS NEEDED
OUTPATIENT
Start: 2025-02-24

## 2025-02-19 RX ORDER — EPINEPHRINE 0.3 MG/.3ML
0.3 INJECTION SUBCUTANEOUS EVERY 5 MIN PRN
OUTPATIENT
Start: 2025-02-24

## 2025-02-19 RX ORDER — PROCHLORPERAZINE MALEATE 10 MG
10 TABLET ORAL EVERY 6 HOURS PRN
OUTPATIENT
Start: 2025-02-24

## 2025-02-19 RX ORDER — DIPHENHYDRAMINE HYDROCHLORIDE 50 MG/ML
50 INJECTION INTRAMUSCULAR; INTRAVENOUS AS NEEDED
OUTPATIENT
Start: 2025-02-24

## 2025-02-19 RX ORDER — ALBUTEROL SULFATE 0.83 MG/ML
3 SOLUTION RESPIRATORY (INHALATION) AS NEEDED
OUTPATIENT
Start: 2025-02-24

## 2025-02-19 RX ORDER — PROCHLORPERAZINE EDISYLATE 5 MG/ML
10 INJECTION INTRAMUSCULAR; INTRAVENOUS EVERY 6 HOURS PRN
OUTPATIENT
Start: 2025-02-24

## 2025-02-24 ENCOUNTER — LAB (OUTPATIENT)
Dept: LAB | Facility: HOSPITAL | Age: 70
End: 2025-02-24
Payer: MEDICARE

## 2025-02-24 ENCOUNTER — INFUSION (OUTPATIENT)
Dept: HEMATOLOGY/ONCOLOGY | Facility: HOSPITAL | Age: 70
End: 2025-02-24
Payer: MEDICARE

## 2025-02-24 ENCOUNTER — OFFICE VISIT (OUTPATIENT)
Dept: HEMATOLOGY/ONCOLOGY | Facility: HOSPITAL | Age: 70
End: 2025-02-24
Payer: MEDICARE

## 2025-02-24 ENCOUNTER — EDUCATION (OUTPATIENT)
Dept: HEMATOLOGY/ONCOLOGY | Facility: HOSPITAL | Age: 70
End: 2025-02-24

## 2025-02-24 VITALS
RESPIRATION RATE: 16 BRPM | TEMPERATURE: 97.3 F | DIASTOLIC BLOOD PRESSURE: 75 MMHG | BODY MASS INDEX: 20.04 KG/M2 | WEIGHT: 107.81 LBS | SYSTOLIC BLOOD PRESSURE: 124 MMHG | OXYGEN SATURATION: 100 % | HEART RATE: 76 BPM

## 2025-02-24 DIAGNOSIS — C34.90 MALIGNANT NEOPLASM OF LUNG, UNSPECIFIED LATERALITY, UNSPECIFIED PART OF LUNG (MULTI): ICD-10-CM

## 2025-02-24 LAB
ALBUMIN SERPL BCP-MCNC: 4.2 G/DL (ref 3.4–5)
ALP SERPL-CCNC: 63 U/L (ref 33–136)
ALT SERPL W P-5'-P-CCNC: 15 U/L (ref 7–45)
ANION GAP SERPL CALC-SCNC: 12 MMOL/L (ref 10–20)
APPEARANCE UR: CLEAR
AST SERPL W P-5'-P-CCNC: 19 U/L (ref 9–39)
BASOPHILS # BLD AUTO: 0.02 X10*3/UL (ref 0–0.1)
BASOPHILS NFR BLD AUTO: 0.4 %
BILIRUB SERPL-MCNC: 0.3 MG/DL (ref 0–1.2)
BILIRUB UR STRIP.AUTO-MCNC: NEGATIVE MG/DL
BUN SERPL-MCNC: 23 MG/DL (ref 6–23)
CALCIUM SERPL-MCNC: 9.5 MG/DL (ref 8.6–10.3)
CHLORIDE SERPL-SCNC: 103 MMOL/L (ref 98–107)
CK SERPL-CCNC: 54 U/L (ref 0–215)
CO2 SERPL-SCNC: 29 MMOL/L (ref 21–32)
COLOR UR: ABNORMAL
CREAT SERPL-MCNC: 1.12 MG/DL (ref 0.5–1.05)
EGFRCR SERPLBLD CKD-EPI 2021: 52 ML/MIN/1.73M*2
EOSINOPHIL # BLD AUTO: 0.22 X10*3/UL (ref 0–0.4)
EOSINOPHIL NFR BLD AUTO: 4 %
ERYTHROCYTE [DISTWIDTH] IN BLOOD BY AUTOMATED COUNT: 14.6 % (ref 11.5–14.5)
GLUCOSE SERPL-MCNC: 81 MG/DL (ref 74–99)
GLUCOSE UR STRIP.AUTO-MCNC: NORMAL MG/DL
HCT VFR BLD AUTO: 43.6 % (ref 36–46)
HGB BLD-MCNC: 13.9 G/DL (ref 12–16)
HYALINE CASTS #/AREA URNS AUTO: ABNORMAL /LPF
IMM GRANULOCYTES # BLD AUTO: 0.03 X10*3/UL (ref 0–0.5)
IMM GRANULOCYTES NFR BLD AUTO: 0.5 % (ref 0–0.9)
KETONES UR STRIP.AUTO-MCNC: NEGATIVE MG/DL
LEUKOCYTE ESTERASE UR QL STRIP.AUTO: ABNORMAL
LYMPHOCYTES # BLD AUTO: 0.6 X10*3/UL (ref 0.8–3)
LYMPHOCYTES NFR BLD AUTO: 10.8 %
MAGNESIUM SERPL-MCNC: 2.02 MG/DL (ref 1.6–2.4)
MCH RBC QN AUTO: 30.2 PG (ref 26–34)
MCHC RBC AUTO-ENTMCNC: 31.9 G/DL (ref 32–36)
MCV RBC AUTO: 95 FL (ref 80–100)
MONOCYTES # BLD AUTO: 0.34 X10*3/UL (ref 0.05–0.8)
MONOCYTES NFR BLD AUTO: 6.1 %
MUCOUS THREADS #/AREA URNS AUTO: ABNORMAL /LPF
NEUTROPHILS # BLD AUTO: 4.34 X10*3/UL (ref 1.6–5.5)
NEUTROPHILS NFR BLD AUTO: 78.2 %
NITRITE UR QL STRIP.AUTO: NEGATIVE
NRBC BLD-RTO: 0 /100 WBCS (ref 0–0)
PH UR STRIP.AUTO: 5 [PH]
PLATELET # BLD AUTO: 147 X10*3/UL (ref 150–450)
POTASSIUM SERPL-SCNC: 4.3 MMOL/L (ref 3.5–5.3)
PROT SERPL-MCNC: 6.6 G/DL (ref 6.4–8.2)
PROT UR STRIP.AUTO-MCNC: ABNORMAL MG/DL
RBC # BLD AUTO: 4.6 X10*6/UL (ref 4–5.2)
RBC # UR STRIP.AUTO: NEGATIVE MG/DL
RBC #/AREA URNS AUTO: ABNORMAL /HPF
SODIUM SERPL-SCNC: 140 MMOL/L (ref 136–145)
SP GR UR STRIP.AUTO: 1.01
SQUAMOUS #/AREA URNS AUTO: ABNORMAL /HPF
TRANS CELLS #/AREA UR COMP ASSIST: ABNORMAL /HPF
UROBILINOGEN UR STRIP.AUTO-MCNC: NORMAL MG/DL
WBC # BLD AUTO: 5.6 X10*3/UL (ref 4.4–11.3)
WBC #/AREA URNS AUTO: ABNORMAL /HPF

## 2025-02-24 PROCEDURE — 81001 URINALYSIS AUTO W/SCOPE: CPT

## 2025-02-24 PROCEDURE — 83735 ASSAY OF MAGNESIUM: CPT

## 2025-02-24 PROCEDURE — 2560000001 HC RX 256 EXPERIMENTAL DRUGS: Mod: TB | Performed by: CLINICAL NURSE SPECIALIST

## 2025-02-24 PROCEDURE — 36415 COLL VENOUS BLD VENIPUNCTURE: CPT

## 2025-02-24 PROCEDURE — 96365 THER/PROPH/DIAG IV INF INIT: CPT | Mod: INF

## 2025-02-24 PROCEDURE — 82550 ASSAY OF CK (CPK): CPT

## 2025-02-24 PROCEDURE — 99214 OFFICE O/P EST MOD 30 MIN: CPT | Performed by: CLINICAL NURSE SPECIALIST

## 2025-02-24 PROCEDURE — 80053 COMPREHEN METABOLIC PANEL: CPT

## 2025-02-24 PROCEDURE — 85025 COMPLETE CBC W/AUTO DIFF WBC: CPT

## 2025-02-24 RX ORDER — DIPHENHYDRAMINE HYDROCHLORIDE 50 MG/ML
50 INJECTION INTRAMUSCULAR; INTRAVENOUS AS NEEDED
Status: DISCONTINUED | OUTPATIENT
Start: 2025-02-24 | End: 2025-02-24 | Stop reason: HOSPADM

## 2025-02-24 RX ORDER — FAMOTIDINE 10 MG/ML
20 INJECTION, SOLUTION INTRAVENOUS ONCE AS NEEDED
Status: DISCONTINUED | OUTPATIENT
Start: 2025-02-24 | End: 2025-02-24 | Stop reason: HOSPADM

## 2025-02-24 RX ORDER — ALBUTEROL SULFATE 0.83 MG/ML
3 SOLUTION RESPIRATORY (INHALATION) AS NEEDED
Status: DISCONTINUED | OUTPATIENT
Start: 2025-02-24 | End: 2025-02-24 | Stop reason: HOSPADM

## 2025-02-24 RX ORDER — EPINEPHRINE 0.3 MG/.3ML
0.3 INJECTION SUBCUTANEOUS EVERY 5 MIN PRN
Status: DISCONTINUED | OUTPATIENT
Start: 2025-02-24 | End: 2025-02-24 | Stop reason: HOSPADM

## 2025-02-24 RX ORDER — PROCHLORPERAZINE MALEATE 10 MG
10 TABLET ORAL EVERY 6 HOURS PRN
Status: DISCONTINUED | OUTPATIENT
Start: 2025-02-24 | End: 2025-02-24 | Stop reason: HOSPADM

## 2025-02-24 RX ORDER — PROCHLORPERAZINE EDISYLATE 5 MG/ML
10 INJECTION INTRAMUSCULAR; INTRAVENOUS EVERY 6 HOURS PRN
Status: DISCONTINUED | OUTPATIENT
Start: 2025-02-24 | End: 2025-02-24 | Stop reason: HOSPADM

## 2025-02-24 RX ADMIN — Medication 757.5 MG: at 13:41

## 2025-02-24 ASSESSMENT — PAIN SCALES - GENERAL: PAINLEVEL_OUTOF10: 0-NO PAIN

## 2025-02-24 ASSESSMENT — ENCOUNTER SYMPTOMS
ARTHRALGIAS: 0
SWOLLEN GLANDS: 0
SORE THROAT: 0
COUGH: 0
JOINT SWELLING: 0
ABDOMINAL PAIN: 0
HEADACHES: 0
MYALGIAS: 0
DIAPHORESIS: 0
CHILLS: 0
FATIGUE: 0
NAUSEA: 0
NECK PAIN: 0
VOMITING: 0
VERTIGO: 0
NUMBNESS: 0
ANOREXIA: 0
FEVER: 0
WEAKNESS: 0
VISUAL CHANGE: 0
CHANGE IN BOWEL HABIT: 1

## 2025-02-24 NOTE — PROGRESS NOTES
Patient arrives to Infusion for her study Bevacizumab.  Bp was good. She tolerated her infusion without any issue. She was discharged stable.

## 2025-02-24 NOTE — PROGRESS NOTES
Patient ID: Mirta Hills is a 70 y.o. female.    DIAGNOSIS     Adenocarcinoma of the lung.  Date of diagnosis is [place] 15, 2023 from a level 7 lymph node biopsy/fine-needle aspiration.  Immunohistochemistry positive for TTF-1.        STAGING     Clinical T2a (right lower lobe mass measuring 3.6 cm), clinical N2 (PET positivity and subcarinal region), M1C disease (presence of brain metastases, ribs, right acetabulum, liver, brain), stage IVb disease        CURRENT SITES OF DISEASE     Right lower lobe, right hilum of the lung, ipsilateral mediastinum, liver, bones, brain        MOLECULAR GENOMICS     TEST: Focused Solid Tumor DNA/RNA Panel   SPECIMEN: Supernatant, LYMPH NODE 7, [id number] A   DISEASE DIAGNOSIS: Adenocarcinoma   Estimated Tumor Content: 40%   COLLECTION DATE: 6/15/2023     MICROSATELLITE STATUS: Microsatellite  Instability-High (MSI-H) is NOT DETECTED.     DISEASE ASSOCIATED GENOMIC FINDINGS:   EGFR p.[id number] ([id number] c.[id number])    TP53 p.N247I ([id number] c.740A>T)     DISEASE RELEVANT ALTERATIONS NOT DETECTED:   Negative for ALK fusion.   Negative for BRAF V600E.   Negative for ERBB2 activating mutation   Negative for KRAS G12C.   Negative for  MET exon 14 skipping mutation.   Negative for NTRK fusion.   Negative for RET fusion.   Negative for ROS1 fusion.        Circulating tumor DNA sent on June 19, 2023  EGFR p.[id number], Variant allelic fraction of 1.6%  TP53 N247I Missense variant, loss of function, variant allelic fraction 1.1%  MSI stable        SERUM TUMOR MARKER     Slightly elevated CEA and CA 19.9 June 2023        PRIOR THERAPY     1- Gamma knife radiosurgery to brain lesions completed on June 28, 2023.        CURRENT THERAPY     1- Osimertinib on study  on clinical trial EA [id number] which is a randomized trial of osimertinib with or without bevacizumab.  She has been randomized to the bevacizumab arm- Cycle 1 Day 1 is 7/12/2023     2-brain metastases, last follow-up MRI  December 9, 2024, no progression       CURRENT ONCOLOGICAL PROBLEMS     1-brain metastases, symptomatic, word finding difficulties, mild unsteadiness on her feet but no falls, Resolved July 3, 2023  2-anterior chest pain, increases with deep inspiration, Improved July 3, 2023  3-mild intermittent grade 1 diarrhea, mild hair loss, mild leukopenia, mild elevation in creatinine, mild fatigue.  Most likely all osimertinib related August 7, 2023  4-fluctuating weight over the past year since late 2023 ranging between 110 and 106 pounds.  Down to 104 pounds December 23, 2024.  Discussed increasing caloric intake.          HISTORY OF PRESENT ILLNESS     This is a 70-year-old patient.  She states that towards Thanksgiving of 2022 she started feeling some pain in her right shoulder.  Eventually got better however the same pain came back in January or February 2023.  Ultimately some x-rays were done of  the shoulder that were generally negative.  She then developed some pain in her sternum a month or 2 later with deep inspiration and ultimately underwent imaging in May 2023.  Chest CT without contrast was done on June 1, 2023.  This showed a 3.4 cm mass  within the superior segment of the right lower lobe with some associated satellite pulmonary nodules.  Additionally there were some other subcentimeter pulmonary nodules.  There was evidence of mediastinal lymphadenopathy.  Subcarinal lymph node measured  1.4 cm in short axis.She was seen by pulmonary medicine on June 5, 2023 a combined PET/CT was ordered on June 6, 2023 showing a relatively intense area of hypermetabolic activity along the superior posteromedial aspect of the right lower lobe corresponding  to the known right lower lobe lung mass.  There was foci of hypermetabolic activity in the right supraclavicular region, subcarinal and right hilar regions.  There was area of uptake within the posterior upper ribs as well as right acetabulum suspicious  for osseous  metastatic disease.  There was a punctate area of mild hypermetabolic activity in the right hepatic lobe concerning for hepatic metastases.  Small focus in the subcutaneous region just anterior to the lower aspect of the sternum could be inflammatory  versus metastatic.  She undergoes a bronchoscopy dated [place] 15, 2023.  There were no endobronchial lesions.  Primary cytology with rapid onsite evaluation was suggestive of malignancy in level 7, final lymph nodes were pathology was pending.  MRI of the  brain with and without contrast dated June 16, 2023 showed approximately 20 enhancing nodules concerning for metastatic disease.  Specifically there was a 1.5 cm nodule in the right cerebellar hemisphere.  Most of the other nodules were less than 1 cm.   Patient also notes that she has some difficulty expressing words over the past 1 to 2 weeks prior to her first visit with us as well as some mild unsteadiness on her legs where she feels she is drifting towards the right side.        PAST MEDICAL HISTORY     1-hypothyroidism  2-hysterectomy 1988  3-abdominal hernia surgery 1995  4-some hearing difficulties        SOCIAL HISTORY     Patient lives with her significant other.  She has 2 daughter.  Lives in [place] [facility].  Smoked for only 3 years during college and during this time it was a pack per day.  She has retired.  She had a retail store.        CURRENT MEDS     See medication list, meds reviewed        ALLERGIES     Patient denies any drug allergies        FAMILY HISTORY     Patient's father had bladder cancer at the age of 86.  She has 1 brother with no cancer.  She has 2 children             Subjective    Today I am meeting with Mirta.  She reports some new pain in her right knee, which she discussed with her .  A few days ago she also noticed some pain in her left hip.  Otherwise she is feeling well and eager for treatment.  No new problems.      Cancer  Associated symptoms include a change in bowel habit.  Pertinent negatives include no abdominal pain, anorexia, arthralgias, chest pain, chills, congestion, coughing, diaphoresis, fatigue, fever, headaches, joint swelling, myalgias, nausea, neck pain, numbness, rash, sore throat, swollen glands, urinary symptoms, vertigo, visual change, vomiting or weakness.     Objective    BSA: There is no height or weight on file to calculate BSA.  There were no vitals taken for this visit.     Physical Exam  Constitutional:       General: She is not in acute distress.     Appearance: Normal appearance. She is not ill-appearing, toxic-appearing or diaphoretic.   HENT:      Nose: No congestion or rhinorrhea.      Mouth/Throat:      Pharynx: No oropharyngeal exudate or posterior oropharyngeal erythema.   Eyes:      General: No scleral icterus.     Conjunctiva/sclera: Conjunctivae normal.   Cardiovascular:      Rate and Rhythm: Normal rate and regular rhythm.      Pulses: Normal pulses.      Heart sounds: Normal heart sounds. No murmur heard.     No friction rub. No gallop.   Pulmonary:      Effort: Pulmonary effort is normal. No respiratory distress.      Breath sounds: Normal breath sounds. No stridor. No wheezing, rhonchi or rales.   Chest:      Chest wall: No tenderness.   Abdominal:      General: Abdomen is flat. Bowel sounds are normal. There is no distension.      Palpations: Abdomen is soft. There is no mass.      Tenderness: There is no abdominal tenderness. There is no guarding or rebound.   Musculoskeletal:      Cervical back: No tenderness.      Right lower leg: No edema.      Left lower leg: No edema.   Lymphadenopathy:      Cervical: No cervical adenopathy.   Skin:     General: Skin is warm.      Coloration: Skin is not jaundiced.   Neurological:      General: No focal deficit present.      Mental Status: She is oriented to person, place, and time.       Performance Status:  Asymptomatic    === 02/13/25 ===    CT CHEST ABDOMEN PELVIS W IV CONTRAST    - Impression -  1.   Stable metastatic disease burden without evidence of new disease.  2. Remaining findings as described above.      I personally reviewed the images/study and I agree with the findings  as stated by Jerson Thurman MD (PGY-2). This study was interpreted at  [facility], [place], [place].    MACRO:  None    Signed by: Alvaro Ayon 2/14/2025 5:22 PM  Dictation workstation:   [id number]     === 02/14/25 ===    MR BRAIN W AND WO CONTRAST    - Impression -  Stable MR appearance of the brain. No abnormal intracranial  enhancement to suggest active intracranial metastatic disease/disease  progression. No interval acute intracranial process.      MACRO:  None    Signed by: Antonio Tello 2/17/2025 11:41 AM  Dictation workstation:   [id number]     Assessment/Plan     This is a very nice 70-year-old patient with the EGFR mutation positive adenocarcinoma of the lung diagnosed now over a year and a half ago on osimertinib with the bevacizumab on a clinical trial.  She has no evidence of disease progression, intermittent grade 1 diarrhea.  She has some new right knee pain and left hip pain, for which her PCP wrote for plain flims.  This is likely not related to her cancer or treatment, but if it continues we will refer her to ortho.  MRI and CT done for this visit are both stable with no new signs of metastatic disease.  Continue per protocol       Cancer Staging   No matching staging information was found for the patient.      Oncology History   Lung cancer (Multi)   7/12/2023 -  Research Study Participant    ([facility], [place]) [id number] Arm B - Bevacizumab / Osimertinib, 21 Day Cycles  Plan Provider: Humberto Landin MD  Treatment goal: [No plan goal]  Line of treatment: [No plan line of treatment]  Associated studies: [id number] (Osimertinib) +/- Bevacizumab as Initial Treatment for EGFR-Mutant Lung Cancer     9/1/2023 Initial Diagnosis    Lung cancer (CMS/HCC)                   Moni Cortez,  APRN-CNS

## 2025-02-24 NOTE — RESEARCH NOTES
Research Note Treatment Day    Mirta Hills is here today for treatment on MA3870. Today is C28. Procedures completed per protocol. AE's and con-meds reviewed with patient. Patient is aware of treatment plan.     [x]   Received treatment as planned   OR  []    Treatment delayed; patient calendar updated as required   Treatment delayed because:    []   AE    []   Physician Discretion    []   Clinical Deterioration or Progression     []   Other    Education Documentation  Treatment Plan and Schedule, taught by Makayla Joseph RN at 2/24/2025  2:43 PM.  Learner: Patient  Readiness: Acceptance  Method: Explanation  Response: Verbalizes Understanding    Education Comments  No comments found.

## 2025-02-25 DIAGNOSIS — I42.7 CARDIOTOXICITY (MULTI): ICD-10-CM

## 2025-02-25 DIAGNOSIS — C34.90 MALIGNANT NEOPLASM OF LUNG, UNSPECIFIED LATERALITY, UNSPECIFIED PART OF LUNG (MULTI): ICD-10-CM

## 2025-02-26 ENCOUNTER — HOSPITAL ENCOUNTER (OUTPATIENT)
Dept: RADIOLOGY | Facility: HOSPITAL | Age: 70
Discharge: HOME | End: 2025-02-26
Payer: MEDICARE

## 2025-02-26 DIAGNOSIS — M25.561 ACUTE PAIN OF RIGHT KNEE: ICD-10-CM

## 2025-02-26 PROCEDURE — 73564 X-RAY EXAM KNEE 4 OR MORE: CPT | Mod: RT

## 2025-03-03 ENCOUNTER — APPOINTMENT (OUTPATIENT)
Dept: HEMATOLOGY/ONCOLOGY | Facility: HOSPITAL | Age: 70
End: 2025-03-03
Payer: MEDICARE

## 2025-03-05 ENCOUNTER — TELEPHONE (OUTPATIENT)
Dept: HEMATOLOGY/ONCOLOGY | Facility: HOSPITAL | Age: 70
End: 2025-03-05
Payer: MEDICARE

## 2025-03-05 NOTE — TELEPHONE ENCOUNTER
Mirta calls today because she needs a root canal. She needs to find out when she can have this done in relation to her treatments. She is on Bevacizumab, which she received last on 2/24. She is also on oral Osimertinib.    She has not scheduled anything with her dentist yet - waiting to hear from medical oncology first.    Message sent to team.

## 2025-03-05 NOTE — TELEPHONE ENCOUNTER
I spoke with Dr. Chairez regarding patient's request for root canal and instructions for timing of medications.  I instructed patient to stop taking Osimertinib 2 days before and 2 days after the procedure, then resume. I also instructed the patient to time procedure 3-4 days or up to a week before next bevacizumab infusion on 0317/2025. Patient taught back instructions and will call research nurse if she has any further questions.

## 2025-03-11 DIAGNOSIS — C34.90 MALIGNANT NEOPLASM OF LUNG, UNSPECIFIED LATERALITY, UNSPECIFIED PART OF LUNG (MULTI): Primary | ICD-10-CM

## 2025-03-11 RX ORDER — DIPHENHYDRAMINE HYDROCHLORIDE 50 MG/ML
50 INJECTION, SOLUTION INTRAMUSCULAR; INTRAVENOUS AS NEEDED
OUTPATIENT
Start: 2025-03-24

## 2025-03-11 RX ORDER — EPINEPHRINE 0.3 MG/.3ML
0.3 INJECTION SUBCUTANEOUS EVERY 5 MIN PRN
OUTPATIENT
Start: 2025-03-24

## 2025-03-11 RX ORDER — PROCHLORPERAZINE MALEATE 10 MG
10 TABLET ORAL EVERY 6 HOURS PRN
OUTPATIENT
Start: 2025-03-24

## 2025-03-11 RX ORDER — PROCHLORPERAZINE EDISYLATE 5 MG/ML
10 INJECTION INTRAMUSCULAR; INTRAVENOUS EVERY 6 HOURS PRN
OUTPATIENT
Start: 2025-03-24

## 2025-03-11 RX ORDER — ALBUTEROL SULFATE 0.83 MG/ML
3 SOLUTION RESPIRATORY (INHALATION) AS NEEDED
OUTPATIENT
Start: 2025-03-24

## 2025-03-11 RX ORDER — FAMOTIDINE 10 MG/ML
20 INJECTION, SOLUTION INTRAVENOUS ONCE AS NEEDED
OUTPATIENT
Start: 2025-03-24

## 2025-03-17 ENCOUNTER — APPOINTMENT (OUTPATIENT)
Dept: HEMATOLOGY/ONCOLOGY | Facility: HOSPITAL | Age: 70
End: 2025-03-17
Payer: MEDICARE

## 2025-03-18 ENCOUNTER — APPOINTMENT (OUTPATIENT)
Dept: ENDOCRINOLOGY | Facility: CLINIC | Age: 70
End: 2025-03-18
Payer: MEDICARE

## 2025-03-18 VITALS
HEIGHT: 62 IN | SYSTOLIC BLOOD PRESSURE: 136 MMHG | RESPIRATION RATE: 16 BRPM | BODY MASS INDEX: 19.65 KG/M2 | WEIGHT: 106.8 LBS | DIASTOLIC BLOOD PRESSURE: 82 MMHG | HEART RATE: 80 BPM

## 2025-03-18 DIAGNOSIS — E03.9 HYPOTHYROIDISM, UNSPECIFIED TYPE: Primary | ICD-10-CM

## 2025-03-18 PROBLEM — S61.431A PUNCTURE WOUND OF RIGHT HAND WITHOUT FOREIGN BODY: Status: RESOLVED | Noted: 2023-09-01 | Resolved: 2025-03-18

## 2025-03-18 ASSESSMENT — ENCOUNTER SYMPTOMS
SHORTNESS OF BREATH: 0
NAUSEA: 0
PALPITATIONS: 0
FEVER: 0
DIARRHEA: 0
FATIGUE: 0
HEADACHES: 0
VOMITING: 0
CHILLS: 0
COUGH: 0

## 2025-03-18 NOTE — ASSESSMENT & PLAN NOTE
Labs in a few month  Follow up in 6 months  Levels have been much improved  Discussed increase in dose requirements likely due to chronic diarrhea  Orders:    Thyroxine, Free; Future    Thyroid Stimulating Hormone; Future    Triiodothyronine, Free; Future

## 2025-03-18 NOTE — PATIENT INSTRUCTIONS
Continue current thyroid med dose  Recheck in 3 months  Follow up in 6 months  Call or message with concerns

## 2025-03-18 NOTE — PROGRESS NOTES
Endocrinology: Follow up visit  Subjective   Patient ID: Mirta Hills is a 70 y.o. female who presents for Hypothyroidism.    PCP: Andrea Bush MD    HPI  Here for follow up hypothyroidism  Have adjusted t4 several times and now on 137 every day  Tolerating chemo pretty well but still needs immodium daily  Review of Systems   Constitutional:  Negative for chills, fatigue and fever.   Respiratory:  Negative for cough and shortness of breath.    Cardiovascular:  Negative for chest pain and palpitations.   Gastrointestinal:  Negative for diarrhea, nausea and vomiting.   Neurological:  Negative for headaches.       Patient Active Problem List   Diagnosis    Anxiety disorder    Breast density    Fecal occult blood test positive    Fecal smearing    Hyperlipidemia, mixed    Hypothyroidism    Lung cancer (Multi)    Osteoporosis    Urinary urgency    Vaginal vault prolapse    Vitamin D deficiency    Secondary malignant neoplasm of brain (Multi)    Dry ARMD    Right lower lobe lung mass    Primary adenocarcinoma of upper lobe of right lung (Multi)    Hypothyroidism due to Hashimoto's thyroiditis    Osteopenia of neck of femur    Pure hypercholesterolemia    Adenocarcinoma of right lung (Multi)    Pyuria        Home Meds:  Current Outpatient Medications   Medication Instructions    atorvastatin (LIPITOR) 10 mg, oral, Daily    BEVACIZUMAB IV Every 21 days    CALCIUM ORAL Take by mouth.    cholecalciferol (Vitamin D-3) 50 MCG (2000 UT) tablet 1 tablet, Daily    levothyroxine (Synthroid, Levoxyl) 137 mcg tablet TAKE 1 TABLET BY MOUTH EVERY DAY IN THE MORNING BEFORE A MEAL; TAKE ADDITIONAL 1/2 TABLET ON WEDNESDAY AND SUNDAY    loperamide (IMODIUM) 2 mg, Daily    multivit-min/ferrous fumarate (MULTI VITAMIN ORAL) Take by mouth.    mv-mn/lutein/zeax/bilber/hb277 ([employer] FORMULA ORAL) 1 tablet, Daily    sertraline (ZOLOFT) 75 mg, oral, Daily        No Known Allergies     Objective   Vitals:    03/18/25 1129   BP: 136/82  "  Pulse: 80   Resp: 16      Vitals:    03/18/25 1129   Weight: 48.4 kg (106 lb 12.8 oz)      Body mass index is 19.85 kg/m².   Physical Exam  Constitutional:       Appearance: Normal appearance. She is normal weight.   HENT:      Head: Normocephalic and atraumatic.   Neck:      Thyroid: No thyroid mass, thyromegaly or thyroid tenderness.   Cardiovascular:      Rate and Rhythm: Normal rate and regular rhythm.      Heart sounds: No murmur heard.     No gallop.   Pulmonary:      Effort: Pulmonary effort is normal.      Breath sounds: Normal breath sounds.   Abdominal:      Palpations: Abdomen is soft.      Comments: benign   Neurological:      General: No focal deficit present.      Mental Status: She is alert and oriented to person, place, and time.      Deep Tendon Reflexes: Reflexes are normal and symmetric.   Psychiatric:         Behavior: Behavior is cooperative.         Labs:  Lab Results   Component Value Date    HGBA1C 5.2 03/11/2024    TSH 0.36 (L) 01/13/2025    FREET4 1.59 (H) 01/13/2025      No results found for: \"PR1\", \"THYROIDPAB\", \"TSI\"     Assessment/Plan   Assessment & Plan  Hypothyroidism, unspecified type  Labs in a few month  Follow up in 6 months  Levels have been much improved  Discussed increase in dose requirements likely due to chronic diarrhea  Orders:    Thyroxine, Free; Future    Thyroid Stimulating Hormone; Future    Triiodothyronine, Free; Future        Electronically signed by:  Ramya Webber MD 03/18/25 11:44 AM                "

## 2025-03-24 ENCOUNTER — LAB (OUTPATIENT)
Dept: LAB | Facility: HOSPITAL | Age: 70
End: 2025-03-24
Payer: MEDICARE

## 2025-03-24 ENCOUNTER — APPOINTMENT (OUTPATIENT)
Dept: HEMATOLOGY/ONCOLOGY | Facility: HOSPITAL | Age: 70
End: 2025-03-24
Payer: MEDICARE

## 2025-03-24 ENCOUNTER — APPOINTMENT (OUTPATIENT)
Dept: PRIMARY CARE | Facility: CLINIC | Age: 70
End: 2025-03-24
Payer: MEDICARE

## 2025-03-24 ENCOUNTER — OFFICE VISIT (OUTPATIENT)
Dept: HEMATOLOGY/ONCOLOGY | Facility: HOSPITAL | Age: 70
End: 2025-03-24
Payer: MEDICARE

## 2025-03-24 ENCOUNTER — EDUCATION (OUTPATIENT)
Dept: HEMATOLOGY/ONCOLOGY | Facility: HOSPITAL | Age: 70
End: 2025-03-24

## 2025-03-24 ENCOUNTER — INFUSION (OUTPATIENT)
Dept: HEMATOLOGY/ONCOLOGY | Facility: HOSPITAL | Age: 70
End: 2025-03-24
Payer: MEDICARE

## 2025-03-24 VITALS
SYSTOLIC BLOOD PRESSURE: 144 MMHG | OXYGEN SATURATION: 100 % | WEIGHT: 108.03 LBS | RESPIRATION RATE: 18 BRPM | TEMPERATURE: 97.2 F | BODY MASS INDEX: 20.08 KG/M2 | HEART RATE: 76 BPM | DIASTOLIC BLOOD PRESSURE: 74 MMHG

## 2025-03-24 DIAGNOSIS — C34.90 MALIGNANT NEOPLASM OF LUNG, UNSPECIFIED LATERALITY, UNSPECIFIED PART OF LUNG (MULTI): ICD-10-CM

## 2025-03-24 LAB
ALBUMIN SERPL BCP-MCNC: 4.4 G/DL (ref 3.4–5)
ALP SERPL-CCNC: 67 U/L (ref 33–136)
ALT SERPL W P-5'-P-CCNC: 16 U/L (ref 7–45)
ANION GAP SERPL CALC-SCNC: 13 MMOL/L (ref 10–20)
AST SERPL W P-5'-P-CCNC: 19 U/L (ref 9–39)
BASOPHILS # BLD AUTO: 0.02 X10*3/UL (ref 0–0.1)
BASOPHILS NFR BLD AUTO: 0.4 %
BILIRUB SERPL-MCNC: 0.5 MG/DL (ref 0–1.2)
BUN SERPL-MCNC: 22 MG/DL (ref 6–23)
CALCIUM SERPL-MCNC: 9.7 MG/DL (ref 8.6–10.3)
CHLORIDE SERPL-SCNC: 104 MMOL/L (ref 98–107)
CK SERPL-CCNC: 50 U/L (ref 0–215)
CO2 SERPL-SCNC: 29 MMOL/L (ref 21–32)
CREAT SERPL-MCNC: 1.24 MG/DL (ref 0.5–1.05)
EGFRCR SERPLBLD CKD-EPI 2021: 46 ML/MIN/1.73M*2
EOSINOPHIL # BLD AUTO: 0.24 X10*3/UL (ref 0–0.4)
EOSINOPHIL NFR BLD AUTO: 4.7 %
ERYTHROCYTE [DISTWIDTH] IN BLOOD BY AUTOMATED COUNT: 14.3 % (ref 11.5–14.5)
GLUCOSE SERPL-MCNC: 99 MG/DL (ref 74–99)
HCT VFR BLD AUTO: 46.7 % (ref 36–46)
HGB BLD-MCNC: 14.7 G/DL (ref 12–16)
IMM GRANULOCYTES # BLD AUTO: 0.03 X10*3/UL (ref 0–0.5)
IMM GRANULOCYTES NFR BLD AUTO: 0.6 % (ref 0–0.9)
LYMPHOCYTES # BLD AUTO: 0.58 X10*3/UL (ref 0.8–3)
LYMPHOCYTES NFR BLD AUTO: 11.3 %
MAGNESIUM SERPL-MCNC: 2.14 MG/DL (ref 1.6–2.4)
MCH RBC QN AUTO: 29.8 PG (ref 26–34)
MCHC RBC AUTO-ENTMCNC: 31.5 G/DL (ref 32–36)
MCV RBC AUTO: 95 FL (ref 80–100)
MONOCYTES # BLD AUTO: 0.26 X10*3/UL (ref 0.05–0.8)
MONOCYTES NFR BLD AUTO: 5.1 %
NEUTROPHILS # BLD AUTO: 4.01 X10*3/UL (ref 1.6–5.5)
NEUTROPHILS NFR BLD AUTO: 77.9 %
NRBC BLD-RTO: 0 /100 WBCS (ref 0–0)
PLATELET # BLD AUTO: 163 X10*3/UL (ref 150–450)
POTASSIUM SERPL-SCNC: 4.7 MMOL/L (ref 3.5–5.3)
PROT SERPL-MCNC: 6.9 G/DL (ref 6.4–8.2)
RBC # BLD AUTO: 4.94 X10*6/UL (ref 4–5.2)
SODIUM SERPL-SCNC: 141 MMOL/L (ref 136–145)
T3FREE SERPL-MCNC: 2.9 PG/ML (ref 2.3–4.2)
T4 FREE SERPL-MCNC: 1.56 NG/DL (ref 0.78–1.48)
TSH SERPL-ACNC: 0.54 MIU/L (ref 0.44–3.98)
WBC # BLD AUTO: 5.1 X10*3/UL (ref 4.4–11.3)

## 2025-03-24 PROCEDURE — 84443 ASSAY THYROID STIM HORMONE: CPT | Performed by: INTERNAL MEDICINE

## 2025-03-24 PROCEDURE — 82550 ASSAY OF CK (CPK): CPT

## 2025-03-24 PROCEDURE — 99214 OFFICE O/P EST MOD 30 MIN: CPT | Performed by: CLINICAL NURSE SPECIALIST

## 2025-03-24 PROCEDURE — 83735 ASSAY OF MAGNESIUM: CPT

## 2025-03-24 PROCEDURE — 99214 OFFICE O/P EST MOD 30 MIN: CPT | Mod: 25 | Performed by: CLINICAL NURSE SPECIALIST

## 2025-03-24 PROCEDURE — 1159F MED LIST DOCD IN RCRD: CPT | Performed by: CLINICAL NURSE SPECIALIST

## 2025-03-24 PROCEDURE — 1036F TOBACCO NON-USER: CPT | Performed by: CLINICAL NURSE SPECIALIST

## 2025-03-24 PROCEDURE — 2560000001 HC RX 256 EXPERIMENTAL DRUGS: Mod: TB | Performed by: INTERNAL MEDICINE

## 2025-03-24 PROCEDURE — 84481 FREE ASSAY (FT-3): CPT | Performed by: INTERNAL MEDICINE

## 2025-03-24 PROCEDURE — 96413 CHEMO IV INFUSION 1 HR: CPT

## 2025-03-24 PROCEDURE — 80053 COMPREHEN METABOLIC PANEL: CPT

## 2025-03-24 PROCEDURE — 1126F AMNT PAIN NOTED NONE PRSNT: CPT | Performed by: CLINICAL NURSE SPECIALIST

## 2025-03-24 PROCEDURE — 84439 ASSAY OF FREE THYROXINE: CPT | Performed by: INTERNAL MEDICINE

## 2025-03-24 PROCEDURE — 85025 COMPLETE CBC W/AUTO DIFF WBC: CPT

## 2025-03-24 RX ORDER — EPINEPHRINE 0.3 MG/.3ML
0.3 INJECTION SUBCUTANEOUS EVERY 5 MIN PRN
Status: DISCONTINUED | OUTPATIENT
Start: 2025-03-24 | End: 2025-03-24 | Stop reason: HOSPADM

## 2025-03-24 RX ORDER — PROCHLORPERAZINE MALEATE 10 MG
10 TABLET ORAL EVERY 6 HOURS PRN
Status: DISCONTINUED | OUTPATIENT
Start: 2025-03-24 | End: 2025-03-24 | Stop reason: HOSPADM

## 2025-03-24 RX ORDER — ALBUTEROL SULFATE 0.83 MG/ML
3 SOLUTION RESPIRATORY (INHALATION) AS NEEDED
Status: DISCONTINUED | OUTPATIENT
Start: 2025-03-24 | End: 2025-03-24 | Stop reason: HOSPADM

## 2025-03-24 RX ORDER — FAMOTIDINE 10 MG/ML
20 INJECTION, SOLUTION INTRAVENOUS ONCE AS NEEDED
Status: DISCONTINUED | OUTPATIENT
Start: 2025-03-24 | End: 2025-03-24 | Stop reason: HOSPADM

## 2025-03-24 RX ORDER — DIPHENHYDRAMINE HYDROCHLORIDE 50 MG/ML
50 INJECTION, SOLUTION INTRAMUSCULAR; INTRAVENOUS AS NEEDED
Status: DISCONTINUED | OUTPATIENT
Start: 2025-03-24 | End: 2025-03-24 | Stop reason: HOSPADM

## 2025-03-24 RX ORDER — PROCHLORPERAZINE EDISYLATE 5 MG/ML
10 INJECTION INTRAMUSCULAR; INTRAVENOUS EVERY 6 HOURS PRN
Status: DISCONTINUED | OUTPATIENT
Start: 2025-03-24 | End: 2025-03-24 | Stop reason: HOSPADM

## 2025-03-24 RX ADMIN — Medication 757.5 MG: at 14:04

## 2025-03-24 ASSESSMENT — ENCOUNTER SYMPTOMS
VISUAL CHANGE: 0
VERTIGO: 0
NECK PAIN: 0
JOINT SWELLING: 0
CHANGE IN BOWEL HABIT: 1
HEADACHES: 0
COUGH: 0
FEVER: 0
DIAPHORESIS: 0
ARTHRALGIAS: 0
NUMBNESS: 0
ANOREXIA: 0
SORE THROAT: 0
WEAKNESS: 0
SWOLLEN GLANDS: 0
CHILLS: 0
MYALGIAS: 0
ABDOMINAL PAIN: 0
NAUSEA: 0
VOMITING: 0
FATIGUE: 0

## 2025-03-24 ASSESSMENT — PAIN SCALES - GENERAL: PAINLEVEL_OUTOF10: 0-NO PAIN

## 2025-03-24 NOTE — PROGRESS NOTES
Patient arrives to Infusion for her study drugs Bevacizumab. She tolerated her treatment without any issue.  All questions answered. She was discharged stable.

## 2025-03-24 NOTE — PROGRESS NOTES
Patient ID: Mirta Hills is a 70 y.o. female.    DIAGNOSIS     Adenocarcinoma of the lung.  Date of diagnosis is [place] 15, 2023 from a level 7 lymph node biopsy/fine-needle aspiration.  Immunohistochemistry positive for TTF-1.        STAGING     Clinical T2a (right lower lobe mass measuring 3.6 cm), clinical N2 (PET positivity and subcarinal region), M1C disease (presence of brain metastases, ribs, right acetabulum, liver, brain), stage IVb disease        CURRENT SITES OF DISEASE     Right lower lobe, right hilum of the lung, ipsilateral mediastinum, liver, bones, brain        MOLECULAR GENOMICS     TEST: Focused Solid Tumor DNA/RNA Panel   SPECIMEN: Supernatant, LYMPH NODE 7, [id number] A   DISEASE DIAGNOSIS: Adenocarcinoma   Estimated Tumor Content: 40%   COLLECTION DATE: 6/15/2023     MICROSATELLITE STATUS: Microsatellite  Instability-High (MSI-H) is NOT DETECTED.     DISEASE ASSOCIATED GENOMIC FINDINGS:   EGFR p.[id number] ([id number])    TP53 p.N247I ([id number] c.740A>T)     DISEASE RELEVANT ALTERATIONS NOT DETECTED:   Negative for ALK fusion.   Negative for BRAF V600E.   Negative for ERBB2 activating mutation   Negative for KRAS G12C.   Negative for  MET exon 14 skipping mutation.   Negative for NTRK fusion.   Negative for RET fusion.   Negative for ROS1 fusion.        Circulating tumor DNA sent on June 19, 2023  EGFR p.[id number], Variant allelic fraction of 1.6%  TP53 N247I Missense variant, loss of function, variant allelic fraction 1.1%  MSI stable        SERUM TUMOR MARKER     Slightly elevated CEA and CA 19.9 June 2023        PRIOR THERAPY     1- Gamma knife radiosurgery to brain lesions completed on June 28, 2023.        CURRENT THERAPY     1- Osimertinib on study  on clinical trial EA [id number] which is a randomized trial of osimertinib with or without bevacizumab.  She has been randomized to the bevacizumab arm- Cycle 1 Day 1 is 7/12/2023     2-brain metastases, last follow-up MRI  December 9, 2024, no progression       CURRENT ONCOLOGICAL PROBLEMS     1-brain metastases, symptomatic, word finding difficulties, mild unsteadiness on her feet but no falls, Resolved July 3, 2023  2-anterior chest pain, increases with deep inspiration, Improved July 3, 2023  3-mild intermittent grade 1 diarrhea, mild hair loss, mild leukopenia, mild elevation in creatinine, mild fatigue.  Most likely all osimertinib related August 7, 2023  4-fluctuating weight over the past year since late 2023 ranging between 110 and 106 pounds.  Down to 104 pounds December 23, 2024.  Discussed increasing caloric intake.          HISTORY OF PRESENT ILLNESS     This is a 70-year-old patient.  She states that towards Thanksgiving of 2022 she started feeling some pain in her right shoulder.  Eventually got better however the same pain came back in January or February 2023.  Ultimately some x-rays were done of  the shoulder that were generally negative.  She then developed some pain in her sternum a month or 2 later with deep inspiration and ultimately underwent imaging in May 2023.  Chest CT without contrast was done on June 1, 2023.  This showed a 3.4 cm mass  within the superior segment of the right lower lobe with some associated satellite pulmonary nodules.  Additionally there were some other subcentimeter pulmonary nodules.  There was evidence of mediastinal lymphadenopathy.  Subcarinal lymph node measured  1.4 cm in short axis.She was seen by pulmonary medicine on June 5, 2023 a combined PET/CT was ordered on June 6, 2023 showing a relatively intense area of hypermetabolic activity along the superior posteromedial aspect of the right lower lobe corresponding  to the known right lower lobe lung mass.  There was foci of hypermetabolic activity in the right supraclavicular region, subcarinal and right hilar regions.  There was area of uptake within the posterior upper ribs as well as right acetabulum suspicious  for osseous  metastatic disease.  There was a punctate area of mild hypermetabolic activity in the right hepatic lobe concerning for hepatic metastases.  Small focus in the subcutaneous region just anterior to the lower aspect of the sternum could be inflammatory  versus metastatic.  She undergoes a bronchoscopy dated [place] 15, 2023.  There were no endobronchial lesions.  Primary cytology with rapid onsite evaluation was suggestive of malignancy in level 7, final lymph nodes were pathology was pending.  MRI of the  brain with and without contrast dated June 16, 2023 showed approximately 20 enhancing nodules concerning for metastatic disease.  Specifically there was a 1.5 cm nodule in the right cerebellar hemisphere.  Most of the other nodules were less than 1 cm.   Patient also notes that she has some difficulty expressing words over the past 1 to 2 weeks prior to her first visit with us as well as some mild unsteadiness on her legs where she feels she is drifting towards the right side.        PAST MEDICAL HISTORY     1-hypothyroidism  2-hysterectomy 1988  3-abdominal hernia surgery 1995  4-some hearing difficulties        SOCIAL HISTORY     Patient lives with her significant other.  She has 2 daughter.  Lives in [facility].  Smoked for only 3 years during college and during this time it was a pack per day.  She has retired.  She had a retail store.        CURRENT MEDS     See medication list, meds reviewed        ALLERGIES     Patient denies any drug allergies        FAMILY HISTORY     Patient's father had bladder cancer at the age of 86.  She has 1 brother with no cancer.  She has 2 children             Subjective    Today I am meeting with Mirta.  She reports some new pain in her right knee, which she discussed with her .  A few days ago she also noticed some pain in her left hip.  She has seen her MD and had X rays.  Latest adventure was having a tooth pulled a week ago.  Otherwise she is feeling well and eager for  treatment.  No new problems.      Cancer  Associated symptoms include a change in bowel habit. Pertinent negatives include no abdominal pain, anorexia, arthralgias, chest pain, chills, congestion, coughing, diaphoresis, fatigue, fever, headaches, joint swelling, myalgias, nausea, neck pain, numbness, rash, sore throat, swollen glands, urinary symptoms, vertigo, visual change, vomiting or weakness.     Objective    BSA: 1.46 meters squared  /74 (BP Location: Left arm, Patient Position: Sitting, BP Cuff Size: Small adult)   Pulse 76   Temp 36.2 °C (97.2 °F) (Temporal)   Resp 18   Wt 49 kg (108 lb 0.4 oz)   SpO2 100%   BMI 20.08 kg/m²      Physical Exam  Constitutional:       General: She is not in acute distress.     Appearance: Normal appearance. She is not ill-appearing, toxic-appearing or diaphoretic.   HENT:      Head: Normocephalic.      Nose: No congestion or rhinorrhea.      Mouth/Throat:      Pharynx: No oropharyngeal exudate or posterior oropharyngeal erythema.   Eyes:      General: No scleral icterus.     Conjunctiva/sclera: Conjunctivae normal.   Cardiovascular:      Rate and Rhythm: Normal rate and regular rhythm.      Pulses: Normal pulses.      Heart sounds: Normal heart sounds. No murmur heard.     No friction rub. No gallop.   Pulmonary:      Effort: Pulmonary effort is normal. No respiratory distress.      Breath sounds: Normal breath sounds. No stridor. No wheezing, rhonchi or rales.   Chest:      Chest wall: No tenderness.   Abdominal:      General: Abdomen is flat. Bowel sounds are normal. There is no distension.      Palpations: Abdomen is soft. There is no mass.      Tenderness: There is no abdominal tenderness. There is no guarding or rebound.   Musculoskeletal:      Cervical back: No tenderness.      Right lower leg: No edema.      Left lower leg: No edema.   Lymphadenopathy:      Cervical: No cervical adenopathy.   Skin:     General: Skin is warm.      Coloration: Skin is not  jaundiced.   Neurological:      General: No focal deficit present.      Mental Status: She is oriented to person, place, and time.         Performance Status:  Asymptomatic    === 02/13/25 ===    CT CHEST ABDOMEN PELVIS W IV CONTRAST    - Impression -  1.  Stable metastatic disease burden without evidence of new disease.  2. Remaining findings as described above.      I personally reviewed the images/study and I agree with the findings  as stated by Jerson Thurman MD (PGY-2). This study was interpreted at  [place], [place].    MACRO:  None    Signed by: Alvaro Ayon 2/14/2025 5:22 PM  Dictation workstation:   [id number]     === 02/14/25 ===    MR BRAIN W AND WO CONTRAST    - Impression -  Stable MR appearance of the brain. No abnormal intracranial  enhancement to suggest active intracranial metastatic disease/disease  progression. No interval acute intracranial process.      MACRO:  None    Signed by: Antonio Tello 2/17/2025 11:41 AM  Dictation workstation:   [id number]     Assessment/Plan     This is a very nice 70-year-old patient with the EGFR mutation positive adenocarcinoma of the lung diagnosed now over a year and a half ago on osimertinib with the bevacizumab on a clinical trial.  She has no evidence of disease progression, intermittent grade 1 diarrhea.  She has some new right knee pain  for which her PCP wrote for plain flims.  This is likely not related to her cancer or treatment, but rather arthritis.    Continue per protocol       Cancer Staging   No matching staging information was found for the patient.      Oncology History   Lung cancer (Multi)   7/12/2023 -  Research Study Participant    ([facility]) [id number] Arm B - Bevacizumab / Osimertinib, 21 Day Cycles  Plan Provider: Humberto Landin MD  Treatment goal: [No plan goal]  Line of treatment: [No plan line of treatment]  Associated studies: [id number] (Osimertinib) +/- Bevacizumab as Initial Treatment for  EGFR-Mutant Lung Cancer     9/1/2023 Initial Diagnosis    Lung cancer (CMS/HCC)                   Moni Cortez, APRN-CNS

## 2025-03-25 NOTE — RESEARCH NOTES
Research Note Treatment Day    Mirta Hills is here today for treatment on Ho0381. Today is C29, Procedures completed per protocol. AE's and con-meds reviewed with patient. Patient is aware of treatment plan.     [x]   Received treatment as planned   OR  []    Treatment delayed; patient calendar updated as required   Treatment delayed because:    []   AE    []   Physician Discretion    []   Clinical Deterioration or Progression     []   Other    Education Documentation  Treatment Plan and Schedule, taught by Makayla Joseph RN at 3/24/2025 10:06 PM.  Learner: Patient  Readiness: Acceptance  Method: Explanation  Response: Verbalizes Understanding    Education Comments  No comments found.

## 2025-03-27 ENCOUNTER — APPOINTMENT (OUTPATIENT)
Dept: PRIMARY CARE | Facility: CLINIC | Age: 70
End: 2025-03-27
Payer: MEDICARE

## 2025-04-01 ENCOUNTER — HOSPITAL ENCOUNTER (OUTPATIENT)
Dept: CARDIOLOGY | Facility: HOSPITAL | Age: 70
Discharge: HOME | End: 2025-04-01
Payer: MEDICARE

## 2025-04-01 DIAGNOSIS — C34.90 MALIGNANT NEOPLASM OF LUNG, UNSPECIFIED LATERALITY, UNSPECIFIED PART OF LUNG (MULTI): Primary | ICD-10-CM

## 2025-04-01 DIAGNOSIS — I42.7 CARDIOTOXICITY (MULTI): ICD-10-CM

## 2025-04-01 DIAGNOSIS — Z51.81 ENCOUNTER FOR THERAPEUTIC DRUG LEVEL MONITORING: ICD-10-CM

## 2025-04-01 PROCEDURE — 93308 TTE F-UP OR LMTD: CPT

## 2025-04-01 RX ORDER — ALBUTEROL SULFATE 0.83 MG/ML
3 SOLUTION RESPIRATORY (INHALATION) AS NEEDED
OUTPATIENT
Start: 2025-04-14

## 2025-04-01 RX ORDER — PROCHLORPERAZINE MALEATE 10 MG
10 TABLET ORAL EVERY 6 HOURS PRN
OUTPATIENT
Start: 2025-04-14

## 2025-04-01 RX ORDER — DIPHENHYDRAMINE HYDROCHLORIDE 50 MG/ML
50 INJECTION, SOLUTION INTRAMUSCULAR; INTRAVENOUS AS NEEDED
OUTPATIENT
Start: 2025-04-14

## 2025-04-01 RX ORDER — EPINEPHRINE 0.3 MG/.3ML
0.3 INJECTION SUBCUTANEOUS EVERY 5 MIN PRN
OUTPATIENT
Start: 2025-04-14

## 2025-04-01 RX ORDER — FAMOTIDINE 10 MG/ML
20 INJECTION, SOLUTION INTRAVENOUS ONCE AS NEEDED
OUTPATIENT
Start: 2025-04-14

## 2025-04-01 RX ORDER — PROCHLORPERAZINE EDISYLATE 5 MG/ML
10 INJECTION INTRAMUSCULAR; INTRAVENOUS EVERY 6 HOURS PRN
OUTPATIENT
Start: 2025-04-14

## 2025-04-02 ENCOUNTER — TELEPHONE (OUTPATIENT)
Dept: PRIMARY CARE | Facility: CLINIC | Age: 70
End: 2025-04-02
Payer: MEDICARE

## 2025-04-02 DIAGNOSIS — M25.562 LEFT KNEE PAIN, UNSPECIFIED CHRONICITY: ICD-10-CM

## 2025-04-02 DIAGNOSIS — Z00.00 HEALTHCARE MAINTENANCE: ICD-10-CM

## 2025-04-02 LAB
EJECTION FRACTION APICAL 4 CHAMBER: 60.7
EJECTION FRACTION: 62 %
LEFT VENTRICLE INTERNAL DIMENSION DIASTOLE: 4 CM (ref 3.5–6)
MITRAL VALVE E/A RATIO: 0.57
TRICUSPID ANNULAR PLANE SYSTOLIC EXCURSION: 1.5 CM

## 2025-04-02 NOTE — TELEPHONE ENCOUNTER
Patient continues to struggle with left knee pain.  It has become more bothersome and limiting.  She would like to pursue PT at this time.  Orders placed and assisted with scheduling.

## 2025-04-04 ENCOUNTER — APPOINTMENT (OUTPATIENT)
Dept: RADIOLOGY | Facility: CLINIC | Age: 70
End: 2025-04-04
Payer: MEDICARE

## 2025-04-04 DIAGNOSIS — Z12.31 SCREENING MAMMOGRAM FOR BREAST CANCER: ICD-10-CM

## 2025-04-07 ENCOUNTER — APPOINTMENT (OUTPATIENT)
Dept: HEMATOLOGY/ONCOLOGY | Facility: HOSPITAL | Age: 70
End: 2025-04-07
Payer: MEDICARE

## 2025-04-08 ENCOUNTER — LAB (OUTPATIENT)
Dept: LAB | Facility: HOSPITAL | Age: 70
End: 2025-04-08
Payer: MEDICARE

## 2025-04-08 ENCOUNTER — HOSPITAL ENCOUNTER (OUTPATIENT)
Dept: RADIOLOGY | Facility: CLINIC | Age: 70
Discharge: HOME | End: 2025-04-08
Payer: MEDICARE

## 2025-04-08 VITALS — HEIGHT: 61 IN | WEIGHT: 108.03 LBS | BODY MASS INDEX: 20.4 KG/M2

## 2025-04-08 DIAGNOSIS — Z00.00 ENCOUNTER FOR GENERAL ADULT MEDICAL EXAMINATION WITHOUT ABNORMAL FINDINGS: Primary | ICD-10-CM

## 2025-04-08 DIAGNOSIS — Z12.31 SCREENING MAMMOGRAM FOR BREAST CANCER: ICD-10-CM

## 2025-04-08 LAB
25(OH)D3 SERPL-MCNC: 64 NG/ML (ref 30–100)
ALBUMIN SERPL BCP-MCNC: 4.3 G/DL (ref 3.4–5)
ALP SERPL-CCNC: 64 U/L (ref 33–136)
ALT SERPL W P-5'-P-CCNC: 15 U/L (ref 7–45)
ANION GAP SERPL CALC-SCNC: 11 MMOL/L (ref 10–20)
APPEARANCE UR: CLEAR
AST SERPL W P-5'-P-CCNC: 21 U/L (ref 9–39)
BASOPHILS # BLD AUTO: 0.04 X10*3/UL (ref 0–0.1)
BASOPHILS NFR BLD AUTO: 0.9 %
BILIRUB SERPL-MCNC: 0.5 MG/DL (ref 0–1.2)
BILIRUB UR STRIP.AUTO-MCNC: NEGATIVE MG/DL
BUN SERPL-MCNC: 22 MG/DL (ref 6–23)
CALCIUM SERPL-MCNC: 9.2 MG/DL (ref 8.6–10.3)
CHLORIDE SERPL-SCNC: 104 MMOL/L (ref 98–107)
CHOLEST SERPL-MCNC: 169 MG/DL (ref 0–199)
CHOLESTEROL/HDL RATIO: 3.2
CO2 SERPL-SCNC: 30 MMOL/L (ref 21–32)
COLOR UR: ABNORMAL
CREAT SERPL-MCNC: 1.21 MG/DL (ref 0.5–1.05)
CRP SERPL HS-MCNC: <0.2 MG/L
EGFRCR SERPLBLD CKD-EPI 2021: 48 ML/MIN/1.73M*2
EOSINOPHIL # BLD AUTO: 0.2 X10*3/UL (ref 0–0.4)
EOSINOPHIL NFR BLD AUTO: 4.3 %
ERYTHROCYTE [DISTWIDTH] IN BLOOD BY AUTOMATED COUNT: 14.3 % (ref 11.5–14.5)
EST. AVERAGE GLUCOSE BLD GHB EST-MCNC: 97 MG/DL
GLUCOSE SERPL-MCNC: 86 MG/DL (ref 74–99)
GLUCOSE UR STRIP.AUTO-MCNC: NORMAL MG/DL
HBA1C MFR BLD: 5 %
HCT VFR BLD AUTO: 45.4 % (ref 36–46)
HDLC SERPL-MCNC: 53.6 MG/DL
HGB BLD-MCNC: 14.4 G/DL (ref 12–16)
HOLD SPECIMEN: NORMAL
IMM GRANULOCYTES # BLD AUTO: 0.02 X10*3/UL (ref 0–0.5)
IMM GRANULOCYTES NFR BLD AUTO: 0.4 % (ref 0–0.9)
KETONES UR STRIP.AUTO-MCNC: NEGATIVE MG/DL
LDLC SERPL CALC-MCNC: 91 MG/DL
LEUKOCYTE ESTERASE UR QL STRIP.AUTO: ABNORMAL
LYMPHOCYTES # BLD AUTO: 0.73 X10*3/UL (ref 0.8–3)
LYMPHOCYTES NFR BLD AUTO: 15.5 %
MCH RBC QN AUTO: 29.9 PG (ref 26–34)
MCHC RBC AUTO-ENTMCNC: 31.7 G/DL (ref 32–36)
MCV RBC AUTO: 94 FL (ref 80–100)
MONOCYTES # BLD AUTO: 0.29 X10*3/UL (ref 0.05–0.8)
MONOCYTES NFR BLD AUTO: 6.2 %
NEUTROPHILS # BLD AUTO: 3.42 X10*3/UL (ref 1.6–5.5)
NEUTROPHILS NFR BLD AUTO: 72.7 %
NITRITE UR QL STRIP.AUTO: NEGATIVE
NON HDL CHOLESTEROL: 115 MG/DL (ref 0–149)
NRBC BLD-RTO: 0 /100 WBCS (ref 0–0)
PH UR STRIP.AUTO: 6 [PH]
PLATELET # BLD AUTO: 168 X10*3/UL (ref 150–450)
POTASSIUM SERPL-SCNC: 4.5 MMOL/L (ref 3.5–5.3)
PROT SERPL-MCNC: 6.5 G/DL (ref 6.4–8.2)
PROT UR STRIP.AUTO-MCNC: ABNORMAL MG/DL
RBC # BLD AUTO: 4.82 X10*6/UL (ref 4–5.2)
RBC # UR STRIP.AUTO: NEGATIVE MG/DL
RBC #/AREA URNS AUTO: ABNORMAL /HPF
SODIUM SERPL-SCNC: 140 MMOL/L (ref 136–145)
SP GR UR STRIP.AUTO: 1.02
SQUAMOUS #/AREA URNS AUTO: ABNORMAL /HPF
TRIGL SERPL-MCNC: 122 MG/DL (ref 0–149)
TSH SERPL-ACNC: 0.52 MIU/L (ref 0.44–3.98)
UROBILINOGEN UR STRIP.AUTO-MCNC: NORMAL MG/DL
VLDL: 24 MG/DL (ref 0–40)
WBC # BLD AUTO: 4.7 X10*3/UL (ref 4.4–11.3)
WBC #/AREA URNS AUTO: ABNORMAL /HPF

## 2025-04-08 PROCEDURE — 83036 HEMOGLOBIN GLYCOSYLATED A1C: CPT

## 2025-04-08 PROCEDURE — 87086 URINE CULTURE/COLONY COUNT: CPT

## 2025-04-08 PROCEDURE — 81001 URINALYSIS AUTO W/SCOPE: CPT

## 2025-04-08 PROCEDURE — 82306 VITAMIN D 25 HYDROXY: CPT

## 2025-04-08 PROCEDURE — 85025 COMPLETE CBC W/AUTO DIFF WBC: CPT

## 2025-04-08 PROCEDURE — 87077 CULTURE AEROBIC IDENTIFY: CPT

## 2025-04-08 PROCEDURE — 80053 COMPREHEN METABOLIC PANEL: CPT

## 2025-04-08 PROCEDURE — 77063 BREAST TOMOSYNTHESIS BI: CPT

## 2025-04-08 PROCEDURE — 80061 LIPID PANEL: CPT

## 2025-04-08 PROCEDURE — 84443 ASSAY THYROID STIM HORMONE: CPT

## 2025-04-08 PROCEDURE — 86141 C-REACTIVE PROTEIN HS: CPT

## 2025-04-10 ENCOUNTER — OFFICE VISIT (OUTPATIENT)
Dept: PRIMARY CARE | Facility: CLINIC | Age: 70
End: 2025-04-10
Payer: MEDICARE

## 2025-04-10 ENCOUNTER — APPOINTMENT (OUTPATIENT)
Dept: PRIMARY CARE | Facility: CLINIC | Age: 70
End: 2025-04-10
Payer: MEDICARE

## 2025-04-10 VITALS
HEIGHT: 61 IN | TEMPERATURE: 98 F | BODY MASS INDEX: 20.09 KG/M2 | SYSTOLIC BLOOD PRESSURE: 143 MMHG | DIASTOLIC BLOOD PRESSURE: 78 MMHG | OXYGEN SATURATION: 100 % | HEART RATE: 80 BPM | WEIGHT: 106.4 LBS

## 2025-04-10 VITALS
HEART RATE: 80 BPM | WEIGHT: 106.4 LBS | HEIGHT: 61 IN | TEMPERATURE: 98 F | BODY MASS INDEX: 20.09 KG/M2 | OXYGEN SATURATION: 100 % | DIASTOLIC BLOOD PRESSURE: 78 MMHG | SYSTOLIC BLOOD PRESSURE: 143 MMHG

## 2025-04-10 DIAGNOSIS — E78.5 DYSLIPIDEMIA: ICD-10-CM

## 2025-04-10 DIAGNOSIS — J34.89 NASAL DRYNESS: ICD-10-CM

## 2025-04-10 DIAGNOSIS — Z00.00 MEDICARE ANNUAL WELLNESS VISIT, SUBSEQUENT: Primary | ICD-10-CM

## 2025-04-10 DIAGNOSIS — R82.81 PYURIA: ICD-10-CM

## 2025-04-10 DIAGNOSIS — Z71.1 CONCERN ABOUT MEMORY: ICD-10-CM

## 2025-04-10 DIAGNOSIS — M25.561 RIGHT KNEE PAIN, UNSPECIFIED CHRONICITY: ICD-10-CM

## 2025-04-10 DIAGNOSIS — Z00.01 ENCOUNTER FOR GENERAL ADULT MEDICAL EXAMINATION WITH ABNORMAL FINDINGS: Primary | ICD-10-CM

## 2025-04-10 DIAGNOSIS — N39.0 URINARY TRACT INFECTION WITHOUT HEMATURIA, SITE UNSPECIFIED: ICD-10-CM

## 2025-04-10 DIAGNOSIS — E03.9 HYPOTHYROIDISM, UNSPECIFIED TYPE: ICD-10-CM

## 2025-04-10 LAB — BACTERIA UR CULT: ABNORMAL

## 2025-04-10 RX ORDER — TRAMADOL HYDROCHLORIDE 50 MG/1
TABLET ORAL
COMMUNITY
Start: 2025-03-17

## 2025-04-10 RX ORDER — CHLORHEXIDINE GLUCONATE ORAL RINSE 1.2 MG/ML
SOLUTION DENTAL
COMMUNITY
Start: 2025-03-17 | End: 2025-04-10 | Stop reason: WASHOUT

## 2025-04-10 RX ORDER — CLINDAMYCIN HYDROCHLORIDE 150 MG/1
CAPSULE ORAL
COMMUNITY
Start: 2025-03-17 | End: 2025-04-10 | Stop reason: WASHOUT

## 2025-04-10 ASSESSMENT — PATIENT HEALTH QUESTIONNAIRE - PHQ9
8. MOVING OR SPEAKING SO SLOWLY THAT OTHER PEOPLE COULD HAVE NOTICED. OR THE OPPOSITE, BEING SO FIGETY OR RESTLESS THAT YOU HAVE BEEN MOVING AROUND A LOT MORE THAN USUAL: NOT AT ALL
9. THOUGHTS THAT YOU WOULD BE BETTER OFF DEAD, OR OF HURTING YOURSELF: NOT AT ALL
1. LITTLE INTEREST OR PLEASURE IN DOING THINGS: NOT AT ALL
SUM OF ALL RESPONSES TO PHQ9 QUESTIONS 1 & 2: 0
SUM OF ALL RESPONSES TO PHQ QUESTIONS 1-9: 1
4. FEELING TIRED OR HAVING LITTLE ENERGY: NOT AT ALL
6. FEELING BAD ABOUT YOURSELF - OR THAT YOU ARE A FAILURE OR HAVE LET YOURSELF OR YOUR FAMILY DOWN: NOT AT ALL
10. IF YOU CHECKED OFF ANY PROBLEMS, HOW DIFFICULT HAVE THESE PROBLEMS MADE IT FOR YOU TO DO YOUR WORK, TAKE CARE OF THINGS AT HOME, OR GET ALONG WITH OTHER PEOPLE: NOT DIFFICULT AT ALL
7. TROUBLE CONCENTRATING ON THINGS, SUCH AS READING THE NEWSPAPER OR WATCHING TELEVISION: SEVERAL DAYS
3. TROUBLE FALLING OR STAYING ASLEEP: NOT AT ALL
2. FEELING DOWN, DEPRESSED OR HOPELESS: NOT AT ALL
5. POOR APPETITE OR OVEREATING: NOT AT ALL

## 2025-04-10 ASSESSMENT — LIFESTYLE VARIABLES
HOW OFTEN DO YOU HAVE A DRINK CONTAINING ALCOHOL: 2-4 TIMES A MONTH
SKIP TO QUESTIONS 9-10: 1
HOW MANY STANDARD DRINKS CONTAINING ALCOHOL DO YOU HAVE ON A TYPICAL DAY: 1 OR 2
HOW OFTEN DO YOU HAVE SIX OR MORE DRINKS ON ONE OCCASION: NEVER
AUDIT-C TOTAL SCORE: 2

## 2025-04-10 ASSESSMENT — ENCOUNTER SYMPTOMS
LOSS OF SENSATION IN FEET: 1
DEPRESSION: 0
OCCASIONAL FEELINGS OF UNSTEADINESS: 0

## 2025-04-10 ASSESSMENT — PAIN SCALES - GENERAL
PAINLEVEL_OUTOF10: 0-NO PAIN
PAINLEVEL_OUTOF10: 0-NO PAIN

## 2025-04-10 NOTE — PROGRESS NOTES
"Mirta Hills is a 70 y.o. year old here for a Medicare Annual Wellness Visit     Health Risk Assessment    In general, health is:  better than on paper (same)      Concerns with balance:  no - trains w/ someone once a week and one of the things they work on     Concerns with teeth or dentures:  no     Concerns with sexual function:  no      Felt anxious, stressed, angry, irritable, lonely, isolated, or had thoughts of hurting themself:  no     Has little interest or pleasure in doing things:  no - volnteers at [employer], enjoys socializing, playing cards     Bothered by feeling down, depressed, or hopeless:  overall feels like \"I am missing a little of my spunk\" - had gone up on sertraline not long ago and w/ apparent benefit      Needs help with grocery shopping, cooking, housework, bathing, grooming, dressing, eating, sitting or standing, walking, using the toilet, handling finances, taking medications, using the telephone, or driving:  no     Following safety precautions in the home environment and vehicle: removed throw rugs from floors, installed grab bars in the bathroom, handrails in stairwells, having adequate lighting, wearing seatbelt at all times?:  yes     Smokes cigarettes, vapes, or chew tobacco:  no     Eats healthy foods including fruits, vegetables, whole grains, and fiber-rich foods:  yes     Number of days per week engages in exercise:  1-3     Average alcohol consumption:  1/2 glass a week if that        Current Providers  Specialists: I have reviewed specialist-related care of the patient in the medical record.  Gyn: Paulina (pelvic floor)  Onc: Mushtaq  Derm: Brandi  Endo: Lawanda  Ophjayeo: Kathy   Audio: [facility]   NORMS: Anselmo  Endodontist: Eden    Medical/Family history review  Reviewed and updated problem list, medical/surgical/family/social history, medications, and allergies.     Opioid use review  Patient use of opioids:  no      Depression screening  Depression Screening PHQ-2 " Score   Today  0         Depression screening tool completed and reviewed. Based on score and interview, patient is not at risk for depression. Screening tool discussed with patient, and I recommended no further intervention at this time.     Cognitive screening  Mini Cog Score:  5     Cognitive screening reviewed and plan:  follow - consider neurocog eval though given brain mets     Functional Observation  Was the patient's timed Up & Go test unsteady or >= 12 seconds?  <12     Advance Care Planning  End of Life planning discussed, including patient's advanced directive wishes:  yes - plans to bring in      Vision and Hearing assessment  Visual acuity (required for Welcome to Medicare):  sees Retinal specialist regularly  Hearing Evaluation:  sees audiologist     HPI  1) nasal dryness, typically notices in the morning -not altogether problematic but certainly annoying, question if she is a bigger problem  She has not tried something like a humidifier or even saline nasal spray   no significant allergy issues nor marked throat clearing or resp issue otherwise    2) ongoing issues with right knee  Is working with PT, just started  Has not really tried thermal therapy or a sleeve  Pain is mostly in the lateral aspect of the knee -does not connect with any back pain or hip pain    3) has some memory concerns  She did have brain mets with her lung cancer  Never feels she is very good with names, feels has gotten worse over time, reports more of a forgetfulness type of phenomenon and also some processing concerns -does not appear that she is really focused on it much - did ace the Mini-Cog     4) Here to follow-up lipids -denies chest pain, shortness of breath, dizziness, lightheadedness, hand or foot swelling that is new or different.  Taking and tolerating medications well.  Doing well with physical activity.    5) hypothyroidism, followed by Dr. Webber, no hyper or hypofunctioning symptoms reported otherwise     6)  "prevention  Due for mammogram this year (Dr Gallardo)  Bone density testing due after mid-May (Dr Gallardo)   Not due for colonoscopy until 2032     Review of Systems  Essentially noncontributory otherwise    Objective   /78 (BP Location: Left arm, Patient Position: Sitting, BP Cuff Size: Adult)   Pulse 80   Temp 36.7 °C (98 °F) (Temporal)   Ht 1.55 m (5' 1.02\")   Wt 48.3 kg (106 lb 6.4 oz)   SpO2 100%   BMI 20.09 kg/m²     Physical Exam  Vitals and nursing note reviewed.   Constitutional:       General: She is not in acute distress.     Appearance: She is not ill-appearing.   HENT:      Head: Normocephalic and atraumatic.      Right Ear: Tympanic membrane normal.      Left Ear: Tympanic membrane normal.      Mouth/Throat:      Pharynx: No posterior oropharyngeal erythema.   Eyes:      General: No scleral icterus.     Extraocular Movements: Extraocular movements intact.      Pupils: Pupils are equal, round, and reactive to light.   Cardiovascular:      Rate and Rhythm: Normal rate and regular rhythm.      Heart sounds: No murmur heard.  Pulmonary:      Breath sounds: Normal breath sounds. No wheezing or rhonchi.   Abdominal:      General: Bowel sounds are normal. There is no distension.      Palpations: Abdomen is soft.      Tenderness: There is no abdominal tenderness.   Musculoskeletal:         General: Normal range of motion.      Cervical back: Normal range of motion.      Right lower leg: No edema.      Left lower leg: No edema.      Comments: Some patellar crepitus with passive flexion/extension - no right hip pain with internal/external rotation   Lymphadenopathy:      Cervical: No cervical adenopathy.   Skin:     General: Skin is warm and dry.   Neurological:      Mental Status: She is alert and oriented to person, place, and time. Mental status is at baseline.      Motor: No weakness.      Coordination: Coordination normal.      Deep Tendon Reflexes: Reflexes normal.   Psychiatric:         Mood and " Affect: Mood normal.         Behavior: Behavior normal.         Thought Content: Thought content normal.         Judgment: Judgment normal.         Assessment/Plan   Problem List Items Addressed This Visit       Hypothyroidism     Other Visit Diagnoses       Encounter for general adult medical examination with abnormal findings    -  Primary    Nasal dryness        Right knee pain, unspecified chronicity        Concern about memory        Dyslipidemia            1) nasal dryness - trial saline    2) ongoing issues with right knee - cont with PT - enc trial of sleeve or brace (consult with PT)    3) has some memory concerns - follow -does not seem altogether problematic, but encouraged her to simply have more of an awareness and just discussed basic principles of what benefits that the most     4) Lipid profile good - cpm    5) hypothyroidism, followed by Dr. Lawanda bernal    6) prevention  Due for mammogram this year (Dr Gallardo)  Bone density testing due after mid-May (Dr Gallardo)   Not due for colonoscopy until 2032     7) UTI - Cx positive - will treat with cephalexin

## 2025-04-11 RX ORDER — CEPHALEXIN 500 MG/1
500 CAPSULE ORAL 2 TIMES DAILY
Qty: 14 CAPSULE | Refills: 0 | Status: SHIPPED | OUTPATIENT
Start: 2025-04-11 | End: 2025-04-18

## 2025-04-11 NOTE — PROGRESS NOTES
"Subjective   Patient ID: Mirta Hills is a 70 y.o. female who presents for Annual Exam ([facility]).  HPI  1) nasal dryness, typically notices in the morning -not altogether problematic but certainly annoying, question if she is a bigger problem  She has not tried something like a humidifier or even saline nasal spray   no significant allergy issues nor marked throat clearing or resp issue otherwise    2) ongoing issues with right knee  Is working with PT, just started  Has not really tried thermal therapy or a sleeve  Pain is mostly in the lateral aspect of the knee -does not connect with any back pain or hip pain    3) has some memory concerns  She did have brain mets with her lung cancer  Never feels she is very good with names, feels has gotten worse over time, reports more of a forgetfulness type of phenomenon and also some processing concerns -does not appear that she is really focused on it much - did ace the Mini-Cog     4) Here to follow-up lipids -denies chest pain, shortness of breath, dizziness, lightheadedness, hand or foot swelling that is new or different.  Taking and tolerating medications well.  Doing well with physical activity.    5) hypothyroidism, followed by Dr. Webber, no hyper or hypofunctioning symptoms reported otherwise     6) prevention  Due for mammogram this year (Dr Gallardo)  Bone density testing due after mid-May (Dr Gallardo)   Not due for colonoscopy until 2032     Review of Systems  Essentially noncontributory otherwise    Objective   /78 (BP Location: Left arm, Patient Position: Sitting, BP Cuff Size: Adult)   Pulse 80   Temp 36.7 °C (98 °F) (Temporal)   Ht 1.55 m (5' 1.02\")   Wt 48.3 kg (106 lb 6.4 oz)   SpO2 100%   BMI 20.09 kg/m²     Physical Exam  Vitals and nursing note reviewed.   Constitutional:       General: She is not in acute distress.     Appearance: She is not ill-appearing.   HENT:      Head: Normocephalic and atraumatic.      Right Ear: Tympanic " membrane normal.      Left Ear: Tympanic membrane normal.      Mouth/Throat:      Pharynx: No posterior oropharyngeal erythema.   Eyes:      General: No scleral icterus.     Extraocular Movements: Extraocular movements intact.      Pupils: Pupils are equal, round, and reactive to light.   Cardiovascular:      Rate and Rhythm: Normal rate and regular rhythm.      Heart sounds: No murmur heard.  Pulmonary:      Breath sounds: Normal breath sounds. No wheezing or rhonchi.   Abdominal:      General: Bowel sounds are normal. There is no distension.      Palpations: Abdomen is soft.      Tenderness: There is no abdominal tenderness.   Musculoskeletal:         General: Normal range of motion.      Cervical back: Normal range of motion.      Right lower leg: No edema.      Left lower leg: No edema.      Comments: Some patellar crepitus with passive flexion/extension - no right hip pain with internal/external rotation   Lymphadenopathy:      Cervical: No cervical adenopathy.   Skin:     General: Skin is warm and dry.   Neurological:      Mental Status: She is alert and oriented to person, place, and time. Mental status is at baseline.      Motor: No weakness.      Coordination: Coordination normal.      Deep Tendon Reflexes: Reflexes normal.   Psychiatric:         Mood and Affect: Mood normal.         Behavior: Behavior normal.         Thought Content: Thought content normal.         Judgment: Judgment normal.         Assessment/Plan   Problem List Items Addressed This Visit       Hypothyroidism     Other Visit Diagnoses       Encounter for general adult medical examination with abnormal findings    -  Primary    Nasal dryness        Right knee pain, unspecified chronicity        Concern about memory        Dyslipidemia            1) nasal dryness - trial saline    2) ongoing issues with right knee - cont with PT - enc trial of sleeve or brace (consult with PT)    3) has some memory concerns - follow -does not seem  altogether problematic, but encouraged her to simply have more of an awareness and just discussed basic principles of what benefits that the most     4) Lipid profile good - cpm    5) hypothyroidism, followed by Dr. Lawanda bernal    6) prevention  Due for mammogram this year (Dr Gallardo)  Bone density testing due after mid-May (Dr Gallardo)   Not due for colonoscopy until 2032     7) UTI - Cx positive - will treat with cephalexin

## 2025-04-12 DIAGNOSIS — R92.8 ABNORMAL MAMMOGRAM OF RIGHT BREAST: Primary | ICD-10-CM

## 2025-04-12 NOTE — PROGRESS NOTES
Reviewed patient's recent screening mammogram, some suspicion of finding in the right breast, however no suspicious wording used and suggest this likely related to dense breasts and/or cyst  She has had a call back before  No other concerning signs or symptoms reported  Did place order and patient will seek to schedule and we can offer assistance to do so if need be  Also discussed going forward consider doing MRI for screening purposes given her age and dense breast tissue

## 2025-04-14 ENCOUNTER — OFFICE VISIT (OUTPATIENT)
Dept: HEMATOLOGY/ONCOLOGY | Facility: HOSPITAL | Age: 70
End: 2025-04-14
Payer: MEDICARE

## 2025-04-14 ENCOUNTER — LAB (OUTPATIENT)
Dept: LAB | Facility: HOSPITAL | Age: 70
End: 2025-04-14
Payer: MEDICARE

## 2025-04-14 ENCOUNTER — INFUSION (OUTPATIENT)
Dept: HEMATOLOGY/ONCOLOGY | Facility: HOSPITAL | Age: 70
End: 2025-04-14
Payer: MEDICARE

## 2025-04-14 ENCOUNTER — HOSPITAL ENCOUNTER (OUTPATIENT)
Dept: RADIOLOGY | Facility: CLINIC | Age: 70
Discharge: HOME | End: 2025-04-14
Payer: MEDICARE

## 2025-04-14 VITALS
HEART RATE: 73 BPM | SYSTOLIC BLOOD PRESSURE: 129 MMHG | RESPIRATION RATE: 18 BRPM | WEIGHT: 107.58 LBS | TEMPERATURE: 98.1 F | BODY MASS INDEX: 20.31 KG/M2 | DIASTOLIC BLOOD PRESSURE: 64 MMHG | OXYGEN SATURATION: 100 %

## 2025-04-14 DIAGNOSIS — C34.90 MALIGNANT NEOPLASM OF LUNG, UNSPECIFIED LATERALITY, UNSPECIFIED PART OF LUNG (MULTI): ICD-10-CM

## 2025-04-14 DIAGNOSIS — R92.8 ABNORMAL MAMMOGRAM OF RIGHT BREAST: ICD-10-CM

## 2025-04-14 LAB
CK SERPL-CCNC: 45 U/L (ref 0–215)
MAGNESIUM SERPL-MCNC: 2.28 MG/DL (ref 1.6–2.4)

## 2025-04-14 PROCEDURE — 96413 CHEMO IV INFUSION 1 HR: CPT

## 2025-04-14 PROCEDURE — 82550 ASSAY OF CK (CPK): CPT

## 2025-04-14 PROCEDURE — 77061 BREAST TOMOSYNTHESIS UNI: CPT | Mod: RT

## 2025-04-14 PROCEDURE — 99214 OFFICE O/P EST MOD 30 MIN: CPT | Performed by: CLINICAL NURSE SPECIALIST

## 2025-04-14 PROCEDURE — 36415 COLL VENOUS BLD VENIPUNCTURE: CPT

## 2025-04-14 PROCEDURE — 83735 ASSAY OF MAGNESIUM: CPT

## 2025-04-14 PROCEDURE — 2560000001 HC RX 256 EXPERIMENTAL DRUGS: Mod: TB | Performed by: INTERNAL MEDICINE

## 2025-04-14 RX ORDER — EPINEPHRINE 0.3 MG/.3ML
0.3 INJECTION SUBCUTANEOUS EVERY 5 MIN PRN
Status: DISCONTINUED | OUTPATIENT
Start: 2025-04-14 | End: 2025-04-14 | Stop reason: HOSPADM

## 2025-04-14 RX ORDER — DIPHENHYDRAMINE HYDROCHLORIDE 50 MG/ML
50 INJECTION, SOLUTION INTRAMUSCULAR; INTRAVENOUS AS NEEDED
Status: DISCONTINUED | OUTPATIENT
Start: 2025-04-14 | End: 2025-04-14 | Stop reason: HOSPADM

## 2025-04-14 RX ORDER — PROCHLORPERAZINE EDISYLATE 5 MG/ML
10 INJECTION INTRAMUSCULAR; INTRAVENOUS EVERY 6 HOURS PRN
Status: DISCONTINUED | OUTPATIENT
Start: 2025-04-14 | End: 2025-04-14 | Stop reason: HOSPADM

## 2025-04-14 RX ORDER — FAMOTIDINE 10 MG/ML
20 INJECTION, SOLUTION INTRAVENOUS ONCE AS NEEDED
Status: DISCONTINUED | OUTPATIENT
Start: 2025-04-14 | End: 2025-04-14 | Stop reason: HOSPADM

## 2025-04-14 RX ORDER — PROCHLORPERAZINE MALEATE 10 MG
10 TABLET ORAL EVERY 6 HOURS PRN
Status: DISCONTINUED | OUTPATIENT
Start: 2025-04-14 | End: 2025-04-14 | Stop reason: HOSPADM

## 2025-04-14 RX ORDER — ALBUTEROL SULFATE 0.83 MG/ML
3 SOLUTION RESPIRATORY (INHALATION) AS NEEDED
Status: DISCONTINUED | OUTPATIENT
Start: 2025-04-14 | End: 2025-04-14 | Stop reason: HOSPADM

## 2025-04-14 RX ADMIN — Medication 757.5 MG: at 13:49

## 2025-04-14 ASSESSMENT — PAIN SCALES - GENERAL: PAINLEVEL_OUTOF10: 0-NO PAIN

## 2025-04-14 NOTE — PROGRESS NOTES
Patient arrives to Infusion for her Study Bevacizumab. She tolerated her treatment without any issue. She was discharged stable.

## 2025-04-15 ASSESSMENT — ENCOUNTER SYMPTOMS
SWOLLEN GLANDS: 0
FATIGUE: 0
NECK PAIN: 0
VOMITING: 0
ABDOMINAL PAIN: 0
ANOREXIA: 0
CHILLS: 0
JOINT SWELLING: 0
HEADACHES: 0
WEAKNESS: 0
SORE THROAT: 0
VERTIGO: 0
NAUSEA: 0
VISUAL CHANGE: 0
FEVER: 0
COUGH: 0
ARTHRALGIAS: 0
CHANGE IN BOWEL HABIT: 1
MYALGIAS: 0
DIAPHORESIS: 0
NUMBNESS: 0

## 2025-04-15 NOTE — PROGRESS NOTES
Patient ID: Mirta Hills is a 70 y.o. female.    DIAGNOSIS     Adenocarcinoma of the lung.  Date of diagnosis is [place] 15, 2023 from a level 7 lymph node biopsy/fine-needle aspiration.  Immunohistochemistry positive for TTF-1.        STAGING     Clinical T2a (right lower lobe mass measuring 3.6 cm), clinical N2 (PET positivity and subcarinal region), M1C disease (presence of brain metastases, ribs, right acetabulum, liver, brain), stage IVb disease        CURRENT SITES OF DISEASE     Right lower lobe, right hilum of the lung, ipsilateral mediastinum, liver, bones, brain        MOLECULAR GENOMICS     TEST: Focused Solid Tumor DNA/RNA Panel   SPECIMEN: Supernatant, LYMPH NODE 7, [id number] A   DISEASE DIAGNOSIS: Adenocarcinoma   Estimated Tumor Content: 40%   COLLECTION DATE: 6/15/2023     MICROSATELLITE STATUS: Microsatellite  Instability-High (MSI-H) is NOT DETECTED.     DISEASE ASSOCIATED GENOMIC FINDINGS:   EGFR p.[id number] ([id number] c.[id number])    TP53 p.N247I (NM_[id number] c.740A>T)     DISEASE RELEVANT ALTERATIONS NOT DETECTED:   Negative for ALK fusion.   Negative for BRAF V600E.   Negative for ERBB2 activating mutation   Negative for KRAS G12C.   Negative for  MET exon 14 skipping mutation.   Negative for NTRK fusion.   Negative for RET fusion.   Negative for ROS1 fusion.        Circulating tumor DNA sent on June 19, 2023  EGFR p.[id number], Variant allelic fraction of 1.6%  TP53 N247I Missense variant, loss of function, variant allelic fraction 1.1%  MSI stable        SERUM TUMOR MARKER     Slightly elevated CEA and CA 19.9 June 2023        PRIOR THERAPY     1- Gamma knife radiosurgery to brain lesions completed on June 28, 2023.        CURRENT THERAPY     1- Osimertinib on study  on clinical trial EA 5182 which is a randomized trial of osimertinib with or without bevacizumab.  She has been randomized to the bevacizumab arm- Cycle 1 Day 1 is 7/12/2023     2-brain metastases, last follow-up MRI  December 9, 2024, no progression       CURRENT ONCOLOGICAL PROBLEMS     1-brain metastases, symptomatic, word finding difficulties, mild unsteadiness on her feet but no falls, Resolved July 3, 2023  2-anterior chest pain, increases with deep inspiration, Improved July 3, 2023  3-mild intermittent grade 1 diarrhea, mild hair loss, mild leukopenia, mild elevation in creatinine, mild fatigue.  Most likely all osimertinib related August 7, 2023  4-fluctuating weight over the past year since late 2023 ranging between 110 and 106 pounds.  Down to 104 pounds December 23, 2024.  Discussed increasing caloric intake.          HISTORY OF PRESENT ILLNESS     This is a 70-year-old patient.  She states that towards Thanksgiving of 2022 she started feeling some pain in her right shoulder.  Eventually got better however the same pain came back in January or February 2023.  Ultimately some x-rays were done of  the shoulder that were generally negative.  She then developed some pain in her sternum a month or 2 later with deep inspiration and ultimately underwent imaging in May 2023.  Chest CT without contrast was done on June 1, 2023.  This showed a 3.4 cm mass  within the superior segment of the right lower lobe with some associated satellite pulmonary nodules.  Additionally there were some other subcentimeter pulmonary nodules.  There was evidence of mediastinal lymphadenopathy.  Subcarinal lymph node measured  1.4 cm in short axis.She was seen by pulmonary medicine on June 5, 2023 a combined PET/CT was ordered on June 6, 2023 showing a relatively intense area of hypermetabolic activity along the superior posteromedial aspect of the right lower lobe corresponding  to the known right lower lobe lung mass.  There was foci of hypermetabolic activity in the right supraclavicular region, subcarinal and right hilar regions.  There was area of uptake within the posterior upper ribs as well as right acetabulum suspicious  for osseous  metastatic disease.  There was a punctate area of mild hypermetabolic activity in the right hepatic lobe concerning for hepatic metastases.  Small focus in the subcutaneous region just anterior to the lower aspect of the sternum could be inflammatory  versus metastatic.  She undergoes a bronchoscopy dated [place] 15, 2023.  There were no endobronchial lesions.  Primary cytology with rapid onsite evaluation was suggestive of malignancy in level 7, final lymph nodes were pathology was pending.  MRI of the  brain with and without contrast dated June 16, 2023 showed approximately 20 enhancing nodules concerning for metastatic disease.  Specifically there was a 1.5 cm nodule in the right cerebellar hemisphere.  Most of the other nodules were less than 1 cm.   Patient also notes that she has some difficulty expressing words over the past 1 to 2 weeks prior to her first visit with us as well as some mild unsteadiness on her legs where she feels she is drifting towards the right side.        PAST MEDICAL HISTORY     1-hypothyroidism  2-hysterectomy 1988  3-abdominal hernia surgery 1995  4-some hearing difficulties        SOCIAL HISTORY     Patient lives with her significant other.  She has 2 daughter.  [facility]  Smoked for only 3 years during college and during this time it was a pack per day.  She has retired.  She had a retail store.        CURRENT MEDS     See medication list, meds reviewed        ALLERGIES     Patient denies any drug allergies        FAMILY HISTORY     Patient's father had bladder cancer at the age of 86.  She has 1 brother with no cancer.  She has 2 children             Subjective    Today I am meeting with Mirta.  Joint pain seems better.   No new problems.      Cancer  Associated symptoms include a change in bowel habit. Pertinent negatives include no abdominal pain, anorexia, arthralgias, chest pain, chills, congestion, coughing, diaphoresis, fatigue, fever, headaches, joint swelling,  myalgias, nausea, neck pain, numbness, rash, sore throat, swollen glands, urinary symptoms, vertigo, visual change, vomiting or weakness.     Objective    BSA: 1.45 meters squared  /64 (BP Location: Left arm, Patient Position: Sitting, BP Cuff Size: Small adult)   Pulse 73   Temp 36.7 °C (98.1 °F) (Temporal)   Resp 18   Wt 48.8 kg (107 lb 9.4 oz)   SpO2 100%   BMI 20.31 kg/m²      Physical Exam  Constitutional:       General: She is not in acute distress.     Appearance: Normal appearance. She is not ill-appearing, toxic-appearing or diaphoretic.   HENT:      Head: Normocephalic.      Nose: No congestion or rhinorrhea.      Mouth/Throat:      Pharynx: No oropharyngeal exudate or posterior oropharyngeal erythema.   Eyes:      General: No scleral icterus.     Conjunctiva/sclera: Conjunctivae normal.   Cardiovascular:      Rate and Rhythm: Normal rate and regular rhythm.      Pulses: Normal pulses.      Heart sounds: Normal heart sounds. No murmur heard.     No friction rub. No gallop.   Pulmonary:      Effort: Pulmonary effort is normal. No respiratory distress.      Breath sounds: Normal breath sounds. No stridor. No wheezing, rhonchi or rales.   Chest:      Chest wall: No tenderness.   Abdominal:      General: Abdomen is flat. Bowel sounds are normal. There is no distension.      Palpations: Abdomen is soft. There is no mass.      Tenderness: There is no abdominal tenderness. There is no guarding or rebound.   Musculoskeletal:      Cervical back: No tenderness.      Right lower leg: No edema.      Left lower leg: No edema.   Lymphadenopathy:      Cervical: No cervical adenopathy.   Skin:     General: Skin is warm.      Coloration: Skin is not jaundiced.   Neurological:      General: No focal deficit present.      Mental Status: She is oriented to person, place, and time.         Performance Status:  Asymptomatic    === 02/13/25 ===    CT CHEST ABDOMEN PELVIS W IV CONTRAST    - Impression -  1.  Stable  metastatic disease burden without evidence of new disease.  2. Remaining findings as described above.      I personally reviewed the images/study and I agree with the findings  as stated by Jerson Thurman MD (PGY-2). This study was interpreted at  [facility], [place], [place].    MACRO:  None    Signed by: Alvaro Ayon 2/14/2025 5:22 PM  Dictation workstation:   [id number]     === 02/14/25 ===    MR BRAIN W AND WO CONTRAST    - Impression -  Stable MR appearance of the brain. No abnormal intracranial  enhancement to suggest active intracranial metastatic disease/disease  progression. No interval acute intracranial process.      MACRO:  None    Signed by: Antonio Tello 2/17/2025 11:41 AM  Dictation workstation:   [id number]     Assessment/Plan     This is a very nice 70-year-old patient with the EGFR mutation positive adenocarcinoma of the lung.  She was diagnosed over a year and a half ago on osimertinib with the bevacizumab on a clinical trial.  She has no evidence of disease progression, intermittent grade 1 diarrhea.  Blood pressure is good.  No new problems.  We will continue on study, with treatment today.         Cancer Staging   No matching staging information was found for the patient.      Oncology History   Lung cancer (Multi)   7/12/2023 -  Research Study Participant    ([facility]) [id number] Arm B - Bevacizumab / Osimertinib, 21 Day Cycles  Plan Provider: Humberto Landin MD  Treatment goal: [No plan goal]  Line of treatment: [No plan line of treatment]  Associated studies: [id number] (Osimertinib) +/- Bevacizumab as Initial Treatment for EGFR-Mutant Lung Cancer     9/1/2023 Initial Diagnosis    Lung cancer (CMS/HCC)                   GONZALO Medrano-CNS

## 2025-04-16 ENCOUNTER — HOSPITAL ENCOUNTER (OUTPATIENT)
Dept: RADIOLOGY | Facility: HOSPITAL | Age: 70
Discharge: HOME | End: 2025-04-16
Payer: MEDICARE

## 2025-04-16 DIAGNOSIS — C34.90 MALIGNANT NEOPLASM OF LUNG, UNSPECIFIED LATERALITY, UNSPECIFIED PART OF LUNG (MULTI): ICD-10-CM

## 2025-04-16 PROCEDURE — 2550000001 HC RX 255 CONTRASTS: Performed by: INTERNAL MEDICINE

## 2025-04-16 PROCEDURE — A9575 INJ GADOTERATE MEGLUMI 0.1ML: HCPCS | Performed by: INTERNAL MEDICINE

## 2025-04-16 PROCEDURE — 70553 MRI BRAIN STEM W/O & W/DYE: CPT

## 2025-04-16 PROCEDURE — 74177 CT ABD & PELVIS W/CONTRAST: CPT

## 2025-04-16 RX ORDER — GADOTERATE MEGLUMINE 376.9 MG/ML
9 INJECTION INTRAVENOUS
Status: COMPLETED | OUTPATIENT
Start: 2025-04-16 | End: 2025-04-16

## 2025-04-16 RX ADMIN — GADOTERATE MEGLUMINE 9 ML: 376.9 INJECTION INTRAVENOUS at 14:48

## 2025-04-16 RX ADMIN — IOHEXOL 75 ML: 350 INJECTION, SOLUTION INTRAVENOUS at 15:25

## 2025-04-18 ENCOUNTER — TELEPHONE (OUTPATIENT)
Dept: OBSTETRICS AND GYNECOLOGY | Facility: HOSPITAL | Age: 70
End: 2025-04-18
Payer: MEDICARE

## 2025-04-21 ENCOUNTER — TELEPHONE (OUTPATIENT)
Dept: OBSTETRICS AND GYNECOLOGY | Facility: CLINIC | Age: 70
End: 2025-04-21
Payer: MEDICARE

## 2025-04-23 ENCOUNTER — HOSPITAL ENCOUNTER (OUTPATIENT)
Dept: RADIOLOGY | Facility: CLINIC | Age: 70
End: 2025-04-23
Payer: MEDICARE

## 2025-04-30 LAB
APPEARANCE UR: CLEAR
BACTERIA #/AREA URNS HPF: ABNORMAL /HPF
BACTERIA UR CULT: ABNORMAL
BILIRUB UR QL STRIP: NEGATIVE
COLOR UR: YELLOW
GLUCOSE UR QL STRIP: NEGATIVE
HGB UR QL STRIP: NEGATIVE
HYALINE CASTS #/AREA URNS LPF: ABNORMAL /LPF
KETONES UR QL STRIP: ABNORMAL
LEUKOCYTE ESTERASE UR QL STRIP: ABNORMAL
NITRITE UR QL STRIP: NEGATIVE
PH UR STRIP: 5.5 [PH] (ref 5–8)
PROT UR QL STRIP: ABNORMAL
RBC #/AREA URNS HPF: ABNORMAL /HPF
SERVICE CMNT-IMP: ABNORMAL
SP GR UR STRIP: 1.02 (ref 1–1.03)
SQUAMOUS #/AREA URNS HPF: ABNORMAL /HPF
WBC #/AREA URNS HPF: ABNORMAL /HPF

## 2025-05-01 NOTE — TELEPHONE ENCOUNTER
PT HAS APPT FOR RA 6/5/25 REQUESTING BONE DENSITY ORDER.     TC with pt. We will discuss and order at upcoming visit if needed.

## 2025-05-04 DIAGNOSIS — C34.90 MALIGNANT NEOPLASM OF LUNG, UNSPECIFIED LATERALITY, UNSPECIFIED PART OF LUNG (MULTI): ICD-10-CM

## 2025-05-05 ENCOUNTER — LAB (OUTPATIENT)
Dept: LAB | Facility: HOSPITAL | Age: 70
End: 2025-05-05
Payer: MEDICARE

## 2025-05-05 ENCOUNTER — OFFICE VISIT (OUTPATIENT)
Dept: HEMATOLOGY/ONCOLOGY | Facility: HOSPITAL | Age: 70
End: 2025-05-05
Payer: MEDICARE

## 2025-05-05 ENCOUNTER — EDUCATION (OUTPATIENT)
Dept: HEMATOLOGY/ONCOLOGY | Facility: HOSPITAL | Age: 70
End: 2025-05-05
Payer: MEDICARE

## 2025-05-05 VITALS
SYSTOLIC BLOOD PRESSURE: 128 MMHG | BODY MASS INDEX: 20.27 KG/M2 | TEMPERATURE: 97.9 F | RESPIRATION RATE: 18 BRPM | DIASTOLIC BLOOD PRESSURE: 65 MMHG | OXYGEN SATURATION: 96 % | HEART RATE: 82 BPM | WEIGHT: 107.36 LBS

## 2025-05-05 DIAGNOSIS — C34.90 MALIGNANT NEOPLASM OF LUNG, UNSPECIFIED LATERALITY, UNSPECIFIED PART OF LUNG (MULTI): ICD-10-CM

## 2025-05-05 DIAGNOSIS — C34.90 MALIGNANT NEOPLASM OF LUNG, UNSPECIFIED LATERALITY, UNSPECIFIED PART OF LUNG (MULTI): Primary | ICD-10-CM

## 2025-05-05 LAB
ALBUMIN SERPL BCP-MCNC: 4.3 G/DL (ref 3.4–5)
ALP SERPL-CCNC: 60 U/L (ref 33–136)
ALT SERPL W P-5'-P-CCNC: 16 U/L (ref 7–45)
ANION GAP SERPL CALC-SCNC: 14 MMOL/L (ref 10–20)
AST SERPL W P-5'-P-CCNC: 20 U/L (ref 9–39)
BASOPHILS # BLD AUTO: 0.03 X10*3/UL (ref 0–0.1)
BASOPHILS NFR BLD AUTO: 0.7 %
BILIRUB SERPL-MCNC: 0.6 MG/DL (ref 0–1.2)
BUN SERPL-MCNC: 26 MG/DL (ref 6–23)
CALCIUM SERPL-MCNC: 9.4 MG/DL (ref 8.6–10.3)
CHLORIDE SERPL-SCNC: 105 MMOL/L (ref 98–107)
CK SERPL-CCNC: 52 U/L (ref 0–215)
CO2 SERPL-SCNC: 28 MMOL/L (ref 21–32)
CREAT SERPL-MCNC: 1.15 MG/DL (ref 0.5–1.05)
EGFRCR SERPLBLD CKD-EPI 2021: 51 ML/MIN/1.73M*2
EOSINOPHIL # BLD AUTO: 0.07 X10*3/UL (ref 0–0.4)
EOSINOPHIL NFR BLD AUTO: 1.5 %
ERYTHROCYTE [DISTWIDTH] IN BLOOD BY AUTOMATED COUNT: 14.3 % (ref 11.5–14.5)
GLUCOSE SERPL-MCNC: 83 MG/DL (ref 74–99)
HCT VFR BLD AUTO: 45.2 % (ref 36–46)
HGB BLD-MCNC: 14.3 G/DL (ref 12–16)
IMM GRANULOCYTES # BLD AUTO: 0.02 X10*3/UL (ref 0–0.5)
IMM GRANULOCYTES NFR BLD AUTO: 0.4 % (ref 0–0.9)
LYMPHOCYTES # BLD AUTO: 0.6 X10*3/UL (ref 0.8–3)
LYMPHOCYTES NFR BLD AUTO: 13.1 %
MAGNESIUM SERPL-MCNC: 2.1 MG/DL (ref 1.6–2.4)
MCH RBC QN AUTO: 30.1 PG (ref 26–34)
MCHC RBC AUTO-ENTMCNC: 31.6 G/DL (ref 32–36)
MCV RBC AUTO: 95 FL (ref 80–100)
MONOCYTES # BLD AUTO: 0.27 X10*3/UL (ref 0.05–0.8)
MONOCYTES NFR BLD AUTO: 5.9 %
NEUTROPHILS # BLD AUTO: 3.58 X10*3/UL (ref 1.6–5.5)
NEUTROPHILS NFR BLD AUTO: 78.4 %
NRBC BLD-RTO: 0 /100 WBCS (ref 0–0)
PLATELET # BLD AUTO: 164 X10*3/UL (ref 150–450)
POTASSIUM SERPL-SCNC: 4.5 MMOL/L (ref 3.5–5.3)
PROT SERPL-MCNC: 6.5 G/DL (ref 6.4–8.2)
RBC # BLD AUTO: 4.75 X10*6/UL (ref 4–5.2)
SODIUM SERPL-SCNC: 142 MMOL/L (ref 136–145)
WBC # BLD AUTO: 4.6 X10*3/UL (ref 4.4–11.3)

## 2025-05-05 PROCEDURE — 84443 ASSAY THYROID STIM HORMONE: CPT | Performed by: INTERNAL MEDICINE

## 2025-05-05 PROCEDURE — 99215 OFFICE O/P EST HI 40 MIN: CPT | Performed by: INTERNAL MEDICINE

## 2025-05-05 PROCEDURE — 82550 ASSAY OF CK (CPK): CPT

## 2025-05-05 PROCEDURE — 83735 ASSAY OF MAGNESIUM: CPT

## 2025-05-05 PROCEDURE — 84481 FREE ASSAY (FT-3): CPT | Performed by: INTERNAL MEDICINE

## 2025-05-05 PROCEDURE — 85025 COMPLETE CBC W/AUTO DIFF WBC: CPT

## 2025-05-05 PROCEDURE — 84439 ASSAY OF FREE THYROXINE: CPT | Performed by: INTERNAL MEDICINE

## 2025-05-05 PROCEDURE — 80053 COMPREHEN METABOLIC PANEL: CPT

## 2025-05-05 RX ORDER — FAMOTIDINE 10 MG/ML
20 INJECTION, SOLUTION INTRAVENOUS ONCE AS NEEDED
Status: CANCELLED | OUTPATIENT
Start: 2025-05-05

## 2025-05-05 RX ORDER — EPINEPHRINE 0.3 MG/.3ML
0.3 INJECTION SUBCUTANEOUS EVERY 5 MIN PRN
Status: CANCELLED | OUTPATIENT
Start: 2025-05-05

## 2025-05-05 RX ORDER — DIPHENHYDRAMINE HYDROCHLORIDE 50 MG/ML
50 INJECTION, SOLUTION INTRAMUSCULAR; INTRAVENOUS AS NEEDED
Status: CANCELLED | OUTPATIENT
Start: 2025-05-05

## 2025-05-05 RX ORDER — PROCHLORPERAZINE EDISYLATE 5 MG/ML
10 INJECTION INTRAMUSCULAR; INTRAVENOUS EVERY 6 HOURS PRN
Status: CANCELLED | OUTPATIENT
Start: 2025-05-05

## 2025-05-05 RX ORDER — ALBUTEROL SULFATE 0.83 MG/ML
3 SOLUTION RESPIRATORY (INHALATION) AS NEEDED
Status: CANCELLED | OUTPATIENT
Start: 2025-05-05

## 2025-05-05 RX ORDER — PROCHLORPERAZINE MALEATE 10 MG
10 TABLET ORAL EVERY 6 HOURS PRN
Status: CANCELLED | OUTPATIENT
Start: 2025-05-05

## 2025-05-05 ASSESSMENT — PAIN SCALES - GENERAL: PAINLEVEL_OUTOF10: 0-NO PAIN

## 2025-05-05 NOTE — PROGRESS NOTES
Patient ID: Mirta Hills is a 70 y.o. female.    DIAGNOSIS     Adenocarcinoma of the lung.  Date of diagnosis is [place] 15, 2023 from a level 7 lymph node biopsy/fine-needle aspiration.  Immunohistochemistry positive for TTF-1.        STAGING     Clinical T2a (right lower lobe mass measuring 3.6 cm), clinical N2 (PET positivity and subcarinal region), M1C disease (presence of brain metastases, ribs, right acetabulum, liver, brain), stage IVb disease        CURRENT SITES OF DISEASE     Right lower lobe, right hilum of the lung, ipsilateral mediastinum, liver, bones, brain        MOLECULAR GENOMICS     TEST: Focused Solid Tumor DNA/RNA Panel   SPECIMEN: Supernatant, LYMPH NODE 7, [id number] A   DISEASE DIAGNOSIS: Adenocarcinoma   Estimated Tumor Content: 40%   COLLECTION DATE: 6/15/2023     MICROSATELLITE STATUS: Microsatellite  Instability-High (MSI-H) is NOT DETECTED.     DISEASE ASSOCIATED GENOMIC FINDINGS:   EGFR p.[id number] ([id number] c.[id number])    TP53 p.N247I (NM_[id number] c.740A>T)     DISEASE RELEVANT ALTERATIONS NOT DETECTED:   Negative for ALK fusion.   Negative for BRAF V600E.   Negative for ERBB2 activating mutation   Negative for KRAS G12C.   Negative for  MET exon 14 skipping mutation.   Negative for NTRK fusion.   Negative for RET fusion.   Negative for ROS1 fusion.        Circulating tumor DNA sent on June 19, 2023  EGFR p.[id number], Variant allelic fraction of 1.6%  TP53 N247I Missense variant, loss of function, variant allelic fraction 1.1%  MSI stable        SERUM TUMOR MARKER     Slightly elevated CEA and CA 19.9 June 2023        PRIOR THERAPY     1- Gamma knife radiosurgery to brain lesions completed on June 28, 2023.        CURRENT THERAPY     1- Osimertinib on study  on clinical trial EA 5182 which is a randomized trial of osimertinib with or without bevacizumab.  She has been randomized to the bevacizumab arm- Cycle 1 Day 1 is 7/12/2023     2-brain metastases, last follow-up MRI  December 9, 2024, no progression        CURRENT ONCOLOGICAL PROBLEMS     1-brain metastases, symptomatic, word finding difficulties, mild unsteadiness on her feet but no falls, Resolved July 3, 2023  2-anterior chest pain, increases with deep inspiration, Improved July 3, 2023  3-mild intermittent grade 1 diarrhea, mild hair loss, mild leukopenia, mild elevation in creatinine, mild fatigue.  Most likely all osimertinib related August 7, 2023  4-fluctuating weight over the past year since late 2023 ranging between 110 and 106 pounds.  Down to 104 pounds December 23, 2024.  Discussed increasing caloric intake.          HISTORY OF PRESENT ILLNESS     This is a 70-year-old patient.  She states that towards Thanksgiving of 2022 she started feeling some pain in her right shoulder.  Eventually got better however the same pain came back in January or February 2023.  Ultimately some x-rays were done of  the shoulder that were generally negative.  She then developed some pain in her sternum a month or 2 later with deep inspiration and ultimately underwent imaging in May 2023.  Chest CT without contrast was done on June 1, 2023.  This showed a 3.4 cm mass  within the superior segment of the right lower lobe with some associated satellite pulmonary nodules.  Additionally there were some other subcentimeter pulmonary nodules.  There was evidence of mediastinal lymphadenopathy.  Subcarinal lymph node measured  1.4 cm in short axis.She was seen by pulmonary medicine on June 5, 2023 a combined PET/CT was ordered on June 6, 2023 showing a relatively intense area of hypermetabolic activity along the superior posteromedial aspect of the right lower lobe corresponding  to the known right lower lobe lung mass.  There was foci of hypermetabolic activity in the right supraclavicular region, subcarinal and right hilar regions.  There was area of uptake within the posterior upper ribs as well as right acetabulum suspicious  for osseous  metastatic disease.  There was a punctate area of mild hypermetabolic activity in the right hepatic lobe concerning for hepatic metastases.  Small focus in the subcutaneous region just anterior to the lower aspect of the sternum could be inflammatory  versus metastatic.  She undergoes a bronchoscopy dated [place] 15, 2023.  There were no endobronchial lesions.  Primary cytology with rapid onsite evaluation was suggestive of malignancy in level 7, final lymph nodes were pathology was pending.  MRI of the  brain with and without contrast dated June 16, 2023 showed approximately 20 enhancing nodules concerning for metastatic disease.  Specifically there was a 1.5 cm nodule in the right cerebellar hemisphere.  Most of the other nodules were less than 1 cm.   Patient also notes that she has some difficulty expressing words over the past 1 to 2 weeks prior to her first visit with us as well as some mild unsteadiness on her legs where she feels she is drifting towards the right side.        PAST MEDICAL HISTORY     1-hypothyroidism  2-hysterectomy 1988  3-abdominal hernia surgery 1995  4-some hearing difficulties        SOCIAL HISTORY     Patient lives with her significant other.  She has 2 daughter.  L[facility] in [facility].  Smoked for only 3 years during college and during this time it was a pack per day.  She has retired.  She had a retail store.        CURRENT MEDS     See medication list, meds reviewed        ALLERGIES     Patient denies any drug allergies        FAMILY HISTORY     Patient's father had bladder cancer at the age of 86.  She has 1 brother with no cancer.  She has 2 children          Subjective    HPI      Objective    BSA: 1.45 meters squared  /65 (BP Location: Left arm, Patient Position: Sitting, BP Cuff Size: Small adult)   Pulse 82   Temp 36.6 °C (97.9 °F) (Temporal)   Resp 18   Wt 48.7 kg (107 lb 5.8 oz)   SpO2 96%   BMI 20.27 kg/m²      Physical Exam    Performance Status:  {ECOG performance  status:41042}     Latest Reference Range & Units 05/05/25 14:07   GLUCOSE 74 - 99 mg/dL 83   SODIUM 136 - 145 mmol/L 142   POTASSIUM 3.5 - 5.3 mmol/L 4.5   CHLORIDE 98 - 107 mmol/L 105   Bicarbonate 21 - 32 mmol/L 28   Anion Gap 10 - 20 mmol/L 14   Blood Urea Nitrogen 6 - 23 mg/dL 26 (H)   Creatinine 0.50 - 1.05 mg/dL 1.15 (H)   EGFR >60 mL/min/1.73m*2 51 (L)   Calcium 8.6 - 10.3 mg/dL 9.4   Albumin 3.4 - 5.0 g/dL 4.3   Alkaline Phosphatase 33 - 136 U/L 60   ALT 7 - 45 U/L 16   AST 9 - 39 U/L 20   Bilirubin Total 0.0 - 1.2 mg/dL 0.6   Total Protein 6.4 - 8.2 g/dL 6.5   MAGNESIUM 1.60 - 2.40 mg/dL 2.10   WBC 4.4 - 11.3 x10*3/uL 4.6   nRBC 0.0 - 0.0 /100 WBCs 0.0   RBC 4.00 - 5.20 x10*6/uL 4.75   HEMOGLOBIN 12.0 - 16.0 g/dL 14.3   HEMATOCRIT 36.0 - 46.0 % 45.2   MCV 80 - 100 fL 95   MCH 26.0 - 34.0 pg 30.1   MCHC 32.0 - 36.0 g/dL 31.6 (L)   RED CELL DISTRIBUTION WIDTH 11.5 - 14.5 % 14.3   Platelets 150 - 450 x10*3/uL 164   Neutrophils % 40.0 - 80.0 % 78.4   Immature Granulocytes %, Automated 0.0 - 0.9 % 0.4   Lymphocytes % 13.0 - 44.0 % 13.1   Monocytes % 2.0 - 10.0 % 5.9   Eosinophils % 0.0 - 6.0 % 1.5   Basophils % 0.0 - 2.0 % 0.7   Neutrophils Absolute 1.60 - 5.50 x10*3/uL 3.58   Immature Granulocytes Absolute, Automated 0.00 - 0.50 x10*3/uL 0.02   Lymphocytes Absolute 0.80 - 3.00 x10*3/uL 0.60 (L)   Monocytes Absolute 0.05 - 0.80 x10*3/uL 0.27   Eosinophils Absolute 0.00 - 0.40 x10*3/uL 0.07   Basophils Absolute 0.00 - 0.10 x10*3/uL 0.03   (H): Data is abnormally high  (L): Data is abnormally low    CT CHEST ABDOMEN PELVIS W IV CONTRAST; 4/16/2025   IMPRESSION:  Adenocarcinoma of the lung restaging scan, in comparison to prior CT  from February 2025:  No significant interval change with no evidence of new disease in the  chest, abdomen and pelvis. Right lower lobe perifissural lesion,  subcarinal lymph node, hepatic lesion, and osseous metastasis remains  stable. Additional chronic and incidental findings as  detailed above.      MR BRAIN W AND WO IV CONTRAST; 4/16/2025   IMPRESSION  * There is no evidence of mass, acute cerebral infarction or acute  hemorrhage.      Assessment/Plan       Cancer Staging   No matching staging information was found for the patient.      Oncology History   Lung cancer (Multi)   7/12/2023 -  Research Study Participant    ([facility]) [id number] Arm B - Bevacizumab / Osimertinib, 21 Day Cycles  Plan Provider: Humberto Landin MD  Treatment goal: [No plan goal]  Line of treatment: [No plan line of treatment]  Associated studies: [id number] (Osimertinib) +/- Bevacizumab as Initial Treatment for EGFR-Mutant Lung Cancer     9/1/2023 Initial Diagnosis    Lung cancer (CMS/HCC)          {Assess/PlanSmartLinks:70192}         Humberto Landin MD             as detailed above.      MR BRAIN W AND WO IV CONTRAST; 4/16/2025   IMPRESSION  * There is no evidence of mass, acute cerebral infarction or acute  hemorrhage.      Assessment/Plan     This is a 70-year-old patient with EGFR exon 19 mutation positive adenocarcinoma of the lung on an [facility] trial of osimertinib with bevacizumab.  Her bevacizumab treatment is being held due to some mild protein in her urine and there are plans for 24-hour protein evaluation.  This most likely is from the bevacizumab.  I personally reviewed her imaging from April 2025 including the brain and chest showing no evidence of disease progression.    Cancer Staging   No matching staging information was found for the patient.      Oncology History   Lung cancer (Multi)   7/12/2023 -  Research Study Participant    ([facility]) [id number] Arm B - Bevacizumab / Osimertinib, 21 Day Cycles  Plan Provider: Humberto Landin MD  Treatment goal: [No plan goal]  Line of treatment: [No plan line of treatment]  Associated studies: [id number] (Osimertinib) +/- Bevacizumab as Initial Treatment for EGFR-Mutant Lung Cancer     9/1/2023 Initial Diagnosis    Lung cancer (CMS/HCC)                   Humberto Landin MD

## 2025-05-05 NOTE — RESEARCH NOTES
Research Note Treatment Day    Mirta Hills is here today for treatment on [id number]. Today is C31. Procedures completed per protocol. AE's and con-meds reviewed with patient. Patient is aware of treatment plan. Patient was noted to have 2+proteinuria from sample on 04/29/2025 ordered by PCP. Dr. Landin instructed patient to hild Bevacizumab today and complete a 24 hour urine and follow up at next study visit with urine per treatment plan. Patient taught back instructions.    []   Received treatment as planned   OR  [x]    Treatment delayed; patient calendar updated as required   Treatment delayed because:    []   AE    []   Physician Discretion    []   Clinical Deterioration or Progression     []   Other    Education Documentation  Treatment Plan and Schedule, taught by Makayla Joseph RN at 5/5/2025  4:29 PM.  Learner: Patient  Readiness: Acceptance  Method: Explanation  Response: Verbalizes Understanding    Education Comments  No comments found.

## 2025-05-06 DIAGNOSIS — E03.9 HYPOTHYROIDISM, UNSPECIFIED TYPE: ICD-10-CM

## 2025-05-06 DIAGNOSIS — E03.9 HYPOTHYROIDISM, UNSPECIFIED TYPE: Primary | ICD-10-CM

## 2025-05-06 RX ORDER — LEVOTHYROXINE SODIUM 137 UG/1
137 TABLET ORAL DAILY
Qty: 90 TABLET | Refills: 1 | Status: SHIPPED | OUTPATIENT
Start: 2025-05-06

## 2025-05-07 ENCOUNTER — TELEPHONE (OUTPATIENT)
Dept: HEMATOLOGY/ONCOLOGY | Facility: HOSPITAL | Age: 70
End: 2025-05-07
Payer: MEDICARE

## 2025-05-08 ENCOUNTER — LAB (OUTPATIENT)
Dept: LAB | Facility: HOSPITAL | Age: 70
End: 2025-05-08
Payer: MEDICARE

## 2025-05-08 ENCOUNTER — LAB REQUISITION (OUTPATIENT)
Dept: LAB | Facility: HOSPITAL | Age: 70
End: 2025-05-08
Payer: MEDICARE

## 2025-05-08 DIAGNOSIS — C34.90 MALIGNANT NEOPLASM OF UNSPECIFIED PART OF UNSPECIFIED BRONCHUS OR LUNG (MULTI): Primary | ICD-10-CM

## 2025-05-08 DIAGNOSIS — C34.90 MALIGNANT NEOPLASM OF UNSPECIFIED PART OF UNSPECIFIED BRONCHUS OR LUNG (MULTI): ICD-10-CM

## 2025-05-08 DIAGNOSIS — E78.5 DYSLIPIDEMIA: ICD-10-CM

## 2025-05-08 DIAGNOSIS — Z13.6 SCREENING FOR HEART DISEASE: ICD-10-CM

## 2025-05-08 DIAGNOSIS — C34.90 MALIGNANT NEOPLASM OF LUNG, UNSPECIFIED LATERALITY, UNSPECIFIED PART OF LUNG (MULTI): ICD-10-CM

## 2025-05-08 LAB
COLLECT DURATION TIME SPEC: 24 HRS
CREAT 24H UR-MCNC: 79.6 MG/DL (ref 20–320)
CREAT 24H UR-MRATE: 0.5 G/24 H (ref 0.67–1.59)
PROT 24H UR-MCNC: 49 MG/DL (ref 5–24)
PROT 24H UR-MRATE: 309 MG/24H (ref 0–149)
SPECIMEN VOL 24H UR: 630 ML

## 2025-05-08 PROCEDURE — 81050 URINALYSIS VOLUME MEASURE: CPT

## 2025-05-08 NOTE — TELEPHONE ENCOUNTER
Patient called clinical trial nurse and noticed a small amount of dried blood on her pillow when she woke up this morning. And a few flecks of blood noted in sputum. She has had no other cough, nasal symptoms or hemoptysis. No bleeding from gums that she noticed. Dr. Landin notified and I called the patient back with instructions to monitor and call provider back if more bleeding is noted. Patient taught back instructions.

## 2025-05-12 DIAGNOSIS — C34.90 MALIGNANT NEOPLASM OF LUNG, UNSPECIFIED LATERALITY, UNSPECIFIED PART OF LUNG (MULTI): ICD-10-CM

## 2025-05-18 ASSESSMENT — ENCOUNTER SYMPTOMS
NECK PAIN: 0
HEADACHES: 0
ABDOMINAL PAIN: 0
DIAPHORESIS: 0
VERTIGO: 0
WEAKNESS: 0
VOMITING: 0
FEVER: 0
ARTHRALGIAS: 0
JOINT SWELLING: 0
SORE THROAT: 0
ANOREXIA: 0
FATIGUE: 0
COUGH: 0
VISUAL CHANGE: 0
SWOLLEN GLANDS: 0
NAUSEA: 0
NUMBNESS: 0
CHILLS: 0
CHANGE IN BOWEL HABIT: 0
MYALGIAS: 0

## 2025-05-23 ENCOUNTER — TELEPHONE (OUTPATIENT)
Dept: ADMISSION | Facility: HOSPITAL | Age: 70
End: 2025-05-23
Payer: MEDICARE

## 2025-05-23 NOTE — TELEPHONE ENCOUNTER
Patient received text message that her Infusion on 5/28 1:00 was cancelled is she is unsure why? On the order it states treatment completed. Please let me know and I can make her aware.

## 2025-05-27 DIAGNOSIS — C34.90 MALIGNANT NEOPLASM OF LUNG, UNSPECIFIED LATERALITY, UNSPECIFIED PART OF LUNG (MULTI): Primary | ICD-10-CM

## 2025-05-27 RX ORDER — DIPHENHYDRAMINE HYDROCHLORIDE 50 MG/ML
50 INJECTION, SOLUTION INTRAMUSCULAR; INTRAVENOUS AS NEEDED
OUTPATIENT
Start: 2025-05-27

## 2025-05-27 RX ORDER — FAMOTIDINE 10 MG/ML
20 INJECTION, SOLUTION INTRAVENOUS ONCE AS NEEDED
OUTPATIENT
Start: 2025-05-27

## 2025-05-27 RX ORDER — PROCHLORPERAZINE EDISYLATE 5 MG/ML
10 INJECTION INTRAMUSCULAR; INTRAVENOUS EVERY 6 HOURS PRN
OUTPATIENT
Start: 2025-05-27

## 2025-05-27 RX ORDER — EPINEPHRINE 0.3 MG/.3ML
0.3 INJECTION SUBCUTANEOUS EVERY 5 MIN PRN
OUTPATIENT
Start: 2025-05-27

## 2025-05-27 RX ORDER — ALBUTEROL SULFATE 0.83 MG/ML
3 SOLUTION RESPIRATORY (INHALATION) AS NEEDED
OUTPATIENT
Start: 2025-05-27

## 2025-05-27 RX ORDER — PROCHLORPERAZINE MALEATE 10 MG
10 TABLET ORAL EVERY 6 HOURS PRN
OUTPATIENT
Start: 2025-05-27

## 2025-05-28 ENCOUNTER — APPOINTMENT (OUTPATIENT)
Dept: HEMATOLOGY/ONCOLOGY | Facility: HOSPITAL | Age: 70
End: 2025-05-28
Payer: MEDICARE

## 2025-05-28 ENCOUNTER — LAB (OUTPATIENT)
Dept: LAB | Facility: HOSPITAL | Age: 70
End: 2025-05-28
Payer: MEDICARE

## 2025-05-28 ENCOUNTER — OFFICE VISIT (OUTPATIENT)
Dept: HEMATOLOGY/ONCOLOGY | Facility: HOSPITAL | Age: 70
End: 2025-05-28
Payer: MEDICARE

## 2025-05-28 VITALS
DIASTOLIC BLOOD PRESSURE: 79 MMHG | BODY MASS INDEX: 20.84 KG/M2 | RESPIRATION RATE: 14 BRPM | TEMPERATURE: 96.1 F | OXYGEN SATURATION: 100 % | WEIGHT: 110.4 LBS | SYSTOLIC BLOOD PRESSURE: 145 MMHG | HEART RATE: 68 BPM

## 2025-05-28 DIAGNOSIS — C34.90 MALIGNANT NEOPLASM OF LUNG, UNSPECIFIED LATERALITY, UNSPECIFIED PART OF LUNG (MULTI): ICD-10-CM

## 2025-05-28 DIAGNOSIS — C34.31 MALIGNANT NEOPLASM OF LOWER LOBE OF RIGHT LUNG (MULTI): Primary | ICD-10-CM

## 2025-05-28 DIAGNOSIS — I42.7 CARDIOTOXICITY (MULTI): ICD-10-CM

## 2025-05-28 DIAGNOSIS — E03.9 HYPOTHYROIDISM, UNSPECIFIED TYPE: Primary | ICD-10-CM

## 2025-05-28 LAB
ALBUMIN SERPL BCP-MCNC: 4.2 G/DL (ref 3.4–5)
ALP SERPL-CCNC: 56 U/L (ref 33–136)
ALT SERPL W P-5'-P-CCNC: 17 U/L (ref 7–45)
ANION GAP SERPL CALC-SCNC: 13 MMOL/L (ref 10–20)
APPEARANCE UR: CLEAR
AST SERPL W P-5'-P-CCNC: 20 U/L (ref 9–39)
BASOPHILS # BLD AUTO: 0.02 X10*3/UL (ref 0–0.1)
BASOPHILS NFR BLD AUTO: 0.5 %
BILIRUB SERPL-MCNC: 0.4 MG/DL (ref 0–1.2)
BILIRUB UR STRIP.AUTO-MCNC: NEGATIVE MG/DL
BUN SERPL-MCNC: 26 MG/DL (ref 6–23)
CALCIUM SERPL-MCNC: 9.6 MG/DL (ref 8.6–10.3)
CHLORIDE SERPL-SCNC: 104 MMOL/L (ref 98–107)
CK SERPL-CCNC: 53 U/L (ref 0–215)
CO2 SERPL-SCNC: 30 MMOL/L (ref 21–32)
COLOR UR: ABNORMAL
CREAT SERPL-MCNC: 1.19 MG/DL (ref 0.5–1.05)
EGFRCR SERPLBLD CKD-EPI 2021: 49 ML/MIN/1.73M*2
EOSINOPHIL # BLD AUTO: 0.09 X10*3/UL (ref 0–0.4)
EOSINOPHIL NFR BLD AUTO: 2.1 %
ERYTHROCYTE [DISTWIDTH] IN BLOOD BY AUTOMATED COUNT: 14.1 % (ref 11.5–14.5)
GLUCOSE SERPL-MCNC: 82 MG/DL (ref 74–99)
GLUCOSE UR STRIP.AUTO-MCNC: NORMAL MG/DL
HCT VFR BLD AUTO: 44.3 % (ref 36–46)
HGB BLD-MCNC: 13.8 G/DL (ref 12–16)
IMM GRANULOCYTES # BLD AUTO: 0.04 X10*3/UL (ref 0–0.5)
IMM GRANULOCYTES NFR BLD AUTO: 0.9 % (ref 0–0.9)
KETONES UR STRIP.AUTO-MCNC: NEGATIVE MG/DL
LEUKOCYTE ESTERASE UR QL STRIP.AUTO: ABNORMAL
LYMPHOCYTES # BLD AUTO: 0.66 X10*3/UL (ref 0.8–3)
LYMPHOCYTES NFR BLD AUTO: 15.2 %
MAGNESIUM SERPL-MCNC: 2.14 MG/DL (ref 1.6–2.4)
MCH RBC QN AUTO: 29.8 PG (ref 26–34)
MCHC RBC AUTO-ENTMCNC: 31.2 G/DL (ref 32–36)
MCV RBC AUTO: 96 FL (ref 80–100)
MONOCYTES # BLD AUTO: 0.33 X10*3/UL (ref 0.05–0.8)
MONOCYTES NFR BLD AUTO: 7.6 %
NEUTROPHILS # BLD AUTO: 3.19 X10*3/UL (ref 1.6–5.5)
NEUTROPHILS NFR BLD AUTO: 73.7 %
NITRITE UR QL STRIP.AUTO: NEGATIVE
NRBC BLD-RTO: 0 /100 WBCS (ref 0–0)
PH UR STRIP.AUTO: 6 [PH]
PLATELET # BLD AUTO: 145 X10*3/UL (ref 150–450)
POTASSIUM SERPL-SCNC: 5 MMOL/L (ref 3.5–5.3)
PROT SERPL-MCNC: 6.4 G/DL (ref 6.4–8.2)
PROT UR STRIP.AUTO-MCNC: ABNORMAL MG/DL
RBC # BLD AUTO: 4.63 X10*6/UL (ref 4–5.2)
RBC # UR STRIP.AUTO: NEGATIVE MG/DL
RBC #/AREA URNS AUTO: ABNORMAL /HPF
SODIUM SERPL-SCNC: 142 MMOL/L (ref 136–145)
SP GR UR STRIP.AUTO: 1.01
SQUAMOUS #/AREA URNS AUTO: ABNORMAL /HPF
T4 FREE SERPL-MCNC: 1.52 NG/DL (ref 0.78–1.48)
TSH SERPL-ACNC: 0.28 MIU/L (ref 0.44–3.98)
UROBILINOGEN UR STRIP.AUTO-MCNC: NORMAL MG/DL
WBC # BLD AUTO: 4.3 X10*3/UL (ref 4.4–11.3)
WBC #/AREA URNS AUTO: ABNORMAL /HPF

## 2025-05-28 PROCEDURE — 83735 ASSAY OF MAGNESIUM: CPT

## 2025-05-28 PROCEDURE — 84443 ASSAY THYROID STIM HORMONE: CPT | Performed by: INTERNAL MEDICINE

## 2025-05-28 PROCEDURE — 81001 URINALYSIS AUTO W/SCOPE: CPT

## 2025-05-28 PROCEDURE — 85025 COMPLETE CBC W/AUTO DIFF WBC: CPT

## 2025-05-28 PROCEDURE — 82550 ASSAY OF CK (CPK): CPT

## 2025-05-28 PROCEDURE — 84439 ASSAY OF FREE THYROXINE: CPT | Performed by: INTERNAL MEDICINE

## 2025-05-28 PROCEDURE — 99215 OFFICE O/P EST HI 40 MIN: CPT | Performed by: INTERNAL MEDICINE

## 2025-05-28 PROCEDURE — 80053 COMPREHEN METABOLIC PANEL: CPT

## 2025-05-28 ASSESSMENT — ENCOUNTER SYMPTOMS
SORE THROAT: 0
CHILLS: 0
CHANGE IN BOWEL HABIT: 0
HEADACHES: 0
ARTHRALGIAS: 0
VISUAL CHANGE: 0
FEVER: 0
NUMBNESS: 0
DIAPHORESIS: 0
NECK PAIN: 0
ABDOMINAL PAIN: 0
JOINT SWELLING: 0
COUGH: 0
ANOREXIA: 0
VERTIGO: 0
MYALGIAS: 0
VOMITING: 0
FATIGUE: 0
NAUSEA: 0
WEAKNESS: 0
SWOLLEN GLANDS: 0

## 2025-05-28 ASSESSMENT — PAIN SCALES - GENERAL: PAINLEVEL_OUTOF10: 0-NO PAIN

## 2025-05-28 NOTE — PROGRESS NOTES
Patient ID: Mirta Hills is a 70 y.o. female.    DIAGNOSIS     Adenocarcinoma of the lung.  Date of diagnosis is [place] 15, 2023 from a level 7 lymph node biopsy/fine-needle aspiration.  Immunohistochemistry positive for TTF-1.        STAGING     Clinical T2a (right lower lobe mass measuring 3.6 cm), clinical N2 (PET positivity and subcarinal region), M1C disease (presence of brain metastases, ribs, right acetabulum, liver, brain), stage IVb disease        CURRENT SITES OF DISEASE     Right lower lobe, right hilum of the lung, ipsilateral mediastinum, liver, bones, brain        MOLECULAR GENOMICS     TEST: Focused Solid Tumor DNA/RNA Panel   SPECIMEN: Supernatant, LYMPH NODE 7, [id number]   DISEASE DIAGNOSIS: Adenocarcinoma   Estimated Tumor Content: 40%   COLLECTION DATE: 6/15/2023     MICROSATELLITE STATUS: Microsatellite  Instability-High (MSI-H) is NOT DETECTED.     DISEASE ASSOCIATED GENOMIC FINDINGS:   EGFR p.[id number] ([id number] c.[id number])    TP53 p.N247I (NM_[id number] c.740A>T)     DISEASE RELEVANT ALTERATIONS NOT DETECTED:   Negative for ALK fusion.   Negative for BRAF V600E.   Negative for ERBB2 activating mutation   Negative for KRAS G12C.   Negative for  MET exon 14 skipping mutation.   Negative for NTRK fusion.   Negative for RET fusion.   Negative for ROS1 fusion.        Circulating tumor DNA sent on June 19, 2023  EGFR p.[id number], Variant allelic fraction of 1.6%  TP53 N247I Missense variant, loss of function, variant allelic fraction 1.1%  MSI stable        SERUM TUMOR MARKER     Slightly elevated CEA and CA 19.9 June 2023        PRIOR THERAPY     1- Gamma knife radiosurgery to brain lesions completed on June 28, 2023.        CURRENT THERAPY     1- Osimertinib on study  on clinical trial [id number] which is a randomized trial of osimertinib with or without bevacizumab.  She has been randomized to the bevacizumab arm- Cycle 1 Day 1 is 7/12/2023     2-brain metastases, last follow-up MRI  April, 2025, no progression        CURRENT ONCOLOGICAL PROBLEMS     1-brain metastases, symptomatic, word finding difficulties, mild unsteadiness on her feet but no falls, Resolved July 3, 2023  2-anterior chest pain, increases with deep inspiration, Improved July 3, 2023  3-mild intermittent grade 1 diarrhea, mild hair loss, mild leukopenia, mild elevation in creatinine, mild fatigue.  Most likely all osimertinib related August 7, 2023  4-fluctuating weight over the past year since late 2023 ranging between 110 and 106 pounds.  Down to 104 pounds December 23, 2024.  Discussed increasing caloric intake.    5-grade 1 proteinuria related to bevacizumab May 2025, dose held with plan for repeat protein assessment.        HISTORY OF PRESENT ILLNESS     This is a 70-year-old patient.  She states that towards Thanksgiving of 2022 she started feeling some pain in her right shoulder.  Eventually got better however the same pain came back in January or February 2023.  Ultimately some x-rays were done of  the shoulder that were generally negative.  She then developed some pain in her sternum a month or 2 later with deep inspiration and ultimately underwent imaging in May 2023.  Chest CT without contrast was done on June 1, 2023.  This showed a 3.4 cm mass  within the superior segment of the right lower lobe with some associated satellite pulmonary nodules.  Additionally there were some other subcentimeter pulmonary nodules.  There was evidence of mediastinal lymphadenopathy.  Subcarinal lymph node measured  1.4 cm in short axis.She was seen by pulmonary medicine on June 5, 2023 a combined PET/CT was ordered on June 6, 2023 showing a relatively intense area of hypermetabolic activity along the superior posteromedial aspect of the right lower lobe corresponding  to the known right lower lobe lung mass.  There was foci of hypermetabolic activity in the right supraclavicular region, subcarinal and right hilar regions.  There was  area of uptake within the posterior upper ribs as well as right acetabulum suspicious  for osseous metastatic disease.  There was a punctate area of mild hypermetabolic activity in the right hepatic lobe concerning for hepatic metastases.  Small focus in the subcutaneous region just anterior to the lower aspect of the sternum could be inflammatory  versus metastatic.  She undergoes a bronchoscopy dated [place] 15, 2023.  There were no endobronchial lesions.  Primary cytology with rapid onsite evaluation was suggestive of malignancy in level 7, final lymph nodes were pathology was pending.  MRI of the  brain with and without contrast dated June 16, 2023 showed approximately 20 enhancing nodules concerning for metastatic disease.  Specifically there was a 1.5 cm nodule in the right cerebellar hemisphere.  Most of the other nodules were less than 1 cm.   Patient also notes that she has some difficulty expressing words over the past 1 to 2 weeks prior to her first visit with us as well as some mild unsteadiness on her legs where she feels she is drifting towards the right side.        PAST MEDICAL HISTORY     1-hypothyroidism  2-hysterectomy 1988  3-abdominal hernia surgery 1995  4-some hearing difficulties        SOCIAL HISTORY     Patient lives with her significant other.  She has 2 daughter.  Lives in [facility].  Smoked for only 3 years during college and during this time it was a pack per day.  She has retired.  She had a retail store.        CURRENT MEDS     See medication list, meds reviewed        ALLERGIES     Patient denies any drug allergies        FAMILY HISTORY     Patient's father had bladder cancer at the age of 86.  She has 1 brother with no cancer.  She has 2 children       Subjective    Cancer  Pertinent negatives include no abdominal pain, anorexia, arthralgias, change in bowel habit, chest pain, chills, congestion, coughing, diaphoresis, fatigue, fever, headaches, joint swelling, myalgias, nausea, neck  pain, numbness, rash, sore throat, swollen glands, urinary symptoms, vertigo, visual change, vomiting or weakness.         Objective    BSA: 1.47 meters squared  /79 (BP Location: Left arm, Patient Position: Sitting, BP Cuff Size: Small adult)   Pulse 68   Temp 35.6 °C (96.1 °F) (Skin)   Resp 14   Wt 50.1 kg (110 lb 6.4 oz)   SpO2 100%   BMI 20.84 kg/m²      Physical Exam  Constitutional:       General: She is not in acute distress.     Appearance: Normal appearance. She is not ill-appearing, toxic-appearing or diaphoretic.   HENT:      Nose: No congestion or rhinorrhea.      Mouth/Throat:      Pharynx: No oropharyngeal exudate or posterior oropharyngeal erythema.   Eyes:      General: No scleral icterus.     Conjunctiva/sclera: Conjunctivae normal.   Cardiovascular:      Rate and Rhythm: Normal rate and regular rhythm.      Pulses: Normal pulses.      Heart sounds: Normal heart sounds. No murmur heard.     No friction rub. No gallop.   Pulmonary:      Effort: Pulmonary effort is normal. No respiratory distress.      Breath sounds: Normal breath sounds. No stridor. No wheezing, rhonchi or rales.   Chest:      Chest wall: No tenderness.   Abdominal:      General: Abdomen is flat. Bowel sounds are normal. There is no distension.      Palpations: Abdomen is soft. There is no mass.      Tenderness: There is no abdominal tenderness. There is no guarding or rebound.   Musculoskeletal:      Cervical back: No tenderness.      Right lower leg: No edema.      Left lower leg: No edema.   Lymphadenopathy:      Cervical: No cervical adenopathy.   Skin:     General: Skin is warm.      Coloration: Skin is not jaundiced.   Neurological:      General: No focal deficit present.      Mental Status: She is oriented to person, place, and time.   Psychiatric:         Mood and Affect: Mood normal.         Behavior: Behavior normal.         Performance Status:  Asymptomatic     Latest Reference Range & Units 05/28/25 10:53    GLUCOSE 74 - 99 mg/dL 82   SODIUM 136 - 145 mmol/L 142   POTASSIUM 3.5 - 5.3 mmol/L 5.0   CHLORIDE 98 - 107 mmol/L 104   Bicarbonate 21 - 32 mmol/L 30   Anion Gap 10 - 20 mmol/L 13   Blood Urea Nitrogen 6 - 23 mg/dL 26 (H)   Creatinine 0.50 - 1.05 mg/dL 1.19 (H)   EGFR >60 mL/min/1.73m*2 49 (L)   Calcium 8.6 - 10.3 mg/dL 9.6   Albumin 3.4 - 5.0 g/dL 4.2   Alkaline Phosphatase 33 - 136 U/L 56   ALT 7 - 45 U/L 17   AST 9 - 39 U/L 20   Bilirubin Total 0.0 - 1.2 mg/dL 0.4   Total Protein 6.4 - 8.2 g/dL 6.4   MAGNESIUM 1.60 - 2.40 mg/dL 2.14   WBC 4.4 - 11.3 x10*3/uL 4.3 (L)   nRBC 0.0 - 0.0 /100 WBCs 0.0   RBC 4.00 - 5.20 x10*6/uL 4.63   HEMOGLOBIN 12.0 - 16.0 g/dL 13.8   HEMATOCRIT 36.0 - 46.0 % 44.3   MCV 80 - 100 fL 96   MCH 26.0 - 34.0 pg 29.8   MCHC 32.0 - 36.0 g/dL 31.2 (L)   RED CELL DISTRIBUTION WIDTH 11.5 - 14.5 % 14.1   Platelets 150 - 450 x10*3/uL 145 (L)   Neutrophils % 40.0 - 80.0 % 73.7   Immature Granulocytes %, Automated 0.0 - 0.9 % 0.9   Lymphocytes % 13.0 - 44.0 % 15.2   Monocytes % 2.0 - 10.0 % 7.6   Eosinophils % 0.0 - 6.0 % 2.1   Basophils % 0.0 - 2.0 % 0.5   Neutrophils Absolute 1.60 - 5.50 x10*3/uL 3.19   Immature Granulocytes Absolute, Automated 0.00 - 0.50 x10*3/uL 0.04   Lymphocytes Absolute 0.80 - 3.00 x10*3/uL 0.66 (L)   Monocytes Absolute 0.05 - 0.80 x10*3/uL 0.33   Eosinophils Absolute 0.00 - 0.40 x10*3/uL 0.09   Basophils Absolute 0.00 - 0.10 x10*3/uL 0.02   (H): Data is abnormally high  (L): Data is abnormally low    0 Result Notes       Component  Ref Range & Units 2 wk ago Resulting Agency   [facility]  [facility]   Urine Volume  mL 630 [facility]   Total Protein, Urine  5 - 24 mg/dL 49 High  [facility]   Total Protein,  24 Hour Urine  0 - 149 mg/24h 309 High  [facility]   Creatinine, Urine  20.0 - 320.0 mg/dL 79.6 [facility]   Creatinine, 24 Hour Urine  0.67 - 1.59 g/24 h 0.50 Low            Assessment/Plan       Cancer Staging   No matching staging information was found for the  patient.      Oncology History   Lung cancer (Multi)   7/12/2023 -  Research Study Participant    ([facility]) [id number] Arm B - Bevacizumab / Osimertinib, 21 Day Cycles  Plan Provider: Humberto Landin MD  Treatment goal: [No plan goal]  Line of treatment: [No plan line of treatment]  Associated studies: [id number] (Osimertinib) +/- Bevacizumab as Initial Treatment for EGFR-Mutant Lung Cancer     9/1/2023 Initial Diagnosis    Lung cancer (CMS/HCC)                   Humberto Landin MD

## 2025-05-29 ENCOUNTER — EDUCATION (OUTPATIENT)
Dept: HEMATOLOGY/ONCOLOGY | Facility: HOSPITAL | Age: 70
End: 2025-05-29
Payer: MEDICARE

## 2025-05-29 ENCOUNTER — TELEPHONE (OUTPATIENT)
Dept: ADMISSION | Facility: HOSPITAL | Age: 70
End: 2025-05-29
Payer: MEDICARE

## 2025-05-29 NOTE — TELEPHONE ENCOUNTER
Mirta called and states she scheduled CT and MRI for the same day as scheduled FUV and infusion.  Wants to confirm okay to have contrast dye and infusion on same day.      Appointments scheduled 6/18/25

## 2025-05-30 NOTE — TELEPHONE ENCOUNTER
Mirta called back, advised okay per team to have scans and infusion on same day. No further questions at this time.

## 2025-05-30 NOTE — RESEARCH NOTES
Research Note Treatment Day    Mirta Hills was here on 05/28/2025 for treatment on FD0533 for C32. Procedures completed per protocol. AE's and con-meds reviewed with patient. Patient is aware of treatment plan. Patient received osimertinib however bevacizumab was held per Dr. Landin discretion due to recent proteinuria.      [x]   Received treatment as planned   OR  []    Treatment delayed; patient calendar updated as required   Treatment delayed because:    [x]   AE    []   Physician Discretion    []   Clinical Deterioration or Progression     []   Other    Education Documentation  Treatment Plan and Schedule, taught by Makayla Joseph, RN at 5/29/2025 11:56 PM.  Learner: Patient  Readiness: Acceptance  Method: Explanation  Response: Verbalizes Understanding    Education Comments  No comments found.

## 2025-06-04 NOTE — PROGRESS NOTES
ASSESSMENT/PLAN    Encounter for well woman exam with routine gynecological exam (Primary)  Routine well woman visit.   Your exam was normal today.   A pap test was not done. Prior hysterectomy.   Yearly GYN visits are still recommended.      Cystocele and vaginal wall prolapse.   Plans to see urogyn.     Encounter for screening mammogram for breast cancer  Your clinical breast exam was normal.   A screening mammogram order has been placed for 2026.    Screening for osteoporosis.  Requisition for bone density.    ------------------------------------------------------------------------------    SUBJECTIVE    PAP 17 NEG HPV NEG  MAMMO 2025 R breast dx  DEXA 2023 T=-2.2  COLON 3/28/2022.     HPI    70  yo for breast/GYN exam  Last visit 2023.   2023 after last visit diagnosed with metastatic adenocarcinoma of the lung. Had gamma knife radiosurgery to brain lesions. Gets diarrhea from treatment. Overall coping well and feeling well.   2025 mammogram.  Dense breasts on mammogram. Right  breast asymmetry. Additional views benign.   In past has seen uroGYN for bulge from vagina c/w cystocele, vaginal vault prolapse. Recently more incontinence. Made an appt to see Dr. Hernandez.   h/o total abdominal hysterectomy  age 33 for fibroids.   Reviewed last bone density with pt. T=-2.2. Will repeat this year.  Nonsmoker, 1-2 ETOH/week.    REVIEW OF SYSTEMS    Constitutional: no recent weight gain, no fatigue.   ENT: + hearing loss.  Cardiovascular: no chest pain, no palpitations.  Respiratory: no shortness of breath.  Gastrointestinal: no abdominal pain, no constipation, no nausea, + diarrhea.  Musculoskeletal: no back pain.  Lymphatic: no swollen glands.  Genitourinary: no pelvic pain, no urinary urgency, no urinary incontinence, no change in urinary frequency, no vaginal dryness, no vaginal itching,  no vaginal discharge, no unexplained vaginal bleeding, no lesion/sore.   Breasts: no  breast pain, no nipple discharge, no breast lump.   Neurological: no headache, no numbness, no dizziness.   Psychiatric: no sleep disturbances, no anxiety, no depression.   Endocrine: no hot flashes, no loss of hair, no hirsutism.       OBJECTIVE    /60   Ht 1.524 m (5')   Wt 48.1 kg (106 lb)   BMI 20.70 kg/m²      Physical Exam     Constitutional: Alert and in no acute distress. Well developed, well nourished   Head and Face: Head and face: normal   Eyes: Normal external exam - nonicteric sclera.  Ears, Nose, Mouth, and Throat: External inspection of ears and nose: normal   Neck: no neck asymmetry. Supple and thyroid not enlarged and there were no palpable thyroid nodules   Cardiovascular: Heart rate and rhythm were normal  Pulmonary: No respiratory distress and clear bilateral breath sounds   Chest: Breasts: normal appearance, no nipple discharge, no skin changes. Palpation of breasts and axillae: no palpable mass, no axillary lymphadenopathy   Abdomen: soft nontender; no abdominal mass palpated, no organomegaly and no hernias   Genitourinary: external genitalia: normal appearing vulva and labia without lesions. No inguinal lymphadenopathy,    Urethra: normal.   Bladder: normal on palpation. cystocele with prolapse.   Perianal area: normal   Vagina: normal, without significant discharge, no lesions.  Absent cervix and uterus.   Right and left adnexa/parametria: Normal  Skin: normal skin color and pigmentation, normal skin turgor, and no rash  Psychiatric: alert and oriented x 3., affect normal to patient baseline and mood: appropriate        Nancy Gallardo MD

## 2025-06-05 ENCOUNTER — APPOINTMENT (OUTPATIENT)
Dept: OBSTETRICS AND GYNECOLOGY | Facility: CLINIC | Age: 70
End: 2025-06-05
Payer: MEDICARE

## 2025-06-05 VITALS
WEIGHT: 106 LBS | DIASTOLIC BLOOD PRESSURE: 60 MMHG | BODY MASS INDEX: 20.81 KG/M2 | SYSTOLIC BLOOD PRESSURE: 122 MMHG | HEIGHT: 60 IN

## 2025-06-05 DIAGNOSIS — Z12.31 ENCOUNTER FOR SCREENING MAMMOGRAM FOR BREAST CANCER: ICD-10-CM

## 2025-06-05 DIAGNOSIS — Z01.419 ENCOUNTER FOR WELL WOMAN EXAM WITH ROUTINE GYNECOLOGICAL EXAM: Primary | ICD-10-CM

## 2025-06-12 ENCOUNTER — TELEPHONE (OUTPATIENT)
Dept: OBSTETRICS AND GYNECOLOGY | Facility: CLINIC | Age: 70
End: 2025-06-12
Payer: MEDICARE

## 2025-06-13 DIAGNOSIS — Z13.820 SCREENING FOR OSTEOPOROSIS: Primary | ICD-10-CM

## 2025-06-13 DIAGNOSIS — Z78.0 ASYMPTOMATIC POSTMENOPAUSAL STATE: ICD-10-CM

## 2025-06-16 ENCOUNTER — TELEPHONE (OUTPATIENT)
Dept: HEMATOLOGY/ONCOLOGY | Facility: HOSPITAL | Age: 70
End: 2025-06-16

## 2025-06-16 ENCOUNTER — APPOINTMENT (OUTPATIENT)
Dept: HEMATOLOGY/ONCOLOGY | Facility: HOSPITAL | Age: 70
End: 2025-06-16
Payer: MEDICARE

## 2025-06-16 ENCOUNTER — HOSPITAL ENCOUNTER (OUTPATIENT)
Dept: RADIOLOGY | Facility: HOSPITAL | Age: 70
Discharge: HOME | End: 2025-06-16
Payer: MEDICARE

## 2025-06-16 DIAGNOSIS — C34.90 MALIGNANT NEOPLASM OF LUNG, UNSPECIFIED LATERALITY, UNSPECIFIED PART OF LUNG (MULTI): ICD-10-CM

## 2025-06-16 PROCEDURE — 74177 CT ABD & PELVIS W/CONTRAST: CPT

## 2025-06-16 PROCEDURE — 2550000001 HC RX 255 CONTRASTS: Performed by: INTERNAL MEDICINE

## 2025-06-16 RX ADMIN — IOHEXOL 100 ML: 350 INJECTION, SOLUTION INTRAVENOUS at 12:00

## 2025-06-17 ENCOUNTER — HOSPITAL ENCOUNTER (OUTPATIENT)
Dept: RADIOLOGY | Facility: HOSPITAL | Age: 70
Discharge: HOME | End: 2025-06-17
Payer: MEDICARE

## 2025-06-17 DIAGNOSIS — C34.90 MALIGNANT NEOPLASM OF LUNG, UNSPECIFIED LATERALITY, UNSPECIFIED PART OF LUNG (MULTI): ICD-10-CM

## 2025-06-17 DIAGNOSIS — C34.11 PRIMARY ADENOCARCINOMA OF UPPER LOBE OF RIGHT LUNG (MULTI): ICD-10-CM

## 2025-06-17 DIAGNOSIS — C34.90 MALIGNANT NEOPLASM OF LUNG, UNSPECIFIED LATERALITY, UNSPECIFIED PART OF LUNG (MULTI): Primary | ICD-10-CM

## 2025-06-17 PROCEDURE — 70553 MRI BRAIN STEM W/O & W/DYE: CPT

## 2025-06-17 PROCEDURE — 2550000001 HC RX 255 CONTRASTS: Performed by: INTERNAL MEDICINE

## 2025-06-17 PROCEDURE — A9575 INJ GADOTERATE MEGLUMI 0.1ML: HCPCS | Performed by: INTERNAL MEDICINE

## 2025-06-17 RX ORDER — PROCHLORPERAZINE EDISYLATE 5 MG/ML
10 INJECTION INTRAMUSCULAR; INTRAVENOUS EVERY 6 HOURS PRN
Status: CANCELLED | OUTPATIENT
Start: 2025-06-18

## 2025-06-17 RX ORDER — ALBUTEROL SULFATE 0.83 MG/ML
3 SOLUTION RESPIRATORY (INHALATION) AS NEEDED
Status: CANCELLED | OUTPATIENT
Start: 2025-06-18

## 2025-06-17 RX ORDER — PROCHLORPERAZINE MALEATE 10 MG
10 TABLET ORAL EVERY 6 HOURS PRN
Status: CANCELLED | OUTPATIENT
Start: 2025-06-18

## 2025-06-17 RX ORDER — FAMOTIDINE 10 MG/ML
20 INJECTION, SOLUTION INTRAVENOUS ONCE AS NEEDED
Status: CANCELLED | OUTPATIENT
Start: 2025-06-18

## 2025-06-17 RX ORDER — DIPHENHYDRAMINE HYDROCHLORIDE 50 MG/ML
50 INJECTION, SOLUTION INTRAMUSCULAR; INTRAVENOUS AS NEEDED
Status: CANCELLED | OUTPATIENT
Start: 2025-06-18

## 2025-06-17 RX ORDER — EPINEPHRINE 0.3 MG/.3ML
0.3 INJECTION SUBCUTANEOUS EVERY 5 MIN PRN
Status: CANCELLED | OUTPATIENT
Start: 2025-06-18

## 2025-06-17 RX ORDER — GADOTERATE MEGLUMINE 376.9 MG/ML
9 INJECTION INTRAVENOUS
Status: COMPLETED | OUTPATIENT
Start: 2025-06-17 | End: 2025-06-17

## 2025-06-17 RX ADMIN — GADOTERATE MEGLUMINE 9 ML: 376.9 INJECTION INTRAVENOUS at 13:20

## 2025-06-18 ENCOUNTER — EDUCATION (OUTPATIENT)
Dept: HEMATOLOGY/ONCOLOGY | Facility: HOSPITAL | Age: 70
End: 2025-06-18

## 2025-06-18 ENCOUNTER — APPOINTMENT (OUTPATIENT)
Dept: RADIOLOGY | Facility: HOSPITAL | Age: 70
End: 2025-06-18
Payer: MEDICARE

## 2025-06-18 ENCOUNTER — OFFICE VISIT (OUTPATIENT)
Dept: HEMATOLOGY/ONCOLOGY | Facility: HOSPITAL | Age: 70
End: 2025-06-18
Payer: MEDICARE

## 2025-06-18 ENCOUNTER — LAB (OUTPATIENT)
Dept: LAB | Facility: HOSPITAL | Age: 70
End: 2025-06-18
Payer: MEDICARE

## 2025-06-18 ENCOUNTER — INFUSION (OUTPATIENT)
Dept: HEMATOLOGY/ONCOLOGY | Facility: HOSPITAL | Age: 70
End: 2025-06-18
Payer: MEDICARE

## 2025-06-18 VITALS
SYSTOLIC BLOOD PRESSURE: 128 MMHG | BODY MASS INDEX: 21.35 KG/M2 | DIASTOLIC BLOOD PRESSURE: 71 MMHG | HEART RATE: 83 BPM | TEMPERATURE: 99 F | RESPIRATION RATE: 16 BRPM | WEIGHT: 109.3 LBS

## 2025-06-18 DIAGNOSIS — C34.90 MALIGNANT NEOPLASM OF LUNG, UNSPECIFIED LATERALITY, UNSPECIFIED PART OF LUNG (MULTI): ICD-10-CM

## 2025-06-18 DIAGNOSIS — C34.31 MALIGNANT NEOPLASM OF LOWER LOBE OF RIGHT LUNG (MULTI): Primary | ICD-10-CM

## 2025-06-18 LAB
ALBUMIN SERPL BCP-MCNC: 4.2 G/DL (ref 3.4–5)
ALP SERPL-CCNC: 67 U/L (ref 33–136)
ALT SERPL W P-5'-P-CCNC: 15 U/L (ref 7–45)
ANION GAP SERPL CALC-SCNC: 12 MMOL/L (ref 10–20)
APPEARANCE UR: ABNORMAL
AST SERPL W P-5'-P-CCNC: 20 U/L (ref 9–39)
BASOPHILS # BLD AUTO: 0.02 X10*3/UL (ref 0–0.1)
BASOPHILS NFR BLD AUTO: 0.4 %
BILIRUB SERPL-MCNC: 0.6 MG/DL (ref 0–1.2)
BILIRUB UR STRIP.AUTO-MCNC: NEGATIVE MG/DL
BUN SERPL-MCNC: 29 MG/DL (ref 6–23)
CALCIUM SERPL-MCNC: 9.2 MG/DL (ref 8.6–10.3)
CHLORIDE SERPL-SCNC: 103 MMOL/L (ref 98–107)
CK SERPL-CCNC: 57 U/L (ref 0–215)
CO2 SERPL-SCNC: 28 MMOL/L (ref 21–32)
COLOR UR: ABNORMAL
CREAT SERPL-MCNC: 1.25 MG/DL (ref 0.5–1.05)
EGFRCR SERPLBLD CKD-EPI 2021: 46 ML/MIN/1.73M*2
EOSINOPHIL # BLD AUTO: 0.12 X10*3/UL (ref 0–0.4)
EOSINOPHIL NFR BLD AUTO: 2.4 %
ERYTHROCYTE [DISTWIDTH] IN BLOOD BY AUTOMATED COUNT: 14.4 % (ref 11.5–14.5)
GLUCOSE SERPL-MCNC: 79 MG/DL (ref 74–99)
GLUCOSE UR STRIP.AUTO-MCNC: NORMAL MG/DL
GRAN CASTS #/AREA UR COMP ASSIST: ABNORMAL /LPF
HCT VFR BLD AUTO: 42.6 % (ref 36–46)
HGB BLD-MCNC: 13.4 G/DL (ref 12–16)
IMM GRANULOCYTES # BLD AUTO: 0.04 X10*3/UL (ref 0–0.5)
IMM GRANULOCYTES NFR BLD AUTO: 0.8 % (ref 0–0.9)
KETONES UR STRIP.AUTO-MCNC: NEGATIVE MG/DL
LEUKOCYTE ESTERASE UR QL STRIP.AUTO: ABNORMAL
LYMPHOCYTES # BLD AUTO: 0.61 X10*3/UL (ref 0.8–3)
LYMPHOCYTES NFR BLD AUTO: 12 %
MAGNESIUM SERPL-MCNC: 2.05 MG/DL (ref 1.6–2.4)
MCH RBC QN AUTO: 29.8 PG (ref 26–34)
MCHC RBC AUTO-ENTMCNC: 31.5 G/DL (ref 32–36)
MCV RBC AUTO: 95 FL (ref 80–100)
MONOCYTES # BLD AUTO: 0.27 X10*3/UL (ref 0.05–0.8)
MONOCYTES NFR BLD AUTO: 5.3 %
MUCOUS THREADS #/AREA URNS AUTO: ABNORMAL /LPF
NEUTROPHILS # BLD AUTO: 4.04 X10*3/UL (ref 1.6–5.5)
NEUTROPHILS NFR BLD AUTO: 79.1 %
NITRITE UR QL STRIP.AUTO: NEGATIVE
NRBC BLD-RTO: 0 /100 WBCS (ref 0–0)
PH UR STRIP.AUTO: 5.5 [PH]
PLATELET # BLD AUTO: 149 X10*3/UL (ref 150–450)
POTASSIUM SERPL-SCNC: 4.2 MMOL/L (ref 3.5–5.3)
PROT SERPL-MCNC: 6.4 G/DL (ref 6.4–8.2)
PROT UR STRIP.AUTO-MCNC: ABNORMAL MG/DL
RBC # BLD AUTO: 4.49 X10*6/UL (ref 4–5.2)
RBC # UR STRIP.AUTO: NEGATIVE MG/DL
RBC #/AREA URNS AUTO: ABNORMAL /HPF
SODIUM SERPL-SCNC: 139 MMOL/L (ref 136–145)
SP GR UR STRIP.AUTO: 1.02
SQUAMOUS #/AREA URNS AUTO: ABNORMAL /HPF
TRANS CELLS #/AREA UR COMP ASSIST: ABNORMAL /HPF
UROBILINOGEN UR STRIP.AUTO-MCNC: NORMAL MG/DL
WBC # BLD AUTO: 5.1 X10*3/UL (ref 4.4–11.3)
WBC #/AREA URNS AUTO: ABNORMAL /HPF

## 2025-06-18 PROCEDURE — 83735 ASSAY OF MAGNESIUM: CPT

## 2025-06-18 PROCEDURE — 96365 THER/PROPH/DIAG IV INF INIT: CPT | Mod: INF

## 2025-06-18 PROCEDURE — 2560000001 HC RX 256 EXPERIMENTAL DRUGS: Mod: TB | Performed by: INTERNAL MEDICINE

## 2025-06-18 PROCEDURE — 80053 COMPREHEN METABOLIC PANEL: CPT

## 2025-06-18 PROCEDURE — 99214 OFFICE O/P EST MOD 30 MIN: CPT | Mod: 25 | Performed by: INTERNAL MEDICINE

## 2025-06-18 PROCEDURE — 82550 ASSAY OF CK (CPK): CPT

## 2025-06-18 PROCEDURE — 85025 COMPLETE CBC W/AUTO DIFF WBC: CPT

## 2025-06-18 PROCEDURE — 81001 URINALYSIS AUTO W/SCOPE: CPT

## 2025-06-18 PROCEDURE — 36415 COLL VENOUS BLD VENIPUNCTURE: CPT

## 2025-06-18 RX ORDER — PROCHLORPERAZINE EDISYLATE 5 MG/ML
10 INJECTION INTRAMUSCULAR; INTRAVENOUS EVERY 6 HOURS PRN
Status: DISCONTINUED | OUTPATIENT
Start: 2025-06-18 | End: 2025-06-18 | Stop reason: HOSPADM

## 2025-06-18 RX ORDER — ALBUTEROL SULFATE 0.83 MG/ML
3 SOLUTION RESPIRATORY (INHALATION) AS NEEDED
Status: DISCONTINUED | OUTPATIENT
Start: 2025-06-18 | End: 2025-06-18 | Stop reason: HOSPADM

## 2025-06-18 RX ORDER — DIPHENHYDRAMINE HYDROCHLORIDE 50 MG/ML
50 INJECTION, SOLUTION INTRAMUSCULAR; INTRAVENOUS AS NEEDED
Status: DISCONTINUED | OUTPATIENT
Start: 2025-06-18 | End: 2025-06-18 | Stop reason: HOSPADM

## 2025-06-18 RX ORDER — PROCHLORPERAZINE MALEATE 10 MG
10 TABLET ORAL EVERY 6 HOURS PRN
Status: DISCONTINUED | OUTPATIENT
Start: 2025-06-18 | End: 2025-06-18 | Stop reason: HOSPADM

## 2025-06-18 RX ORDER — FAMOTIDINE 10 MG/ML
20 INJECTION, SOLUTION INTRAVENOUS ONCE AS NEEDED
Status: DISCONTINUED | OUTPATIENT
Start: 2025-06-18 | End: 2025-06-18 | Stop reason: HOSPADM

## 2025-06-18 RX ORDER — EPINEPHRINE 0.3 MG/.3ML
0.3 INJECTION SUBCUTANEOUS EVERY 5 MIN PRN
Status: DISCONTINUED | OUTPATIENT
Start: 2025-06-18 | End: 2025-06-18 | Stop reason: HOSPADM

## 2025-06-18 RX ADMIN — Medication 757.5 MG: at 15:56

## 2025-06-18 ASSESSMENT — ENCOUNTER SYMPTOMS
SWOLLEN GLANDS: 0
MYALGIAS: 0
DIAPHORESIS: 0
WEAKNESS: 0
ANOREXIA: 0
CHILLS: 0
SORE THROAT: 0
ABDOMINAL PAIN: 0
HEADACHES: 0
VOMITING: 0
JOINT SWELLING: 0
NAUSEA: 0
COUGH: 0
NUMBNESS: 0
FATIGUE: 0
NECK PAIN: 0
CHANGE IN BOWEL HABIT: 1
VISUAL CHANGE: 0
FEVER: 0
VERTIGO: 0
ARTHRALGIAS: 0

## 2025-06-18 ASSESSMENT — PAIN SCALES - GENERAL: PAINLEVEL_OUTOF10: 0-NO PAIN

## 2025-06-18 NOTE — PROGRESS NOTES
Patient ID: Mirta Hills is a 70 y.o. female.    DIAGNOSIS     Adenocarcinoma of the lung.  Date of diagnosis is [place] 15, 2023 from a level 7 lymph node biopsy/fine-needle aspiration.  Immunohistochemistry positive for TTF-1.        STAGING     Clinical T2a (right lower lobe mass measuring 3.6 cm), clinical N2 (PET positivity and subcarinal region), M1C disease (presence of brain metastases, ribs, right acetabulum, liver, brain), stage IVb disease        CURRENT SITES OF DISEASE     Right lower lobe, right hilum of the lung, ipsilateral mediastinum, liver, bones, brain        MOLECULAR GENOMICS     TEST: Focused Solid Tumor DNA/RNA Panel   SPECIMEN: Supernatant, LYMPH NODE 7, [id number] A   DISEASE DIAGNOSIS: Adenocarcinoma   Estimated Tumor Content: 40%   COLLECTION DATE: 6/15/2023     MICROSATELLITE STATUS: Microsatellite  Instability-High (MSI-H) is NOT DETECTED.     DISEASE ASSOCIATED GENOMIC FINDINGS:   EGFR p.[id number] ([id number] c.[id number])    TP53 p.N247I (NM_000546 c.740A>T)     DISEASE RELEVANT ALTERATIONS NOT DETECTED:   Negative for ALK fusion.   Negative for BRAF V600E.   Negative for ERBB2 activating mutation   Negative for KRAS G12C.   Negative for  MET exon 14 skipping mutation.   Negative for NTRK fusion.   Negative for RET fusion.   Negative for ROS1 fusion.        Circulating tumor DNA sent on June 19, 2023  EGFR p.[id number], Variant allelic fraction of 1.6%  TP53 N247I Missense variant, loss of function, variant allelic fraction 1.1%  MSI stable        SERUM TUMOR MARKER     Slightly elevated CEA and CA 19.9 June 2023        PRIOR THERAPY     1- Gamma knife radiosurgery to brain lesions completed on June 28, 2023.        CURRENT THERAPY     1- Osimertinib on study  on clinical trial [id number] which is a randomized trial of osimertinib with or without bevacizumab.  She has been randomized to the bevacizumab arm- Cycle 1 Day 1 is 7/12/2023     2-brain metastases, last follow-up MRI  April, 2025, no progression        CURRENT ONCOLOGICAL PROBLEMS     1-brain metastases, symptomatic, word finding difficulties, mild unsteadiness on her feet but no falls, Resolved July 3, 2023  2-anterior chest pain, increases with deep inspiration, Improved July 3, 2023  3-mild intermittent grade 1 diarrhea, mild hair loss, mild leukopenia, mild elevation in creatinine, mild fatigue.  Most likely all osimertinib related August 7, 2023  4-fluctuating weight over the past year since late 2023 ranging between 110 and 106 pounds.  Down to 104 pounds December 23, 2024.  Discussed increasing caloric intake.    5-grade 1 proteinuria related to bevacizumab May 2025, dose held with plan for repeat protein assessment.  Down to trace June 2025  6-chronic skin discoloration torso most likely from bevacizumab        HISTORY OF PRESENT ILLNESS     This is a 70-year-old patient.  She states that towards Thanksgiving of 2022 she started feeling some pain in her right shoulder.  Eventually got better however the same pain came back in January or February 2023.  Ultimately some x-rays were done of  the shoulder that were generally negative.  She then developed some pain in her sternum a month or 2 later with deep inspiration and ultimately underwent imaging in May 2023.  Chest CT without contrast was done on June 1, 2023.  This showed a 3.4 cm mass  within the superior segment of the right lower lobe with some associated satellite pulmonary nodules.  Additionally there were some other subcentimeter pulmonary nodules.  There was evidence of mediastinal lymphadenopathy.  Subcarinal lymph node measured  1.4 cm in short axis.She was seen by pulmonary medicine on June 5, 2023 a combined PET/CT was ordered on June 6, 2023 showing a relatively intense area of hypermetabolic activity along the superior posteromedial aspect of the right lower lobe corresponding  to the known right lower lobe lung mass.  There was foci of hypermetabolic  activity in the right supraclavicular region, subcarinal and right hilar regions.  There was area of uptake within the posterior upper ribs as well as right acetabulum suspicious  for osseous metastatic disease.  There was a punctate area of mild hypermetabolic activity in the right hepatic lobe concerning for hepatic metastases.  Small focus in the subcutaneous region just anterior to the lower aspect of the sternum could be inflammatory  versus metastatic.  She undergoes a bronchoscopy dated [place] 15, 2023.  There were no endobronchial lesions.  Primary cytology with rapid onsite evaluation was suggestive of malignancy in level 7, final lymph nodes were pathology was pending.  MRI of the  brain with and without contrast dated June 16, 2023 showed approximately 20 enhancing nodules concerning for metastatic disease.  Specifically there was a 1.5 cm nodule in the right cerebellar hemisphere.  Most of the other nodules were less than 1 cm.   Patient also notes that she has some difficulty expressing words over the past 1 to 2 weeks prior to her first visit with us as well as some mild unsteadiness on her legs where she feels she is drifting towards the right side.        PAST MEDICAL HISTORY     1-hypothyroidism  2-hysterectomy 1988  3-abdominal hernia surgery 1995  4-some hearing difficulties        SOCIAL HISTORY     Patient lives with her significant other.  She has 2 daughter.  Lives in [facility].  Smoked for only 3 years during college and during this time it was a pack per day.  She has retired.  She had a retail store.        CURRENT MEDS     See medication list, meds reviewed        ALLERGIES     Patient denies any drug allergies        FAMILY HISTORY     Patient's father had bladder cancer at the age of 86.  She has 1 brother with no cancer.  She has 2 children          Subjective    Cancer  Associated symptoms include a change in bowel habit. Pertinent negatives include no abdominal pain, anorexia,  arthralgias, chest pain, chills, congestion, coughing, diaphoresis, fatigue, fever, headaches, joint swelling, myalgias, nausea, neck pain, numbness, rash, sore throat, swollen glands, urinary symptoms, vertigo, visual change, vomiting or weakness.     Patient doing well, intermittent grade 1 diarrhea, chronic skin discoloration mainly on her torso.  Recent tooth extraction, [place] held for few days.    Objective    BSA: 1.45 meters squared  /71 (BP Location: Right arm, Patient Position: Sitting, BP Cuff Size: Small adult)   Pulse 83   Temp 37.2 °C (99 °F) (Temporal)   Resp 16   Wt 49.6 kg (109 lb 4.8 oz)   BMI 21.35 kg/m²      Physical Exam  Constitutional:       General: She is not in acute distress.     Appearance: Normal appearance. She is not ill-appearing, toxic-appearing or diaphoretic.   HENT:      Nose: No congestion or rhinorrhea.      Mouth/Throat:      Pharynx: No oropharyngeal exudate or posterior oropharyngeal erythema.   Eyes:      General: No scleral icterus.     Conjunctiva/sclera: Conjunctivae normal.   Cardiovascular:      Rate and Rhythm: Normal rate and regular rhythm.      Pulses: Normal pulses.      Heart sounds: Normal heart sounds. No murmur heard.     No friction rub. No gallop.   Pulmonary:      Effort: Pulmonary effort is normal. No respiratory distress.      Breath sounds: Normal breath sounds. No stridor. No wheezing, rhonchi or rales.   Chest:      Chest wall: No tenderness.   Abdominal:      General: Abdomen is flat. Bowel sounds are normal. There is no distension.      Palpations: Abdomen is soft. There is no mass.      Tenderness: There is no abdominal tenderness. There is no guarding or rebound.   Musculoskeletal:      Cervical back: No tenderness.      Right lower leg: No edema.      Left lower leg: No edema.   Lymphadenopathy:      Cervical: No cervical adenopathy.         Performance Status:  Asymptomatic     Latest Reference Range & Units 06/18/25 13:05   GLUCOSE  74 - 99 mg/dL 79   SODIUM 136 - 145 mmol/L 139   POTASSIUM 3.5 - 5.3 mmol/L 4.2   CHLORIDE 98 - 107 mmol/L 103   Bicarbonate 21 - 32 mmol/L 28   Anion Gap 10 - 20 mmol/L 12   Blood Urea Nitrogen 6 - 23 mg/dL 29 (H)   Creatinine 0.50 - 1.05 mg/dL 1.25 (H)   EGFR >60 mL/min/1.73m*2 46 (L)   Calcium 8.6 - 10.3 mg/dL 9.2   Albumin 3.4 - 5.0 g/dL 4.2   Alkaline Phosphatase 33 - 136 U/L 67   ALT 7 - 45 U/L 15   AST 9 - 39 U/L 20   Bilirubin Total 0.0 - 1.2 mg/dL 0.6   Total Protein 6.4 - 8.2 g/dL 6.4   MAGNESIUM 1.60 - 2.40 mg/dL 2.05   WBC 4.4 - 11.3 x10*3/uL 5.1   nRBC 0.0 - 0.0 /100 WBCs 0.0   RBC 4.00 - 5.20 x10*6/uL 4.49   HEMOGLOBIN 12.0 - 16.0 g/dL 13.4   HEMATOCRIT 36.0 - 46.0 % 42.6   MCV 80 - 100 fL 95   MCH 26.0 - 34.0 pg 29.8   MCHC 32.0 - 36.0 g/dL 31.5 (L)   RED CELL DISTRIBUTION WIDTH 11.5 - 14.5 % 14.4   Platelets 150 - 450 x10*3/uL 149 (L)   Neutrophils % 40.0 - 80.0 % 79.1   Immature Granulocytes %, Automated 0.0 - 0.9 % 0.8   Lymphocytes % 13.0 - 44.0 % 12.0   Monocytes % 2.0 - 10.0 % 5.3   Eosinophils % 0.0 - 6.0 % 2.4   Basophils % 0.0 - 2.0 % 0.4   Neutrophils Absolute 1.60 - 5.50 x10*3/uL 4.04   Immature Granulocytes Absolute, Automated 0.00 - 0.50 x10*3/uL 0.04   Lymphocytes Absolute 0.80 - 3.00 x10*3/uL 0.61 (L)   Monocytes Absolute 0.05 - 0.80 x10*3/uL 0.27   Eosinophils Absolute 0.00 - 0.40 x10*3/uL 0.12   Basophils Absolute 0.00 - 0.10 x10*3/uL 0.02   (H): Data is abnormally high  (L): Data is abnormally low    CT CHEST ABDOMEN PELVIS W IV CONTRAST; 6/16/2025  IMPRESSION:  1. Metastatic disease within the chest, abdomen and pelvis is stable  relative to the comparison exam.  2. No new or enlarging soft tissue or osseous lesions concerning for  worsening metastatic disease.      MR BRAIN W AND WO IV CONTRAST; 6/17/2025    IMPRESSION:  1. No evident intracranial enhancing lesions to suggest the presence  of active intracranial metastatic disease.  2. No acute intracranial abnormality.         Assessment/Plan     This is a very nice 70-year-old patient with stage IV EGFR mutation positive adenocarcinoma of the lung now on systemic treatment with an EGFR inhibitor and bevacizumab (on a clinical trial) with an excellent response to treatment.  There is no evidence of disease progression based on repeat imaging studies that I personally reviewed.  We plan on resuming bevacizumab.    Cancer Staging   No matching staging information was found for the patient.      Oncology History   Lung cancer (Multi)   7/12/2023 -  Research Study Participant    ([facility]) [id number] Arm B - Bevacizumab / Osimertinib, 21 Day Cycles  Plan Provider: Humberto Landin MD  Treatment goal: [No plan goal]  Line of treatment: [No plan line of treatment]  Associated studies: [id number] (Osimertinib) +/- Bevacizumab as Initial Treatment for EGFR-Mutant Lung Cancer     9/1/2023 Initial Diagnosis    Lung cancer (CMS/HCC)                   Humberto Landin MD

## 2025-06-19 NOTE — RESEARCH NOTES
Research Note Treatment Day    Mirta Hills is here today for treatment on [id number]. Today is C33. Procedures completed per protocol. AE's and con-meds reviewed with patient. Patient is aware of treatment plan. Scan results reviewed with patient by Dr. Landin. Patient had dental extraction on 06/17/2025, held doses of Osimertinib and will resume daily dose on 06/19/2025.    [x]   Received treatment as planned   OR  []    Treatment delayed; patient calendar updated as required   Treatment delayed because:    []   AE    []   Physician Discretion    []   Clinical Deterioration or Progression     []   Other    Education Documentation  Treatment Plan and Schedule, taught by Makayla Joseph, RN at 6/18/2025 11:26 PM.  Learner: Patient  Readiness: Acceptance  Method: Explanation  Response: Verbalizes Understanding    Education Comments  No comments found.

## 2025-07-02 NOTE — PROGRESS NOTES
Urogynecology  Provider:  Mary Hernandez MD  [phone]              ASSESSMENT AND PLAN:     Problem List Items Addressed This Visit    None          I spent a total of *** minutes in face to face and non face to face time.      Mary Hernandez MD              HISTORY OF PRESENT ILLNESS:     ***     Record Review:     Prolapse Symptoms :     Urinary Symptoms:     Bowel Symptoms:     Sexual Activity:          Past Medical History:       Medical History[1]       Past Surgical History:       Surgical History[2]      Medications:       Prior to Admission medications    Medication Sig Start Date End Date Taking? Authorizing Provider   atorvastatin (Lipitor) 10 mg tablet Take 1 tablet (10 mg) by mouth once daily. 9/3/24   Andrea Bush MD   BEVACIZUMAB IV Infuse into a venous catheter every 21 (twenty-one) days.    Historical Provider, MD   CALCIUM ORAL Take by mouth.    Historical Provider, MD   cholecalciferol (Vitamin D-3) 50 MCG (2000 UT) tablet Take 1 tablet (50 mcg) by mouth once daily. 12/21/14   Historical Provider, MD   levothyroxine (Synthroid, Levoxyl) 137 mcg tablet Take 1 tablet (137 mcg) by mouth once daily. 5/6/25   Ramya Webber MD   loperamide (Imodium) 2 mg capsule Take 1 capsule (2 mg) by mouth 4 times a day as needed for diarrhea.    Historical Provider, MD   multivit-min/ferrous fumarate (MULTI VITAMIN ORAL) Take by mouth.    Historical Provider, MD   sertraline (Zoloft) 50 mg tablet Take 1.5 tablets (75 mg) by mouth once daily. 7/22/24   Andrea Bush MD   Study [id number] osimertinib 80 mg tablet Take 1 tablet (80 mg total) by mouth once daily for 21 doses.  Swallow whole. 6/23/25 7/14/25  Humberto Landin MD   vit A/vit C/vit E/zinc/copper (PRESERVISION AREDS ORAL) Take by mouth.    Historical ProviderMD LUZ  Review of Systems       PHYSICAL EXAM:      There were no vitals taken for this visit.     No LMP recorded. Patient has had a hysterectomy.      Declines chaperone for physical  exam.    PVR=     Well developed, well nourished, in no apparent distress.   Neurologic/Psychiatric:  Awake, Alert and Oriented times 3.  Affect normal. Normal cranial nerves  Pulm: breathing without effort  Sexual maturity: Martin stage V  Abd exam: soft, non-tender      GENITAL/URINARY:       External Genitalia:  The patient has normal appearing external genitalia, normal skenes and bartholins glands, and a normal hair distribution.  Her vulva is without lesions, erythema or discharge.  It is non-tender with appropriate sensation.     Urethral Meatus:  Size normal, Location normal, Lesions absent, Prolapse absent,      Urethra:  Fullness absent, Masses absent,      Bladder:  Fullness absent, Masses absent, Tenderness absent,      Vagina:  General appearance normal, Estrogen effect normal, Discharge absent, Lesions absent, ***     Cervix: Normal, no discharge.   Uterus:  {UTERUS, EXAM:09015}  Adnexa:  {exam; adnexa:64362}    Anus/Perineum:  Lesions absent and Masses absent {Exam; anus:97519}  {Exam; anus and perineum:76641}    Stress urinary incontinence *** demonstrable.       POP-Q  The patient has Stage *** Prolapse.    POP-Q:  Stage: {NUMBERS 0-4:45087}  Position: {SITTING STANDIN}    Aa: ***       Ba: *** C: ***   Gh: *** Pb: *** TVL: ***         Ap: *** Bp: *** D: ***             The patient has {0-5 hernandez:58003} out of 5 pelvic floor muscle strength.    She {DOES/ DOES NOT:96765} have myofascial tenderness on exam.   Her highest pain score on exam is {NUMBERS 1-10:51031}        She has {0-5 hernandez:44526} resting anal sphincter and *** squeeze tone.    Rectal exam: ***.     Assessment and Plan:        Mary Hernandez MD             [1]  Past Medical History:  Diagnosis Date   • Chronic rhinitis 2014    Rhinitis   • Encounter for other screening for malignant neoplasm of breast     Breast cancer screening   • Encounter for screening for malignant neoplasm of cervix     Screening for cervical  cancer   • Laceration without foreign body of scalp, initial encounter 06/16/2014    Scalp laceration   • Lung cancer (Multi)    • Personal history of diseases of the skin and subcutaneous tissue 12/19/2014    History of dermatitis   • Personal history of other benign neoplasm 03/18/2015    History of uterine leiomyoma   [2]  Past Surgical History:  Procedure Laterality Date   • HERNIA REPAIR  03/18/2015    Hernia Repair   • HYSTERECTOMY  04/09/2013    Hysterectomy

## 2025-07-03 ENCOUNTER — APPOINTMENT (OUTPATIENT)
Dept: OBSTETRICS AND GYNECOLOGY | Facility: CLINIC | Age: 70
End: 2025-07-03
Payer: MEDICARE

## 2025-07-03 ENCOUNTER — OFFICE VISIT (OUTPATIENT)
Dept: OBSTETRICS AND GYNECOLOGY | Facility: CLINIC | Age: 70
End: 2025-07-03
Payer: MEDICARE

## 2025-07-03 VITALS
DIASTOLIC BLOOD PRESSURE: 72 MMHG | HEART RATE: 84 BPM | BODY MASS INDEX: 19.9 KG/M2 | WEIGHT: 105.4 LBS | HEIGHT: 61 IN | SYSTOLIC BLOOD PRESSURE: 132 MMHG

## 2025-07-03 DIAGNOSIS — N81.9 VAGINAL VAULT PROLAPSE: Primary | ICD-10-CM

## 2025-07-03 DIAGNOSIS — N81.10 PROLAPSE OF VAGINAL WALLS: Primary | ICD-10-CM

## 2025-07-03 PROCEDURE — 99214 OFFICE O/P EST MOD 30 MIN: CPT | Mod: 25 | Performed by: OBSTETRICS & GYNECOLOGY

## 2025-07-03 PROCEDURE — 51798 US URINE CAPACITY MEASURE: CPT | Performed by: OBSTETRICS & GYNECOLOGY

## 2025-07-03 ASSESSMENT — ENCOUNTER SYMPTOMS
DIARRHEA: 1
ENDOCRINE NEGATIVE: 1
LOSS OF SENSATION IN FEET: 0
CARDIOVASCULAR NEGATIVE: 1
OCCASIONAL FEELINGS OF UNSTEADINESS: 0
MUSCULOSKELETAL NEGATIVE: 1
CONSTITUTIONAL NEGATIVE: 1
EYES NEGATIVE: 1
DEPRESSION: 0
NEUROLOGICAL NEGATIVE: 1
RESPIRATORY NEGATIVE: 1
PSYCHIATRIC NEGATIVE: 1

## 2025-07-03 ASSESSMENT — PAIN SCALES - GENERAL: PAINLEVEL_OUTOF10: 0-NO PAIN

## 2025-07-03 NOTE — LETTER
July 6, 2025     Nancy Gallardo MD  [address] [facility], [place] [address]  [place] [address]    Patient: Mirta Hills   YOB: 1955   Date of Visit: 7/3/2025       Dear Dr. Nancy Gallardo MD:    Thank you for referring Mirta Hills to me for evaluation. Below are my notes for this consultation.  If you have questions, please do not hesitate to call me. I look forward to following your patient along with you.       Sincerely,     Mary Hernandez MD      CC: Andrea Bush MD  ______________________________________________________________________________________    Urogynecology  Provider:  Mary Hernandez MD  [phone]      ASSESSMENT AND PLAN:   70 year old female with a stage 3 VVP with C at +5 upon exam today, ABL with diarrhea, and DEIDRE with urge > stress. 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 Landin).      Diagnoses:  #1 Vaginal vault prolapse, stage 3  #2 Mixed urinary incontinence, urge > stress   #3 Fecal incontinence with loose stools  #4 Lung cancer    Plan:  1. Cystocele, stage 3  - PVR = 76mL by bladder U/S.   - Upon Pop-Q exam today it demonstrated C at +5 which is consistent with progression of her POP in comparison to her 2021 exam.   - Reviewed POP management options for prolapse including expectant management, pessary fitting, and surgery.   - We discussed the benefits of fitting her with a pessary which is a small flexible silicone ring that is placed intravaginally to help support the pelvic organs to prevent the symptomatic vaginal bulge. She may learn how to manage the pessary at home on her own with taking the pessary in/out prior to intercourse and recommended maintenance at least every 2 weeks or she may RTC every 3 months for us to perform her pessary maintenance.   - We discussed there are different POP repair surgical  approaches to consider given that she wishes to maintain the ability to be sexually active in the future such as laparoscopic abdominal surgery that involves placing polypropylene mesh (i.e. SCP + PITO) which has a success rate around 90-95% or a native tissue repair vaginal approach that does not involve mesh but rather uses sutures with the intent of the body to scar over to suspend the pelvic organs that can be performed with or without a hysterectomy (i.e. SSLS +/- TVH and APR) which has a success rate around 80-85%. For either surgery, it involves 6 weeks of postoperative complete pelvic rest and no heavy lifting > 5-10 pounds.   > She is interested in continuing POP surveillance as she is planning to focus on managing her other health issues/lung cancer at this time.     2. UUI  - Reviewed the data that by age 60 around 40% of women experience OAB symptoms. We discussed the etiology of overactive bladder with the neurological aging process and that OAB symptoms occur when the detrusor muscle contracts/squeezes allowing the feeling of urgency and frequency prior to the bladder actually being full enough to void (I.e. inappropriate bladder spasms).     3. FI, diarrhea  - We advised her to try increase Imodium to BID to help reduce ABL with diarrhea.  - Reviewed the goal of having a soft/easy to pass BM without pushing or straining and to have around [place] scale #4-5 stool consistency.     Follow up in 6 months with Dr. Hernandez for a POP check.     Scribe Attestation  By signing my name below, I, Colin López, Ethan, attest that this documentation has been prepared under the direction and in the presence of Mary Hernandez MD on 07/03/2025 at 6:28 PM.     Agree with above. I Dr. Hernandez, personally performed the services described in the documentation which was scribed virtually and confirm it is both complete and accurate.  Mary Hernandez MD      Problem List Items Addressed This Visit    None           I spent a total of 50 minutes in face to face and non face to face time.      Mary Hernandez MD      HISTORY OF PRESENT ILLNESS:   70 year old female who was referred by Dr. Gallardo for prolapse and OAB.    Records Review:   -  with GONZALO Chang-CNP exam: POP-Q: Stage: 3 and patient was standing. C: +2 Gh: 4.5. Pb: 2     Prolapse Symptoms:  - The patient reports having vaginal vault prolapse for the past x4 years.   - Her POP is symptomatic with her vaginal bulge protruding past the introitus.   - She reports that her prolapse has remained stable with only slight symptomatic progression of the vaginal bulge.      Urinary Symptoms:   - Endorses DEIDRE with urge > stress.   - Rare GHAZALA with activity and coughing/sneezing when she has a full bladder.   - Denies sensation of incomplete bladder emptying.     Bowel Symptoms:  - Patient reports experiencing ABL.   - FI occurs with diarrhea and loose stools are associated with her lung cancer medication.     OBGYN History:  - , x 2   - Weight of largest baby: 6# 11oz  - Postmenopausal   - Prior hysterectomy at age 33 due to having uterine fibroids.   - She is currently sexually active and wishes to maintain the ability to have intercourse.        Past Medical History:     Medical History[1]       Past Surgical History:     Surgical History[2]      Medications:     Prior to Admission medications    Medication Sig Start Date End Date Taking? Authorizing Provider   atorvastatin (Lipitor) 10 mg tablet Take 1 tablet (10 mg) by mouth once daily. 9/3/24  Yes Andrea Bush MD   BEVACIZUMAB IV Infuse into a venous catheter every 21 (twenty-one) days.   Yes Historical Provider, MD   CALCIUM ORAL Take by mouth.   Yes Historical Provider, MD   cholecalciferol (Vitamin D-3) 50 MCG ( UT) tablet Take 1 tablet (50 mcg) by mouth once daily. 14  Yes Historical Provider, MD   levothyroxine (Synthroid, Levoxyl) 137 mcg tablet Take 1 tablet (137 mcg) by mouth  "once daily. 5/6/25  Yes Ramya Webber MD   loperamide (Imodium) 2 mg capsule Take 1 capsule (2 mg) by mouth 4 times a day as needed for diarrhea.   Yes Historical Provider, MD   multivit-min/ferrous fumarate (MULTI VITAMIN ORAL) Take by mouth.   Yes Historical Provider, MD   sertraline (Zoloft) 50 mg tablet Take 1.5 tablets (75 mg) by mouth once daily. 7/22/24  Yes Andrea Bush MD   Study [id number] osimertinib 80 mg tablet Take 1 tablet (80 mg total) by mouth once daily for 21 doses.  Swallow whole. 6/23/25 7/14/25 Yes Humberto Landin MD   vit A/vit C/vit E/zinc/copper (PRESERVISION AREDS ORAL) Take by mouth.   Yes Historical Provider, MD LUZ  Review of Systems   Constitutional: Negative.    HENT: Negative.     Eyes: Negative.    Respiratory: Negative.     Cardiovascular: Negative.    Gastrointestinal:  Positive for diarrhea.   Endocrine: Negative.    Genitourinary:  Positive for enuresis and urgency.   Musculoskeletal: Negative.    Neurological: Negative.    Psychiatric/Behavioral: Negative.            PHYSICAL EXAM:    /72 (Patient Position: Sitting)   Pulse 84   Ht (!) 1.549 m (5' 1\")   Wt 47.8 kg (105 lb 6.4 oz)   BMI 19.92 kg/m²   No LMP recorded. Patient has had a hysterectomy.      Declines chaperone for physical exam.    PVR = 76mL by US    Well developed, well nourished, in no apparent distress.   Neurologic/Psychiatric:  Awake, Alert and Oriented times 3.  Affect normal. Normal cranial nerves  Pulm: breathing without effort  Sexual maturity: Martin stage V  Abd exam: soft, non-tender      GENITAL/URINARY:     External Genitalia:  The patient has normal appearing external genitalia, normal skenes and bartholins glands, and a normal hair distribution.  Her vulva is without lesions, erythema or discharge.  It is non-tender with appropriate sensation.     Urethral Meatus:  Size normal, Location normal, Lesions absent, Prolapse absent.    Urethra:  Fullness absent, Masses absent.    Bladder:  " Fullness absent, Masses absent, Tenderness absent.    Vagina:  General appearance normal, Estrogen effect normal, Discharge absent, Lesions absent.     Cervix: surgically absent  Uterus:  surgically absent  Adnexa: normal, no masses or tenderness over the bilateral adnexa     Anus/Perineum:  Lesions absent and masses absent. Normal appearing anus and perineum.     Stress urinary incontinence not demonstrable.         POP-Q  The patient has Stage III Prolapse.    POP-Q:  Stage: 3  Position: lithotomy    Aa: +3       Ba: +5 C: +5   Gh:  Pb:  TVL: 8         Ap: +3 Bp: +5 D: x             She does not have myofascial tenderness on exam.   Her highest pain score on exam is 1        Rectal exam: Normal. There maybe a minimal/partial amount of rectal prolapse, circumferential hemorrhoids.         Mary Hernandez MD            [1]  Past Medical History:  Diagnosis Date   • Chronic rhinitis 11/18/2014    Rhinitis   • Encounter for other screening for malignant neoplasm of breast     Breast cancer screening   • Encounter for screening for malignant neoplasm of cervix     Screening for cervical cancer   • Laceration without foreign body of scalp, initial encounter 06/16/2014    Scalp laceration   • Lung cancer (Multi)    • Personal history of diseases of the skin and subcutaneous tissue 12/19/2014    History of dermatitis   • Personal history of other benign neoplasm 03/18/2015    History of uterine leiomyoma   [2]  Past Surgical History:  Procedure Laterality Date   • HERNIA REPAIR  03/18/2015    Hernia Repair   • HYSTERECTOMY  04/09/2013    Hysterectomy

## 2025-07-03 NOTE — PROGRESS NOTES
Urogynecology  Provider:  Mary Hernandez MD  [phone]      ASSESSMENT AND PLAN:   70 year old female with a stage 3 VVP with C at +5 upon exam today, ABL with diarrhea, and DEIDRE with urge > stress. 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 Landin).      Diagnoses:  #1 Vaginal vault prolapse, stage 3  #2 Mixed urinary incontinence, urge > stress   #3 Fecal incontinence with loose stools  #4 Lung cancer    Plan:  1. Cystocele, stage 3  - PVR = 76mL by bladder U/S.   - Upon Pop-Q exam today it demonstrated C at +5 which is consistent with progression of her POP in comparison to her 2021 exam.   - Reviewed POP management options for prolapse including expectant management, pessary fitting, and surgery.   - We discussed the benefits of fitting her with a pessary which is a small flexible silicone ring that is placed intravaginally to help support the pelvic organs to prevent the symptomatic vaginal bulge. She may learn how to manage the pessary at home on her own with taking the pessary in/out prior to intercourse and recommended maintenance at least every 2 weeks or she may RTC every 3 months for us to perform her pessary maintenance.   - We discussed there are different POP repair surgical approaches to consider given that she wishes to maintain the ability to be sexually active in the future such as laparoscopic abdominal surgery that involves placing polypropylene mesh (i.e. SCP + PITO) which has a success rate around 90-95% or a native tissue repair vaginal approach that does not involve mesh but rather uses sutures with the intent of the body to scar over to suspend the pelvic organs that can be performed with or without a hysterectomy (i.e. SSLS +/- TVH and APR) which has a success rate around 80-85%. For either surgery, it involves 6 weeks of postoperative complete pelvic rest and no  heavy lifting > 5-10 pounds.   > She is interested in continuing POP surveillance as she is planning to focus on managing her other health issues/lung cancer at this time.     2. UUI  - Reviewed the data that by age 60 around 40% of women experience OAB symptoms. We discussed the etiology of overactive bladder with the neurological aging process and that OAB symptoms occur when the detrusor muscle contracts/squeezes allowing the feeling of urgency and frequency prior to the bladder actually being full enough to void (I.e. inappropriate bladder spasms).     3. FI, diarrhea  - We advised her to try increase Imodium to BID to help reduce ABL with diarrhea.  - Reviewed the goal of having a soft/easy to pass BM without pushing or straining and to have around [place] scale #4-5 stool consistency.     Follow up in 6 months with Dr. Hernandez for a POP check.     Scribe Attestation  By signing my name below, I, Colin López, Scribe, attest that this documentation has been prepared under the direction and in the presence of Mary Hernandez MD on 07/03/2025 at 6:28 PM.     Agree with above. I Dr. Hernandez, personally performed the services described in the documentation which was scribed virtually and confirm it is both complete and accurate.  Mary Hernandez MD      Problem List Items Addressed This Visit    None          I spent a total of 50 minutes in face to face and non face to face time.      Mary Hernandez MD      HISTORY OF PRESENT ILLNESS:   70 year old female who was referred by Dr. Gallardo for prolapse and OAB.    Records Review:   - 2021 with GONZALO Chang-CNP exam: POP-Q: Stage: 3 and patient was standing. C: +2 Gh: 4.5. Pb: 2     Prolapse Symptoms:  - The patient reports having vaginal vault prolapse for the past x4 years.   - Her POP is symptomatic with her vaginal bulge protruding past the introitus.   - She reports that her prolapse has remained stable with only slight symptomatic  progression of the vaginal bulge.      Urinary Symptoms:   - Endorses DEIDRE with urge > stress.   - Rare GHAZALA with activity and coughing/sneezing when she has a full bladder.   - Denies sensation of incomplete bladder emptying.     Bowel Symptoms:  - Patient reports experiencing ABL.   - FI occurs with diarrhea and loose stools are associated with her lung cancer medication.     OBGYN History:  - , x 2   - Weight of largest baby: 6# 11oz  - Postmenopausal   - Prior hysterectomy at age 33 due to having uterine fibroids.   - She is currently sexually active and wishes to maintain the ability to have intercourse.        Past Medical History:     Medical History[1]       Past Surgical History:     Surgical History[2]      Medications:     Prior to Admission medications    Medication Sig Start Date End Date Taking? Authorizing Provider   atorvastatin (Lipitor) 10 mg tablet Take 1 tablet (10 mg) by mouth once daily. 9/3/24  Yes Andrea Bush MD   BEVACIZUMAB IV Infuse into a venous catheter every 21 (twenty-one) days.   Yes Historical Provider, MD   CALCIUM ORAL Take by mouth.   Yes Historical Provider, MD   cholecalciferol (Vitamin D-3) 50 MCG (2000 UT) tablet Take 1 tablet (50 mcg) by mouth once daily. 14  Yes Historical Provider, MD   levothyroxine (Synthroid, Levoxyl) 137 mcg tablet Take 1 tablet (137 mcg) by mouth once daily. 25  Yes Ramya Webber MD   loperamide (Imodium) 2 mg capsule Take 1 capsule (2 mg) by mouth 4 times a day as needed for diarrhea.   Yes Historical Provider, MD   multivit-min/ferrous fumarate (MULTI VITAMIN ORAL) Take by mouth.   Yes Historical Provider, MD   sertraline (Zoloft) 50 mg tablet Take 1.5 tablets (75 mg) by mouth once daily. 24  Yes Andrea Bush MD   Study [id number] osimertinib 80 mg tablet Take 1 tablet (80 mg total) by mouth once daily for 21 doses.  Swallow whole. 25 Yes Humberto Landin MD   vit A/vit C/vit E/zinc/copper (PRESERVISION AREDS ORAL)  "Take by mouth.   Yes Historical Provider, MD LUZ  Review of Systems   Constitutional: Negative.    HENT: Negative.     Eyes: Negative.    Respiratory: Negative.     Cardiovascular: Negative.    Gastrointestinal:  Positive for diarrhea.   Endocrine: Negative.    Genitourinary:  Positive for enuresis and urgency.   Musculoskeletal: Negative.    Neurological: Negative.    Psychiatric/Behavioral: Negative.            PHYSICAL EXAM:    /72 (Patient Position: Sitting)   Pulse 84   Ht (!) 1.549 m (5' 1\")   Wt 47.8 kg (105 lb 6.4 oz)   BMI 19.92 kg/m²   No LMP recorded. Patient has had a hysterectomy.      Declines chaperone for physical exam.    PVR = 76mL by US    Well developed, well nourished, in no apparent distress.   Neurologic/Psychiatric:  Awake, Alert and Oriented times 3.  Affect normal. Normal cranial nerves  Pulm: breathing without effort  Sexual maturity: Martin stage V  Abd exam: soft, non-tender      GENITAL/URINARY:     External Genitalia:  The patient has normal appearing external genitalia, normal skenes and bartholins glands, and a normal hair distribution.  Her vulva is without lesions, erythema or discharge.  It is non-tender with appropriate sensation.     Urethral Meatus:  Size normal, Location normal, Lesions absent, Prolapse absent.    Urethra:  Fullness absent, Masses absent.    Bladder:  Fullness absent, Masses absent, Tenderness absent.    Vagina:  General appearance normal, Estrogen effect normal, Discharge absent, Lesions absent.     Cervix: surgically absent  Uterus:  surgically absent  Adnexa: normal, no masses or tenderness over the bilateral adnexa     Anus/Perineum:  Lesions absent and masses absent. Normal appearing anus and perineum.     Stress urinary incontinence not demonstrable.         POP-Q  The patient has Stage III Prolapse.    POP-Q:  Stage: 3  Position: lithotomy    Aa: +3       Ba: +5 C: +5   Gh:  Pb:  TVL: 8         Ap: +3 Bp: +5 D: x             She does " not have myofascial tenderness on exam.   Her highest pain score on exam is 1        Rectal exam: Normal. There maybe a minimal/partial amount of rectal prolapse, circumferential hemorrhoids.         Mary Hernandez MD          [1]   Past Medical History:  Diagnosis Date    Chronic rhinitis 11/18/2014    Rhinitis    Encounter for other screening for malignant neoplasm of breast     Breast cancer screening    Encounter for screening for malignant neoplasm of cervix     Screening for cervical cancer    Laceration without foreign body of scalp, initial encounter 06/16/2014    Scalp laceration    Lung cancer (Multi)     Personal history of diseases of the skin and subcutaneous tissue 12/19/2014    History of dermatitis    Personal history of other benign neoplasm 03/18/2015    History of uterine leiomyoma   [2]   Past Surgical History:  Procedure Laterality Date    HERNIA REPAIR  03/18/2015    Hernia Repair    HYSTERECTOMY  04/09/2013    Hysterectomy

## 2025-07-07 ENCOUNTER — APPOINTMENT (OUTPATIENT)
Dept: HEMATOLOGY/ONCOLOGY | Facility: HOSPITAL | Age: 70
End: 2025-07-07
Payer: MEDICARE

## 2025-07-08 DIAGNOSIS — C34.90 MALIGNANT NEOPLASM OF LUNG, UNSPECIFIED LATERALITY, UNSPECIFIED PART OF LUNG (MULTI): Primary | ICD-10-CM

## 2025-07-08 RX ORDER — EPINEPHRINE 0.3 MG/.3ML
0.3 INJECTION SUBCUTANEOUS EVERY 5 MIN PRN
OUTPATIENT
Start: 2025-07-09

## 2025-07-08 RX ORDER — ALBUTEROL SULFATE 0.83 MG/ML
3 SOLUTION RESPIRATORY (INHALATION) AS NEEDED
OUTPATIENT
Start: 2025-07-09

## 2025-07-08 RX ORDER — FAMOTIDINE 10 MG/ML
20 INJECTION, SOLUTION INTRAVENOUS ONCE AS NEEDED
OUTPATIENT
Start: 2025-07-09

## 2025-07-08 RX ORDER — DIPHENHYDRAMINE HYDROCHLORIDE 50 MG/ML
50 INJECTION, SOLUTION INTRAMUSCULAR; INTRAVENOUS AS NEEDED
OUTPATIENT
Start: 2025-07-09

## 2025-07-11 DIAGNOSIS — I42.7 CARDIOTOXICITY (MULTI): ICD-10-CM

## 2025-07-14 ENCOUNTER — INFUSION (OUTPATIENT)
Dept: HEMATOLOGY/ONCOLOGY | Facility: HOSPITAL | Age: 70
End: 2025-07-14
Payer: MEDICARE

## 2025-07-14 ENCOUNTER — OFFICE VISIT (OUTPATIENT)
Dept: HEMATOLOGY/ONCOLOGY | Facility: HOSPITAL | Age: 70
End: 2025-07-14
Payer: MEDICARE

## 2025-07-14 ENCOUNTER — LAB (OUTPATIENT)
Dept: LAB | Facility: HOSPITAL | Age: 70
End: 2025-07-14
Payer: MEDICARE

## 2025-07-14 VITALS
WEIGHT: 106.04 LBS | TEMPERATURE: 97 F | DIASTOLIC BLOOD PRESSURE: 65 MMHG | HEART RATE: 80 BPM | RESPIRATION RATE: 18 BRPM | OXYGEN SATURATION: 100 % | BODY MASS INDEX: 20.04 KG/M2 | SYSTOLIC BLOOD PRESSURE: 118 MMHG

## 2025-07-14 DIAGNOSIS — C34.90 MALIGNANT NEOPLASM OF LUNG, UNSPECIFIED LATERALITY, UNSPECIFIED PART OF LUNG (MULTI): ICD-10-CM

## 2025-07-14 DIAGNOSIS — F41.9 ANXIETY DISORDER, UNSPECIFIED TYPE: ICD-10-CM

## 2025-07-14 LAB
ALBUMIN SERPL BCP-MCNC: 4.1 G/DL (ref 3.4–5)
ALP SERPL-CCNC: 61 U/L (ref 33–136)
ALT SERPL W P-5'-P-CCNC: 13 U/L (ref 7–45)
ANION GAP SERPL CALC-SCNC: 10 MMOL/L (ref 10–20)
AST SERPL W P-5'-P-CCNC: 18 U/L (ref 9–39)
BASOPHILS # BLD AUTO: 0.03 X10*3/UL (ref 0–0.1)
BASOPHILS NFR BLD AUTO: 0.7 %
BILIRUB SERPL-MCNC: 0.4 MG/DL (ref 0–1.2)
BUN SERPL-MCNC: 25 MG/DL (ref 6–23)
CALCIUM SERPL-MCNC: 9.5 MG/DL (ref 8.6–10.3)
CHLORIDE SERPL-SCNC: 105 MMOL/L (ref 98–107)
CK SERPL-CCNC: 56 U/L (ref 0–215)
CO2 SERPL-SCNC: 31 MMOL/L (ref 21–32)
CREAT SERPL-MCNC: 1.37 MG/DL (ref 0.5–1.05)
EGFRCR SERPLBLD CKD-EPI 2021: 41 ML/MIN/1.73M*2
EOSINOPHIL # BLD AUTO: 0.42 X10*3/UL (ref 0–0.4)
EOSINOPHIL NFR BLD AUTO: 9.7 %
ERYTHROCYTE [DISTWIDTH] IN BLOOD BY AUTOMATED COUNT: 14.8 % (ref 11.5–14.5)
GLUCOSE SERPL-MCNC: 102 MG/DL (ref 74–99)
HCT VFR BLD AUTO: 42.5 % (ref 36–46)
HGB BLD-MCNC: 13.3 G/DL (ref 12–16)
IMM GRANULOCYTES # BLD AUTO: 0.03 X10*3/UL (ref 0–0.5)
IMM GRANULOCYTES NFR BLD AUTO: 0.7 % (ref 0–0.9)
LYMPHOCYTES # BLD AUTO: 0.64 X10*3/UL (ref 0.8–3)
LYMPHOCYTES NFR BLD AUTO: 14.8 %
MAGNESIUM SERPL-MCNC: 2.24 MG/DL (ref 1.6–2.4)
MCH RBC QN AUTO: 30 PG (ref 26–34)
MCHC RBC AUTO-ENTMCNC: 31.3 G/DL (ref 32–36)
MCV RBC AUTO: 96 FL (ref 80–100)
MONOCYTES # BLD AUTO: 0.2 X10*3/UL (ref 0.05–0.8)
MONOCYTES NFR BLD AUTO: 4.6 %
NEUTROPHILS # BLD AUTO: 3 X10*3/UL (ref 1.6–5.5)
NEUTROPHILS NFR BLD AUTO: 69.5 %
NRBC BLD-RTO: 0 /100 WBCS (ref 0–0)
PLATELET # BLD AUTO: 156 X10*3/UL (ref 150–450)
POTASSIUM SERPL-SCNC: 4.5 MMOL/L (ref 3.5–5.3)
PROT SERPL-MCNC: 6.3 G/DL (ref 6.4–8.2)
RBC # BLD AUTO: 4.43 X10*6/UL (ref 4–5.2)
SODIUM SERPL-SCNC: 141 MMOL/L (ref 136–145)
TSH SERPL-ACNC: 0.53 MIU/L (ref 0.44–3.98)
WBC # BLD AUTO: 4.3 X10*3/UL (ref 4.4–11.3)

## 2025-07-14 PROCEDURE — 83735 ASSAY OF MAGNESIUM: CPT

## 2025-07-14 PROCEDURE — 96413 CHEMO IV INFUSION 1 HR: CPT

## 2025-07-14 PROCEDURE — 80053 COMPREHEN METABOLIC PANEL: CPT

## 2025-07-14 PROCEDURE — 99214 OFFICE O/P EST MOD 30 MIN: CPT | Mod: 25 | Performed by: INTERNAL MEDICINE

## 2025-07-14 PROCEDURE — 84443 ASSAY THYROID STIM HORMONE: CPT | Performed by: INTERNAL MEDICINE

## 2025-07-14 PROCEDURE — 85025 COMPLETE CBC W/AUTO DIFF WBC: CPT

## 2025-07-14 PROCEDURE — 2560000001 HC RX 256 EXPERIMENTAL DRUGS: Mod: TB | Performed by: CLINICAL NURSE SPECIALIST

## 2025-07-14 PROCEDURE — 82550 ASSAY OF CK (CPK): CPT

## 2025-07-14 RX ORDER — PROCHLORPERAZINE EDISYLATE 5 MG/ML
10 INJECTION INTRAMUSCULAR; INTRAVENOUS EVERY 6 HOURS PRN
Status: CANCELLED | OUTPATIENT
Start: 2025-07-14

## 2025-07-14 RX ORDER — EPINEPHRINE 0.3 MG/.3ML
0.3 INJECTION SUBCUTANEOUS EVERY 5 MIN PRN
Status: DISCONTINUED | OUTPATIENT
Start: 2025-07-14 | End: 2025-07-14 | Stop reason: HOSPADM

## 2025-07-14 RX ORDER — PROCHLORPERAZINE MALEATE 10 MG
10 TABLET ORAL EVERY 6 HOURS PRN
Status: CANCELLED | OUTPATIENT
Start: 2025-07-14

## 2025-07-14 RX ORDER — SERTRALINE HYDROCHLORIDE 50 MG/1
75 TABLET, FILM COATED ORAL DAILY
Qty: 135 TABLET | Refills: 3 | Status: SHIPPED | OUTPATIENT
Start: 2025-07-14

## 2025-07-14 RX ORDER — FAMOTIDINE 10 MG/ML
20 INJECTION, SOLUTION INTRAVENOUS ONCE AS NEEDED
Status: DISCONTINUED | OUTPATIENT
Start: 2025-07-14 | End: 2025-07-14 | Stop reason: HOSPADM

## 2025-07-14 RX ORDER — PROCHLORPERAZINE MALEATE 10 MG
10 TABLET ORAL EVERY 6 HOURS PRN
Status: DISCONTINUED | OUTPATIENT
Start: 2025-07-14 | End: 2025-07-14 | Stop reason: HOSPADM

## 2025-07-14 RX ORDER — PROCHLORPERAZINE EDISYLATE 5 MG/ML
10 INJECTION INTRAMUSCULAR; INTRAVENOUS EVERY 6 HOURS PRN
Status: DISCONTINUED | OUTPATIENT
Start: 2025-07-14 | End: 2025-07-14 | Stop reason: HOSPADM

## 2025-07-14 RX ORDER — ALBUTEROL SULFATE 0.83 MG/ML
3 SOLUTION RESPIRATORY (INHALATION) AS NEEDED
Status: DISCONTINUED | OUTPATIENT
Start: 2025-07-14 | End: 2025-07-14 | Stop reason: HOSPADM

## 2025-07-14 RX ORDER — DIPHENHYDRAMINE HYDROCHLORIDE 50 MG/ML
50 INJECTION, SOLUTION INTRAMUSCULAR; INTRAVENOUS AS NEEDED
Status: DISCONTINUED | OUTPATIENT
Start: 2025-07-14 | End: 2025-07-14 | Stop reason: HOSPADM

## 2025-07-14 RX ADMIN — Medication 757.5 MG: at 14:33

## 2025-07-14 ASSESSMENT — ENCOUNTER SYMPTOMS
ARTHRALGIAS: 0
NAUSEA: 0
NUMBNESS: 0
SORE THROAT: 0
JOINT SWELLING: 0
COUGH: 0
WEAKNESS: 0
FATIGUE: 0
DIAPHORESIS: 0
ABDOMINAL PAIN: 0
FEVER: 0
MYALGIAS: 0
VERTIGO: 0
SWOLLEN GLANDS: 0
VISUAL CHANGE: 0
CHANGE IN BOWEL HABIT: 1
HEADACHES: 0
CHILLS: 0
ANOREXIA: 0
VOMITING: 0
NECK PAIN: 0

## 2025-07-14 ASSESSMENT — PAIN SCALES - GENERAL: PAINLEVEL_OUTOF10: 0-NO PAIN

## 2025-07-14 NOTE — PROGRESS NOTES
Patient ID: Mirta Hills is a 70 y.o. female.    DIAGNOSIS     Adenocarcinoma of the lung.  Date of diagnosis is [place] 15, 2023 from a level 7 lymph node biopsy/fine-needle aspiration.  Immunohistochemistry positive for TTF-1.        STAGING     Clinical T2a (right lower lobe mass measuring 3.6 cm), clinical N2 (PET positivity and subcarinal region), M1C disease (presence of brain metastases, ribs, right acetabulum, liver, brain), stage IVb disease        CURRENT SITES OF DISEASE     Right lower lobe, right hilum of the lung, ipsilateral mediastinum, liver, bones, brain        MOLECULAR GENOMICS     TEST: Focused Solid Tumor DNA/RNA Panel   SPECIMEN: Supernatant, LYMPH NODE 7, [id number] A   DISEASE DIAGNOSIS: Adenocarcinoma   Estimated Tumor Content: 40%   COLLECTION DATE: 6/15/2023     MICROSATELLITE STATUS: Microsatellite  Instability-High (MSI-H) is NOT DETECTED.     DISEASE ASSOCIATED GENOMIC FINDINGS:   EGFR p.[id number] (NM_[id number] c.[id number]_[id number])    TP53 p.N247I (NM_000546 c.740A>T)     DISEASE RELEVANT ALTERATIONS NOT DETECTED:   Negative for ALK fusion.   Negative for BRAF V600E.   Negative for ERBB2 activating mutation   Negative for KRAS G12C.   Negative for  MET exon 14 skipping mutation.   Negative for NTRK fusion.   Negative for RET fusion.   Negative for ROS1 fusion.        Circulating tumor DNA sent on June 19, 2023  EGFR p.[id number], Variant allelic fraction of 1.6%  TP53 N247I Missense variant, loss of function, variant allelic fraction 1.1%  MSI stable        SERUM TUMOR MARKER     Slightly elevated CEA and CA 19.9 June 2023        PRIOR THERAPY     1- Gamma knife radiosurgery to brain lesions completed on June 28, 2023.        CURRENT THERAPY     1- Osimertinib on study  on clinical trial [id number] 5182 which is a randomized trial of osimertinib with or without bevacizumab.  She has been randomized to the bevacizumab arm- Cycle 1 Day 1 is 7/12/2023     2-brain metastases, last follow-up MRI  April, 2025, no progression        CURRENT ONCOLOGICAL PROBLEMS     1-brain metastases, symptomatic, word finding difficulties, mild unsteadiness on her feet but no falls, Resolved July 3, 2023  2-anterior chest pain, increases with deep inspiration, Improved July 3, 2023  3-mild intermittent grade 1 diarrhea, mild hair loss, mild leukopenia, mild elevation in creatinine, mild fatigue.  Most likely all osimertinib related August 7, 2023  4-fluctuating weight over the past year since late 2023 ranging between 110 and 106 pounds.  Down to 104 pounds December 23, 2024.  Discussed increasing caloric intake.    5-grade 1 proteinuria related to bevacizumab May 2025, dose held with plan for repeat protein assessment.  Down to trace June 2025  6-chronic skin discoloration torso most likely from bevacizumab        HISTORY OF PRESENT ILLNESS     This is a 70-year-old patient.  She states that towards Thanksgiving of 2022 she started feeling some pain in her right shoulder.  Eventually got better however the same pain came back in January or February 2023.  Ultimately some x-rays were done of  the shoulder that were generally negative.  She then developed some pain in her sternum a month or 2 later with deep inspiration and ultimately underwent imaging in May 2023.  Chest CT without contrast was done on June 1, 2023.  This showed a 3.4 cm mass  within the superior segment of the right lower lobe with some associated satellite pulmonary nodules.  Additionally there were some other subcentimeter pulmonary nodules.  There was evidence of mediastinal lymphadenopathy.  Subcarinal lymph node measured  1.4 cm in short axis.She was seen by pulmonary medicine on June 5, 2023 a combined PET/CT was ordered on June 6, 2023 showing a relatively intense area of hypermetabolic activity along the superior posteromedial aspect of the right lower lobe corresponding  to the known right lower lobe lung mass.  There was foci of hypermetabolic  activity in the right supraclavicular region, subcarinal and right hilar regions.  There was area of uptake within the posterior upper ribs as well as right acetabulum suspicious  for osseous metastatic disease.  There was a punctate area of mild hypermetabolic activity in the right hepatic lobe concerning for hepatic metastases.  Small focus in the subcutaneous region just anterior to the lower aspect of the sternum could be inflammatory  versus metastatic.  She undergoes a bronchoscopy dated [place] 15, 2023.  There were no endobronchial lesions.  Primary cytology with rapid onsite evaluation was suggestive of malignancy in level 7, final lymph nodes were pathology was pending.  MRI of the  brain with and without contrast dated June 16, 2023 showed approximately 20 enhancing nodules concerning for metastatic disease.  Specifically there was a 1.5 cm nodule in the right cerebellar hemisphere.  Most of the other nodules were less than 1 cm.   Patient also notes that she has some difficulty expressing words over the past 1 to 2 weeks prior to her first visit with us as well as some mild unsteadiness on her legs where she feels she is drifting towards the right side.        PAST MEDICAL HISTORY     1-hypothyroidism  2-hysterectomy 1988  3-abdominal hernia surgery 1995  4-some hearing difficulties        SOCIAL HISTORY     Patient lives with her significant other.  She has 2 daughter.  Lives in [facility].  Smoked for only 3 years during college and during this time it was a pack per day.  She has retired.  She had a retail store.        CURRENT MEDS     See medication list, meds reviewed        ALLERGIES     Patient denies any drug allergies        FAMILY HISTORY     Patient's father had bladder cancer at the age of 86.  She has 1 brother with no cancer.  She has 2 children      Subjective    Cancer  Associated symptoms include a change in bowel habit. Pertinent negatives include no abdominal pain, anorexia, arthralgias,  chest pain, chills, congestion, coughing, diaphoresis, fatigue, fever, headaches, joint swelling, myalgias, nausea, neck pain, numbness, rash, sore throat, swollen glands, urinary symptoms, vertigo, visual change, vomiting or weakness.     Doing overall very well, intermittent diarrhea up to maximum 4 stools per day,    Objective    BSA: There is no height or weight on file to calculate BSA.  There were no vitals taken for this visit.     Physical Exam  Constitutional:       General: She is not in acute distress.     Appearance: Normal appearance. She is not ill-appearing, toxic-appearing or diaphoretic.   HENT:      Nose: No congestion or rhinorrhea.      Mouth/Throat:      Pharynx: No oropharyngeal exudate or posterior oropharyngeal erythema.   Eyes:      General: No scleral icterus.     Conjunctiva/sclera: Conjunctivae normal.   Cardiovascular:      Rate and Rhythm: Normal rate and regular rhythm.      Pulses: Normal pulses.      Heart sounds: Normal heart sounds. No murmur heard.     No friction rub. No gallop.   Pulmonary:      Effort: Pulmonary effort is normal. No respiratory distress.      Breath sounds: Normal breath sounds. No stridor. No wheezing, rhonchi or rales.   Chest:      Chest wall: No tenderness.   Abdominal:      General: Abdomen is flat. Bowel sounds are normal. There is no distension.      Palpations: Abdomen is soft. There is no mass.      Tenderness: There is no abdominal tenderness. There is no guarding or rebound.   Musculoskeletal:      Cervical back: No tenderness.      Right lower leg: No edema.      Left lower leg: No edema.   Lymphadenopathy:      Cervical: No cervical adenopathy.   Skin:     General: Skin is warm.      Coloration: Skin is not jaundiced.   Psychiatric:         Mood and Affect: Mood normal.         Behavior: Behavior normal.         Performance Status:  Asymptomatic      Assessment/Plan     Patient clinically doing well with EGFR mutation positive adenocarcinoma of the  lung now on anti-EGFR therapy since July 2023.  She has thus been on this agent for 24 months with a good response.  She is also on a clinical trial with the concurrent administration of bevacizumab.      Cancer Staging   No matching staging information was found for the patient.      Oncology History   Lung cancer (Multi)   7/12/2023 -  Research Study Participant    ([facility]) [id number] Arm B - Bevacizumab / Osimertinib, 21 Day Cycles  Plan Provider: Humberto Landin MD  Treatment goal: [No plan goal]  Line of treatment: [No plan line of treatment]  Associated studies: [id number] (Osimertinib) +/- Bevacizumab as Initial Treatment for EGFR-Mutant Lung Cancer     9/1/2023 Initial Diagnosis    Lung cancer (CMS/HCC)                   Humberto Landin MD

## 2025-07-14 NOTE — PROGRESS NOTES
Patient arrives to Infusion for her Study Bevacizumab.  Parameters are good. She tolerated her treatment without any issue. She was discharged stable.

## 2025-07-24 ENCOUNTER — APPOINTMENT (OUTPATIENT)
Dept: RADIOLOGY | Facility: CLINIC | Age: 70
End: 2025-07-24
Payer: MEDICARE

## 2025-07-25 ENCOUNTER — APPOINTMENT (OUTPATIENT)
Dept: RADIOLOGY | Facility: CLINIC | Age: 70
End: 2025-07-25
Payer: MEDICARE

## 2025-07-25 DIAGNOSIS — Z78.0 ASYMPTOMATIC POSTMENOPAUSAL STATE: ICD-10-CM

## 2025-07-25 DIAGNOSIS — Z13.820 SCREENING FOR OSTEOPOROSIS: ICD-10-CM

## 2025-07-25 PROCEDURE — 77080 DXA BONE DENSITY AXIAL: CPT

## 2025-07-28 ENCOUNTER — APPOINTMENT (OUTPATIENT)
Dept: HEMATOLOGY/ONCOLOGY | Facility: HOSPITAL | Age: 70
End: 2025-07-28
Payer: MEDICARE

## 2025-07-29 DIAGNOSIS — C34.90 MALIGNANT NEOPLASM OF LUNG, UNSPECIFIED LATERALITY, UNSPECIFIED PART OF LUNG (MULTI): Primary | ICD-10-CM

## 2025-07-29 RX ORDER — PROCHLORPERAZINE MALEATE 10 MG
10 TABLET ORAL EVERY 6 HOURS PRN
OUTPATIENT
Start: 2025-07-30

## 2025-07-29 RX ORDER — PROCHLORPERAZINE EDISYLATE 5 MG/ML
10 INJECTION INTRAMUSCULAR; INTRAVENOUS EVERY 6 HOURS PRN
OUTPATIENT
Start: 2025-07-30

## 2025-07-29 RX ORDER — FAMOTIDINE 10 MG/ML
20 INJECTION, SOLUTION INTRAVENOUS ONCE AS NEEDED
OUTPATIENT
Start: 2025-07-30

## 2025-07-29 RX ORDER — ALBUTEROL SULFATE 0.83 MG/ML
3 SOLUTION RESPIRATORY (INHALATION) AS NEEDED
OUTPATIENT
Start: 2025-07-30

## 2025-07-29 RX ORDER — DIPHENHYDRAMINE HYDROCHLORIDE 50 MG/ML
50 INJECTION, SOLUTION INTRAMUSCULAR; INTRAVENOUS AS NEEDED
OUTPATIENT
Start: 2025-07-30

## 2025-07-29 RX ORDER — EPINEPHRINE 0.3 MG/.3ML
0.3 INJECTION SUBCUTANEOUS EVERY 5 MIN PRN
OUTPATIENT
Start: 2025-07-30

## 2025-07-30 ENCOUNTER — HOSPITAL ENCOUNTER (OUTPATIENT)
Dept: CARDIOLOGY | Facility: HOSPITAL | Age: 70
Discharge: HOME | End: 2025-07-30
Payer: MEDICARE

## 2025-07-30 ENCOUNTER — APPOINTMENT (OUTPATIENT)
Dept: CARDIOLOGY | Facility: HOSPITAL | Age: 70
End: 2025-07-30
Payer: MEDICARE

## 2025-07-30 DIAGNOSIS — I42.7 CARDIOTOXICITY (MULTI): ICD-10-CM

## 2025-07-30 DIAGNOSIS — Z51.81 ENCOUNTER FOR THERAPEUTIC DRUG LEVEL MONITORING: ICD-10-CM

## 2025-07-30 LAB
AORTIC VALVE PEAK VELOCITY: 1.34 M/S
AV PEAK GRADIENT: 7 MMHG
EJECTION FRACTION APICAL 4 CHAMBER: 45
EJECTION FRACTION: 58 %
LEFT ATRIUM VOLUME AREA LENGTH INDEX BSA: 25.5 ML/M2
LEFT VENTRICLE INTERNAL DIMENSION DIASTOLE: 3.81 CM (ref 3.5–6)
LEFT VENTRICULAR OUTFLOW TRACT DIAMETER: 1.73 CM
MITRAL VALVE E/A RATIO: 0.65
MITRAL VALVE E/E' RATIO: 5.55
RIGHT VENTRICLE FREE WALL PEAK S': 10.66 CM/S
RIGHT VENTRICLE PEAK SYSTOLIC PRESSURE: 27 MMHG
TRICUSPID ANNULAR PLANE SYSTOLIC EXCURSION: 1.9 CM

## 2025-07-30 PROCEDURE — 93306 TTE W/DOPPLER COMPLETE: CPT

## 2025-08-04 ENCOUNTER — APPOINTMENT (OUTPATIENT)
Dept: HEMATOLOGY/ONCOLOGY | Facility: HOSPITAL | Age: 70
End: 2025-08-04
Payer: MEDICARE

## 2025-08-04 ENCOUNTER — INFUSION (OUTPATIENT)
Dept: HEMATOLOGY/ONCOLOGY | Facility: HOSPITAL | Age: 70
End: 2025-08-04
Payer: MEDICARE

## 2025-08-04 ENCOUNTER — OFFICE VISIT (OUTPATIENT)
Dept: HEMATOLOGY/ONCOLOGY | Facility: HOSPITAL | Age: 70
End: 2025-08-04
Payer: MEDICARE

## 2025-08-04 ENCOUNTER — EDUCATION (OUTPATIENT)
Dept: HEMATOLOGY/ONCOLOGY | Facility: HOSPITAL | Age: 70
End: 2025-08-04

## 2025-08-04 ENCOUNTER — LAB (OUTPATIENT)
Dept: LAB | Facility: HOSPITAL | Age: 70
End: 2025-08-04
Payer: MEDICARE

## 2025-08-04 VITALS
HEART RATE: 71 BPM | RESPIRATION RATE: 16 BRPM | SYSTOLIC BLOOD PRESSURE: 137 MMHG | BODY MASS INDEX: 19.9 KG/M2 | DIASTOLIC BLOOD PRESSURE: 75 MMHG | OXYGEN SATURATION: 100 % | WEIGHT: 105.3 LBS | TEMPERATURE: 96.1 F

## 2025-08-04 DIAGNOSIS — C34.90 MALIGNANT NEOPLASM OF LUNG, UNSPECIFIED LATERALITY, UNSPECIFIED PART OF LUNG (MULTI): ICD-10-CM

## 2025-08-04 DIAGNOSIS — C34.11 PRIMARY ADENOCARCINOMA OF UPPER LOBE OF RIGHT LUNG (MULTI): ICD-10-CM

## 2025-08-04 DIAGNOSIS — C34.31 MALIGNANT NEOPLASM OF LOWER LOBE OF RIGHT LUNG (MULTI): Primary | ICD-10-CM

## 2025-08-04 LAB
ALBUMIN SERPL BCP-MCNC: 4.1 G/DL (ref 3.4–5)
ALP SERPL-CCNC: 59 U/L (ref 33–136)
ALT SERPL W P-5'-P-CCNC: 15 U/L (ref 7–45)
ANION GAP SERPL CALC-SCNC: 11 MMOL/L (ref 10–20)
APPEARANCE UR: CLEAR
AST SERPL W P-5'-P-CCNC: 20 U/L (ref 9–39)
BASOPHILS # BLD AUTO: 0.02 X10*3/UL (ref 0–0.1)
BASOPHILS NFR BLD AUTO: 0.4 %
BILIRUB SERPL-MCNC: 0.5 MG/DL (ref 0–1.2)
BILIRUB UR STRIP.AUTO-MCNC: NEGATIVE MG/DL
BUN SERPL-MCNC: 27 MG/DL (ref 6–23)
CALCIUM SERPL-MCNC: 9.3 MG/DL (ref 8.6–10.3)
CHLORIDE SERPL-SCNC: 104 MMOL/L (ref 98–107)
CK SERPL-CCNC: 59 U/L (ref 0–215)
CO2 SERPL-SCNC: 29 MMOL/L (ref 21–32)
COLOR UR: ABNORMAL
CREAT SERPL-MCNC: 1.37 MG/DL (ref 0.5–1.05)
EGFRCR SERPLBLD CKD-EPI 2021: 41 ML/MIN/1.73M*2
EOSINOPHIL # BLD AUTO: 0.2 X10*3/UL (ref 0–0.4)
EOSINOPHIL NFR BLD AUTO: 4.4 %
ERYTHROCYTE [DISTWIDTH] IN BLOOD BY AUTOMATED COUNT: 15 % (ref 11.5–14.5)
GLUCOSE SERPL-MCNC: 96 MG/DL (ref 74–99)
GLUCOSE UR STRIP.AUTO-MCNC: NORMAL MG/DL
HCT VFR BLD AUTO: 41.9 % (ref 36–46)
HGB BLD-MCNC: 13.3 G/DL (ref 12–16)
IMM GRANULOCYTES # BLD AUTO: 0.02 X10*3/UL (ref 0–0.5)
IMM GRANULOCYTES NFR BLD AUTO: 0.4 % (ref 0–0.9)
KETONES UR STRIP.AUTO-MCNC: NEGATIVE MG/DL
LEUKOCYTE ESTERASE UR QL STRIP.AUTO: ABNORMAL
LYMPHOCYTES # BLD AUTO: 0.68 X10*3/UL (ref 0.8–3)
LYMPHOCYTES NFR BLD AUTO: 14.9 %
MAGNESIUM SERPL-MCNC: 2.19 MG/DL (ref 1.6–2.4)
MCH RBC QN AUTO: 30.2 PG (ref 26–34)
MCHC RBC AUTO-ENTMCNC: 31.7 G/DL (ref 32–36)
MCV RBC AUTO: 95 FL (ref 80–100)
MONOCYTES # BLD AUTO: 0.35 X10*3/UL (ref 0.05–0.8)
MONOCYTES NFR BLD AUTO: 7.7 %
MUCOUS THREADS #/AREA URNS AUTO: ABNORMAL /LPF
NEUTROPHILS # BLD AUTO: 3.28 X10*3/UL (ref 1.6–5.5)
NEUTROPHILS NFR BLD AUTO: 72.2 %
NITRITE UR QL STRIP.AUTO: NEGATIVE
NRBC BLD-RTO: 0 /100 WBCS (ref 0–0)
PH UR STRIP.AUTO: 6 [PH]
PLATELET # BLD AUTO: 156 X10*3/UL (ref 150–450)
POTASSIUM SERPL-SCNC: 4.4 MMOL/L (ref 3.5–5.3)
PROT SERPL-MCNC: 6.4 G/DL (ref 6.4–8.2)
PROT UR STRIP.AUTO-MCNC: ABNORMAL MG/DL
RBC # BLD AUTO: 4.41 X10*6/UL (ref 4–5.2)
RBC # UR STRIP.AUTO: NEGATIVE MG/DL
RBC #/AREA URNS AUTO: ABNORMAL /HPF
SODIUM SERPL-SCNC: 140 MMOL/L (ref 136–145)
SP GR UR STRIP.AUTO: 1.01
SQUAMOUS #/AREA URNS AUTO: ABNORMAL /HPF
UROBILINOGEN UR STRIP.AUTO-MCNC: NORMAL MG/DL
WBC # BLD AUTO: 4.6 X10*3/UL (ref 4.4–11.3)
WBC #/AREA URNS AUTO: ABNORMAL /HPF

## 2025-08-04 PROCEDURE — 83735 ASSAY OF MAGNESIUM: CPT

## 2025-08-04 PROCEDURE — 2560000001 HC RX 256 EXPERIMENTAL DRUGS: Mod: TB | Performed by: INTERNAL MEDICINE

## 2025-08-04 PROCEDURE — 85025 COMPLETE CBC W/AUTO DIFF WBC: CPT

## 2025-08-04 PROCEDURE — 80053 COMPREHEN METABOLIC PANEL: CPT

## 2025-08-04 PROCEDURE — 36415 COLL VENOUS BLD VENIPUNCTURE: CPT

## 2025-08-04 PROCEDURE — 82550 ASSAY OF CK (CPK): CPT

## 2025-08-04 PROCEDURE — 99214 OFFICE O/P EST MOD 30 MIN: CPT | Mod: 25 | Performed by: CLINICAL NURSE SPECIALIST

## 2025-08-04 PROCEDURE — 81001 URINALYSIS AUTO W/SCOPE: CPT

## 2025-08-04 PROCEDURE — 96365 THER/PROPH/DIAG IV INF INIT: CPT | Mod: INF

## 2025-08-04 RX ORDER — PROCHLORPERAZINE EDISYLATE 5 MG/ML
10 INJECTION INTRAMUSCULAR; INTRAVENOUS EVERY 6 HOURS PRN
Status: DISCONTINUED | OUTPATIENT
Start: 2025-08-04 | End: 2025-08-04 | Stop reason: HOSPADM

## 2025-08-04 RX ORDER — PROCHLORPERAZINE MALEATE 10 MG
10 TABLET ORAL EVERY 6 HOURS PRN
Status: DISCONTINUED | OUTPATIENT
Start: 2025-08-04 | End: 2025-08-04 | Stop reason: HOSPADM

## 2025-08-04 RX ADMIN — Medication 757.5 MG: at 13:05

## 2025-08-04 ASSESSMENT — PAIN SCALES - GENERAL: PAINLEVEL_OUTOF10: 0-NO PAIN

## 2025-08-04 ASSESSMENT — ENCOUNTER SYMPTOMS
COUGH: 0
ANOREXIA: 0
WEAKNESS: 0
VERTIGO: 0
VOMITING: 0
FATIGUE: 0
NUMBNESS: 0
SORE THROAT: 0
ABDOMINAL PAIN: 0
MYALGIAS: 0
DIAPHORESIS: 0
CHILLS: 0
CHANGE IN BOWEL HABIT: 1
NAUSEA: 0
HEADACHES: 0
JOINT SWELLING: 0
FEVER: 0
VISUAL CHANGE: 0
NECK PAIN: 0
ARTHRALGIAS: 0
SWOLLEN GLANDS: 0

## 2025-08-04 NOTE — PROGRESS NOTES
Patient ID: Mirta Hills is a 70 y.o. female.    DIAGNOSIS     Adenocarcinoma of the lung.  Date of diagnosis is [place] 15, 2023 from a level 7 lymph node biopsy/fine-needle aspiration.  Immunohistochemistry positive for TTF-1.        STAGING     Clinical T2a (right lower lobe mass measuring 3.6 cm), clinical N2 (PET positivity and subcarinal region), M1C disease (presence of brain metastases, ribs, right acetabulum, liver, brain), stage IVb disease        CURRENT SITES OF DISEASE     Right lower lobe, right hilum of the lung, ipsilateral mediastinum, liver, bones, brain        MOLECULAR GENOMICS     TEST: Focused Solid Tumor DNA/RNA Panel   SPECIMEN: Supernatant, LYMPH NODE 7, [id number] A   DISEASE DIAGNOSIS: Adenocarcinoma   Estimated Tumor Content: 40%   COLLECTION DATE: 6/15/2023     MICROSATELLITE STATUS: Microsatellite  Instability-High (MSI-H) is NOT DETECTED.     DISEASE ASSOCIATED GENOMIC FINDINGS:   EGFR p.[id number] ([id number] c.[id number])    TP53 p.N247I (NM_000546 c.740A>T)     DISEASE RELEVANT ALTERATIONS NOT DETECTED:   Negative for ALK fusion.   Negative for BRAF V600E.   Negative for ERBB2 activating mutation   Negative for KRAS G12C.   Negative for  MET exon 14 skipping mutation.   Negative for NTRK fusion.   Negative for RET fusion.   Negative for ROS1 fusion.        Circulating tumor DNA sent on June 19, 2023  EGFR p.[id number], Variant allelic fraction of 1.6%  TP53 N247I Missense variant, loss of function, variant allelic fraction 1.1%  MSI stable        SERUM TUMOR MARKER     Slightly elevated CEA and CA 19.9 June 2023        PRIOR THERAPY     1- Gamma knife radiosurgery to brain lesions completed on June 28, 2023.        CURRENT THERAPY     1- Osimertinib on study  on clinical trial EA 5182 which is a randomized trial of osimertinib with or without bevacizumab.  She has been randomized to the bevacizumab arm- Cycle 1 Day 1 is 7/12/2023     2-brain metastases, last follow-up MRI  April, 2025, no progression        CURRENT ONCOLOGICAL PROBLEMS     1-brain metastases, symptomatic, word finding difficulties, mild unsteadiness on her feet but no falls, Resolved July 3, 2023  2-anterior chest pain, increases with deep inspiration, Improved July 3, 2023  3-mild intermittent grade 1 diarrhea, mild hair loss, mild leukopenia, mild elevation in creatinine, mild fatigue.  Most likely all osimertinib related August 7, 2023  4-fluctuating weight over the past year since late 2023 ranging between 110 and 106 pounds.  Down to 104 pounds December 23, 2024.  Discussed increasing caloric intake.    5-grade 1 proteinuria related to bevacizumab May 2025, dose held with plan for repeat protein assessment.  Down to trace June 2025  6-chronic skin discoloration torso most likely from bevacizumab        HISTORY OF PRESENT ILLNESS     This is a 70-year-old patient.  She states that towards Thanksgiving of 2022 she started feeling some pain in her right shoulder.  Eventually got better however the same pain came back in January or February 2023.  Ultimately some x-rays were done of  the shoulder that were generally negative.  She then developed some pain in her sternum a month or 2 later with deep inspiration and ultimately underwent imaging in May 2023.  Chest CT without contrast was done on June 1, 2023.  This showed a 3.4 cm mass  within the superior segment of the right lower lobe with some associated satellite pulmonary nodules.  Additionally there were some other subcentimeter pulmonary nodules.  There was evidence of mediastinal lymphadenopathy.  Subcarinal lymph node measured  1.4 cm in short axis.She was seen by pulmonary medicine on June 5, 2023 a combined PET/CT was ordered on June 6, 2023 showing a relatively intense area of hypermetabolic activity along the superior posteromedial aspect of the right lower lobe corresponding  to the known right lower lobe lung mass.  There was foci of hypermetabolic  activity in the right supraclavicular region, subcarinal and right hilar regions.  There was area of uptake within the posterior upper ribs as well as right acetabulum suspicious  for osseous metastatic disease.  There was a punctate area of mild hypermetabolic activity in the right hepatic lobe concerning for hepatic metastases.  Small focus in the subcutaneous region just anterior to the lower aspect of the sternum could be inflammatory  versus metastatic.  She undergoes a bronchoscopy dated [place] 15, 2023.  There were no endobronchial lesions.  Primary cytology with rapid onsite evaluation was suggestive of malignancy in level 7, final lymph nodes were pathology was pending.  MRI of the  brain with and without contrast dated June 16, 2023 showed approximately 20 enhancing nodules concerning for metastatic disease.  Specifically there was a 1.5 cm nodule in the right cerebellar hemisphere.  Most of the other nodules were less than 1 cm.   Patient also notes that she has some difficulty expressing words over the past 1 to 2 weeks prior to her first visit with us as well as some mild unsteadiness on her legs where she feels she is drifting towards the right side.        PAST MEDICAL HISTORY     1-hypothyroidism  2-hysterectomy 1988  3-abdominal hernia surgery 1995  4-some hearing difficulties        SOCIAL HISTORY     Patient lives with her significant other.  She has 2 daughter.  Lives in [facility].  Smoked for only 3 years during college and during this time it was a pack per day.  She has retired.  She had a retail store.        CURRENT MEDS     See medication list, meds reviewed        ALLERGIES     Patient denies any drug allergies        FAMILY HISTORY     Patient's father had bladder cancer at the age of 86.  She has 1 brother with no cancer.  She has 2 children      Subjective    Today We are seeing Mirta prior to avastin infusion on study.  She reports that she is feeling well overall, and busy with trips  and activities.  She has her baseline loose stools, and takes daily imodium.  No new problems.  Follows with her eye doc. For macular degeneration.      Cancer  Associated symptoms include a change in bowel habit. Pertinent negatives include no abdominal pain, anorexia, arthralgias, chest pain, chills, congestion, coughing, diaphoresis, fatigue, fever, headaches, joint swelling, myalgias, nausea, neck pain, numbness, rash, sore throat, swollen glands, urinary symptoms, vertigo, visual change, vomiting or weakness.         Objective    BSA: 1.43 meters squared  /75 (BP Location: Right arm, Patient Position: Sitting, BP Cuff Size: Small adult)   Pulse 71   Temp 35.6 °C (96.1 °F) (Temporal)   Resp 16   Wt 47.8 kg (105 lb 4.8 oz)   SpO2 100%   BMI 19.90 kg/m²      Physical Exam  Constitutional:       General: She is not in acute distress.     Appearance: Normal appearance. She is not ill-appearing, toxic-appearing or diaphoretic.   HENT:      Nose: No congestion or rhinorrhea.      Mouth/Throat:      Pharynx: No oropharyngeal exudate or posterior oropharyngeal erythema.     Eyes:      General: No scleral icterus.     Conjunctiva/sclera: Conjunctivae normal.       Cardiovascular:      Rate and Rhythm: Normal rate and regular rhythm.      Pulses: Normal pulses.      Heart sounds: Normal heart sounds. No murmur heard.     No friction rub. No gallop.   Pulmonary:      Effort: Pulmonary effort is normal. No respiratory distress.      Breath sounds: Normal breath sounds. No stridor. No wheezing, rhonchi or rales.   Chest:      Chest wall: No tenderness.   Abdominal:      General: Abdomen is flat. Bowel sounds are normal. There is no distension.      Palpations: Abdomen is soft. There is no mass.      Tenderness: There is no abdominal tenderness. There is no guarding or rebound.     Musculoskeletal:      Cervical back: No tenderness.      Right lower leg: No edema.      Left lower leg: No edema.   Lymphadenopathy:       Cervical: No cervical adenopathy.     Skin:     General: Skin is warm.      Coloration: Skin is not jaundiced.     Psychiatric:         Mood and Affect: Mood normal.         Behavior: Behavior normal.         Performance Status:  Asymptomatic      Assessment/Plan     Patient clinically doing well with EGFR mutation positive adenocarcinoma of the lung now on anti-EGFR therapy since July 2023.  She has  been on this agent for 24 months with a good response.  She is also on a clinical trial with the concurrent administration of bevacizumab.  No new problems.  Continue treatment per protocol.         Cancer Staging   No matching staging information was found for the patient.      Oncology History   Lung cancer (Multi)   7/12/2023 -  Research Study Participant    ([facility]) [id number] Arm B - Bevacizumab / Osimertinib, 21 Day Cycles  Plan Provider: Humberto Landin MD  Treatment goal: [No plan goal]  Line of treatment: [No plan line of treatment]  Associated studies: [id number] (Osimertinib) +/- Bevacizumab as Initial Treatment for EGFR-Mutant Lung Cancer     9/1/2023 Initial Diagnosis    Lung cancer (CMS/HCC)                   GONZALO Medrano-CNS

## 2025-08-04 NOTE — PROGRESS NOTES
Patient was seen by NP prior to treatment. Patient tolerated the treatment very well. Discussed and reviewed upcoming schedules with the patient. All queries and concerns addressed. Discharged to home in stable condition.

## 2025-08-04 NOTE — RESEARCH NOTES
Research Note Treatment Day    Mirta Hills is here today for treatment on [id number]. Today is C35D1. Procedures completed per protocol. AE's and con-meds reviewed with patient. Patient is aware of treatment plan.    [x]   Received treatment as planned   OR  []    Treatment delayed; patient calendar updated as required   Treatment delayed because:    []   AE    []   Physician Discretion    []   Clinical Deterioration or Progression     []   Other    Education Documentation  Treatment Plan and Schedule, taught by Makayla Joseph RN at 8/4/2025 11:04 AM.  Learner: Patient  Readiness: Acceptance  Method: Explanation  Response: Verbalizes Understanding    Education Comments  No comments found.

## 2025-08-16 ENCOUNTER — HOSPITAL ENCOUNTER (OUTPATIENT)
Dept: RADIOLOGY | Facility: HOSPITAL | Age: 70
Discharge: HOME | End: 2025-08-16
Payer: MEDICARE

## 2025-08-16 DIAGNOSIS — Z13.6 SCREENING FOR HEART DISEASE: ICD-10-CM

## 2025-08-16 DIAGNOSIS — E78.5 DYSLIPIDEMIA: ICD-10-CM

## 2025-08-16 PROCEDURE — 75571 CT HRT W/O DYE W/CA TEST: CPT

## 2025-08-18 ENCOUNTER — APPOINTMENT (OUTPATIENT)
Dept: RADIOLOGY | Facility: HOSPITAL | Age: 70
End: 2025-08-18
Payer: MEDICARE

## 2025-08-18 ENCOUNTER — HOSPITAL ENCOUNTER (OUTPATIENT)
Dept: RADIOLOGY | Facility: HOSPITAL | Age: 70
Discharge: HOME | End: 2025-08-18
Payer: MEDICARE

## 2025-08-18 ENCOUNTER — APPOINTMENT (OUTPATIENT)
Dept: CARDIOLOGY | Facility: HOSPITAL | Age: 70
End: 2025-08-18
Payer: MEDICARE

## 2025-08-18 ENCOUNTER — TELEPHONE (OUTPATIENT)
Dept: PRIMARY CARE | Facility: CLINIC | Age: 70
End: 2025-08-18
Payer: MEDICARE

## 2025-08-18 DIAGNOSIS — E78.00 PURE HYPERCHOLESTEROLEMIA: ICD-10-CM

## 2025-08-18 DIAGNOSIS — C34.90 MALIGNANT NEOPLASM OF LUNG, UNSPECIFIED LATERALITY, UNSPECIFIED PART OF LUNG (MULTI): ICD-10-CM

## 2025-08-18 DIAGNOSIS — Z82.49 FAMILY HISTORY OF CORONARY ARTERY DISEASE: ICD-10-CM

## 2025-08-18 DIAGNOSIS — E78.2 HYPERLIPIDEMIA, MIXED: Primary | ICD-10-CM

## 2025-08-18 DIAGNOSIS — R93.1 AGATSTON CORONARY ARTERY CALCIUM SCORE GREATER THAN 400: ICD-10-CM

## 2025-08-18 DIAGNOSIS — I42.7 CARDIOTOXICITY (MULTI): ICD-10-CM

## 2025-08-18 LAB
CREAT SERPL-MCNC: 0.63 MG/DL (ref 0.6–1.3)
EGFRCR SERPLBLD CKD-EPI 2021: >90 ML/MIN/1.73M*2

## 2025-08-18 PROCEDURE — 82565 ASSAY OF CREATININE: CPT

## 2025-08-18 PROCEDURE — 70553 MRI BRAIN STEM W/O & W/DYE: CPT

## 2025-08-18 PROCEDURE — 2550000001 HC RX 255 CONTRASTS: Performed by: INTERNAL MEDICINE

## 2025-08-18 PROCEDURE — A9575 INJ GADOTERATE MEGLUMI 0.1ML: HCPCS | Performed by: INTERNAL MEDICINE

## 2025-08-18 PROCEDURE — 71260 CT THORAX DX C+: CPT

## 2025-08-18 RX ORDER — GADOTERATE MEGLUMINE 376.9 MG/ML
12 INJECTION INTRAVENOUS
Status: COMPLETED | OUTPATIENT
Start: 2025-08-18 | End: 2025-08-18

## 2025-08-18 RX ADMIN — IOHEXOL 75 ML: 350 INJECTION, SOLUTION INTRAVENOUS at 15:18

## 2025-08-18 RX ADMIN — GADOTERATE MEGLUMINE 12 ML: 376.9 INJECTION INTRAVENOUS at 15:59

## 2025-08-19 DIAGNOSIS — C34.90 MALIGNANT NEOPLASM OF LUNG, UNSPECIFIED LATERALITY, UNSPECIFIED PART OF LUNG (MULTI): Primary | ICD-10-CM

## 2025-08-19 RX ORDER — ATORVASTATIN CALCIUM 10 MG/1
10 TABLET, FILM COATED ORAL DAILY
Qty: 90 TABLET | Refills: 3 | Status: SHIPPED | OUTPATIENT
Start: 2025-08-19

## 2025-08-19 RX ORDER — FAMOTIDINE 10 MG/ML
20 INJECTION, SOLUTION INTRAVENOUS ONCE AS NEEDED
OUTPATIENT
Start: 2025-08-25

## 2025-08-19 RX ORDER — DIPHENHYDRAMINE HYDROCHLORIDE 50 MG/ML
50 INJECTION, SOLUTION INTRAMUSCULAR; INTRAVENOUS AS NEEDED
OUTPATIENT
Start: 2025-08-25

## 2025-08-19 RX ORDER — PROCHLORPERAZINE EDISYLATE 5 MG/ML
10 INJECTION INTRAMUSCULAR; INTRAVENOUS EVERY 6 HOURS PRN
OUTPATIENT
Start: 2025-08-25

## 2025-08-19 RX ORDER — ALBUTEROL SULFATE 0.83 MG/ML
3 SOLUTION RESPIRATORY (INHALATION) AS NEEDED
OUTPATIENT
Start: 2025-08-25

## 2025-08-19 RX ORDER — PROCHLORPERAZINE MALEATE 10 MG
10 TABLET ORAL EVERY 6 HOURS PRN
OUTPATIENT
Start: 2025-08-25

## 2025-08-19 RX ORDER — EPINEPHRINE 0.3 MG/.3ML
0.3 INJECTION SUBCUTANEOUS EVERY 5 MIN PRN
OUTPATIENT
Start: 2025-08-25

## 2025-08-25 ENCOUNTER — LAB (OUTPATIENT)
Dept: LAB | Facility: HOSPITAL | Age: 70
End: 2025-08-25
Payer: MEDICARE

## 2025-08-25 ENCOUNTER — INFUSION (OUTPATIENT)
Dept: HEMATOLOGY/ONCOLOGY | Facility: HOSPITAL | Age: 70
End: 2025-08-25
Payer: MEDICARE

## 2025-08-25 ENCOUNTER — EDUCATION (OUTPATIENT)
Dept: HEMATOLOGY/ONCOLOGY | Facility: HOSPITAL | Age: 70
End: 2025-08-25

## 2025-08-25 ENCOUNTER — OFFICE VISIT (OUTPATIENT)
Dept: HEMATOLOGY/ONCOLOGY | Facility: HOSPITAL | Age: 70
End: 2025-08-25
Payer: MEDICARE

## 2025-08-25 ENCOUNTER — APPOINTMENT (OUTPATIENT)
Dept: HEMATOLOGY/ONCOLOGY | Facility: HOSPITAL | Age: 70
End: 2025-08-25
Payer: MEDICARE

## 2025-08-25 VITALS
TEMPERATURE: 98.6 F | BODY MASS INDEX: 20.41 KG/M2 | HEART RATE: 84 BPM | DIASTOLIC BLOOD PRESSURE: 73 MMHG | WEIGHT: 108.03 LBS | SYSTOLIC BLOOD PRESSURE: 125 MMHG | OXYGEN SATURATION: 100 % | RESPIRATION RATE: 18 BRPM

## 2025-08-25 DIAGNOSIS — C34.90 MALIGNANT NEOPLASM OF LUNG, UNSPECIFIED LATERALITY, UNSPECIFIED PART OF LUNG (MULTI): ICD-10-CM

## 2025-08-25 DIAGNOSIS — C34.11 PRIMARY ADENOCARCINOMA OF UPPER LOBE OF RIGHT LUNG (MULTI): Primary | ICD-10-CM

## 2025-08-25 LAB
ALBUMIN SERPL BCP-MCNC: 3.9 G/DL (ref 3.4–5)
ALP SERPL-CCNC: 81 U/L (ref 33–136)
ALT SERPL W P-5'-P-CCNC: 20 U/L (ref 7–45)
ANION GAP SERPL CALC-SCNC: 12 MMOL/L (ref 10–20)
APPEARANCE UR: CLEAR
AST SERPL W P-5'-P-CCNC: 19 U/L (ref 9–39)
BASOPHILS # BLD AUTO: 0.01 X10*3/UL (ref 0–0.1)
BASOPHILS NFR BLD AUTO: 0.2 %
BILIRUB SERPL-MCNC: 0.6 MG/DL (ref 0–1.2)
BILIRUB UR STRIP.AUTO-MCNC: NEGATIVE MG/DL
BUN SERPL-MCNC: 21 MG/DL (ref 6–23)
CALCIUM SERPL-MCNC: 9.2 MG/DL (ref 8.6–10.3)
CHLORIDE SERPL-SCNC: 103 MMOL/L (ref 98–107)
CK SERPL-CCNC: 42 U/L (ref 0–215)
CO2 SERPL-SCNC: 28 MMOL/L (ref 21–32)
COLOR UR: ABNORMAL
CREAT SERPL-MCNC: 1.23 MG/DL (ref 0.5–1.05)
EGFRCR SERPLBLD CKD-EPI 2021: 47 ML/MIN/1.73M*2
EOSINOPHIL # BLD AUTO: 0.11 X10*3/UL (ref 0–0.4)
EOSINOPHIL NFR BLD AUTO: 2 %
ERYTHROCYTE [DISTWIDTH] IN BLOOD BY AUTOMATED COUNT: 14.6 % (ref 11.5–14.5)
GLUCOSE SERPL-MCNC: 155 MG/DL (ref 74–99)
GLUCOSE UR STRIP.AUTO-MCNC: NORMAL MG/DL
HCT VFR BLD AUTO: 39.4 % (ref 36–46)
HGB BLD-MCNC: 12.7 G/DL (ref 12–16)
HYALINE CASTS #/AREA URNS AUTO: ABNORMAL /LPF
IMM GRANULOCYTES # BLD AUTO: 0.03 X10*3/UL (ref 0–0.5)
IMM GRANULOCYTES NFR BLD AUTO: 0.5 % (ref 0–0.9)
KETONES UR STRIP.AUTO-MCNC: NEGATIVE MG/DL
LEUKOCYTE ESTERASE UR QL STRIP.AUTO: ABNORMAL
LYMPHOCYTES # BLD AUTO: 0.68 X10*3/UL (ref 0.8–3)
LYMPHOCYTES NFR BLD AUTO: 12.2 %
MAGNESIUM SERPL-MCNC: 2.05 MG/DL (ref 1.6–2.4)
MCH RBC QN AUTO: 30.9 PG (ref 26–34)
MCHC RBC AUTO-ENTMCNC: 32.2 G/DL (ref 32–36)
MCV RBC AUTO: 96 FL (ref 80–100)
MONOCYTES # BLD AUTO: 0.37 X10*3/UL (ref 0.05–0.8)
MONOCYTES NFR BLD AUTO: 6.7 %
MUCOUS THREADS #/AREA URNS AUTO: ABNORMAL /LPF
NEUTROPHILS # BLD AUTO: 4.36 X10*3/UL (ref 1.6–5.5)
NEUTROPHILS NFR BLD AUTO: 78.4 %
NITRITE UR QL STRIP.AUTO: NEGATIVE
NRBC BLD-RTO: 0 /100 WBCS (ref 0–0)
PH UR STRIP.AUTO: 5.5 [PH]
PLATELET # BLD AUTO: 147 X10*3/UL (ref 150–450)
POTASSIUM SERPL-SCNC: 4.5 MMOL/L (ref 3.5–5.3)
PROT SERPL-MCNC: 6.3 G/DL (ref 6.4–8.2)
PROT UR STRIP.AUTO-MCNC: ABNORMAL MG/DL
RBC # BLD AUTO: 4.11 X10*6/UL (ref 4–5.2)
RBC # UR STRIP.AUTO: NEGATIVE MG/DL
RBC #/AREA URNS AUTO: ABNORMAL /HPF
SODIUM SERPL-SCNC: 138 MMOL/L (ref 136–145)
SP GR UR STRIP.AUTO: 1.01
SQUAMOUS #/AREA URNS AUTO: ABNORMAL /HPF
TRANS CELLS #/AREA UR COMP ASSIST: ABNORMAL /HPF
UROBILINOGEN UR STRIP.AUTO-MCNC: NORMAL MG/DL
WBC # BLD AUTO: 5.6 X10*3/UL (ref 4.4–11.3)
WBC #/AREA URNS AUTO: ABNORMAL /HPF

## 2025-08-25 PROCEDURE — 99215 OFFICE O/P EST HI 40 MIN: CPT | Mod: 25 | Performed by: INTERNAL MEDICINE

## 2025-08-25 PROCEDURE — 82550 ASSAY OF CK (CPK): CPT

## 2025-08-25 PROCEDURE — 81001 URINALYSIS AUTO W/SCOPE: CPT

## 2025-08-25 PROCEDURE — 36415 COLL VENOUS BLD VENIPUNCTURE: CPT

## 2025-08-25 PROCEDURE — 85025 COMPLETE CBC W/AUTO DIFF WBC: CPT

## 2025-08-25 PROCEDURE — 2560000001 HC RX 256 EXPERIMENTAL DRUGS: Mod: TB | Performed by: INTERNAL MEDICINE

## 2025-08-25 PROCEDURE — 83735 ASSAY OF MAGNESIUM: CPT

## 2025-08-25 PROCEDURE — 80053 COMPREHEN METABOLIC PANEL: CPT

## 2025-08-25 PROCEDURE — 96413 CHEMO IV INFUSION 1 HR: CPT

## 2025-08-25 RX ORDER — PROCHLORPERAZINE EDISYLATE 5 MG/ML
10 INJECTION INTRAMUSCULAR; INTRAVENOUS EVERY 6 HOURS PRN
Status: DISCONTINUED | OUTPATIENT
Start: 2025-08-25 | End: 2025-08-25 | Stop reason: HOSPADM

## 2025-08-25 RX ORDER — ALBUTEROL SULFATE 0.83 MG/ML
3 SOLUTION RESPIRATORY (INHALATION) AS NEEDED
Status: DISCONTINUED | OUTPATIENT
Start: 2025-08-25 | End: 2025-08-25 | Stop reason: HOSPADM

## 2025-08-25 RX ORDER — PROCHLORPERAZINE MALEATE 10 MG
10 TABLET ORAL EVERY 6 HOURS PRN
Status: DISCONTINUED | OUTPATIENT
Start: 2025-08-25 | End: 2025-08-25 | Stop reason: HOSPADM

## 2025-08-25 RX ORDER — DIPHENHYDRAMINE HYDROCHLORIDE 50 MG/ML
50 INJECTION, SOLUTION INTRAMUSCULAR; INTRAVENOUS AS NEEDED
Status: DISCONTINUED | OUTPATIENT
Start: 2025-08-25 | End: 2025-08-25 | Stop reason: HOSPADM

## 2025-08-25 RX ORDER — EPINEPHRINE 0.3 MG/.3ML
0.3 INJECTION SUBCUTANEOUS EVERY 5 MIN PRN
Status: DISCONTINUED | OUTPATIENT
Start: 2025-08-25 | End: 2025-08-25 | Stop reason: HOSPADM

## 2025-08-25 RX ORDER — FAMOTIDINE 10 MG/ML
20 INJECTION, SOLUTION INTRAVENOUS ONCE AS NEEDED
Status: DISCONTINUED | OUTPATIENT
Start: 2025-08-25 | End: 2025-08-25 | Stop reason: HOSPADM

## 2025-08-25 RX ADMIN — Medication 757.5 MG: at 11:33

## 2025-08-25 ASSESSMENT — ENCOUNTER SYMPTOMS
COUGH: 1
JOINT SWELLING: 0
WEAKNESS: 0
NAUSEA: 0
FATIGUE: 0
ARTHRALGIAS: 0
VISUAL CHANGE: 0
DIAPHORESIS: 0
SWOLLEN GLANDS: 0
ANOREXIA: 0
CHANGE IN BOWEL HABIT: 0
VERTIGO: 0
NECK PAIN: 0
FEVER: 0
NUMBNESS: 0
ABDOMINAL PAIN: 0
SORE THROAT: 1
CHILLS: 0
HEADACHES: 0
MYALGIAS: 0
VOMITING: 0

## 2025-08-25 ASSESSMENT — PAIN SCALES - GENERAL: PAINLEVEL_OUTOF10: 0-NO PAIN

## 2025-08-26 ENCOUNTER — TELEPHONE (OUTPATIENT)
Dept: PRIMARY CARE | Facility: CLINIC | Age: 70
End: 2025-08-26
Payer: MEDICARE

## 2025-08-26 DIAGNOSIS — R05.2 SUBACUTE COUGH: Primary | ICD-10-CM

## 2025-08-26 RX ORDER — AZITHROMYCIN 250 MG/1
TABLET, FILM COATED ORAL
Qty: 6 TABLET | Refills: 0 | Status: SHIPPED | OUTPATIENT
Start: 2025-08-26

## 2025-08-29 DIAGNOSIS — C34.90 MALIGNANT NEOPLASM OF LUNG, UNSPECIFIED LATERALITY, UNSPECIFIED PART OF LUNG (MULTI): Primary | ICD-10-CM

## 2025-09-15 ENCOUNTER — APPOINTMENT (OUTPATIENT)
Dept: HEMATOLOGY/ONCOLOGY | Facility: HOSPITAL | Age: 70
End: 2025-09-15
Payer: MEDICARE

## 2025-09-16 ENCOUNTER — APPOINTMENT (OUTPATIENT)
Dept: ENDOCRINOLOGY | Facility: CLINIC | Age: 70
End: 2025-09-16
Payer: MEDICARE